# Patient Record
Sex: MALE | Race: WHITE | Employment: UNEMPLOYED | URBAN - METROPOLITAN AREA
[De-identification: names, ages, dates, MRNs, and addresses within clinical notes are randomized per-mention and may not be internally consistent; named-entity substitution may affect disease eponyms.]

---

## 2019-04-26 NOTE — PROGRESS NOTES
Name_______________________________________Printed:____________________  Date and time of surgery____5/9/19  1100____________________Arrival Time:___FEC 0930_____________   1. Do not eat or drink anything after 12 midnight (or____hours) prior to surgery. This includes no water, chewing gum or mints. Endoscopy patients follow your doctors bowel prep instructions,which may include taking part of prep after midnight. 2. Take the following pills with a small sip of water on the morning of surgery_________advair, albuterol prn__________________________________________                  Do not take blood pressure medications ending in pril or sartan the leah prior to surgery or the morning of surgery_   3. Aspirin, Ibuprofen, Advil, Naproxen, Vitamin E and other Anti-inflammatory products should be stopped for 5 days before surgery or as directed by your physician. 4. Check with your Doctor regarding stopping Plavix, Coumadin,Eliquis, Lovenox,Effient,Pradaxa,Xarelto, Fragmin or other blood thinners and follow their instructions. 5. Do not smoke, and do not drink any alcoholic beverages 24 hours prior to surgery. This includes NA Beer. Refrain from the usage of any recreational drugs. 6. You may brush your teeth and gargle the morning of surgery. DO NOT SWALLOW WATER   7. You MUST make arrangements for a responsible adult to stay on site while you are here and take you home after your surgery. You will not be allowed to leave alone or drive yourself home. It is strongly suggested someone stay with you the first 24 hrs. Your surgery will be cancelled if you do not have a ride home. 8. A parent/legal guardian must accompany a child scheduled for surgery and plan to stay at the hospital until the child is discharged. Please do not bring other children with you.    9. Please wear simple, loose fitting clothing to the hospital.  Do not bring valuables (money, credit cards, checkbooks, etc.) Do not wear any makeup (including no eye makeup) or nail polish on your fingers or toes. 10. DO NOT wear any jewelry or piercings on day of surgery. All body piercing jewelry must be removed. 11. If you have ___dentures, they will be removed before going to the OR; we will provide you a container. If you wear ___contact lenses or ___glasses, they will be removed; please bring a case for them. 12. Please see your family doctor/pediatrician for a history & physical and/or concerning medications. Bring any test results/reports from your physician's office. PCP__________________Phone___________H&P Appt. Date________             13 If you  have a Living Will and Durable Power of  for Healthcare, please bring in a copy. 15. Notify your Surgeon if you develop any illness between now and surgery  time, cough, cold, fever, sore throat, nausea, vomiting, etc.  Please notify your surgeon if you experience dizziness, shortness of breath or blurred vision between now & the time of your surgery             15. DO NOT shave your operative site 96 hours prior to surgery. For face & neck surgery, men may use an electric razor 48 hours prior to surgery. 16. Shower the night before surgery with ___Antibacterial soap ___Hibiclens             17. To provide excellent care visitors will be limited to one in the room at any given time. 18.  Please bring picture ID and insurance card. 19.  Visit our web site for additional information:  [email or web address]              20.During flu season no children under the age of 15 are permitted in the hospital for the safety of all patients.                               21. If you take a long acting insulin in the evening only  take half of your usual  dose the night  before your procedure              22. If you use a c-pap please bring DOS if staying overnight,             23.For your convenience [facility] has a pharmacy on site to fill your prescriptions. 24. If you use oxygen and have a portable tank please bring it  with you the DOS             25. Bring a complete list of all your medications with name and dose include any supplements. 26. Other__________________________________________   *Please call pre admission testing if you any further questions   Kavita Guajardo     [id number] [facility]  [phone]   [address] [place] [address]       All above information reviewed with patient in person or by phone. Patient verbalizes understanding. All questions and concerns addressed.                                                                                                  Patient/Rep____________________                                                                                                                                    PRE OP INSTRUCTIONS

## 2019-04-29 ENCOUNTER — ANESTHESIA EVENT (OUTPATIENT)
Dept: ENDOSCOPY | Age: 44
End: 2019-04-29
Payer: COMMERCIAL

## 2019-05-09 ENCOUNTER — HOSPITAL ENCOUNTER (OUTPATIENT)
Age: 44
Setting detail: OUTPATIENT SURGERY
Discharge: HOME | End: 2019-05-09
Attending: INTERNAL MEDICINE | Admitting: INTERNAL MEDICINE
Payer: COMMERCIAL

## 2019-05-09 ENCOUNTER — ANESTHESIA (OUTPATIENT)
Dept: ENDOSCOPY | Age: 44
End: 2019-05-09
Payer: COMMERCIAL

## 2019-05-09 VITALS
BODY MASS INDEX: 26.6 KG/M2 | HEIGHT: 71 IN | HEART RATE: 83 BPM | TEMPERATURE: 99.6 F | WEIGHT: 190 LBS | SYSTOLIC BLOOD PRESSURE: 141 MMHG | OXYGEN SATURATION: 100 % | DIASTOLIC BLOOD PRESSURE: 87 MMHG | RESPIRATION RATE: 16 BRPM

## 2019-05-09 VITALS — SYSTOLIC BLOOD PRESSURE: 156 MMHG | DIASTOLIC BLOOD PRESSURE: 110 MMHG | OXYGEN SATURATION: 94 %

## 2019-05-09 PROCEDURE — 7100000011 HC PHASE II RECOVERY - ADDTL 15 MIN: Performed by: INTERNAL MEDICINE

## 2019-05-09 PROCEDURE — 2500000003 HC RX 250 WO HCPCS: Performed by: NURSE ANESTHETIST, CERTIFIED REGISTERED

## 2019-05-09 PROCEDURE — 3700000000 HC ANESTHESIA ATTENDED CARE: Performed by: INTERNAL MEDICINE

## 2019-05-09 PROCEDURE — 2709999900 HC NON-CHARGEABLE SUPPLY: Performed by: INTERNAL MEDICINE

## 2019-05-09 PROCEDURE — 2580000003 HC RX 258: Performed by: FAMILY MEDICINE

## 2019-05-09 PROCEDURE — 7100000010 HC PHASE II RECOVERY - FIRST 15 MIN: Performed by: INTERNAL MEDICINE

## 2019-05-09 PROCEDURE — 6360000002 HC RX W HCPCS: Performed by: NURSE ANESTHETIST, CERTIFIED REGISTERED

## 2019-05-09 PROCEDURE — 3609012400 HC EGD TRANSORAL BIOPSY SINGLE/MULTIPLE: Performed by: INTERNAL MEDICINE

## 2019-05-09 RX ORDER — CETIRIZINE HYDROCHLORIDE 10 MG/1
10 TABLET ORAL DAILY
COMMUNITY
End: 2022-10-28

## 2019-05-09 RX ORDER — SODIUM CHLORIDE 0.9 % (FLUSH) 0.9 %
10 SYRINGE (ML) INJECTION PRN
Status: DISCONTINUED | OUTPATIENT
Start: 2019-05-09 | End: 2019-05-09 | Stop reason: HOSPADM

## 2019-05-09 RX ORDER — OMEPRAZOLE 20 MG/1
20 CAPSULE, DELAYED RELEASE ORAL
Qty: 60 CAPSULE | Refills: 5 | Status: SHIPPED | OUTPATIENT
Start: 2019-05-09

## 2019-05-09 RX ORDER — LIDOCAINE HYDROCHLORIDE 20 MG/ML
INJECTION, SOLUTION EPIDURAL; INFILTRATION; INTRACAUDAL; PERINEURAL PRN
Status: DISCONTINUED | OUTPATIENT
Start: 2019-05-09 | End: 2019-05-09 | Stop reason: SDUPTHER

## 2019-05-09 RX ORDER — SODIUM CHLORIDE 0.9 % (FLUSH) 0.9 %
10 SYRINGE (ML) INJECTION EVERY 12 HOURS SCHEDULED
Status: DISCONTINUED | OUTPATIENT
Start: 2019-05-09 | End: 2019-05-09 | Stop reason: HOSPADM

## 2019-05-09 RX ORDER — SODIUM CHLORIDE 9 MG/ML
INJECTION, SOLUTION INTRAVENOUS CONTINUOUS
Status: DISCONTINUED | OUTPATIENT
Start: 2019-05-09 | End: 2019-05-09 | Stop reason: HOSPADM

## 2019-05-09 RX ORDER — LABETALOL HYDROCHLORIDE 5 MG/ML
5 INJECTION, SOLUTION INTRAVENOUS EVERY 10 MIN PRN
Status: DISCONTINUED | OUTPATIENT
Start: 2019-05-09 | End: 2019-05-09 | Stop reason: HOSPADM

## 2019-05-09 RX ORDER — PROPOFOL 10 MG/ML
INJECTION, EMULSION INTRAVENOUS PRN
Status: DISCONTINUED | OUTPATIENT
Start: 2019-05-09 | End: 2019-05-09 | Stop reason: SDUPTHER

## 2019-05-09 RX ORDER — PROMETHAZINE HYDROCHLORIDE 25 MG/ML
6.25 INJECTION, SOLUTION INTRAMUSCULAR; INTRAVENOUS
Status: DISCONTINUED | OUTPATIENT
Start: 2019-05-09 | End: 2019-05-09 | Stop reason: HOSPADM

## 2019-05-09 RX ORDER — ONDANSETRON 2 MG/ML
4 INJECTION INTRAMUSCULAR; INTRAVENOUS
Status: DISCONTINUED | OUTPATIENT
Start: 2019-05-09 | End: 2019-05-09 | Stop reason: HOSPADM

## 2019-05-09 RX ADMIN — LIDOCAINE HYDROCHLORIDE 100 MG: 20 INJECTION, SOLUTION EPIDURAL; INFILTRATION; INTRACAUDAL; PERINEURAL at 11:09

## 2019-05-09 RX ADMIN — PROPOFOL 5 MG: 10 INJECTION, EMULSION INTRAVENOUS at 11:17

## 2019-05-09 RX ADMIN — PROPOFOL 50 MG: 10 INJECTION, EMULSION INTRAVENOUS at 11:12

## 2019-05-09 RX ADMIN — PROPOFOL 100 MG: 10 INJECTION, EMULSION INTRAVENOUS at 11:09

## 2019-05-09 RX ADMIN — PROPOFOL 50 MG: 10 INJECTION, EMULSION INTRAVENOUS at 11:16

## 2019-05-09 RX ADMIN — PROPOFOL 25 MG: 10 INJECTION, EMULSION INTRAVENOUS at 11:11

## 2019-05-09 RX ADMIN — SODIUM CHLORIDE: 9 INJECTION, SOLUTION INTRAVENOUS at 11:08

## 2019-05-09 RX ADMIN — PROPOFOL 20 MG: 10 INJECTION, EMULSION INTRAVENOUS at 11:10

## 2019-05-09 RX ADMIN — PROPOFOL 50 MG: 10 INJECTION, EMULSION INTRAVENOUS at 11:14

## 2019-05-09 ASSESSMENT — PAIN SCALES - GENERAL: PAINLEVEL_OUTOF10: 0

## 2019-05-09 ASSESSMENT — PAIN - FUNCTIONAL ASSESSMENT: PAIN_FUNCTIONAL_ASSESSMENT: 0-10

## 2019-05-09 NOTE — ANESTHESIA POSTPROCEDURE EVALUATION
[facility] Department of Anesthesiology  Post-Anesthesia Note       Name:  Esthela Cervantes                                  Age:  37 y.o. MRN:  [id number]     Last Vitals & Oxygen Saturation: /89   Pulse 99   Temp 99.6 °F (37.6 °C) (Temporal)   Resp 16   Ht 5' 11\" (1.803 m)   Wt 190 lb (86.2 kg)   SpO2 100%   BMI 26.50 kg/m²   Patient Vitals for the past 4 hrs:   BP Temp Temp src Pulse Resp SpO2 Height Weight   05/09/19 1135 127/89 -- -- 99 16 100 % -- --   05/09/19 1120 131/88 99.6 °F (37.6 °C) Temporal 104 14 96 % -- --   05/09/19 1012 (!) 141/99 98.1 °F (36.7 °C) Temporal 92 18 100 % 5' 11\" (1.803 m) 190 lb (86.2 kg)       Level of consciousness:  Awake, alert to baseline    Respiratory: Respirations easy, no distress. Stable. Cardiovascular: Hemodynamically stable. Hydration: Adequate. PONV: Adequately managed. Post-op pain: Adequately controlled. Post-op assessment: Tolerated anesthetic well without complication. Complications:  None.     Rajni Elena MD  May 9, 2019   11:36 AM

## 2019-05-09 NOTE — ANESTHESIA PRE PROCEDURE
[facility] Department of Anesthesiology  Pre-Anesthesia Evaluation/Consultation       Name:  Geoff Howard  : [birth year]  Age:  37 y.o. MRN:  [id number]  Date: 2019           Surgeon: Surgeon(s):  Brigette Espana MD    Procedure: Procedure(s):  EGD     Allergies   Allergen Reactions    Symbicort [Budesonide-Formoterol Fumarate] Other (See Comments)     Lung infection     There is no problem list on file for this patient. Past Medical History:   Diagnosis Date    Asthma      History reviewed. No pertinent surgical history. Social History     Tobacco Use    Smoking status: Never Smoker    Smokeless tobacco: Never Used   Substance Use Topics    Alcohol use: Yes     Comment: hasn't had since last week , occasional beer samples    Drug use: No     Medications  No current facility-administered medications on file prior to encounter.       Current Outpatient Medications on File Prior to Encounter   Medication Sig Dispense Refill    cetirizine (ZYRTEC) 10 MG tablet Take 10 mg by mouth daily      Fluticasone-Salmeterol (ADVAIR HFA IN) Inhale into the lungs 2 times daily       albuterol sulfate HFA (PROVENTIL HFA) 108 (90 BASE) MCG/ACT inhaler Inhale 2 puffs into the lungs every 6 hours as needed for Wheezing      fluticasone (FLONASE) 50 MCG/ACT nasal spray 1 spray by Nasal route daily      ondansetron (ZOFRAN ODT) 4 MG disintegrating tablet Take 1 tablet by mouth every 8 hours as needed for Nausea or Vomiting 20 tablet 0     Current Facility-Administered Medications   Medication Dose Route Frequency Provider Last Rate Last Dose    0.9 % sodium chloride infusion   Intravenous Continuous Angela Malave MD        sodium chloride flush 0.9 % injection 10 mL  10 mL Intravenous 2 times per day Angela Malave MD        sodium chloride flush 0.9 % injection 10 mL  10 mL Intravenous PRN Angela Malave MD         Vital Signs (Current)   Vitals: hours.   Coags  No results found for: PROTIME, INR, APTT  HCG (If Applicable) No results found for: PREGTESTUR, PREGSERUM, HCG, HCGQUANT   ABGs No results found for: PHART, PO2ART, EKY3AWO, ILN9IIH, BEART, Z3LKRQGJ   Type & Screen (If Applicable)  No results found for: LABABO, LABRH                         BMI: Wt Readings from Last 3 Encounters:       NPO Status:   Date of last liquid consumption: 05/08/19   Time of last liquid consumption: 2300   Date of last solid food consumption: 05/08/19      Time of last solid consumption: 2300       Anesthesia Evaluation  Patient summary reviewed no history of anesthetic complications:   Airway: Mallampati: II  TM distance: >3 FB   Neck ROM: full   Dental: normal exam         Pulmonary:normal exam    (+) recent URI:  asthma:                            Cardiovascular:Negative CV ROS  Exercise tolerance: good (>4 METS),         ECG reviewed  Rhythm: regular  Rate: normal                    Neuro/Psych:   Negative Neuro/Psych ROS              GI/Hepatic/Renal:   (+) GERD:,           Endo/Other: Negative Endo/Other ROS                    Abdominal:   (+) obese,         Vascular: negative vascular ROS. Anesthesia Plan      general     ASA 2       Induction: intravenous. Anesthetic plan and risks discussed with patient. Plan discussed with CRNA. This pre-anesthesia assessment may be used as a history and physical.    DOS STAFF ADDENDUM:    Pt seen and examined, chart reviewed (including anesthesia, drug and allergy history). No interval changes to history and physical examination. Anesthetic plan, risks, benefits, alternatives, and personnel involved discussed with patient. Patient verbalized an understanding and agrees to proceed.       Saloni Srinivasan MD  May 9, 2019  10:31 AM

## 2019-05-09 NOTE — PROGRESS NOTES
Patient present for upper endoscopy. Patient states he has had abdominal pain/nausea/vomiting for about 9 months. Teaching / education initiated regarding perioperative experience, expectations, and pain management during stay. Patient verbalized understanding.

## 2019-05-09 NOTE — PROGRESS NOTES
on file    Intimate partner violence:     Fear of current or ex partner: Not on file     Emotionally abused: Not on file     Physically abused: Not on file     Forced sexual activity: Not on file   Other Topics Concern    Not on file   Social History Narrative    Not on file       Family History:  History reviewed. No pertinent family history.     Vital Signs:  Vitals:    05/09/19 1012   BP: (!) 141/99   Pulse: 92   Resp: 18   Temp: 98.1 °F (36.7 °C)   SpO2: 100%

## 2019-06-04 ENCOUNTER — OFFICE VISIT (OUTPATIENT)
Dept: ENT CLINIC | Age: 44
End: 2019-06-04
Payer: COMMERCIAL

## 2019-06-04 VITALS — HEART RATE: 120 BPM | SYSTOLIC BLOOD PRESSURE: 138 MMHG | DIASTOLIC BLOOD PRESSURE: 70 MMHG | OXYGEN SATURATION: 97 %

## 2019-06-04 DIAGNOSIS — J32.9 RECURRENT SINUSITIS: ICD-10-CM

## 2019-06-04 DIAGNOSIS — J30.9 ALLERGIC SINUSITIS: Primary | ICD-10-CM

## 2019-06-04 DIAGNOSIS — J34.2 NOSE SEPTUM DEVIATION: ICD-10-CM

## 2019-06-04 PROCEDURE — 1036F TOBACCO NON-USER: CPT | Performed by: OTOLARYNGOLOGY

## 2019-06-04 PROCEDURE — G8427 DOCREV CUR MEDS BY ELIG CLIN: HCPCS | Performed by: OTOLARYNGOLOGY

## 2019-06-04 PROCEDURE — 99203 OFFICE O/P NEW LOW 30 MIN: CPT | Performed by: OTOLARYNGOLOGY

## 2019-06-04 PROCEDURE — G8419 CALC BMI OUT NRM PARAM NOF/U: HCPCS | Performed by: OTOLARYNGOLOGY

## 2019-06-04 RX ORDER — METHYLPREDNISOLONE 4 MG/1
4 TABLET ORAL SEE ADMIN INSTRUCTIONS
Qty: 1 KIT | Refills: 0 | Status: SHIPPED | OUTPATIENT
Start: 2019-06-04 | End: 2019-07-17

## 2019-06-04 ASSESSMENT — ENCOUNTER SYMPTOMS
SINUS PRESSURE: 1
SINUS PAIN: 1
EYES NEGATIVE: 1
ALLERGIC/IMMUNOLOGIC NEGATIVE: 1
VOICE CHANGE: 0
TROUBLE SWALLOWING: 0
SORE THROAT: 0
RHINORRHEA: 1
RESPIRATORY NEGATIVE: 1
FACIAL SWELLING: 0

## 2019-06-04 NOTE — PROGRESS NOTES
SUBJECTIVE:    Chief Complaint   Patient presents with    Sinus Problem     Alleries? Pressure, drinage, headaches, can't breathe and can't sleep. Latesha Celestin is a 37 y.o. male    Patient relates a long-standing history of difficulty with congestion and severe nasal obstruction greater on the left side than the right. This is been markedly severe of the past several months and he is quite beside himself as to what to do. He is tried over-the-counter medications orally as well as Flonase nasal spray and Afrin with no success. It been on 2 bouts of an antibiotic in the past year as well. The problem affects his sleep. It seems to be somewhat worse in the spring and summer. He does have symptoms year round, however. His most recent actual infection was in February. He's had no recent fever. He does not smoke and is not exposed to unusual fumes. There is no history of nasal trauma. Past Medical History:   Diagnosis Date    Asthma       Past Surgical History:   Procedure Laterality Date    UPPER GASTROINTESTINAL ENDOSCOPY N/A 5/9/2019    EGD BIOPSY performed by Elidia Escalera MD at [address] [place] [address]      No family history on file. Social History     Tobacco Use    Smoking status: Never Smoker    Smokeless tobacco: Never Used   Substance Use Topics    Alcohol use: Yes     Comment: hasn't had since last week , occasional beer samples        Review of Systems:  Review of Systems   Constitutional: Negative. Negative for fever. HENT: Positive for postnasal drip, rhinorrhea, sinus pressure and sinus pain. Negative for congestion, dental problem, drooling, ear discharge, ear pain, facial swelling, hearing loss, mouth sores, nosebleeds, sneezing, sore throat, tinnitus, trouble swallowing and voice change. Eyes: Negative. Respiratory: Negative. Cardiovascular: Negative. Endocrine: Negative. Skin: Negative. Allergic/Immunologic: Negative. Neurological: Negative. Hematological: Negative. Psychiatric/Behavioral: Negative. OBJECTIVE:  /70   Pulse 120   SpO2 97%   Physical Exam   Constitutional: He is oriented to person, place, and time. He appears well-developed and well-nourished. HENT:   Head: Normocephalic and atraumatic. Right Ear: External ear normal.   Left Ear: External ear normal.   Mouth/Throat: Oropharynx is clear and moist. No oropharyngeal exudate. Indirect laryngoscopy is unremarkable. Nasal mucosa treated with cotton pledgets  impregnated with Afrin and lidocaine placed in middle meatuses against turbinates. Pledgets left in place for five minutes and removed enabling enhanced visualization. The exam revealed positive edema of mucosa. it was negative for erythema indicative of infection. There was evidence of deviation of the septum which was significant. There were not polyps present. .There were not other masses present. The turbinates were not enlarged beyond normal.     Eyes: Pupils are equal, round, and reactive to light. Conjunctivae and EOM are normal.   Neck: Normal range of motion. Neck supple. No tracheal deviation present. No thyromegaly present. Cardiovascular: Normal rate and regular rhythm. Pulmonary/Chest: Effort normal.   Lymphadenopathy:     He has no cervical adenopathy. Neurological: He is alert and oriented to person, place, and time. Skin: Skin is warm and dry. Psychiatric: He has a normal mood and affect. His behavior is normal. Judgment and thought content normal.        ASSESSMENT:    Marked deviation of nasal septum with 80% obstruction on the right and 75% obstruction on the left is noted. In addition, there is evidence that there may be some recurring sinus infections as well. PLAN:     It is very likely will require septal surgery but I would like to see what the sinuses look like prior to initiating that therapy. As a result, sinus CT scan will be ordered. He will continue his current nasal spray.   A Medrol Dosepak will be given for improvement in his condition currently.     Kamari Guardado MD

## 2019-06-12 ENCOUNTER — HOSPITAL ENCOUNTER (OUTPATIENT)
Dept: CT IMAGING | Age: 44
Discharge: HOME | End: 2019-06-12
Payer: COMMERCIAL

## 2019-06-12 DIAGNOSIS — J32.9 RECURRENT SINUSITIS: ICD-10-CM

## 2019-06-12 PROCEDURE — 70486 CT MAXILLOFACIAL W/O DYE: CPT

## 2019-06-14 ENCOUNTER — TELEPHONE (OUTPATIENT)
Dept: OTOLARYNGOLOGY | Age: 44
End: 2019-06-14

## 2019-06-18 ENCOUNTER — TELEPHONE (OUTPATIENT)
Dept: OTOLARYNGOLOGY | Age: 44
End: 2019-06-18

## 2019-06-18 NOTE — TELEPHONE ENCOUNTER
I have had a discussion with the patient relative to the findings on his CT scan which indicates significant deviation of the septum along with what looks like a polyp on the left side. We will schedule septal reconstruction and functional endoscopic sinus surgery on the left to remove the polyp.

## 2019-06-20 ENCOUNTER — TELEPHONE (OUTPATIENT)
Dept: ENT CLINIC | Age: 44
End: 2019-06-20

## 2019-07-17 ENCOUNTER — OFFICE VISIT (OUTPATIENT)
Dept: FAMILY MEDICINE CLINIC | Age: 44
End: 2019-07-17
Payer: COMMERCIAL

## 2019-07-17 VITALS
HEART RATE: 97 BPM | OXYGEN SATURATION: 97 % | HEIGHT: 70 IN | WEIGHT: 189 LBS | BODY MASS INDEX: 27.06 KG/M2 | DIASTOLIC BLOOD PRESSURE: 95 MMHG | SYSTOLIC BLOOD PRESSURE: 140 MMHG

## 2019-07-17 DIAGNOSIS — Z00.00 ANNUAL PHYSICAL EXAM: Primary | ICD-10-CM

## 2019-07-17 DIAGNOSIS — B96.89 ACUTE BACTERIAL SINUSITIS: ICD-10-CM

## 2019-07-17 DIAGNOSIS — K21.00 GASTROESOPHAGEAL REFLUX DISEASE WITH ESOPHAGITIS: ICD-10-CM

## 2019-07-17 DIAGNOSIS — Z01.818 PRE-OP EXAM: ICD-10-CM

## 2019-07-17 DIAGNOSIS — J01.90 ACUTE BACTERIAL SINUSITIS: ICD-10-CM

## 2019-07-17 DIAGNOSIS — Z23 NEED FOR VACCINATION: ICD-10-CM

## 2019-07-17 DIAGNOSIS — G25.0 ESSENTIAL TREMOR: ICD-10-CM

## 2019-07-17 PROCEDURE — 90471 IMMUNIZATION ADMIN: CPT | Performed by: FAMILY MEDICINE

## 2019-07-17 PROCEDURE — 99386 PREV VISIT NEW AGE 40-64: CPT | Performed by: FAMILY MEDICINE

## 2019-07-17 PROCEDURE — 90472 IMMUNIZATION ADMIN EACH ADD: CPT | Performed by: FAMILY MEDICINE

## 2019-07-17 PROCEDURE — 90732 PPSV23 VACC 2 YRS+ SUBQ/IM: CPT | Performed by: FAMILY MEDICINE

## 2019-07-17 PROCEDURE — 90715 TDAP VACCINE 7 YRS/> IM: CPT | Performed by: FAMILY MEDICINE

## 2019-07-17 RX ORDER — PROPRANOLOL HYDROCHLORIDE 40 MG/1
40 TABLET ORAL 2 TIMES DAILY
Qty: 60 TABLET | Refills: 5 | Status: SHIPPED | OUTPATIENT
Start: 2019-07-17 | End: 2021-01-29

## 2019-07-17 ASSESSMENT — PATIENT HEALTH QUESTIONNAIRE - PHQ9
SUM OF ALL RESPONSES TO PHQ QUESTIONS 1-9: 0
2. FEELING DOWN, DEPRESSED OR HOPELESS: 0
SUM OF ALL RESPONSES TO PHQ QUESTIONS 1-9: 0
SUM OF ALL RESPONSES TO PHQ9 QUESTIONS 1 & 2: 0
1. LITTLE INTEREST OR PLEASURE IN DOING THINGS: 0

## 2019-07-17 NOTE — PROGRESS NOTES
swelling, no hearing problems, no LAD      Vitals:  BP (!) 140/100   Pulse 97   Ht 5' 10.08\" (1.78 m)   Wt 189 lb (85.7 kg)   SpO2 97%   BMI 27.06 kg/m²     Physical Exam   General:  Well-appearing, NAD, alert, non-toxic  HEENT:  Normocephalic, atraumatic. Pupils equal and round. TMs pearly with good landmarks. Moist mucous membranes. Normal dentition  NECK:  Supple, normal range of motion, no LAD, no meningeal signs, no JVD, nontender  CHEST/LUNGS: CTAB, no crackles, no wheeze, no rhonchi. Symmetric rise  CARDIOVASCULAR: RRR,  no murmur, no rub  ABDOMEN: Soft, non-tender, non-distended. No masses  EXTREMETIES: Normal movement of all extremities. No edema. No joint swelling. SKIN:  No rash, no cellulitis, no bruising, no petechiae/purpura/vesicles/pustules/abscess  PSYCH:  A+O x 3; normal affect  NEURO:  GCS 15, CN2-12 grossly intact, no focal motor/sensory deficits, no cerebellar deficits, normal gait, normal speech      Assessment/Plan     78-year-old male here for annual exam/preop exam.  Vital signs are stable. No sign of heart failure. No sign of active infection. Patient is cleared for the surgery. Advised pt to hold propanolol the day of surgery. Will check routine labs, and update patient on vaccines. Patient blood pressure is borderline. No need for medications at this time, however we will be starting him on propanolol for his essential tremor. Follow-up in 1 month to check blood pressure and assess his progress on the essential tremor. Advised patient that the beta-blocker could potentially affect his asthma, so he should let us know if his breathing changes in any way. Patient has intermittent asthma. Continue Advair and albuterol as needed. Overall is stable.     Discussed with patient medication/s dosage, instructions, potential S/E, A/R and Drug Interaction  Educational material provided regarding patient's condition  Tylenol or Motrin as needed for pain or fever  Advise to

## 2019-07-17 NOTE — PATIENT INSTRUCTIONS
a straw. When should you call for help? Watch closely for changes in your health, and be sure to contact your doctor if:    · You notice your tremors are getting worse.     · You can't do your everyday activities because of your tremors.     · You are sad and embarrassed about your shaking. Where can you learn more? Go to [email or web address] and sign in to your [employer] account. Enter B746 in the [employer] box to learn more about \"Benign Essential Tremor: Care Instructions. \"     If you do not have an account, please click on the \"Sign Up Now\" link. Current as of: Priti 3, 2018  Content Version: 12.0  © 8638-5916 Healthwise, Incorporated. Care instructions adapted under license by [facility] ([facility]). If you have questions about a medical condition or this instruction, always ask your healthcare professional. [address] any warranty or liability for your use of this information.

## 2019-07-31 ENCOUNTER — TELEPHONE (OUTPATIENT)
Dept: ENT CLINIC | Age: 44
End: 2019-07-31

## 2019-07-31 NOTE — TELEPHONE ENCOUNTER
PT RUSTY do surgery , cost too much, he says his share will be around $5,00.00 and  rusty afford. Please cancel his surgery for 8-5-19.

## 2019-08-06 ENCOUNTER — APPOINTMENT (OUTPATIENT)
Dept: GENERAL RADIOLOGY | Age: 44
End: 2019-08-06
Payer: COMMERCIAL

## 2019-08-06 ENCOUNTER — APPOINTMENT (OUTPATIENT)
Dept: CT IMAGING | Age: 44
End: 2019-08-06
Payer: COMMERCIAL

## 2019-08-06 ENCOUNTER — HOSPITAL ENCOUNTER (EMERGENCY)
Age: 44
Discharge: HOME | End: 2019-08-06
Attending: EMERGENCY MEDICINE
Payer: COMMERCIAL

## 2019-08-06 VITALS
BODY MASS INDEX: 26.6 KG/M2 | DIASTOLIC BLOOD PRESSURE: 102 MMHG | OXYGEN SATURATION: 100 % | WEIGHT: 190 LBS | TEMPERATURE: 99 F | RESPIRATION RATE: 17 BRPM | SYSTOLIC BLOOD PRESSURE: 182 MMHG | HEIGHT: 71 IN | HEART RATE: 101 BPM

## 2019-08-06 DIAGNOSIS — Z91.89 AT RISK FOR SEIZURES: Primary | ICD-10-CM

## 2019-08-06 DIAGNOSIS — R55 SYNCOPE AND COLLAPSE: ICD-10-CM

## 2019-08-06 DIAGNOSIS — S01.512A TONGUE LACERATION, INITIAL ENCOUNTER: ICD-10-CM

## 2019-08-06 LAB
A/G RATIO: 1.5 (ref 1.1–2.2)
ALBUMIN SERPL-MCNC: 4.5 G/DL (ref 3.4–5)
ALP BLD-CCNC: 46 U/L (ref 40–129)
ALT SERPL-CCNC: 57 U/L (ref 10–40)
AMPHETAMINE SCREEN, URINE: NORMAL
ANION GAP SERPL CALCULATED.3IONS-SCNC: 21 MMOL/L (ref 3–16)
AST SERPL-CCNC: 68 U/L (ref 15–37)
BARBITURATE SCREEN URINE: NORMAL
BASOPHILS ABSOLUTE: 0 K/UL (ref 0–0.2)
BASOPHILS RELATIVE PERCENT: 0.8 %
BENZODIAZEPINE SCREEN, URINE: NORMAL
BILIRUB SERPL-MCNC: 1.2 MG/DL (ref 0–1)
BILIRUBIN URINE: NEGATIVE
BLOOD, URINE: ABNORMAL
BUN BLDV-MCNC: 11 MG/DL (ref 7–20)
CALCIUM SERPL-MCNC: 9.9 MG/DL (ref 8.3–10.6)
CANNABINOID SCREEN URINE: NORMAL
CHLORIDE BLD-SCNC: 97 MMOL/L (ref 99–110)
CLARITY: CLEAR
CO2: 18 MMOL/L (ref 21–32)
COCAINE METABOLITE SCREEN URINE: NORMAL
COLOR: YELLOW
CREAT SERPL-MCNC: 1 MG/DL (ref 0.9–1.3)
EKG ATRIAL RATE: 109 BPM
EKG DIAGNOSIS: NORMAL
EKG P AXIS: 69 DEGREES
EKG P-R INTERVAL: 148 MS
EKG Q-T INTERVAL: 344 MS
EKG QRS DURATION: 80 MS
EKG QTC CALCULATION (BAZETT): 463 MS
EKG R AXIS: 17 DEGREES
EKG T AXIS: 18 DEGREES
EKG VENTRICULAR RATE: 109 BPM
EOSINOPHILS ABSOLUTE: 0.1 K/UL (ref 0–0.6)
EOSINOPHILS RELATIVE PERCENT: 1.9 %
EPITHELIAL CELLS, UA: 0 /HPF (ref 0–5)
ETHANOL: NORMAL MG/DL (ref 0–0.08)
GFR AFRICAN AMERICAN: >60
GFR NON-AFRICAN AMERICAN: >60
GLOBULIN: 3 G/DL
GLUCOSE BLD-MCNC: 158 MG/DL (ref 70–99)
GLUCOSE URINE: NEGATIVE MG/DL
HCT VFR BLD CALC: 44.5 % (ref 40.5–52.5)
HEMOGLOBIN: 14.9 G/DL (ref 13.5–17.5)
HYALINE CASTS: 2 /LPF (ref 0–8)
KETONES, URINE: 15 MG/DL
LACTIC ACID: 1.5 MMOL/L (ref 0.4–2)
LACTIC ACID: 7.7 MMOL/L (ref 0.4–2)
LEUKOCYTE ESTERASE, URINE: NEGATIVE
LYMPHOCYTES ABSOLUTE: 0.9 K/UL (ref 1–5.1)
LYMPHOCYTES RELATIVE PERCENT: 17.3 %
Lab: NORMAL
MCH RBC QN AUTO: 33.2 PG (ref 26–34)
MCHC RBC AUTO-ENTMCNC: 33.4 G/DL (ref 31–36)
MCV RBC AUTO: 99.4 FL (ref 80–100)
METHADONE SCREEN, URINE: NORMAL
MICROSCOPIC EXAMINATION: YES
MONOCYTES ABSOLUTE: 0.4 K/UL (ref 0–1.3)
MONOCYTES RELATIVE PERCENT: 8.7 %
NEUTROPHILS ABSOLUTE: 3.6 K/UL (ref 1.7–7.7)
NEUTROPHILS RELATIVE PERCENT: 71.3 %
NITRITE, URINE: NEGATIVE
OPIATE SCREEN URINE: NORMAL
OXYCODONE URINE: NORMAL
PDW BLD-RTO: 13.2 % (ref 12.4–15.4)
PH UA: 7
PH UA: 7 (ref 5–8)
PHENCYCLIDINE SCREEN URINE: NORMAL
PLATELET # BLD: ABNORMAL K/UL (ref 135–450)
PLATELET SLIDE REVIEW: ABNORMAL
POTASSIUM REFLEX MAGNESIUM: 3.6 MMOL/L (ref 3.5–5.1)
PRO-BNP: 189 PG/ML (ref 0–124)
PROPOXYPHENE SCREEN: NORMAL
PROTEIN UA: ABNORMAL MG/DL
RBC # BLD: 4.48 M/UL (ref 4.2–5.9)
RBC # BLD: NORMAL 10*6/UL
RBC UA: 1 /HPF (ref 0–4)
SLIDE REVIEW: ABNORMAL
SODIUM BLD-SCNC: 136 MMOL/L (ref 136–145)
SPECIFIC GRAVITY UA: 1.01 (ref 1–1.03)
TOTAL CK: 184 U/L (ref 39–308)
TOTAL PROTEIN: 7.5 G/DL (ref 6.4–8.2)
TROPONIN: <0.01 NG/ML
URINE REFLEX TO CULTURE: ABNORMAL
URINE TYPE: ABNORMAL
UROBILINOGEN, URINE: 1 E.U./DL
WBC # BLD: 5.1 K/UL (ref 4–11)
WBC UA: 1 /HPF (ref 0–5)

## 2019-08-06 PROCEDURE — 81001 URINALYSIS AUTO W/SCOPE: CPT

## 2019-08-06 PROCEDURE — 80307 DRUG TEST PRSMV CHEM ANLYZR: CPT

## 2019-08-06 PROCEDURE — 71045 X-RAY EXAM CHEST 1 VIEW: CPT

## 2019-08-06 PROCEDURE — 83605 ASSAY OF LACTIC ACID: CPT

## 2019-08-06 PROCEDURE — 96361 HYDRATE IV INFUSION ADD-ON: CPT

## 2019-08-06 PROCEDURE — 72125 CT NECK SPINE W/O DYE: CPT

## 2019-08-06 PROCEDURE — 6360000002 HC RX W HCPCS: Performed by: PHYSICIAN ASSISTANT

## 2019-08-06 PROCEDURE — 2580000003 HC RX 258: Performed by: PHYSICIAN ASSISTANT

## 2019-08-06 PROCEDURE — 93010 ELECTROCARDIOGRAM REPORT: CPT | Performed by: INTERNAL MEDICINE

## 2019-08-06 PROCEDURE — 70450 CT HEAD/BRAIN W/O DYE: CPT

## 2019-08-06 PROCEDURE — 72100 X-RAY EXAM L-S SPINE 2/3 VWS: CPT

## 2019-08-06 PROCEDURE — 96375 TX/PRO/DX INJ NEW DRUG ADDON: CPT

## 2019-08-06 PROCEDURE — G0480 DRUG TEST DEF 1-7 CLASSES: HCPCS

## 2019-08-06 PROCEDURE — 85025 COMPLETE CBC W/AUTO DIFF WBC: CPT

## 2019-08-06 PROCEDURE — 80053 COMPREHEN METABOLIC PANEL: CPT

## 2019-08-06 PROCEDURE — 84484 ASSAY OF TROPONIN QUANT: CPT

## 2019-08-06 PROCEDURE — 99284 EMERGENCY DEPT VISIT MOD MDM: CPT

## 2019-08-06 PROCEDURE — 82550 ASSAY OF CK (CPK): CPT

## 2019-08-06 PROCEDURE — 93005 ELECTROCARDIOGRAM TRACING: CPT | Performed by: PHYSICIAN ASSISTANT

## 2019-08-06 PROCEDURE — 96374 THER/PROPH/DIAG INJ IV PUSH: CPT

## 2019-08-06 PROCEDURE — 83880 ASSAY OF NATRIURETIC PEPTIDE: CPT

## 2019-08-06 RX ORDER — CHLORHEXIDINE GLUCONATE 0.12 MG/ML
15 RINSE ORAL 2 TIMES DAILY
Qty: 420 ML | Refills: 0 | Status: SHIPPED | OUTPATIENT
Start: 2019-08-06 | End: 2019-08-20

## 2019-08-06 RX ORDER — AMOXICILLIN AND CLAVULANATE POTASSIUM 875; 125 MG/1; MG/1
1 TABLET, FILM COATED ORAL 2 TIMES DAILY
Qty: 20 TABLET | Refills: 0 | Status: SHIPPED | OUTPATIENT
Start: 2019-08-06 | End: 2019-08-16

## 2019-08-06 RX ORDER — 0.9 % SODIUM CHLORIDE 0.9 %
1586 INTRAVENOUS SOLUTION INTRAVENOUS ONCE
Status: COMPLETED | OUTPATIENT
Start: 2019-08-06 | End: 2019-08-06

## 2019-08-06 RX ORDER — ONDANSETRON 2 MG/ML
4 INJECTION INTRAMUSCULAR; INTRAVENOUS ONCE
Status: COMPLETED | OUTPATIENT
Start: 2019-08-06 | End: 2019-08-06

## 2019-08-06 RX ORDER — 0.9 % SODIUM CHLORIDE 0.9 %
1000 INTRAVENOUS SOLUTION INTRAVENOUS ONCE
Status: COMPLETED | OUTPATIENT
Start: 2019-08-06 | End: 2019-08-06

## 2019-08-06 RX ORDER — MORPHINE SULFATE 2 MG/ML
2 INJECTION, SOLUTION INTRAMUSCULAR; INTRAVENOUS ONCE
Status: COMPLETED | OUTPATIENT
Start: 2019-08-06 | End: 2019-08-06

## 2019-08-06 RX ADMIN — SODIUM CHLORIDE 1000 ML: 9 INJECTION, SOLUTION INTRAVENOUS at 13:17

## 2019-08-06 RX ADMIN — ONDANSETRON 4 MG: 2 INJECTION INTRAMUSCULAR; INTRAVENOUS at 15:31

## 2019-08-06 RX ADMIN — MORPHINE SULFATE 2 MG: 2 INJECTION, SOLUTION INTRAMUSCULAR; INTRAVENOUS at 15:31

## 2019-08-06 RX ADMIN — SODIUM CHLORIDE 1586 ML: 9 INJECTION, SOLUTION INTRAVENOUS at 14:30

## 2019-08-06 ASSESSMENT — ENCOUNTER SYMPTOMS
RESPIRATORY NEGATIVE: 1
DIARRHEA: 0
COLOR CHANGE: 0
ABDOMINAL PAIN: 0
PHOTOPHOBIA: 0
NAUSEA: 0
SHORTNESS OF BREATH: 0
COUGH: 0
VOMITING: 0
CONSTIPATION: 0

## 2019-08-06 ASSESSMENT — PAIN SCALES - GENERAL: PAINLEVEL_OUTOF10: 10

## 2019-08-06 NOTE — ED NOTES
Bed: 01  Expected date:   Expected time:   Means of arrival: [facility]  Comments:  FF [address] [facility]  08/06/19 121

## 2019-08-06 NOTE — ED PROVIDER NOTES
source of infection and is not septic. EKG with sinus tachycardia, troponin normal, ACS or malignant dysrhythmia not supported. No clinically significant electrolyte abnormalities. No injury identified by imaging. On re-eval after c-collar removal patient has maceration of left lateral tongue, no active bleeding, nothing that would benefit from primary repair. Tetanus UTD. Case discussed with neurology who recommended outpatient follow up. Instructed patient he is at risk for seizures and not to drive until he is evaluated by neurology. Strict return precautions discussed with patient and hsis mother. Patient Referrals:  Vimal Pitt MD  [address]  [phone]    Schedule an appointment as soon as possible for a visit   For a Re-check in 3-5     days. [facility] Emergency Department  [address] [facility]  [address] [facility] [address]  [phone]  Go to   As needed, If symptoms worsen    Pranav Romero MD  [address] [address]  [phone]    Schedule an appointment as soon as possible for a visit   As needed, If symptoms worsen      Discharge Medications:  Discharge Medication List as of 8/6/2019  4:40 PM      START taking these medications    Details   chlorhexidine (PERIDEX) 0.12 % solution Take 15 mLs by mouth 2 times daily for 14 days, Disp-420 mL, R-0Print      amoxicillin-clavulanate (AUGMENTIN) 875-125 MG per tablet Take 1 tablet by mouth 2 times daily for 10 days, Disp-20 tablet, R-0Print             FINAL IMPRESSION  1. At risk for seizures    2. Syncope and collapse    3. Tongue laceration, initial encounter        Blood pressure (!) 182/102, pulse 101, temperature 99 °F (37.2 °C), temperature source Oral, resp. rate 17, height 5' 11\" (1.803 m), weight 190 lb (86.2 kg), SpO2 100 %. For [facility] encounter, please see documentation by advanced practice provider, CICI Ames.     Norris Dodge DO [place] (electronically signed)  Attending Emergency Physician       Luis Emanuel DO  08/07/19 0140

## 2019-08-07 NOTE — ED PROVIDER NOTES
more for level 5)     ED Triage Vitals [08/06/19 1221]   BP Temp Temp Source Pulse Resp SpO2 Height Weight   123/83 99 °F (37.2 °C) Oral 119 22 95 % 5' 11\" (1.803 m) 190 lb (86.2 kg)       Physical Exam   Constitutional: He is oriented to person, place, and time. He appears well-developed and well-nourished. No distress. HENT:   Head: Normocephalic. Right Ear: External ear normal.   Left Ear: External ear normal.   Mouth/Throat: No oropharyngeal exudate. Upon examination patient has dried blood noted around the oral mucosa. Does have what appears to be an avulsion laceration to the left lateral aspect of the mid tongue. Nothing to repair at this time. No trismus or dental abnormality noted. No evidence of jaw malocclusion. No evidence of [place] fracture. No raccoon eyes or escamilla sign. No epistaxis. No septal hematoma. Eyes: Pupils are equal, round, and reactive to light. Conjunctivae and EOM are normal. Right eye exhibits no discharge. Left eye exhibits no discharge. Neck: Normal range of motion. Neck supple. Cardiovascular: Normal rate, regular rhythm, normal heart sounds and intact distal pulses. Exam reveals no gallop and no friction rub. No murmur heard. Pulmonary/Chest: Effort normal and breath sounds normal. No stridor. No respiratory distress. He has no wheezes. He has no rales. He exhibits no tenderness. Abdominal: Soft. He exhibits no distension and no mass. There is no tenderness. There is no rebound and no guarding. Musculoskeletal: Normal range of motion. C-collar in place at this time. No tenderness palpation of the midline cervical, thoracic or lumbar spine. No anterior chest wall tenderness. No pelvis instability. No TTP to the upper and lower extremities throughout. Full range of motion strength throughout. Distal neurovascular intact. Gait deferred. Neurological: He is alert and oriented to person, place, and time. He has normal strength.  No cranial nerve

## 2019-08-12 ENCOUNTER — OFFICE VISIT (OUTPATIENT)
Dept: FAMILY MEDICINE CLINIC | Age: 44
End: 2019-08-12
Payer: COMMERCIAL

## 2019-08-12 VITALS
WEIGHT: 185 LBS | OXYGEN SATURATION: 95 % | BODY MASS INDEX: 25.8 KG/M2 | DIASTOLIC BLOOD PRESSURE: 78 MMHG | SYSTOLIC BLOOD PRESSURE: 102 MMHG | HEART RATE: 89 BPM

## 2019-08-12 DIAGNOSIS — R56.9 SEIZURE (HCC): ICD-10-CM

## 2019-08-12 DIAGNOSIS — S01.512A LACERATION OF TONGUE, INITIAL ENCOUNTER: ICD-10-CM

## 2019-08-12 DIAGNOSIS — G25.0 ESSENTIAL TREMOR: ICD-10-CM

## 2019-08-12 DIAGNOSIS — R40.20 LOSS OF CONSCIOUSNESS (HCC): Primary | ICD-10-CM

## 2019-08-12 PROCEDURE — 1036F TOBACCO NON-USER: CPT | Performed by: FAMILY MEDICINE

## 2019-08-12 PROCEDURE — 99214 OFFICE O/P EST MOD 30 MIN: CPT | Performed by: FAMILY MEDICINE

## 2019-08-12 PROCEDURE — G8427 DOCREV CUR MEDS BY ELIG CLIN: HCPCS | Performed by: FAMILY MEDICINE

## 2019-08-12 PROCEDURE — G8419 CALC BMI OUT NRM PARAM NOF/U: HCPCS | Performed by: FAMILY MEDICINE

## 2019-08-12 RX ORDER — HYDROCODONE BITARTRATE AND ACETAMINOPHEN 5; 325 MG/1; MG/1
1 TABLET ORAL EVERY 6 HOURS PRN
Qty: 20 TABLET | Refills: 0 | Status: SHIPPED | OUTPATIENT
Start: 2019-08-12 | End: 2019-08-17

## 2019-08-12 NOTE — PROGRESS NOTES
[address] Note    Date: 8/12/2019                                               Subjective/Objective:     Chief Complaint   Patient presents with    Follow-Up from Hospital     in pain. Zechariah Oxford bp check       HPI   Patient is here for blood pressure check. Currently takes propanolol. Denies side effects medication. Patient's tremor has improved on propanolol. Patient is also here for ED follow-up. He was seen in the ED on 8/6/2019 after he lost consciousness in the shower. He was told that while he was in the shower, his friend noticed he had generalized tremors and clenching of his teeth. Prior to this patient had no history of seizures. Lab tests were normal. EKG and CT of the head and neck were normal. Did not start him on seizure medicine. Since leaving the hospital pt has been without seizures. Only complaint is that his tongue hurts where he bit it. Pain is significant, is hard to eat and drink. There are no active problems to display for this patient. Past Medical History:   Diagnosis Date    Asthma     GERD (gastroesophageal reflux disease)     Hypertension        Current Outpatient Medications   Medication Sig Dispense Refill    HYDROcodone-acetaminophen (NORCO) 5-325 MG per tablet Take 1 tablet by mouth every 6 hours as needed for Pain for up to 5 days. Intended supply: 5 days.  Take lowest dose possible to manage pain 20 tablet 0    chlorhexidine (PERIDEX) 0.12 % solution Take 15 mLs by mouth 2 times daily for 14 days 420 mL 0    amoxicillin-clavulanate (AUGMENTIN) 875-125 MG per tablet Take 1 tablet by mouth 2 times daily for 10 days 20 tablet 0    propranolol (INDERAL) 40 MG tablet Take 1 tablet by mouth 2 times daily 60 tablet 5    cetirizine (ZYRTEC) 10 MG tablet Take 10 mg by mouth daily      omeprazole (PRILOSEC) 20 MG delayed release capsule Take 1 capsule by mouth 2 times daily (before meals) 60 capsule 5    Fluticasone-Salmeterol (ADVAIR HFA IN) Inhale into the lungs 2 times daily       albuterol sulfate HFA (PROVENTIL HFA) 108 (90 BASE) MCG/ACT inhaler Inhale 2 puffs into the lungs every 6 hours as needed for Wheezing      fluticasone (FLONASE) 50 MCG/ACT nasal spray 1 spray by Nasal route daily       No current facility-administered medications for this visit. Allergies   Allergen Reactions    Symbicort [Budesonide-Formoterol Fumarate] Other (See Comments)     Lung infection       Review of Systems   No fever, no vomiting, +HA on L side. Vitals:  /78   Pulse 89   Wt 185 lb (83.9 kg)   SpO2 95%   BMI 25.80 kg/m²     Physical Exam   General:  Well-appearing, NAD, alert, non-toxic   No numbness, no weaknessHEENT:  Normocephalic, atraumatic. Pupils equal and round. +3cm laceration of L posterior tongue. No bleeding or drainage  CHEST/LUNGS: CTAB, no crackles, no wheeze, no rhonchi. Symmetric rise  CARDIOVASCULAR: RRR,  no murmur, no rub  EXTREMETIES: Normal movement of all extremities  SKIN:  No rash, no cellulitis, no bruising, no petechiae/purpura/vesicles/pustules/abscess  PSYCH:  A+O x 3; normal affect  NEURO:  GCS 15, CN2-12 grossly intact, no focal motor/sensory deficits, no cerebellar deficits, normal gait, normal speech      Assessment/Plan     25-year-old male with recent unprovoked seizure. No diagnosis of epilepsy at this time. Recommend follow-up neurology as scheduled. Patient has pain in his tongue where he bit it during the seizure. Recommend follow-up with ENT to ensure proper healing. Norco as needed for pain. No sign of infection at this time. Patient has elevated blood pressure that is now at goal on propanolol. Continue propanolol. Follow up in 6 months for blood pressure check. Discharged in fair condition at 4:35 PM.    1. Loss of consciousness ([address]      2. Seizure ([address]    - Bobbi Solares MD, Neurology, [facility]    3.  Laceration of tongue, initial encounter    -

## 2019-09-11 ENCOUNTER — HOSPITAL ENCOUNTER (OUTPATIENT)
Age: 44
Discharge: HOME | End: 2019-09-11
Payer: COMMERCIAL

## 2019-09-11 DIAGNOSIS — Z00.00 ANNUAL PHYSICAL EXAM: ICD-10-CM

## 2019-09-11 LAB
A/G RATIO: 1.6 (ref 1.1–2.2)
ALBUMIN SERPL-MCNC: 4.7 G/DL (ref 3.4–5)
ALP BLD-CCNC: 52 U/L (ref 40–129)
ALT SERPL-CCNC: 71 U/L (ref 10–40)
ANION GAP SERPL CALCULATED.3IONS-SCNC: 20 MMOL/L (ref 3–16)
AST SERPL-CCNC: 118 U/L (ref 15–37)
BASOPHILS ABSOLUTE: 0.1 K/UL (ref 0–0.2)
BASOPHILS RELATIVE PERCENT: 2.8 %
BILIRUB SERPL-MCNC: 0.8 MG/DL (ref 0–1)
BUN BLDV-MCNC: 6 MG/DL (ref 7–20)
CALCIUM SERPL-MCNC: 9.6 MG/DL (ref 8.3–10.6)
CHLORIDE BLD-SCNC: 105 MMOL/L (ref 99–110)
CHOLESTEROL, FASTING: 251 MG/DL (ref 0–199)
CO2: 22 MMOL/L (ref 21–32)
CREAT SERPL-MCNC: 0.8 MG/DL (ref 0.9–1.3)
EOSINOPHILS ABSOLUTE: 0.2 K/UL (ref 0–0.6)
EOSINOPHILS RELATIVE PERCENT: 3.4 %
GFR AFRICAN AMERICAN: >60
GFR NON-AFRICAN AMERICAN: >60
GLOBULIN: 2.9 G/DL
GLUCOSE BLD-MCNC: 93 MG/DL (ref 70–99)
HCT VFR BLD CALC: 46.4 % (ref 40.5–52.5)
HDLC SERPL-MCNC: 55 MG/DL (ref 40–60)
HEMOGLOBIN: 15.7 G/DL (ref 13.5–17.5)
LDL CHOLESTEROL CALCULATED: 166 MG/DL
LYMPHOCYTES ABSOLUTE: 1.2 K/UL (ref 1–5.1)
LYMPHOCYTES RELATIVE PERCENT: 25.2 %
MCH RBC QN AUTO: 33.8 PG (ref 26–34)
MCHC RBC AUTO-ENTMCNC: 33.9 G/DL (ref 31–36)
MCV RBC AUTO: 99.7 FL (ref 80–100)
MONOCYTES ABSOLUTE: 0.5 K/UL (ref 0–1.3)
MONOCYTES RELATIVE PERCENT: 10.3 %
NEUTROPHILS ABSOLUTE: 2.8 K/UL (ref 1.7–7.7)
NEUTROPHILS RELATIVE PERCENT: 58.3 %
PDW BLD-RTO: 13.8 % (ref 12.4–15.4)
PLATELET # BLD: 144 K/UL (ref 135–450)
PMV BLD AUTO: 9 FL (ref 5–10.5)
POTASSIUM SERPL-SCNC: 3.8 MMOL/L (ref 3.5–5.1)
RBC # BLD: 4.65 M/UL (ref 4.2–5.9)
SODIUM BLD-SCNC: 147 MMOL/L (ref 136–145)
TOTAL PROTEIN: 7.6 G/DL (ref 6.4–8.2)
TRIGLYCERIDE, FASTING: 151 MG/DL (ref 0–150)
TSH REFLEX: 1.31 UIU/ML (ref 0.27–4.2)
VLDLC SERPL CALC-MCNC: 30 MG/DL
WBC # BLD: 4.8 K/UL (ref 4–11)

## 2019-09-11 PROCEDURE — 85025 COMPLETE CBC W/AUTO DIFF WBC: CPT

## 2019-09-11 PROCEDURE — 80053 COMPREHEN METABOLIC PANEL: CPT

## 2019-09-11 PROCEDURE — 84443 ASSAY THYROID STIM HORMONE: CPT

## 2019-09-11 PROCEDURE — 80061 LIPID PANEL: CPT

## 2019-09-11 PROCEDURE — 83036 HEMOGLOBIN GLYCOSYLATED A1C: CPT

## 2019-09-11 PROCEDURE — 36415 COLL VENOUS BLD VENIPUNCTURE: CPT

## 2019-09-12 ENCOUNTER — TELEPHONE (OUTPATIENT)
Dept: FAMILY MEDICINE CLINIC | Age: 44
End: 2019-09-12

## 2019-09-12 LAB
ESTIMATED AVERAGE GLUCOSE: 96.8 MG/DL
HBA1C MFR BLD: 5 %

## 2019-09-13 ENCOUNTER — TELEPHONE (OUTPATIENT)
Dept: FAMILY MEDICINE CLINIC | Age: 44
End: 2019-09-13

## 2019-09-13 NOTE — TELEPHONE ENCOUNTER
Pt had blood work on 9/11/19 and is calling for the results.       Once results are available, please call pt back and advise

## 2019-09-18 ENCOUNTER — OFFICE VISIT (OUTPATIENT)
Dept: FAMILY MEDICINE CLINIC | Age: 44
End: 2019-09-18
Payer: COMMERCIAL

## 2019-09-18 VITALS
WEIGHT: 184 LBS | HEART RATE: 88 BPM | DIASTOLIC BLOOD PRESSURE: 82 MMHG | SYSTOLIC BLOOD PRESSURE: 112 MMHG | HEIGHT: 71 IN | BODY MASS INDEX: 25.76 KG/M2

## 2019-09-18 DIAGNOSIS — R74.8 ELEVATED LIVER ENZYMES: ICD-10-CM

## 2019-09-18 DIAGNOSIS — R56.9 SEIZURE (HCC): ICD-10-CM

## 2019-09-18 DIAGNOSIS — R40.20 LOSS OF CONSCIOUSNESS (HCC): ICD-10-CM

## 2019-09-18 DIAGNOSIS — F41.9 ANXIETY: Primary | ICD-10-CM

## 2019-09-18 DIAGNOSIS — R55 SYNCOPE, UNSPECIFIED SYNCOPE TYPE: ICD-10-CM

## 2019-09-18 DIAGNOSIS — F41.0 PANIC DISORDER: ICD-10-CM

## 2019-09-18 PROCEDURE — 99214 OFFICE O/P EST MOD 30 MIN: CPT | Performed by: FAMILY MEDICINE

## 2019-09-18 RX ORDER — LORAZEPAM 0.5 MG/1
0.5 TABLET ORAL 3 TIMES DAILY PRN
Qty: 30 TABLET | Refills: 0 | Status: SHIPPED | OUTPATIENT
Start: 2019-09-18 | End: 2019-10-18

## 2019-09-18 NOTE — PATIENT INSTRUCTIONS
Patient Education        Panic Attacks: Care Instructions  Your Care Instructions    During a panic attack, you may have a feeling of intense fear or terror, trouble breathing, chest pain or tightness, heartbeat changes, dizziness, sweating, and shaking. A panic attack starts suddenly and usually lasts from 5 to 20 minutes but may last even longer. You have the most anxiety about 10 minutes after the attack starts. An attack can begin with a stressful event, or it can happen without a cause. Although panic attacks can cause scary symptoms, you can learn to manage them with self-care, counseling, and medicine. Follow-up care is a key part of your treatment and safety. Be sure to make and go to all appointments, and call your doctor if you are having problems. It's also a good idea to know your test results and keep a list of the medicines you take. How can you care for yourself at home? · Take your medicine exactly as directed. Call your doctor if you think you are having a problem with your medicine. · Go to your counseling sessions and follow-up appointments. · Recognize and accept your anxiety. Then, when you are in a situation that makes you anxious, say to yourself, \"This is not an emergency. I feel uncomfortable, but I am not in danger. I can keep going even if I feel anxious. \"  · Be kind to your body:  ? Relieve tension with exercise or a massage. ? Get enough rest.  ? Avoid alcohol, caffeine, nicotine, and illegal drugs. They can increase your anxiety level, cause sleep problems, or trigger a panic attack. ? Learn and do relaxation techniques. See below for more about these techniques. · Engage your mind. Get out and do something you enjoy. Go to a funny movie, or take a walk or hike. Plan your day. Having too much or too little to do can make you anxious. · Keep a record of your symptoms.  Discuss your fears with a good friend or family member, or join a support group for people with similar do anything that might cause an accident if you are not fully alert. Never play a relaxation tape while driving a car. When should you call for help? Call 911 anytime you think you may need emergency care. For example, call if:    · You feel you cannot stop from hurting yourself or someone else.    Watch closely for changes in your health, and be sure to contact your doctor if:    · Your panic attacks get worse.     · You have new or different anxiety.     · You are not getting better as expected. Where can you learn more? Go to [email or web address]://[email or web address].Ajaline. org and sign in to your [employer] account. Enter H601 in the MyClean box to learn more about \"Panic Attacks: Care Instructions. \"     If you do not have an account, please click on the \"Sign Up Now\" link. Current as of: September 11, 2018  Content Version: 12.1  © 1998-1755 [employer]wise, Incorporated. Care instructions adapted under license by [facility] ([facility]). If you have questions about a medical condition or this instruction, always ask your healthcare professional. [address] any warranty or liability for your use of this information.

## 2019-09-18 NOTE — PROGRESS NOTES
fatty liver. Recommend weight loss of 5 to 10 pounds. We will recheck in 6 to 12 months. No somewhat elevated. Per statin calculator, no need for statin at this time. Discussed with patient medication/s dosage, instructions, potential S/E, A/R and Drug Interaction  Educational material provided regarding patient's condition  Tylenol or Motrin as needed for pain or fever  Advise to return here if worse or go to nearest ER  Encourage fluids  Pt discharged in stable condition at 15:12      1. Anxiety    - LORazepam (ATIVAN) 0.5 MG tablet; Take 1 tablet by mouth 3 times daily as needed for Anxiety for up to 30 days. Dispense: 30 tablet; Refill: 0    2. Syncope, unspecified syncope type    - Martha Paniagua MD, Endocrinology, [facility]    3. Seizure ([facility] [address]      4. Loss of consciousness ([facility] [address]      5. Panic disorder    - LORazepam (ATIVAN) 0.5 MG tablet; Take 1 tablet by mouth 3 times daily as needed for Anxiety for up to 30 days. Dispense: 30 tablet; Refill: 0    6. Elevated liver enzymes           Orders Placed This Encounter   Procedures   Martha Paniagua MD, Endocrinology, [facility]     Referral Priority:   Routine     Referral Type:   Eval and Treat     Referral Reason:   Specialty Services Required     Referred to Provider:   Pavithra Hughes MD     Requested Specialty:   Endocrinology     Number of Visits Requested:   1       No follow-ups on file.     Shwetha Kendrick MD    9/18/2019  3:13 PM

## 2019-09-25 RX ORDER — ALBUTEROL SULFATE 90 UG/1
2 AEROSOL, METERED RESPIRATORY (INHALATION) EVERY 6 HOURS PRN
Qty: 1 INHALER | Refills: 3 | Status: CANCELLED | OUTPATIENT
Start: 2019-09-25

## 2019-10-22 ENCOUNTER — OFFICE VISIT (OUTPATIENT)
Dept: NEUROLOGY | Age: 44
End: 2019-10-22
Payer: COMMERCIAL

## 2019-10-22 VITALS
DIASTOLIC BLOOD PRESSURE: 103 MMHG | HEART RATE: 87 BPM | SYSTOLIC BLOOD PRESSURE: 177 MMHG | BODY MASS INDEX: 25.8 KG/M2 | WEIGHT: 185 LBS

## 2019-10-22 DIAGNOSIS — F41.9 ANXIETY: ICD-10-CM

## 2019-10-22 DIAGNOSIS — R56.9 NEW ONSET SEIZURE (HCC): Primary | ICD-10-CM

## 2019-10-22 PROCEDURE — 99245 OFF/OP CONSLTJ NEW/EST HI 55: CPT | Performed by: PSYCHIATRY & NEUROLOGY

## 2019-10-22 RX ORDER — LORAZEPAM 0.5 MG/1
0.5 TABLET ORAL EVERY 6 HOURS PRN
COMMUNITY
End: 2019-11-20

## 2019-10-30 ENCOUNTER — HOSPITAL ENCOUNTER (OUTPATIENT)
Dept: MRI IMAGING | Age: 44
Discharge: HOME | End: 2019-10-30
Payer: COMMERCIAL

## 2019-10-30 ENCOUNTER — HOSPITAL ENCOUNTER (OUTPATIENT)
Dept: NEUROLOGY | Age: 44
Discharge: HOME | End: 2019-10-30
Payer: COMMERCIAL

## 2019-10-30 DIAGNOSIS — R56.9 NEW ONSET SEIZURE (HCC): ICD-10-CM

## 2019-10-30 PROCEDURE — 95816 EEG AWAKE AND DROWSY: CPT

## 2019-10-30 PROCEDURE — A9577 INJ MULTIHANCE: HCPCS | Performed by: PSYCHIATRY & NEUROLOGY

## 2019-10-30 PROCEDURE — 6360000004 HC RX CONTRAST MEDICATION: Performed by: PSYCHIATRY & NEUROLOGY

## 2019-10-30 PROCEDURE — 70553 MRI BRAIN STEM W/O & W/DYE: CPT

## 2019-10-30 PROCEDURE — 95816 EEG AWAKE AND DROWSY: CPT | Performed by: PSYCHIATRY & NEUROLOGY

## 2019-10-30 RX ADMIN — GADOBENATE DIMEGLUMINE 20 ML: 529 INJECTION, SOLUTION INTRAVENOUS at 15:14

## 2019-11-05 ENCOUNTER — TELEPHONE (OUTPATIENT)
Dept: NEUROLOGY | Age: 44
End: 2019-11-05

## 2019-11-20 ENCOUNTER — OFFICE VISIT (OUTPATIENT)
Dept: FAMILY MEDICINE CLINIC | Age: 44
End: 2019-11-20
Payer: COMMERCIAL

## 2019-11-20 VITALS
WEIGHT: 182 LBS | DIASTOLIC BLOOD PRESSURE: 78 MMHG | HEART RATE: 85 BPM | SYSTOLIC BLOOD PRESSURE: 120 MMHG | BODY MASS INDEX: 25.38 KG/M2 | OXYGEN SATURATION: 98 %

## 2019-11-20 DIAGNOSIS — R40.20 LOSS OF CONSCIOUSNESS (HCC): ICD-10-CM

## 2019-11-20 DIAGNOSIS — F41.9 ANXIETY: ICD-10-CM

## 2019-11-20 DIAGNOSIS — F41.0 PANIC DISORDER: ICD-10-CM

## 2019-11-20 DIAGNOSIS — Z23 NEED FOR VACCINATION: Primary | ICD-10-CM

## 2019-11-20 PROCEDURE — 99214 OFFICE O/P EST MOD 30 MIN: CPT | Performed by: FAMILY MEDICINE

## 2019-11-20 PROCEDURE — 90471 IMMUNIZATION ADMIN: CPT | Performed by: FAMILY MEDICINE

## 2019-11-20 PROCEDURE — 90686 IIV4 VACC NO PRSV 0.5 ML IM: CPT | Performed by: FAMILY MEDICINE

## 2019-11-20 RX ORDER — ALPRAZOLAM 0.5 MG/1
0.5 TABLET ORAL 3 TIMES DAILY PRN
Qty: 90 TABLET | Refills: 0 | Status: SHIPPED | OUTPATIENT
Start: 2019-11-20 | End: 2019-12-20

## 2019-11-20 RX ORDER — BUSPIRONE HYDROCHLORIDE 5 MG/1
5 TABLET ORAL 3 TIMES DAILY
Qty: 90 TABLET | Refills: 0 | Status: SHIPPED | OUTPATIENT
Start: 2019-11-20 | End: 2019-12-20

## 2020-01-13 ENCOUNTER — HOSPITAL ENCOUNTER (INPATIENT)
Age: 45
LOS: 3 days | Discharge: HOME | DRG: 897 | End: 2020-01-16
Attending: FAMILY MEDICINE | Admitting: INTERNAL MEDICINE
Payer: COMMERCIAL

## 2020-01-13 ENCOUNTER — APPOINTMENT (OUTPATIENT)
Dept: CT IMAGING | Age: 45
DRG: 897 | End: 2020-01-13
Payer: COMMERCIAL

## 2020-01-13 ENCOUNTER — APPOINTMENT (OUTPATIENT)
Dept: GENERAL RADIOLOGY | Age: 45
DRG: 897 | End: 2020-01-13
Payer: COMMERCIAL

## 2020-01-13 PROBLEM — F10.930 ALCOHOL WITHDRAWAL SEIZURE WITHOUT COMPLICATION (HCC): Status: ACTIVE | Noted: 2020-01-13

## 2020-01-13 PROBLEM — F10.10 ALCOHOL ABUSE: Status: ACTIVE | Noted: 2020-01-13

## 2020-01-13 PROBLEM — E87.29 HIGH ANION GAP METABOLIC ACIDOSIS: Status: ACTIVE | Noted: 2020-01-13

## 2020-01-13 PROBLEM — E87.20 LACTIC ACIDOSIS: Status: ACTIVE | Noted: 2020-01-13

## 2020-01-13 PROBLEM — R56.9 ALCOHOL WITHDRAWAL SEIZURE WITHOUT COMPLICATION (HCC): Status: ACTIVE | Noted: 2020-01-13

## 2020-01-13 LAB
AMPHETAMINE SCREEN, URINE: NORMAL
ANION GAP SERPL CALCULATED.3IONS-SCNC: 28 MMOL/L (ref 3–16)
BARBITURATE SCREEN URINE: NORMAL
BASOPHILS ABSOLUTE: 0.1 K/UL (ref 0–0.2)
BASOPHILS RELATIVE PERCENT: 0.9 %
BENZODIAZEPINE SCREEN, URINE: NORMAL
BILIRUBIN URINE: NEGATIVE
BLOOD, URINE: ABNORMAL
BUN BLDV-MCNC: 6 MG/DL (ref 7–20)
CALCIUM SERPL-MCNC: 9.3 MG/DL (ref 8.3–10.6)
CANNABINOID SCREEN URINE: NORMAL
CHLORIDE BLD-SCNC: 96 MMOL/L (ref 99–110)
CLARITY: CLEAR
CO2: 14 MMOL/L (ref 21–32)
COCAINE METABOLITE SCREEN URINE: NORMAL
COLOR: YELLOW
CREAT SERPL-MCNC: 0.7 MG/DL (ref 0.9–1.3)
EOSINOPHILS ABSOLUTE: 0.1 K/UL (ref 0–0.6)
EOSINOPHILS RELATIVE PERCENT: 1.1 %
EPITHELIAL CELLS, UA: 0 /HPF (ref 0–5)
ETHANOL: 20 MG/DL (ref 0–0.08)
GFR AFRICAN AMERICAN: >60
GFR NON-AFRICAN AMERICAN: >60
GLUCOSE BLD-MCNC: 119 MG/DL (ref 70–99)
GLUCOSE URINE: NEGATIVE MG/DL
HCT VFR BLD CALC: 49.9 % (ref 40.5–52.5)
HEMOGLOBIN: 16.4 G/DL (ref 13.5–17.5)
HYALINE CASTS: 1 /LPF (ref 0–8)
INR BLD: 0.99 (ref 0.86–1.14)
KETONES, URINE: 15 MG/DL
LACTIC ACID, SEPSIS: 10.8 MMOL/L (ref 0.4–1.9)
LACTIC ACID, SEPSIS: 2 MMOL/L (ref 0.4–1.9)
LEUKOCYTE ESTERASE, URINE: NEGATIVE
LYMPHOCYTES ABSOLUTE: 1.3 K/UL (ref 1–5.1)
LYMPHOCYTES RELATIVE PERCENT: 18.3 %
Lab: NORMAL
MCH RBC QN AUTO: 33.4 PG (ref 26–34)
MCHC RBC AUTO-ENTMCNC: 32.9 G/DL (ref 31–36)
MCV RBC AUTO: 101.4 FL (ref 80–100)
METHADONE SCREEN, URINE: NORMAL
MICROSCOPIC EXAMINATION: YES
MONOCYTES ABSOLUTE: 0.7 K/UL (ref 0–1.3)
MONOCYTES RELATIVE PERCENT: 9.7 %
NEUTROPHILS ABSOLUTE: 5.1 K/UL (ref 1.7–7.7)
NEUTROPHILS RELATIVE PERCENT: 70 %
NITRITE, URINE: NEGATIVE
OPIATE SCREEN URINE: NORMAL
OXYCODONE URINE: NORMAL
PDW BLD-RTO: 13.2 % (ref 12.4–15.4)
PH UA: 7
PH UA: 7 (ref 5–8)
PHENCYCLIDINE SCREEN URINE: NORMAL
PLATELET # BLD: 85 K/UL (ref 135–450)
PMV BLD AUTO: 9.9 FL (ref 5–10.5)
POTASSIUM SERPL-SCNC: 3.9 MMOL/L (ref 3.5–5.1)
PROPOXYPHENE SCREEN: NORMAL
PROTEIN UA: 30 MG/DL
PROTHROMBIN TIME: 11.5 SEC (ref 10–13.2)
RAPID INFLUENZA  B AGN: NEGATIVE
RAPID INFLUENZA A AGN: NEGATIVE
RBC # BLD: 4.92 M/UL (ref 4.2–5.9)
RBC UA: 2 /HPF (ref 0–4)
SLIDE REVIEW: ABNORMAL
SODIUM BLD-SCNC: 138 MMOL/L (ref 136–145)
SPECIFIC GRAVITY UA: 1.01 (ref 1–1.03)
TROPONIN: <0.01 NG/ML
URINE REFLEX TO CULTURE: ABNORMAL
URINE TYPE: ABNORMAL
UROBILINOGEN, URINE: 1 E.U./DL
WBC # BLD: 7.3 K/UL (ref 4–11)
WBC UA: 3 /HPF (ref 0–5)

## 2020-01-13 PROCEDURE — 85610 PROTHROMBIN TIME: CPT

## 2020-01-13 PROCEDURE — 6360000002 HC RX W HCPCS: Performed by: NURSE PRACTITIONER

## 2020-01-13 PROCEDURE — G0480 DRUG TEST DEF 1-7 CLASSES: HCPCS

## 2020-01-13 PROCEDURE — 87804 INFLUENZA ASSAY W/OPTIC: CPT

## 2020-01-13 PROCEDURE — 83605 ASSAY OF LACTIC ACID: CPT

## 2020-01-13 PROCEDURE — 84484 ASSAY OF TROPONIN QUANT: CPT

## 2020-01-13 PROCEDURE — 71045 X-RAY EXAM CHEST 1 VIEW: CPT

## 2020-01-13 PROCEDURE — 80074 ACUTE HEPATITIS PANEL: CPT

## 2020-01-13 PROCEDURE — 81001 URINALYSIS AUTO W/SCOPE: CPT

## 2020-01-13 PROCEDURE — 36415 COLL VENOUS BLD VENIPUNCTURE: CPT

## 2020-01-13 PROCEDURE — 80048 BASIC METABOLIC PNL TOTAL CA: CPT

## 2020-01-13 PROCEDURE — 6370000000 HC RX 637 (ALT 250 FOR IP): Performed by: NURSE PRACTITIONER

## 2020-01-13 PROCEDURE — 96375 TX/PRO/DX INJ NEW DRUG ADDON: CPT

## 2020-01-13 PROCEDURE — 99285 EMERGENCY DEPT VISIT HI MDM: CPT

## 2020-01-13 PROCEDURE — 85025 COMPLETE CBC W/AUTO DIFF WBC: CPT

## 2020-01-13 PROCEDURE — 2580000003 HC RX 258: Performed by: NURSE PRACTITIONER

## 2020-01-13 PROCEDURE — 2500000003 HC RX 250 WO HCPCS: Performed by: NURSE PRACTITIONER

## 2020-01-13 PROCEDURE — 70450 CT HEAD/BRAIN W/O DYE: CPT

## 2020-01-13 PROCEDURE — 96374 THER/PROPH/DIAG INJ IV PUSH: CPT

## 2020-01-13 PROCEDURE — 1200000000 HC SEMI PRIVATE

## 2020-01-13 PROCEDURE — 87040 BLOOD CULTURE FOR BACTERIA: CPT

## 2020-01-13 PROCEDURE — 80307 DRUG TEST PRSMV CHEM ANLYZR: CPT

## 2020-01-13 PROCEDURE — 93005 ELECTROCARDIOGRAM TRACING: CPT | Performed by: FAMILY MEDICINE

## 2020-01-13 RX ORDER — LORAZEPAM 2 MG/ML
2 INJECTION INTRAMUSCULAR
Status: DISCONTINUED | OUTPATIENT
Start: 2020-01-13 | End: 2020-01-16 | Stop reason: HOSPADM

## 2020-01-13 RX ORDER — LORAZEPAM 2 MG/ML
4 INJECTION INTRAMUSCULAR
Status: DISCONTINUED | OUTPATIENT
Start: 2020-01-13 | End: 2020-01-16 | Stop reason: HOSPADM

## 2020-01-13 RX ORDER — LORAZEPAM 2 MG/ML
3 INJECTION INTRAMUSCULAR
Status: DISCONTINUED | OUTPATIENT
Start: 2020-01-13 | End: 2020-01-16 | Stop reason: HOSPADM

## 2020-01-13 RX ORDER — LORAZEPAM 1 MG/1
1 TABLET ORAL
Status: DISCONTINUED | OUTPATIENT
Start: 2020-01-13 | End: 2020-01-16 | Stop reason: HOSPADM

## 2020-01-13 RX ORDER — DIAZEPAM 5 MG/1
10 TABLET ORAL ONCE
Status: COMPLETED | OUTPATIENT
Start: 2020-01-13 | End: 2020-01-13

## 2020-01-13 RX ORDER — LORAZEPAM 1 MG/1
3 TABLET ORAL
Status: DISCONTINUED | OUTPATIENT
Start: 2020-01-13 | End: 2020-01-16 | Stop reason: HOSPADM

## 2020-01-13 RX ORDER — ALBUTEROL SULFATE 90 UG/1
2 AEROSOL, METERED RESPIRATORY (INHALATION) EVERY 4 HOURS PRN
Status: DISCONTINUED | OUTPATIENT
Start: 2020-01-13 | End: 2020-01-16 | Stop reason: HOSPADM

## 2020-01-13 RX ORDER — LORAZEPAM 2 MG/ML
1 INJECTION INTRAMUSCULAR
Status: DISCONTINUED | OUTPATIENT
Start: 2020-01-13 | End: 2020-01-16 | Stop reason: HOSPADM

## 2020-01-13 RX ORDER — SODIUM CHLORIDE 9 MG/ML
INJECTION, SOLUTION INTRAVENOUS CONTINUOUS
Status: DISCONTINUED | OUTPATIENT
Start: 2020-01-14 | End: 2020-01-14

## 2020-01-13 RX ORDER — SODIUM CHLORIDE 0.9 % (FLUSH) 0.9 %
10 SYRINGE (ML) INJECTION EVERY 12 HOURS SCHEDULED
Status: DISCONTINUED | OUTPATIENT
Start: 2020-01-13 | End: 2020-01-14 | Stop reason: SDUPTHER

## 2020-01-13 RX ORDER — CHLORDIAZEPOXIDE HYDROCHLORIDE 5 MG/1
10 CAPSULE, GELATIN COATED ORAL 3 TIMES DAILY
Status: DISCONTINUED | OUTPATIENT
Start: 2020-01-14 | End: 2020-01-14

## 2020-01-13 RX ORDER — FOLIC ACID 1 MG/1
1 TABLET ORAL DAILY
Status: DISCONTINUED | OUTPATIENT
Start: 2020-01-14 | End: 2020-01-16 | Stop reason: HOSPADM

## 2020-01-13 RX ORDER — PROPRANOLOL HYDROCHLORIDE 20 MG/1
40 TABLET ORAL 2 TIMES DAILY
Status: DISCONTINUED | OUTPATIENT
Start: 2020-01-14 | End: 2020-01-16 | Stop reason: HOSPADM

## 2020-01-13 RX ORDER — LORAZEPAM 1 MG/1
4 TABLET ORAL
Status: DISCONTINUED | OUTPATIENT
Start: 2020-01-13 | End: 2020-01-16 | Stop reason: HOSPADM

## 2020-01-13 RX ORDER — LORAZEPAM 1 MG/1
2 TABLET ORAL
Status: DISCONTINUED | OUTPATIENT
Start: 2020-01-13 | End: 2020-01-16 | Stop reason: HOSPADM

## 2020-01-13 RX ORDER — ONDANSETRON 2 MG/ML
4 INJECTION INTRAMUSCULAR; INTRAVENOUS EVERY 6 HOURS PRN
Status: DISCONTINUED | OUTPATIENT
Start: 2020-01-13 | End: 2020-01-16 | Stop reason: HOSPADM

## 2020-01-13 RX ORDER — ONDANSETRON 2 MG/ML
4 INJECTION INTRAMUSCULAR; INTRAVENOUS EVERY 30 MIN PRN
Status: DISCONTINUED | OUTPATIENT
Start: 2020-01-13 | End: 2020-01-14 | Stop reason: HOSPADM

## 2020-01-13 RX ORDER — THIAMINE MONONITRATE (VIT B1) 100 MG
100 TABLET ORAL DAILY
Status: DISCONTINUED | OUTPATIENT
Start: 2020-01-14 | End: 2020-01-14

## 2020-01-13 RX ORDER — 0.9 % SODIUM CHLORIDE 0.9 %
1000 INTRAVENOUS SOLUTION INTRAVENOUS ONCE
Status: COMPLETED | OUTPATIENT
Start: 2020-01-13 | End: 2020-01-13

## 2020-01-13 RX ORDER — SODIUM CHLORIDE 0.9 % (FLUSH) 0.9 %
10 SYRINGE (ML) INJECTION PRN
Status: DISCONTINUED | OUTPATIENT
Start: 2020-01-13 | End: 2020-01-16 | Stop reason: HOSPADM

## 2020-01-13 RX ORDER — OMEPRAZOLE 20 MG/1
20 CAPSULE, DELAYED RELEASE ORAL
Status: DISCONTINUED | OUTPATIENT
Start: 2020-01-14 | End: 2020-01-14 | Stop reason: CLARIF

## 2020-01-13 RX ORDER — SODIUM CHLORIDE 0.9 % (FLUSH) 0.9 %
10 SYRINGE (ML) INJECTION EVERY 12 HOURS SCHEDULED
Status: DISCONTINUED | OUTPATIENT
Start: 2020-01-14 | End: 2020-01-16 | Stop reason: HOSPADM

## 2020-01-13 RX ORDER — MULTIVITAMIN WITH FOLIC ACID 400 MCG
1 TABLET ORAL DAILY
Status: DISCONTINUED | OUTPATIENT
Start: 2020-01-14 | End: 2020-01-16 | Stop reason: HOSPADM

## 2020-01-13 RX ORDER — ACETAMINOPHEN 325 MG/1
650 TABLET ORAL EVERY 4 HOURS PRN
Status: DISCONTINUED | OUTPATIENT
Start: 2020-01-13 | End: 2020-01-16 | Stop reason: HOSPADM

## 2020-01-13 RX ORDER — SODIUM CHLORIDE 0.9 % (FLUSH) 0.9 %
10 SYRINGE (ML) INJECTION PRN
Status: DISCONTINUED | OUTPATIENT
Start: 2020-01-13 | End: 2020-01-14 | Stop reason: SDUPTHER

## 2020-01-13 RX ADMIN — FOLIC ACID: 5 INJECTION, SOLUTION INTRAMUSCULAR; INTRAVENOUS; SUBCUTANEOUS at 20:16

## 2020-01-13 RX ADMIN — DIAZEPAM 10 MG: 5 TABLET ORAL at 21:06

## 2020-01-13 RX ADMIN — SODIUM CHLORIDE 1000 ML: 9 INJECTION, SOLUTION INTRAVENOUS at 20:26

## 2020-01-13 RX ADMIN — LORAZEPAM 1 MG: 2 INJECTION INTRAMUSCULAR; INTRAVENOUS at 20:47

## 2020-01-13 RX ADMIN — ONDANSETRON 4 MG: 2 INJECTION INTRAMUSCULAR; INTRAVENOUS at 20:47

## 2020-01-13 ASSESSMENT — ENCOUNTER SYMPTOMS
SHORTNESS OF BREATH: 0
VOMITING: 0
CHEST TIGHTNESS: 0
ABDOMINAL PAIN: 0
DIARRHEA: 0
NAUSEA: 0

## 2020-01-14 PROBLEM — D69.6 THROMBOCYTOPENIA (HCC): Status: ACTIVE | Noted: 2020-01-13

## 2020-01-14 LAB
A/G RATIO: 1.3 (ref 1.1–2.2)
ALBUMIN SERPL-MCNC: 3.7 G/DL (ref 3.4–5)
ALP BLD-CCNC: 58 U/L (ref 40–129)
ALT SERPL-CCNC: 54 U/L (ref 10–40)
ANION GAP SERPL CALCULATED.3IONS-SCNC: 13 MMOL/L (ref 3–16)
AST SERPL-CCNC: 80 U/L (ref 15–37)
BILIRUB SERPL-MCNC: 1.4 MG/DL (ref 0–1)
BUN BLDV-MCNC: 5 MG/DL (ref 7–20)
C DIFF TOXIN/ANTIGEN: NORMAL
CALCIUM SERPL-MCNC: 8.7 MG/DL (ref 8.3–10.6)
CHLORIDE BLD-SCNC: 101 MMOL/L (ref 99–110)
CO2: 24 MMOL/L (ref 21–32)
CREAT SERPL-MCNC: 0.7 MG/DL (ref 0.9–1.3)
EKG ATRIAL RATE: 127 BPM
EKG DIAGNOSIS: NORMAL
EKG Q-T INTERVAL: 318 MS
EKG QRS DURATION: 78 MS
EKG QTC CALCULATION (BAZETT): 462 MS
EKG R AXIS: 45 DEGREES
EKG T AXIS: 23 DEGREES
EKG VENTRICULAR RATE: 127 BPM
GFR AFRICAN AMERICAN: >60
GFR NON-AFRICAN AMERICAN: >60
GLOBULIN: 2.8 G/DL
GLUCOSE BLD-MCNC: 95 MG/DL (ref 70–99)
HAV IGM SER IA-ACNC: NORMAL
HCT VFR BLD CALC: 41.2 % (ref 40.5–52.5)
HEMOGLOBIN: 14.2 G/DL (ref 13.5–17.5)
HEPATITIS B CORE IGM ANTIBODY: NORMAL
HEPATITIS B SURFACE ANTIGEN INTERPRETATION: NORMAL
HEPATITIS C ANTIBODY INTERPRETATION: NORMAL
LACTIC ACID: 1.4 MMOL/L (ref 0.4–2)
LACTIC ACID: 1.7 MMOL/L (ref 0.4–2)
LACTIC ACID: 1.8 MMOL/L (ref 0.4–2)
MAGNESIUM: 1.8 MG/DL (ref 1.8–2.4)
MCH RBC QN AUTO: 33.7 PG (ref 26–34)
MCHC RBC AUTO-ENTMCNC: 34.6 G/DL (ref 31–36)
MCV RBC AUTO: 97.6 FL (ref 80–100)
PDW BLD-RTO: 12.7 % (ref 12.4–15.4)
PHOSPHORUS: 2 MG/DL (ref 2.5–4.9)
PLATELET # BLD: 57 K/UL (ref 135–450)
PLATELET SLIDE REVIEW: ABNORMAL
PMV BLD AUTO: 10.2 FL (ref 5–10.5)
POTASSIUM REFLEX MAGNESIUM: 3.3 MMOL/L (ref 3.5–5.1)
RBC # BLD: 4.22 M/UL (ref 4.2–5.9)
SLIDE REVIEW: ABNORMAL
SODIUM BLD-SCNC: 138 MMOL/L (ref 136–145)
TOTAL PROTEIN: 6.5 G/DL (ref 6.4–8.2)
WBC # BLD: 5 K/UL (ref 4–11)

## 2020-01-14 PROCEDURE — 6370000000 HC RX 637 (ALT 250 FOR IP): Performed by: INTERNAL MEDICINE

## 2020-01-14 PROCEDURE — 99255 IP/OBS CONSLTJ NEW/EST HI 80: CPT | Performed by: PSYCHIATRY & NEUROLOGY

## 2020-01-14 PROCEDURE — 93010 ELECTROCARDIOGRAM REPORT: CPT | Performed by: INTERNAL MEDICINE

## 2020-01-14 PROCEDURE — 6370000000 HC RX 637 (ALT 250 FOR IP): Performed by: PSYCHIATRY & NEUROLOGY

## 2020-01-14 PROCEDURE — 84100 ASSAY OF PHOSPHORUS: CPT

## 2020-01-14 PROCEDURE — 94761 N-INVAS EAR/PLS OXIMETRY MLT: CPT

## 2020-01-14 PROCEDURE — 87324 CLOSTRIDIUM AG IA: CPT

## 2020-01-14 PROCEDURE — 2580000003 HC RX 258: Performed by: INTERNAL MEDICINE

## 2020-01-14 PROCEDURE — 6360000002 HC RX W HCPCS: Performed by: INTERNAL MEDICINE

## 2020-01-14 PROCEDURE — 94640 AIRWAY INHALATION TREATMENT: CPT

## 2020-01-14 PROCEDURE — 6360000002 HC RX W HCPCS: Performed by: HOSPITALIST

## 2020-01-14 PROCEDURE — 87449 NOS EACH ORGANISM AG IA: CPT

## 2020-01-14 PROCEDURE — 83735 ASSAY OF MAGNESIUM: CPT

## 2020-01-14 PROCEDURE — 94760 N-INVAS EAR/PLS OXIMETRY 1: CPT

## 2020-01-14 PROCEDURE — 80053 COMPREHEN METABOLIC PANEL: CPT

## 2020-01-14 PROCEDURE — 85027 COMPLETE CBC AUTOMATED: CPT

## 2020-01-14 PROCEDURE — 83605 ASSAY OF LACTIC ACID: CPT

## 2020-01-14 PROCEDURE — 2000000000 HC ICU R&B

## 2020-01-14 PROCEDURE — 36415 COLL VENOUS BLD VENIPUNCTURE: CPT

## 2020-01-14 PROCEDURE — 2500000003 HC RX 250 WO HCPCS: Performed by: INTERNAL MEDICINE

## 2020-01-14 RX ORDER — MAGNESIUM SULFATE 1 G/100ML
1 INJECTION INTRAVENOUS PRN
Status: DISCONTINUED | OUTPATIENT
Start: 2020-01-14 | End: 2020-01-16 | Stop reason: HOSPADM

## 2020-01-14 RX ORDER — MAGNESIUM SULFATE IN WATER 40 MG/ML
2 INJECTION, SOLUTION INTRAVENOUS ONCE
Status: COMPLETED | OUTPATIENT
Start: 2020-01-14 | End: 2020-01-14

## 2020-01-14 RX ORDER — ATORVASTATIN CALCIUM 20 MG/1
20 TABLET, FILM COATED ORAL NIGHTLY
Status: DISCONTINUED | OUTPATIENT
Start: 2020-01-14 | End: 2020-01-16 | Stop reason: HOSPADM

## 2020-01-14 RX ORDER — THIAMINE MONONITRATE (VIT B1) 100 MG
100 TABLET ORAL DAILY
Status: DISCONTINUED | OUTPATIENT
Start: 2020-01-17 | End: 2020-01-16 | Stop reason: HOSPADM

## 2020-01-14 RX ORDER — CHLORDIAZEPOXIDE HYDROCHLORIDE 25 MG/1
25 CAPSULE, GELATIN COATED ORAL 3 TIMES DAILY
Status: DISCONTINUED | OUTPATIENT
Start: 2020-01-14 | End: 2020-01-16 | Stop reason: HOSPADM

## 2020-01-14 RX ORDER — PANTOPRAZOLE SODIUM 40 MG/1
40 TABLET, DELAYED RELEASE ORAL
Status: DISCONTINUED | OUTPATIENT
Start: 2020-01-14 | End: 2020-01-16 | Stop reason: HOSPADM

## 2020-01-14 RX ORDER — LEVETIRACETAM 5 MG/ML
500 INJECTION INTRAVASCULAR EVERY 12 HOURS
Status: DISCONTINUED | OUTPATIENT
Start: 2020-01-14 | End: 2020-01-16

## 2020-01-14 RX ORDER — HYDRALAZINE HYDROCHLORIDE 20 MG/ML
10 INJECTION INTRAMUSCULAR; INTRAVENOUS EVERY 4 HOURS PRN
Status: DISCONTINUED | OUTPATIENT
Start: 2020-01-14 | End: 2020-01-14

## 2020-01-14 RX ORDER — LEVETIRACETAM 500 MG/1
500 TABLET ORAL 2 TIMES DAILY
Status: DISCONTINUED | OUTPATIENT
Start: 2020-01-14 | End: 2020-01-14

## 2020-01-14 RX ORDER — POTASSIUM CHLORIDE 20 MEQ/1
40 TABLET, EXTENDED RELEASE ORAL PRN
Status: DISCONTINUED | OUTPATIENT
Start: 2020-01-14 | End: 2020-01-16 | Stop reason: HOSPADM

## 2020-01-14 RX ORDER — POTASSIUM CHLORIDE 7.45 MG/ML
10 INJECTION INTRAVENOUS PRN
Status: DISCONTINUED | OUTPATIENT
Start: 2020-01-14 | End: 2020-01-16 | Stop reason: HOSPADM

## 2020-01-14 RX ORDER — HYDRALAZINE HYDROCHLORIDE 20 MG/ML
10 INJECTION INTRAMUSCULAR; INTRAVENOUS EVERY 6 HOURS PRN
Status: DISCONTINUED | OUTPATIENT
Start: 2020-01-14 | End: 2020-01-16 | Stop reason: HOSPADM

## 2020-01-14 RX ORDER — THIAMINE MONONITRATE (VIT B1) 100 MG
300 TABLET ORAL DAILY
Status: COMPLETED | OUTPATIENT
Start: 2020-01-14 | End: 2020-01-16

## 2020-01-14 RX ORDER — AMLODIPINE BESYLATE 5 MG/1
5 TABLET ORAL DAILY
Status: DISCONTINUED | OUTPATIENT
Start: 2020-01-14 | End: 2020-01-16 | Stop reason: HOSPADM

## 2020-01-14 RX ORDER — METOPROLOL TARTRATE 5 MG/5ML
2.5 INJECTION INTRAVENOUS EVERY 6 HOURS PRN
Status: DISCONTINUED | OUTPATIENT
Start: 2020-01-14 | End: 2020-01-16 | Stop reason: HOSPADM

## 2020-01-14 RX ADMIN — LEVETIRACETAM 500 MG: 5 INJECTION INTRAVENOUS at 21:59

## 2020-01-14 RX ADMIN — PROPRANOLOL HYDROCHLORIDE 40 MG: 20 TABLET ORAL at 09:45

## 2020-01-14 RX ADMIN — Medication 2 PUFF: at 08:31

## 2020-01-14 RX ADMIN — SODIUM CHLORIDE: 9 INJECTION, SOLUTION INTRAVENOUS at 00:23

## 2020-01-14 RX ADMIN — LORAZEPAM 4 MG: 2 INJECTION INTRAMUSCULAR; INTRAVENOUS at 20:14

## 2020-01-14 RX ADMIN — Medication 10 ML: at 09:47

## 2020-01-14 RX ADMIN — PROPRANOLOL HYDROCHLORIDE 40 MG: 20 TABLET ORAL at 00:33

## 2020-01-14 RX ADMIN — MAGNESIUM SULFATE HEPTAHYDRATE 2 G: 40 INJECTION, SOLUTION INTRAVENOUS at 12:30

## 2020-01-14 RX ADMIN — THERA TABS 1 TABLET: TAB at 09:45

## 2020-01-14 RX ADMIN — POTASSIUM CHLORIDE 40 MEQ: 1500 TABLET, EXTENDED RELEASE ORAL at 10:53

## 2020-01-14 RX ADMIN — FOLIC ACID 1 MG: 1 TABLET ORAL at 09:45

## 2020-01-14 RX ADMIN — LORAZEPAM 2 MG: 2 INJECTION INTRAMUSCULAR; INTRAVENOUS at 18:41

## 2020-01-14 RX ADMIN — LORAZEPAM 2 MG: 2 INJECTION INTRAMUSCULAR; INTRAVENOUS at 13:56

## 2020-01-14 RX ADMIN — PANTOPRAZOLE SODIUM 40 MG: 40 TABLET, DELAYED RELEASE ORAL at 09:52

## 2020-01-14 RX ADMIN — Medication 300 MG: at 12:30

## 2020-01-14 RX ADMIN — Medication 100 MG: at 09:46

## 2020-01-14 RX ADMIN — LORAZEPAM 1 MG: 2 INJECTION INTRAMUSCULAR; INTRAVENOUS at 16:44

## 2020-01-14 RX ADMIN — AMLODIPINE BESYLATE 5 MG: 5 TABLET ORAL at 13:56

## 2020-01-14 RX ADMIN — Medication 10 ML: at 20:06

## 2020-01-14 RX ADMIN — LEVETIRACETAM 500 MG: 500 TABLET ORAL at 10:53

## 2020-01-14 RX ADMIN — LORAZEPAM 4 MG: 2 INJECTION INTRAMUSCULAR; INTRAVENOUS at 14:49

## 2020-01-14 RX ADMIN — SODIUM CHLORIDE: 9 INJECTION, SOLUTION INTRAVENOUS at 09:47

## 2020-01-14 RX ADMIN — Medication 2 PUFF: at 06:19

## 2020-01-14 RX ADMIN — CHLORDIAZEPOXIDE HYDROCHLORIDE 10 MG: 5 CAPSULE ORAL at 09:45

## 2020-01-14 RX ADMIN — Medication 10 ML: at 14:49

## 2020-01-14 RX ADMIN — CHLORDIAZEPOXIDE HYDROCHLORIDE 25 MG: 25 CAPSULE ORAL at 18:41

## 2020-01-14 RX ADMIN — LORAZEPAM 4 MG: 2 INJECTION INTRAMUSCULAR; INTRAVENOUS at 22:01

## 2020-01-14 RX ADMIN — DEXMEDETOMIDINE 0.2 MCG/KG/HR: 100 INJECTION, SOLUTION, CONCENTRATE INTRAVENOUS at 22:12

## 2020-01-14 ASSESSMENT — PAIN SCALES - GENERAL
PAINLEVEL_OUTOF10: 0

## 2020-01-14 NOTE — PROGRESS NOTES
Medicine Addendum    Apparently pt was lying to me and does actively drink as stated in previous notes. Now in active withdrawal and hallucinating. Has already fallen and is requiring increasing amounts of ativan. Will transfer to higher level of care. Likely needs phenobarb protocol which requires ICU transfer. Could also be benzo withdrawal, but that would be illicit consumption as his OARRS report documents last xanax as being 11/20/2019. Discussed with nursing.     Electronically signed by Nereida Becker MD on 1/14/2020 at 4:01 PM

## 2020-01-14 NOTE — CONSULTS
In patient Neurology consult        [facility] Neurology      Jared Coley MD      Favio Arboleda  [id number]    Date of Service: 1/14/2020    Referring Physician: Jose Hudson MD      Reason for the consult and CC: Acute encephalopathy and recurrent seizure. HPI:   The patient is a 53y.o.  years old male with history of alcohol abuse, hypertension and GED who was admitted to the hospital yesterday with witnessed seizure at home. He does not recall such events. He was home when he suddenly blacked out and had a witnessed convulsion by his friend. Description was generalized tonic-clonic seizure for several seconds to minutes. Degree was severe. No other triggers or other associated symptom except for postictal confusion. He did bite his tongue with no bladder incontinence. No relieving or aggravating factor recent fever chills or head trauma. He came to the ED for evaluation. Initial work-up was unremarkable. UDS was negative. He was admitted. Today he is awake and alert. He denies any residual symptoms. No headache, chest pain, dysphagia or focal weakness or numbness. His last drink was 24 hours before such a seizure. He drinks only twice a week. He denies use of drugs. No family history of epilepsy or history of trauma with LOC. He had a similar seizure back in August 2019. Description was generalized motor seizure at home while taking a shower. He was seen by Dr. Jigar Saunders last October who had some work-up including unremarkable MRI and EEG. He was not placed on AED. He feels his first seizure was not related to alcohol. He does not work. No other new symptoms today. Other review of system was unremarkable.       Family History   Problem Relation Age of Onset    High Blood Pressure Mother      Past Surgical History:   Procedure Laterality Date    UPPER GASTROINTESTINAL ENDOSCOPY N/A 5/9/2019    EGD BIOPSY performed by Grady Turner MD at [address] [place] [address]        Past Medical History:   Diagnosis Date    Asthma     GERD (gastroesophageal reflux disease)     Hypertension      Social History     Tobacco Use    Smoking status: Never Smoker    Smokeless tobacco: Never Used   Substance Use Topics    Alcohol use: Yes     Comment: hasn't had since last week , occasional beer samples    Drug use: No     Allergies   Allergen Reactions    Symbicort [Budesonide-Formoterol Fumarate] Other (See Comments)     Lung infection     Current Facility-Administered Medications   Medication Dose Route Frequency Provider Last Rate Last Dose    mometasone-formoterol (DULERA) 200-5 MCG/ACT inhaler 2 puff  2 puff Inhalation BID Rosario Parra MD   2 puff at 01/14/20 0831    pantoprazole (PROTONIX) tablet 40 mg  40 mg Oral QAM AC Rosario Parra MD   40 mg at 01/14/20 0952    levETIRAcetam (KEPPRA) tablet 500 mg  500 mg Oral BID Axel Yoder MD        LORazepam (ATIVAN) tablet 1 mg  1 mg Oral Q1H PRN Rosario Parra MD        Or    LORazepam (ATIVAN) injection 1 mg  1 mg Intravenous Q1H PRN Rosario Parra MD   1 mg at 01/13/20 2047    Or    LORazepam (ATIVAN) tablet 2 mg  2 mg Oral Q1H PRN Rosario Parra MD        Or    LORazepam (ATIVAN) injection 2 mg  2 mg Intravenous Q1H PRN Rosario Parra MD        Or    LORazepam (ATIVAN) tablet 3 mg  3 mg Oral Q1H PRN Rosario Parra MD        Or    LORazepam (ATIVAN) injection 3 mg  3 mg Intravenous Q1H PRN Rosario Parra MD        Or    LORazepam (ATIVAN) tablet 4 mg  4 mg Oral Q1H PRN Rosario Parra MD        Or    LORazepam (ATIVAN) injection 4 mg  4 mg Intravenous Q1H PRN Rosario Parra MD        albuterol sulfate  (90 Base) MCG/ACT inhaler 2 puff  2 puff Inhalation Q4H PRN Rosario Parra MD   2 puff at 01/14/20 0619    propranolol (INDERAL) tablet 40 mg  40 mg Oral BID Rosario Parra MD   40 mg at 01/14/20 0945    sodium chloride flush 0.9 % injection 10 mL  10 mL remote memory  Normal attention span and concentration. Language: intact naming, repeating and fluency   Good fund of Knowledge. Aware of current events and vocabulary   Cranial Nerves:   II: Visual fields: Full to confrontation and nl [facility]. Pupils: equal, round, reactive to light  III,IV,VI: Extra Ocular Movements are intact. No nystagmus  V: Facial sensation is intact to pin prick and light touch  VII: Facial strength and movements: intact and symmetric  VIII: Hearing: Intact to finger rub bilaterally  IX: Palate elevation is symmetric  XI: Shoulder shrug is intact  XII: Tongue movements are normal  Musculoskeletal: 5/5 in all 4 extremities. Tone: Normal tone. Reflexes: Bilateral biceps 2/4, triceps 2/4, brachial radialis 2/4, knee 2/4 and ankle 2/4. Planters: flexor bilaterally. Coordination: no pronator drift, no dysmetria with FNF in upper extremities. Normal REM. Mild postural hand tremors  Sensation: normal to all modalities in both arms and legs. Gait/Posture: Not tested due to recent seizure    Data:  LABS:   Lab Results   Component Value Date     01/14/2020    K 3.3 01/14/2020     01/14/2020    CO2 24 01/14/2020    BUN 5 01/14/2020    CREATININE 0.7 01/14/2020    GFRAA >60 01/14/2020    LABGLOM >60 01/14/2020    GLUCOSE 95 01/14/2020    PHOS 2.0 01/14/2020    MG 1.80 01/14/2020    CALCIUM 8.7 01/14/2020     Lab Results   Component Value Date    WBC 5.0 01/14/2020    RBC 4.22 01/14/2020    HGB 14.2 01/14/2020    HCT 41.2 01/14/2020    MCV 97.6 01/14/2020    RDW 12.7 01/14/2020    PLT 57 01/14/2020     Lab Results   Component Value Date    INR 0.99 01/13/2020    PROTIME 11.5 01/13/2020       Neuroimaging were independently reviewed by myself and discussed results with the patient. Initial CT of the head showed no acute findings. Reviewed notes from different physicians  Reviewed lab and blood testing. Initial blood test showed sodium 138 and potassium 3.3. UDS was negative.   Alcohol

## 2020-01-14 NOTE — CARE COORDINATION
Discharge Planning Assessment  RN/SW discharge planner met with patient/(and family member) to discuss reason for admission, current living situation, and potential needs at the time of discharge    Demographics/Insurance verified Yes- [facility]    Current type of dwelling:ranch home with 2-3 steps to enter and 10-12 down to basement     Patient from ECF/SW confirmed with:n/a    Living arrangements:states lives with a friend    Level of function/Support:independent with adl's, states mother is probably his emotional support    [facility] [place]    Last Visit to [address] [place] [address] Road then 6 months    DME:none     Active with any community resources/agencies/skilled home care:none offered pt resources for outpatient treatment, pt states he does not drink anymore. Review of physician note states drinks twice weekly. Pt states \" I can't drink I take anxiety pills. \" pt states he was referred to a psychiatrist but never went d/t \"I have no need for a psychiatrist, they just want your money. \"     Medication compliance issues: none     Financial issues that could impact healthcare:none     Transportation at the time of discharge: states friend     Tentative discharge plan: home. CM will continue to follow for any changing discharge needs.      Mnany Matta RN, BSN  [phone]

## 2020-01-14 NOTE — H&P
Hospital Medicine History & Physical      PCP: Isidra Wilkes MD    Date of Admission: 1/13/2020    Date of Service: Pt seen/examined on 1/13/2020  and Admitted to Inpatient with expected LOS greater than two midnights due to medical therapy. Chief Complaint:    Chief Complaint   Patient presents with    Seizures     pt brought in by squad. pt had witnessed seizure by friend at home. pt with ETOH present on scene per squad. pt does not recall event. History Of Present Illness: The patient is a 53 y.o. male with history of alcohol abuse, hypertension, history of convulsions for which she was seen by neurology in October 2019 and had EEG and MRI of the brain done which were unremarkable. He is not on any antiepileptic drugs. He reports as his last drink was over 24 hours ago and had a witnessed seizure at home this evening reported tonic-clonic in nature. He was brought to the ER. On presentation laboratory work-up noted for elevated lactic acid with tachycardia. He received IV fluid and started on CIWA protocol. CT brain was unremarkable  Chest x-ray clear  EKG noted for atrial fibrillation with rapid ventricular response    Past Medical History:        Diagnosis Date    Asthma     GERD (gastroesophageal reflux disease)     Hypertension        Past Surgical History:        Procedure Laterality Date    UPPER GASTROINTESTINAL ENDOSCOPY N/A 5/9/2019    EGD BIOPSY performed by Maya Gonzalez MD at [address] [place] [address]       Medications Prior to Admission:    Prior to Admission medications    Medication Sig Start Date End Date Taking?  Authorizing Provider   fluticasone-salmeterol (ADVAIR DISKUS) 500-50 MCG/DOSE diskus inhaler Inhale 1 puff into the lungs every 12 hours 12/12/19  Yes Porter Plummer MD   albuterol sulfate (PROAIR RESPICLICK) 339 (90 Base) MCG/ACT aerosol powder inhalation Inhale 2 puffs into the lungs every 4 hours as needed for Shortness of Breath 9/25/19  Yes Cliff Caro Aly Sanchez MD   propranolol (INDERAL) 40 MG tablet Take 1 tablet by mouth 2 times daily 7/17/19  Yes Robert Mcpherson MD   cetirizine (ZYRTEC) 10 MG tablet Take 10 mg by mouth daily   Yes Historical Provider, MD   omeprazole (PRILOSEC) 20 MG delayed release capsule Take 1 capsule by mouth 2 times daily (before meals) 5/9/19  Yes Pascale Nash MD   Fluticasone-Salmeterol (ADVAIR HFA IN) Inhale into the lungs 2 times daily    Yes Historical Provider, MD   fluticasone (FLONASE) 50 MCG/ACT nasal spray 1 spray by Nasal route daily   Yes Historical Provider, MD       Allergies:  Symbicort [budesonide-formoterol fumarate]    Social History:  The patient currently lives with family    TOBACCO:   reports that he has never smoked. He has never used smokeless tobacco.  ETOH:   reports current alcohol use. Family History:  Reviewed in detail and negative for DM, Early CAD, Cancer, CVA. Positive as follows:        Problem Relation Age of Onset    High Blood Pressure Mother        REVIEW OF SYSTEMS:   All other systems reviewed and negative. PHYSICAL EXAM:    BP (!) 120/104   Pulse 129   Temp 98.1 °F (36.7 °C) (Oral)   Resp 18   SpO2 94%     General appearance: No apparent distress appears stated age and cooperative. HEENT Normal cephalic, atraumatic without obvious deformity. Pupils equal, round, and reactive to light. Extra ocular muscles intact. Conjunctivae/corneas clear. Neck: Supple, No jugular venous distention/bruits. Trachea midline without thyromegaly or adenopathy with full range of motion. Lungs: Clear to auscultation, bilaterally without Rales/Wheezes/Rhonchi with good respiratory effort. Heart: Regular rate and rhythm with Normal S1/S2 without murmurs, rubs or gallops, point of maximum impulse non-displaced  Abdomen: Soft, non-tender or non-distended without rigidity or guarding and positive bowel sounds all four quadrants. Extremities: No clubbing, cyanosis, or edema bilaterally.   Full range of motion without deformity and normal gait intact. Skin: Skin color, texture, turgor normal.  No rashes or lesions. Neurologic: Alert and oriented X 3, neurovascularly intact with sensory/motor intact upper extremities/lower extremities, bilaterally. Cranial nerves: II-XII intact, grossly non-focal.  Mental status: Alert, oriented, thought content appropriate. CBC   Recent Labs     01/13/20 1858   WBC 7.3   HGB 16.4   HCT 49.9   PLT 85*      RENAL  Recent Labs     01/13/20 1858      K 3.9   CL 96*   CO2 14*   BUN 6*   CREATININE 0.7*     COAG  Recent Labs     01/13/20 1858   INR 0.99     CARDIAC ENZYMES  Recent Labs     01/13/20 1858   TROPONINI <0.01       U/A:    Lab Results   Component Value Date    COLORU YELLOW 01/13/2020    WBCUA 3 01/13/2020    RBCUA 2 01/13/2020    CLARITYU Clear 01/13/2020    SPECGRAV 1.010 01/13/2020    LEUKOCYTESUR Negative 01/13/2020    BLOODU MODERATE 01/13/2020    GLUCOSEU Negative 01/13/2020         Active Hospital Problems    Diagnosis Date Noted    Alcohol withdrawal seizure without complication ([facility] [address] [K50.546] 01/13/2020    High anion gap metabolic acidosis [W90.7] 01/13/2020    Lactic acidosis [E87.2] 01/13/2020    Alcohol abuse [F10.10] 01/13/2020         ASSESSMENT/PLAN:  53 y.o. male with history of alcohol abuse, hypertension, history of convulsions who had witnessed seizure at home in the setting of alcohol use possibly alcohol withdrawal seizures.     Plan:  - We will continue seizure precautions  - We will place him on scheduled Librium  - [facility]  - Vitamins including thiamine, folic acid and multivitamins  -Neurology consult  Given recent MRI which was unremarkable, will hold off repeat MRI at this time until seen by neurology  -Counseled on alcohol use      DVT Prophylaxis: Subcut enoxaparin  Diet: General  Code Status: Full         Blanchie Libman, MD    Thank you Jose Sebastian MD for the opportunity to be involved in this patient's care. If you have any questions or concerns please feel free to contact me at [phone].

## 2020-01-14 NOTE — ED PROVIDER NOTES
I independently examined and evaluated Edgar Arboleda. In brief, 49-year-old male who drinks alcohol on a near daily basis who has not drank in approximately 24 to 36 hours and had a witnessed seizure at home today. This is the second time in the last year he has had a seizure that has been believed to be alcohol withdrawal related. He was admitted in July for the same, saw neurology, was not started on medications at that time secondary to being believed not to be epileptic but alcohol withdrawal related. Focused exam revealed tremulous and tachycardic. Awake alert oriented. No hallucinations. No delusions. Lungs clear to auscultation. Heart tachycardic but regular. Abdomen is soft and nontender with no peritoneal signs. No shoulder pain. Upper extremities and lower extremities with normal strength and movement. No evidence of dislocation. No tongue biting. ED course: Lactic and acidosis are consistent with grand mal seizure. Treated with Ativan, Valium, banana bag. Will require admission for the same. Influenza a and B are negative. Renal function is normal.  No anemia. White blood cell count normal.  No evidence of infectious etiology. Alcohol level 20 consistent with not drinking over the last 24 hours but half gallon to a gallon per day typically. Chest x-ray with no acute pathology. Head CT negative for tumor, bleed, mass, stroke, fracture. Treated with both Ativan as well as Valium and started on CIWA scale. Currently mentating normally with no hallucinations or delusions. No recurrent seizures while in the emergency department so far. 2230: Repeat lactic trending down appropriately and is currently 2.0. Urine drug screen negative. Urinalysis negative. In total critical care time of 15 minutes with CNS, toxicology, cardiac systems of acute risk of decompensation and collapse requiring fluid resuscitation, thiamine, Ativan, Valium and seizure precautions.   This

## 2020-01-14 NOTE — PROGRESS NOTES
Pt CIWA scale elevated (see MAR); ativan given. Pt anxious, agitated, refuses to keep bathroom door open, hallucinating. Cleatus Sables in bathroom, unobserved. PCA outside door,heard thud and  Found pt sitting on buttocks. Did not hit head. No abrasions. VSS. Assisted back to bed; pt agitated and uncooperative.

## 2020-01-14 NOTE — ED PROVIDER NOTES
[facility]        Pt Name: Damion Hirsch  MRN: [id number]  Birthdate 1975  Date of evaluation: 1/13/2020  Provider: Clarance Dandy, APRN - YONI  PCP: Herman Appiah MD    This patient was seen and evaluated by the attending physician [facility]       Chief Complaint   Patient presents with    Seizures     pt brought in by squad. pt had witnessed seizure by friend at home. pt with ETOH present on scene per squad. pt does not recall event. HISTORY OF PRESENT ILLNESS   (Location/Symptom, Timing/Onset, Context/Setting, Quality, Duration, Modifying Factors, Severity)  Note limiting factors. Damion Hirsch is a 53 y.o. male presents to the emergency department after seizure-like activity/convulsions reported by friend called 911. The patient was on the couch today, visiting with his friend when he returned home from work. The friend states that he did fall asleep but heard the patient shout his name, when he awakened he found the patient with arm straight out and then began convulsion-like activity with lips turning blue and patient staring straight forward. States that the activity did then seem to \"fall asleep with snoring\" lasting less than 5 seconds, the patient awakened and states that he does not remember the event that caused him to present to the emergency department. Patient reports history of 1 previous episode of seizure-like activity, states that he did follow-up with neurosurgery, Dr. Carmela Sage, had a negative EEG and was told that the seizure-like activity was likely related to stress. Patient states that he takes Xanax as needed for history of high anxiety. Friend did pull me aside and reports that the patient does have a history of heavy drinking, drinks 1/2 gallon of whiskey some nights.   States that the patient has not had any alcohol today, he believes that this activity may be related to not drinking. Patient is awake and alert, pale and ill-appearing. He denies any recent travel or sick exposure. Reports that he does take his medications as appropriate as prescribed. He denies any drugs or alcohol today. Denies any fever, lightheadedness, dizziness, visual disturbances. No chest pain or pressure. No neck or back pain. No shortness of breath, cough, or congestion. No abdominal pain, nausea, vomiting, diarrhea, constipation, or dysuria. No rash. Nursing Notes were all reviewed and agreed with or any disagreements were addressed in the HPI. REVIEW OF SYSTEMS    (2-9 systems for level 4, 10 or more for level 5)     Review of Systems   Constitutional: Negative for activity change, chills and fever. Respiratory: Negative for chest tightness and shortness of breath. Cardiovascular: Negative for chest pain. Gastrointestinal: Negative for abdominal pain, diarrhea, nausea and vomiting. Genitourinary: Negative for dysuria. Neurological: Positive for seizures. All other systems reviewed and are negative. Positives and Pertinent negatives as per HPI. Except as noted above in the ROS, all other systems were reviewed and negative.        PAST MEDICAL HISTORY     Past Medical History:   Diagnosis Date    Asthma     GERD (gastroesophageal reflux disease)     Hypertension          SURGICAL HISTORY     Past Surgical History:   Procedure Laterality Date    UPPER GASTROINTESTINAL ENDOSCOPY N/A 5/9/2019    EGD BIOPSY performed by Earnest Pérez MD at [phone]       Previous Medications    ALBUTEROL SULFATE (PROAIR RESPICLICK) 167 (90 BASE) MCG/ACT AEROSOL POWDER INHALATION    Inhale 2 puffs into the lungs every 4 hours as needed for Shortness of Breath    CETIRIZINE (ZYRTEC) 10 MG TABLET    Take 10 mg by mouth daily    FLUTICASONE (FLONASE) 50 MCG/ACT NASAL SPRAY    1 spray by Nasal route daily    FLUTICASONE-SALMETEROL (ADVAIR DISKUS) 500-50 MCG/DOSE DISKUS INHALER    Inhale 1 puff into the lungs every 12 hours    FLUTICASONE-SALMETEROL (ADVAIR HFA IN)    Inhale into the lungs 2 times daily     OMEPRAZOLE (PRILOSEC) 20 MG DELAYED RELEASE CAPSULE    Take 1 capsule by mouth 2 times daily (before meals)    PROPRANOLOL (INDERAL) 40 MG TABLET    Take 1 tablet by mouth 2 times daily         ALLERGIES     Symbicort [budesonide-formoterol fumarate]    FAMILYHISTORY       Family History   Problem Relation Age of Onset    High Blood Pressure Mother           SOCIAL HISTORY       Social History     Tobacco Use    Smoking status: Never Smoker    Smokeless tobacco: Never Used   Substance Use Topics    Alcohol use: Yes     Comment: hasn't had since last week , occasional beer samples    Drug use: No       SCREENINGS             PHYSICAL EXAM    (up to 7 for level 4, 8 or more for level 5)     ED Triage Vitals [01/13/20 1857]   BP Temp Temp Source Pulse Resp SpO2 Height Weight   (!) 139/102 98.1 °F (36.7 °C) Oral 63 20 95 % -- --       Physical Exam  Vitals signs and nursing note reviewed. Constitutional:       Appearance: He is well-developed. He is not diaphoretic. HENT:      Head: Normocephalic and atraumatic. Right Ear: External ear normal.      Left Ear: External ear normal.   Eyes:      General:         Right eye: No discharge. Left eye: No discharge. Neck:      Musculoskeletal: Normal range of motion and neck supple. Vascular: No JVD. Cardiovascular:      Rate and Rhythm: Tachycardia present. Rhythm irregular. Pulses: Normal pulses. Heart sounds: Normal heart sounds. Pulmonary:      Effort: Pulmonary effort is normal. No respiratory distress. Breath sounds: Normal breath sounds. Abdominal:      Palpations: Abdomen is soft. Musculoskeletal: Normal range of motion. Skin:     General: Skin is warm and dry. Coloration: Skin is pale.    Neurological:      Mental Status: He is alert and oriented to person, place, and time.    Psychiatric:         Behavior: Behavior normal.         DIAGNOSTIC RESULTS   LABS:    Labs Reviewed   BASIC METABOLIC PANEL - Abnormal; Notable for the following components:       Result Value    Chloride 96 (*)     CO2 14 (*)     Anion Gap 28 (*)     Glucose 119 (*)     BUN 6 (*)     CREATININE 0.7 (*)     All other components within normal limits    Narrative:     ERIC  [facility] tel. [phone],  Chemistry results called to and read back by ghislaine parson, 01/13/2020  19:34, by ARSENIO  Performed at:  [facility] [address],  [place], [address]   Phone [phone]   CBC WITH AUTO DIFFERENTIAL - Abnormal; Notable for the following components:    .4 (*)     Platelets 85 (*)     All other components within normal limits    Narrative:     Performed at:  [facility] [address],  [place], [address] [place]   Phone [phone]   LACTATE, SEPSIS - Abnormal; Notable for the following components:    Lactic Acid, Sepsis 10.8 (*)     All other components within normal limits    Narrative:     Jayson Jose  [facility] tel. [phone],  Chemistry results called to and read back by GHISLAINE Sheets, 01/13/2020 20:28,  by Prema Pinedo  Performed at:  [facility] [address],  [place], [address]   Phone [phone]   RAPID INFLUENZA A/B ANTIGENS    Narrative:     Performed at:  [facility] [address], [place], [address]   Phone [phone]   CULTURE BLOOD #2   CULTURE BLOOD #1   PROTIME-INR    Narrative:     Performed at:  [facility] [address],  [place], [address]   Phone [phone]   ETHANOL    Narrative:     Performed at:  [facility] [address],  [place], [address]   Phone [phone]   TROPONIN    Narrative:     Performed at:  [facility]) - chloride bolus (1,000 mLs Intravenous New Bag 1/13/20 2026)   sodium chloride flush 0.9 % injection 10 mL ( Intravenous Canceled Entry 1/13/20 2026)   sodium chloride flush 0.9 % injection 10 mL (has no administration in time range)   LORazepam (ATIVAN) tablet 1 mg ( Oral See Alternative 1/13/20 2047)     Or   LORazepam (ATIVAN) injection 1 mg (1 mg Intravenous Given 1/13/20 2047)     Or   LORazepam (ATIVAN) tablet 2 mg ( Oral See Alternative 1/13/20 2047)     Or   LORazepam (ATIVAN) injection 2 mg ( Intravenous See Alternative 1/13/20 2047)     Or   LORazepam (ATIVAN) tablet 3 mg ( Oral See Alternative 1/13/20 2047)     Or   LORazepam (ATIVAN) injection 3 mg ( Intravenous See Alternative 1/13/20 2047)     Or   LORazepam (ATIVAN) tablet 4 mg ( Oral See Alternative 1/13/20 2047)     Or   LORazepam (ATIVAN) injection 4 mg ( Intravenous See Alternative 1/13/20 2047)   ondansetron (ZOFRAN) injection 4 mg (4 mg Intravenous Given 1/13/20 2047)   sodium chloride 0.9 % 3,607 mL with folic acid 1 mg, adult multi-vitamin with vitamin k 10 mL, thiamine 100 mg ( Intravenous New Bag 1/13/20 2016)   diazepam (VALIUM) tablet 10 mg (10 mg Oral Given 1/13/20 2106)       Briefly, this is a 53year old male that presents to the emergency department after seizure-like activity/convulsions reported by friend called 911. The patient was on the couch today, visiting with his friend when he returned home from work. The friend states that he did fall asleep but heard the patient shout his name, when he awakened he found the patient with arm straight out and then began convulsion-like activity with lips turning blue and patient staring straight forward. States that the activity did then seem to \"fall asleep with snoring\" lasting less than 5 seconds, the patient awakened and states that he does not remember the event that caused him to present to the emergency department.     Patient reports history of 1 previous episode of seizure-like Ativan. He was still tremulous following the Ativan with significant tachycardia, attending then asked me to give the patient 10 mg of Valium p.o. He was also given a banana bag and IV fluids. Hospitalist consulted for admission of this patient, the patient is agreeable regarding plan of care    FINAL IMPRESSION      1. Alcohol withdrawal seizure without complication [facility])          DISPOSITION/PLAN   DISPOSITION Decision To Admit 01/13/2020 08:57:55 PM      PATIENT REFERREDTO:  No follow-up provider specified.     DISCHARGE MEDICATIONS:  New Prescriptions    No medications on file       DISCONTINUED MEDICATIONS:  Discontinued Medications    No medications on file              (Please note that portions of this note were completed with a voice recognition program.  Efforts were made to edit the dictations but occasionally words are mis-transcribed.)    BRUCE Sequeira CNP (electronically signed)           BRUCE Sequeira CNP  01/13/20 2127

## 2020-01-15 LAB
ANION GAP SERPL CALCULATED.3IONS-SCNC: 13 MMOL/L (ref 3–16)
BUN BLDV-MCNC: 4 MG/DL (ref 7–20)
CALCIUM SERPL-MCNC: 9.4 MG/DL (ref 8.3–10.6)
CHLORIDE BLD-SCNC: 103 MMOL/L (ref 99–110)
CHOLESTEROL, TOTAL: 178 MG/DL (ref 0–199)
CO2: 25 MMOL/L (ref 21–32)
CREAT SERPL-MCNC: 0.7 MG/DL (ref 0.9–1.3)
GFR AFRICAN AMERICAN: >60
GFR NON-AFRICAN AMERICAN: >60
GLUCOSE BLD-MCNC: 86 MG/DL (ref 70–99)
HDLC SERPL-MCNC: 43 MG/DL (ref 40–60)
LDL CHOLESTEROL CALCULATED: 115 MG/DL
MAGNESIUM: 2.2 MG/DL (ref 1.8–2.4)
POTASSIUM SERPL-SCNC: 3.7 MMOL/L (ref 3.5–5.1)
SODIUM BLD-SCNC: 141 MMOL/L (ref 136–145)
TRIGL SERPL-MCNC: 98 MG/DL (ref 0–150)
VLDLC SERPL CALC-MCNC: 20 MG/DL

## 2020-01-15 PROCEDURE — 2000000000 HC ICU R&B

## 2020-01-15 PROCEDURE — 83735 ASSAY OF MAGNESIUM: CPT

## 2020-01-15 PROCEDURE — 80048 BASIC METABOLIC PNL TOTAL CA: CPT

## 2020-01-15 PROCEDURE — 2580000003 HC RX 258: Performed by: INTERNAL MEDICINE

## 2020-01-15 PROCEDURE — 80061 LIPID PANEL: CPT

## 2020-01-15 PROCEDURE — 94640 AIRWAY INHALATION TREATMENT: CPT

## 2020-01-15 PROCEDURE — 6370000000 HC RX 637 (ALT 250 FOR IP): Performed by: INTERNAL MEDICINE

## 2020-01-15 PROCEDURE — 2500000003 HC RX 250 WO HCPCS: Performed by: INTERNAL MEDICINE

## 2020-01-15 PROCEDURE — 99232 SBSQ HOSP IP/OBS MODERATE 35: CPT | Performed by: PSYCHIATRY & NEUROLOGY

## 2020-01-15 PROCEDURE — 99223 1ST HOSP IP/OBS HIGH 75: CPT | Performed by: INTERNAL MEDICINE

## 2020-01-15 PROCEDURE — 6360000002 HC RX W HCPCS: Performed by: HOSPITALIST

## 2020-01-15 PROCEDURE — 36415 COLL VENOUS BLD VENIPUNCTURE: CPT

## 2020-01-15 RX ADMIN — AMLODIPINE BESYLATE 5 MG: 5 TABLET ORAL at 09:30

## 2020-01-15 RX ADMIN — THERA TABS 1 TABLET: TAB at 09:30

## 2020-01-15 RX ADMIN — CHLORDIAZEPOXIDE HYDROCHLORIDE 25 MG: 25 CAPSULE ORAL at 13:38

## 2020-01-15 RX ADMIN — Medication 2 PUFF: at 20:04

## 2020-01-15 RX ADMIN — FOLIC ACID 1 MG: 1 TABLET ORAL at 09:30

## 2020-01-15 RX ADMIN — LEVETIRACETAM 500 MG: 5 INJECTION INTRAVENOUS at 19:47

## 2020-01-15 RX ADMIN — DEXMEDETOMIDINE 0.5 MCG/KG/HR: 100 INJECTION, SOLUTION, CONCENTRATE INTRAVENOUS at 14:11

## 2020-01-15 RX ADMIN — CHLORDIAZEPOXIDE HYDROCHLORIDE 25 MG: 25 CAPSULE ORAL at 09:30

## 2020-01-15 RX ADMIN — ATORVASTATIN CALCIUM 20 MG: 20 TABLET, FILM COATED ORAL at 19:47

## 2020-01-15 RX ADMIN — HYDRALAZINE HYDROCHLORIDE 10 MG: 20 INJECTION INTRAMUSCULAR; INTRAVENOUS at 00:03

## 2020-01-15 RX ADMIN — Medication 10 ML: at 09:30

## 2020-01-15 RX ADMIN — LEVETIRACETAM 500 MG: 5 INJECTION INTRAVENOUS at 09:30

## 2020-01-15 RX ADMIN — PROPRANOLOL HYDROCHLORIDE 40 MG: 20 TABLET ORAL at 19:47

## 2020-01-15 RX ADMIN — PROPRANOLOL HYDROCHLORIDE 40 MG: 20 TABLET ORAL at 09:29

## 2020-01-15 RX ADMIN — Medication 300 MG: at 09:29

## 2020-01-15 RX ADMIN — Medication 2 PUFF: at 20:03

## 2020-01-15 RX ADMIN — Medication 10 ML: at 19:48

## 2020-01-15 RX ADMIN — CHLORDIAZEPOXIDE HYDROCHLORIDE 25 MG: 25 CAPSULE ORAL at 19:47

## 2020-01-15 ASSESSMENT — PAIN SCALES - GENERAL
PAINLEVEL_OUTOF10: 0

## 2020-01-15 ASSESSMENT — ENCOUNTER SYMPTOMS
CHEST TIGHTNESS: 0
SHORTNESS OF BREATH: 0
COUGH: 0
WHEEZING: 0

## 2020-01-15 NOTE — PROGRESS NOTES
Reassessment and VS completed. See flowsheet. Small amount of urinary incontinence this morning. Offered urinal- pt states \"I don't know. \" Pt awake and disoriented. Asking John Hidalgo is everybody at? \" Precedex slightly increased as agitated with care. See eMAR. Zoila care provided and diaper changed. Bed pad changed. Repositioned for comfort. Call light in reach.

## 2020-01-15 NOTE — PROGRESS NOTES
Almaz Villegas  Neurology Follow-up  [facility] Neurology    Date of Service: 1/15/2020    Subjective:   CC: Follow up today regarding: Acute encephalopathy, breakthrough seizure and DT    Events noted. Chart and lab reviewed. The patient became more confused and agitated overnight requiring restraints and transferred to the ICU. He is currently in the ICU, restraints and sedated. Other ROS was limited. ROS : A 10-12 system review could not be obtained due to poor cooperation and confusion. family history includes High Blood Pressure in his mother.     Past Medical History:   Diagnosis Date    Asthma     GERD (gastroesophageal reflux disease)     Hypertension      Current Facility-Administered Medications   Medication Dose Route Frequency Provider Last Rate Last Dose    mometasone-formoterol (DULERA) 200-5 MCG/ACT inhaler 2 puff  2 puff Inhalation BID Rosario Parra MD   2 puff at 01/14/20 0831    pantoprazole (PROTONIX) tablet 40 mg  40 mg Oral QAM AC Rosario Parra MD   40 mg at 01/14/20 0952    potassium chloride (KLOR-CON M) extended release tablet 40 mEq  40 mEq Oral PRN Jitendra Christina MD   40 mEq at 01/14/20 1053    Or    potassium bicarb-citric acid (EFFER-K) effervescent tablet 40 mEq  40 mEq Oral PRN Jitendra Christina MD        Or    potassium chloride 10 mEq/100 mL IVPB (Peripheral Line)  10 mEq Intravenous PRN Jitendra Christina MD        magnesium sulfate 1 g in dextrose 5% 100 mL IVPB  1 g Intravenous PRN Jitendra Christina MD        [START ON 1/17/2020] vitamin B-1 (THIAMINE) tablet 100 mg  100 mg Oral Daily Jitendra Christina MD        vitamin B-1 (THIAMINE) tablet 300 mg  300 mg Oral Daily Jitendra Christina MD   300 mg at 01/15/20 0929    amLODIPine (NORVASC) tablet 5 mg  5 mg Oral Daily Jitendra Christina MD   5 mg at 01/15/20 0930    atorvastatin (LIPITOR) tablet 20 mg  20 mg Oral Nightly MD Sanjuana Fairbanks 141 01/15/2020    K 3.7 01/15/2020    K 3.3 01/14/2020     01/15/2020    CO2 25 01/15/2020    BUN 4 01/15/2020    CREATININE 0.7 01/15/2020    GFRAA >60 01/15/2020    LABGLOM >60 01/15/2020    GLUCOSE 86 01/15/2020    PHOS 2.0 01/14/2020    MG 2.20 01/15/2020    CALCIUM 9.4 01/15/2020     Lab Results   Component Value Date    WBC 5.0 01/14/2020    RBC 4.22 01/14/2020    HGB 14.2 01/14/2020    HCT 41.2 01/14/2020    MCV 97.6 01/14/2020    RDW 12.7 01/14/2020    PLT 57 01/14/2020     Lab Results   Component Value Date    INR 0.99 01/13/2020    PROTIME 11.5 01/13/2020       Neuroimaging and labs were independently reviewed by me  Reviewed notes from different physicians. Impression:  Acute encephalopathy and likely breakthrough seizure. Severe. Likely underlying epilepsy and his recurrent seizures could be triggered or provoked by his drinking habit. Chronic alcohol abuse  Hypokalemia  Hypertension  Depression      Recommendation  Continue current supportive care  DT precautions  He is currently on phenobarb protocol for DT  Seizure precautions  Keppra when he is more awake and alert  Hydration  Blood sugar monitor  Follow CMP  We will follow           Alessio Dickerson MD   [phone]      This dictation was generated by voice recognition computer software. Although all attempts are made to edit the dictation for accuracy, there may be errors in the transcription that are not intended.

## 2020-01-15 NOTE — PROGRESS NOTES
Pt still attempting to climb OOB, throwing his legs over the side rails and sounding the bed alarm. Pt disoriented x4. Does know that it is \"January\" but states incorrect year. Precedex initiated at rate of 0.2mcg/kg/hr per MD order. New IV placed in left hand, as Keppra and Precedex are not compatible. Tolerated well. Call light in reach.

## 2020-01-15 NOTE — PROGRESS NOTES
Spoke to Dr. Gisele Palacio regarding critical care consult. No Precedex ordered at this time- give 4mg Ativan per [place] protocol per MD verbal order. Will reassess need for Precedex.

## 2020-01-15 NOTE — PROGRESS NOTES
Pt pulled out both IV's from bilateral hands. Hematoma to left hand. Blood on hands and sheet. Changed. New IV placed to left hand. Pt states he is trying to go home. Wants to call his mother. Dialed mother, Jesi Shine in [place] and let pt talk to her. Updated again on POC and reoriented pt.

## 2020-01-15 NOTE — CONSULTS
Behavior: Behavior normal.       LABS:  Recent Labs     01/13/20 1858 01/14/20  0510   WBC 7.3 5.0   HGB 16.4 14.2   HCT 49.9 41.2   PLT 85* 57*                                                                  Recent Labs     01/13/20 1858 01/14/20  0510 01/15/20  0447    138 141   K 3.9 3.3* 3.7   CL 96* 101 103   CO2 14* 24 25   BUN 6* 5* 4*   CREATININE 0.7* 0.7* 0.7*   GLUCOSE 119* 95 86     Recent Labs     01/14/20  0510   AST 80*   ALT 54*   BILITOT 1.4*   ALKPHOS 58     Recent Labs     01/13/20 1858   TROPONINI <0.01     No results for input(s): BNP in the last 72 hours. No results found for: PHART, Brittany Rumple, PO2ART  Recent Labs     01/13/20 1858   INR 0.99     @LABRCNT(NITRITE,COLORU,PHUR,LABCAST,WBCUA,RBCUA,MUCUS,TRICHOMONAS,YEAST,BACTERIA,CLARITYU,SPECGRAV,LEUKOCYTESUR,UROBILINOGEN,BILIRUBINUR,BLOODU,GLUCOSEU,KETONESU,AMORPHOUS)@     DATA:  CT head  1. No acute intracranial abnormality. 2. Partial opacification of the right mastoid air cells, possibly   representing a mastoid effusion.         CXR   Stable portable study. EKG  A.fib  Poor progression of R wave     Assessment &Plan:    Patient Active Problem List:     Seizure disorder (HCC)     Thrombocytopenia (HCC)     High anion gap metabolic acidosis     Lactic acidosis     Alcohol abuse     Acute encephalopathy     Partial idiopathic epilepsy with seizures of localized onset, not intractable, without status epilepticus ([facility] [address]     HTN (hypertension), benign      DT. Still in DT. Hemodynamically stable on current Mx. Continue to have tremors and nystagmus. Seizure thought to be due to DT. Neurology following   A.fib evident on admission EKG. Prior EKG was sinus. This is probably due to \"holiday heart\". Currently in sinus. Continue librium   Continue CIWA  precedex   Thiamine   Get Echo after resolution of DT. He is currently in sinus.   I will hold on anticoagulation     The patient and / or the family were informed of the

## 2020-01-16 VITALS
OXYGEN SATURATION: 98 % | WEIGHT: 176.15 LBS | DIASTOLIC BLOOD PRESSURE: 97 MMHG | TEMPERATURE: 97.4 F | HEIGHT: 71 IN | BODY MASS INDEX: 24.66 KG/M2 | SYSTOLIC BLOOD PRESSURE: 141 MMHG | RESPIRATION RATE: 15 BRPM | HEART RATE: 82 BPM

## 2020-01-16 LAB
ANION GAP SERPL CALCULATED.3IONS-SCNC: 13 MMOL/L (ref 3–16)
BUN BLDV-MCNC: 9 MG/DL (ref 7–20)
CALCIUM SERPL-MCNC: 9.3 MG/DL (ref 8.3–10.6)
CHLORIDE BLD-SCNC: 102 MMOL/L (ref 99–110)
CO2: 23 MMOL/L (ref 21–32)
CREAT SERPL-MCNC: 0.7 MG/DL (ref 0.9–1.3)
GFR AFRICAN AMERICAN: >60
GFR NON-AFRICAN AMERICAN: >60
GLUCOSE BLD-MCNC: 98 MG/DL (ref 70–99)
MAGNESIUM: 2.2 MG/DL (ref 1.8–2.4)
POTASSIUM SERPL-SCNC: 3.3 MMOL/L (ref 3.5–5.1)
SODIUM BLD-SCNC: 138 MMOL/L (ref 136–145)

## 2020-01-16 PROCEDURE — 80048 BASIC METABOLIC PNL TOTAL CA: CPT

## 2020-01-16 PROCEDURE — 83735 ASSAY OF MAGNESIUM: CPT

## 2020-01-16 PROCEDURE — 94640 AIRWAY INHALATION TREATMENT: CPT

## 2020-01-16 PROCEDURE — 99232 SBSQ HOSP IP/OBS MODERATE 35: CPT | Performed by: PSYCHIATRY & NEUROLOGY

## 2020-01-16 PROCEDURE — 99233 SBSQ HOSP IP/OBS HIGH 50: CPT | Performed by: INTERNAL MEDICINE

## 2020-01-16 PROCEDURE — 6370000000 HC RX 637 (ALT 250 FOR IP): Performed by: PSYCHIATRY & NEUROLOGY

## 2020-01-16 PROCEDURE — 94761 N-INVAS EAR/PLS OXIMETRY MLT: CPT

## 2020-01-16 PROCEDURE — 6370000000 HC RX 637 (ALT 250 FOR IP): Performed by: INTERNAL MEDICINE

## 2020-01-16 PROCEDURE — 2580000003 HC RX 258: Performed by: INTERNAL MEDICINE

## 2020-01-16 RX ORDER — LEVETIRACETAM 250 MG/1
500 TABLET ORAL 2 TIMES DAILY
Status: DISCONTINUED | OUTPATIENT
Start: 2020-01-16 | End: 2020-01-16 | Stop reason: HOSPADM

## 2020-01-16 RX ORDER — LEVETIRACETAM 500 MG/1
500 TABLET ORAL 2 TIMES DAILY
Qty: 60 TABLET | Refills: 3 | Status: SHIPPED | OUTPATIENT
Start: 2020-01-16

## 2020-01-16 RX ADMIN — PROPRANOLOL HYDROCHLORIDE 40 MG: 20 TABLET ORAL at 08:36

## 2020-01-16 RX ADMIN — Medication 2 PUFF: at 09:01

## 2020-01-16 RX ADMIN — AMLODIPINE BESYLATE 5 MG: 5 TABLET ORAL at 08:36

## 2020-01-16 RX ADMIN — LEVETIRACETAM 500 MG: 250 TABLET ORAL at 08:36

## 2020-01-16 RX ADMIN — POTASSIUM CHLORIDE 40 MEQ: 1500 TABLET, EXTENDED RELEASE ORAL at 08:36

## 2020-01-16 RX ADMIN — PANTOPRAZOLE SODIUM 40 MG: 40 TABLET, DELAYED RELEASE ORAL at 08:36

## 2020-01-16 RX ADMIN — THERA TABS 1 TABLET: TAB at 08:36

## 2020-01-16 RX ADMIN — FOLIC ACID 1 MG: 1 TABLET ORAL at 08:36

## 2020-01-16 RX ADMIN — Medication 300 MG: at 08:36

## 2020-01-16 RX ADMIN — Medication 10 ML: at 08:37

## 2020-01-16 RX ADMIN — CHLORDIAZEPOXIDE HYDROCHLORIDE 25 MG: 25 CAPSULE ORAL at 08:36

## 2020-01-16 ASSESSMENT — PAIN SCALES - GENERAL
PAINLEVEL_OUTOF10: 0

## 2020-01-16 NOTE — PROGRESS NOTES
Shift assessment completed, see MAR. Morning medication passed, see mar. A&O X4. VSS and afebrile. Lung sounds are clear all over. Bowel sounds are active. Pedal pulses are palpable. Pt up to bathroom and urinated, face washed and teeth brushed. Precedex stopped. Critical Care team at bedside and updated. Possible discharge later today. Will continue to monitor.

## 2020-01-16 NOTE — PROGRESS NOTES
Labs     01/13/20  1858 01/14/20  0510   WBC 7.3 5.0   HGB 16.4 14.2   HCT 49.9 41.2   .4* 97.6   PLT 85* 57*     BMP:   Recent Labs     01/14/20  0510 01/15/20  0447 01/16/20  0501    141 138   K 3.3* 3.7 3.3*    103 102   CO2 24 25 23   PHOS 2.0*  --   --    BUN 5* 4* 9   CREATININE 0.7* 0.7* 0.7*     LIVER PROFILE:   Recent Labs     01/14/20  0510   AST 80*   ALT 54*   BILITOT 1.4*   ALKPHOS 58     PT/INR:   Recent Labs     01/13/20 1858   PROTIME 11.5   INR 0.99     APTT: No results for input(s): APTT in the last 72 hours. UA:  Recent Labs     01/13/20  2204   COLORU YELLOW   PHUR 7.0  7.0   WBCUA 3   RBCUA 2   CLARITYU Clear   SPECGRAV 1.010   LEUKOCYTESUR Negative   UROBILINOGEN 1.0   BILIRUBINUR Negative   BLOODU MODERATE*   GLUCOSEU Negative         Assessment/Plan:  53 y.o. male with     DT - resolved. Did not require ativan overnight. He is off precedex. Remain on librium. Seizure thought to be due to DT. Neurology following   A.fib evident on admission EKG. Prior EKG was sinus. This is probably due to \"holiday heart\". remain in sinus during his presence in the ICU. D/c precedex  If he gets discharged consider outpatient echo, however it is more likely that this episode of A.fib is due to excessive drinking.      I will sign off, please call with any questions or concerns.    Joo Cain MD

## 2020-01-16 NOTE — PROGRESS NOTES
Discharge instructions discussed with pt and his room mate. Paper script for Keppra IV removed and pt wheeled out to lobby. Discharged home.

## 2020-01-16 NOTE — PROGRESS NOTES
Pt. Awake and alert. Report off to Kartik Batista. Restraints off to eat dinner, Room-mate at bedside. Call light in reach and bed alarm intact. Room-mate to take pt. Cell phone home to charge and bring back tomorrow. Glasses on pt. Room-mate did bring those today.

## 2020-01-16 NOTE — PROGRESS NOTES
[facility]    1/16/2020 11:21 AM        Name: Sherice Fox . Admitted: 1/13/2020  Primary Care Provider: Penny Tony MD (Tel: [phone])                        Subjective:  . No acute events overnight. Resting well. Pain control. Diet ok. Labs reviewed  Denies any chest pain sob.      Reviewed interval ancillary notes    Current Medications  levETIRAcetam (KEPPRA) tablet 500 mg, BID  mometasone-formoterol (DULERA) 200-5 MCG/ACT inhaler 2 puff, BID  pantoprazole (PROTONIX) tablet 40 mg, QAM AC  potassium chloride (KLOR-CON M) extended release tablet 40 mEq, PRN    Or  potassium bicarb-citric acid (EFFER-K) effervescent tablet 40 mEq, PRN    Or  potassium chloride 10 mEq/100 mL IVPB (Peripheral Line), PRN  magnesium sulfate 1 g in dextrose 5% 100 mL IVPB, PRN  [START ON 1/17/2020] vitamin B-1 (THIAMINE) tablet 100 mg, Daily  amLODIPine (NORVASC) tablet 5 mg, Daily  atorvastatin (LIPITOR) tablet 20 mg, Nightly  chlordiazePOXIDE (LIBRIUM) capsule 25 mg, TID  hydrALAZINE (APRESOLINE) injection 10 mg, Q6H PRN  metoprolol (LOPRESSOR) injection 2.5 mg, Q6H PRN  dexmedetomidine (PRECEDEX) 400 mcg in sodium chloride 0.9 % 100 mL infusion, Continuous  LORazepam (ATIVAN) tablet 1 mg, Q1H PRN    Or  LORazepam (ATIVAN) injection 1 mg, Q1H PRN    Or  LORazepam (ATIVAN) tablet 2 mg, Q1H PRN    Or  LORazepam (ATIVAN) injection 2 mg, Q1H PRN    Or  LORazepam (ATIVAN) tablet 3 mg, Q1H PRN    Or  LORazepam (ATIVAN) injection 3 mg, Q1H PRN    Or  LORazepam (ATIVAN) tablet 4 mg, Q1H PRN    Or  LORazepam (ATIVAN) injection 4 mg, Q1H PRN  albuterol sulfate  (90 Base) MCG/ACT inhaler 2 puff, Q4H PRN  propranolol (INDERAL) tablet 40 mg, BID  sodium chloride flush 0.9 % injection 10 mL, 2 times per day  sodium chloride flush 0.9 % injection 10 mL, PRN  magnesium hydroxide (MILK OF MAGNESIA) 400 MG/5ML suspension 30 mL, Daily PRN  ondansetron (ZOFRAN) injection 4 mg, Q6H PRN  acetaminophen (TYLENOL) tablet 950 mg, K2X PRN  folic acid (FOLVITE) tablet 1 mg, Daily  multivitamin 1 tablet, Daily        Objective:  BP (!) 141/97   Pulse 82   Temp 97.4 °F (36.3 °C) (Temporal)   Resp 15   Ht 5' 11\" (1.803 m)   Wt 176 lb 2.4 oz (79.9 kg)   SpO2 98%   BMI 24.57 kg/m²     Intake/Output Summary (Last 24 hours) at 1/16/2020 1121  Last data filed at 1/16/2020 0600  Gross per 24 hour   Intake 1386 ml   Output 375 ml   Net 1011 ml      Wt Readings from Last 3 Encounters:   01/16/20 176 lb 2.4 oz (79.9 kg)   11/20/19 182 lb (82.6 kg)   10/22/19 185 lb (83.9 kg)       General appearance:  Appears comfortable  Eyes: Sclera clear. Pupils equal.  ENT: Moist oral mucosa. Trachea midline, no adenopathy. Cardiovascular: Regular rhythm, normal S1, S2. No murmur. No edema in lower extremities  Respiratory: Not using accessory muscles. Good inspiratory effort. Clear to auscultation bilaterally, no wheeze or crackles. GI: Abdomen soft, no tenderness, not distended, normal bowel sounds  Musculoskeletal: No cyanosis in digits, neck supple  Neurology: CN 2-12 grossly intact. No speech or motor deficits  Psych: Normal affect.  Alert and oriented in time, place and person  Skin: Warm, dry, normal turgor    Labs and Tests:  CBC:   Recent Labs     01/13/20  1858 01/14/20  0510   WBC 7.3 5.0   HGB 16.4 14.2   PLT 85* 57*     BMP:    Recent Labs     01/14/20  0510 01/15/20  0447 01/16/20  0501    141 138   K 3.3* 3.7 3.3*    103 102   CO2 24 25 23   BUN 5* 4* 9   CREATININE 0.7* 0.7* 0.7*   GLUCOSE 95 86 98     Hepatic:   Recent Labs     01/14/20  0510   AST 80*   ALT 54*   BILITOT 1.4*   ALKPHOS 58       Discussed care with family and patient             Spent 30  minutes with patient and family at bedside and on unit reviewing medical records and labs, spent greater than 50% time counseling patient and family on diagnosis and plan   Problem List  Principal Problem:    Seizure disorder [facility])  Active Problems: Thrombocytopenia (HCC)    High anion gap metabolic acidosis    Lactic acidosis    Alcohol abuse    Acute encephalopathy    Partial idiopathic epilepsy with seizures of localized onset, not intractable, without status epilepticus ([facility] [address]    HTN (hypertension), benign  Resolved Problems:    * No resolved hospital problems. *       Assessment & Plan:   1. Acute alcohol withdrawal  -Improved significantly.   Doing better  Precedex drip has been turned down   -Plan for discharge later today if improving s      Seizure  -Likely alcohol withdrawal none since admission monitor closely  Continue Keppra  -      Diet: DIET GENERAL;  Code:Full Code  DVT PPX lovenox       Fracisco Plata MD   1/16/2020 11:21 AM

## 2020-01-16 NOTE — PROGRESS NOTES
Bill Guardado  Neurology Follow-up  [facility]    Date of Service: 1/16/2020    Subjective:   CC: Follow up today regarding: Acute encephalopathy, breakthrough seizure and DT    Events noted. Chart and lab reviewed. The patient is awake and alert today. He is back to his baseline. He denies any headache, double vision or blurred vision or weakness or numbness. He wants to go home. Other review of system was unremarkable. ROS : A 10-12 system review could not be obtained due to poor cooperation and confusion. family history includes High Blood Pressure in his mother.     Past Medical History:   Diagnosis Date    Asthma     GERD (gastroesophageal reflux disease)     Hypertension      Current Facility-Administered Medications   Medication Dose Route Frequency Provider Last Rate Last Dose    levETIRAcetam (KEPPRA) tablet 500 mg  500 mg Oral BID Roseann Mishra MD   500 mg at 01/16/20 0836    mometasone-formoterol (Wendy Toussaint) 200-5 MCG/ACT inhaler 2 puff  2 puff Inhalation BID Rosario Parra MD   2 puff at 01/16/20 0901    pantoprazole (PROTONIX) tablet 40 mg  40 mg Oral QAM AC Rosario Parra MD   40 mg at 01/16/20 0836    potassium chloride (KLOR-CON M) extended release tablet 40 mEq  40 mEq Oral PRN Graciela Villanueva MD   40 mEq at 01/16/20 0836    Or    potassium bicarb-citric acid (EFFER-K) effervescent tablet 40 mEq  40 mEq Oral PRN Graciela Villanueva MD        Or    potassium chloride 10 mEq/100 mL IVPB (Peripheral Line)  10 mEq Intravenous PRN Graciela Villanueva MD        magnesium sulfate 1 g in dextrose 5% 100 mL IVPB  1 g Intravenous PRN Graciela Villanueva MD        [START ON 1/17/2020] vitamin B-1 (THIAMINE) tablet 100 mg  100 mg Oral Daily Graciela Villanueva MD        amLODIPine (NORVASC) tablet 5 mg  5 mg Oral Daily Graciela Villnaueva MD   5 mg at 01/16/20 0836    atorvastatin (LIPITOR) tablet 20 mg  20 mg Oral Nightly Marvin Santiago Trini Holland MD   20 mg at 01/15/20 1947    chlordiazePOXIDE (LIBRIUM) capsule 25 mg  25 mg Oral TID Umang Panda MD   25 mg at 01/16/20 0836    hydrALAZINE (APRESOLINE) injection 10 mg  10 mg Intravenous Q6H PRN Sarkis Eugene MD   10 mg at 01/15/20 0003    metoprolol (LOPRESSOR) injection 2.5 mg  2.5 mg Intravenous Q6H PRN Sarkis Eugene MD        dexmedetomidine (PRECEDEX) 400 mcg in sodium chloride 0.9 % 100 mL infusion  0.2 mcg/kg/hr Intravenous Continuous Maia Balderas MD   Stopped at 01/16/20 0837    LORazepam (ATIVAN) tablet 1 mg  1 mg Oral Q1H PRN Rosario Parra MD        Or    LORazepam (ATIVAN) injection 1 mg  1 mg Intravenous Q1H PRN Rosario Parra MD   1 mg at 01/14/20 1644    Or    LORazepam (ATIVAN) tablet 2 mg  2 mg Oral Q1H PRN Rosario Parra MD        Or    LORazepam (ATIVAN) injection 2 mg  2 mg Intravenous Q1H PRN Rosario Parra MD   2 mg at 01/14/20 1841    Or    LORazepam (ATIVAN) tablet 3 mg  3 mg Oral Q1H PRN Rosario Parra MD        Or    LORazepam (ATIVAN) injection 3 mg  3 mg Intravenous Q1H PRN Rosario Parra MD        Or    LORazepam (ATIVAN) tablet 4 mg  4 mg Oral Q1H PRN Rosario Parra MD        Or    LORazepam (ATIVAN) injection 4 mg  4 mg Intravenous Q1H PRN Rosario Parra MD   4 mg at 01/14/20 2201    albuterol sulfate  (90 Base) MCG/ACT inhaler 2 puff  2 puff Inhalation Q4H PRN Rosario Parra MD   2 puff at 01/15/20 2003    propranolol (INDERAL) tablet 40 mg  40 mg Oral BID Rosario Parra MD   40 mg at 01/16/20 0836    sodium chloride flush 0.9 % injection 10 mL  10 mL Intravenous 2 times per day Rosario Parra MD   10 mL at 01/16/20 0837    sodium chloride flush 0.9 % injection 10 mL  10 mL Intravenous PRN Rosario Parra MD   10 mL at 01/14/20 1449    magnesium hydroxide (MILK OF MAGNESIA) 400 MG/5ML suspension 30 mL  30 mL Oral Daily PRN MD Lucio Albrecht ondansetron (ZOFRAN) injection 4 mg  4 mg Intravenous Q6H PRN Rosario Parra MD        acetaminophen (TYLENOL) tablet 650 mg  650 mg Oral Q4H PRN Rosario Parra MD        folic acid (FOLVITE) tablet 1 mg  1 mg Oral Daily Rosario Parra MD   1 mg at 01/16/20 0836    multivitamin 1 tablet  1 tablet Oral Daily Rosario Parra MD   1 tablet at 01/16/20 9700     Allergies   Allergen Reactions    Symbicort [Budesonide-Formoterol Fumarate] Other (See Comments)     Lung infection      reports that he has never smoked. He has never used smokeless tobacco. He reports current alcohol use. He reports that he does not use drugs. Exam:   Constitutional:   Vitals:    01/16/20 0500 01/16/20 0600 01/16/20 0834 01/16/20 0903   BP: (!) 142/91 (!) 147/83 (!) 141/97    Pulse: 66 57 82    Resp: 14 14 18 15   Temp:   97.4 °F (36.3 °C)    TempSrc:   Temporal    SpO2:   100% 98%   Weight:       Height:           General appearance:  Normal development and appear in no acute distress. Eye: No icterus. Neck: supple  Cardiovascular:      No lower leg edema with good pulsation. Mental Status:   Oriented to person, place, problem, and time. Memory: Aware of recent and remote event. Good immediate recall. Intact remote memory  Normal attention span and concentration. Language: intact naming, repeating and fluency   Good fund of Knowledge. Aware of current events and vocabulary   Cranial Nerves:   II: V Pupils: equal, round, reactive to light  III,IV,VI: Extra Ocular Movements are intact. No nystagmus  V: Facial sensation is intact to pin prick and light touch  VII: Facial strength and movements: intact and symmetric  VIII: Hearing: Intact to finger rub bilaterally  IX: Palate elevation is symmetric  XI: Shoulder shrug is intact  XII: Tongue movements are normal  Musculoskeletal: 5/5 in all 4 extremities. Tone: Normal tone.    Reflexes: Bilateral biceps 2/4, triceps 2/4, brachial radialis 2/4, knee 2/4

## 2020-01-16 NOTE — PROGRESS NOTES
Reassessment and VS completed. See flowsheet. Assisted pt to BR, same as before. Still A&O and calm. Pt cleaned himself up in BR with bath wipes, wilfredo care, brushed teeth. New gown. New bed pad- back to bed safely. Sitting up on side of bed. Lemon-lime soda provided. Denies further needs. Call light in reach.

## 2020-01-17 LAB
BLOOD CULTURE, ROUTINE: NORMAL
CULTURE, BLOOD 2: NORMAL

## 2020-01-17 NOTE — DISCHARGE SUMMARY
[facility] DISCHARGE SUMMARY    Patient Demographics    Patient. Izzy Hamilton  Date of Birth. 1975  MRN. [id number]     Primary care provider. Jacqui Bales MD  (Tel: [phone])    Admit date: 1/13/2020    Discharge date (blank if same as Note Date): 1/16/2020  Note Date: 1/17/2020     Reason for Hospitalization. Chief Complaint   Patient presents with    Seizures     pt brought in by squad. pt had witnessed seizure by friend at home. pt with ETOH present on scene per squad. pt does not recall event. [facility]. Seizrue  - started on keppra    ETOh withdrawal  - needed predex gtt  - discharged stable after withdrawal resoved     Consults. IP CONSULT TO HOSPITALIST  IP CONSULT TO NEUROLOGY  IP CONSULT TO CRITICAL CARE    Physical examination on discharge day. BP (!) 141/97   Pulse 82   Temp 97.4 °F (36.3 °C) (Temporal)   Resp 15   Ht 5' 11\" (1.803 m)   Wt 176 lb 2.4 oz (79.9 kg)   SpO2 98%   BMI 24.57 kg/m²   General appearance. Alert. Looks comfortable. HEENT. Sclera clear. Moist mucus membranes. Cardiovascular. Regular rate and rhythm, normal S1, S2. No murmur. Respiratory. Not using accessory muscles. Clear to auscultation bilaterally, no wheeze. Gastrointestinal. Abdomen soft, non-tender, not distended, normal bowel sounds  Neurology. Facial symmetry. No speech deficits. Moving all extremities equally. Extremities. No edema in lower extremities. Skin. Warm, dry, normal turgor    Condition at time of discharge stable     Medication instructions provided to patient at discharge.      Medication List      START taking these medications    levETIRAcetam 500 MG tablet  Commonly known as:  KEPPRA  Take 1 tablet by mouth 2 times daily        CONTINUE taking these medications    * ADVAIR HFA IN     * fluticasone-salmeterol 500-50 MCG/DOSE diskus

## 2020-01-20 ENCOUNTER — TELEPHONE (OUTPATIENT)
Dept: FAMILY MEDICINE CLINIC | Age: 45
End: 2020-01-20

## 2020-01-20 NOTE — TELEPHONE ENCOUNTER
[place] requested last ov (11/20/19) to be faxed to [facility] [place]    FAXED ([phone])

## 2020-01-22 ENCOUNTER — OFFICE VISIT (OUTPATIENT)
Dept: FAMILY MEDICINE CLINIC | Age: 45
End: 2020-01-22
Payer: COMMERCIAL

## 2020-01-22 VITALS
DIASTOLIC BLOOD PRESSURE: 90 MMHG | OXYGEN SATURATION: 94 % | SYSTOLIC BLOOD PRESSURE: 140 MMHG | WEIGHT: 182 LBS | BODY MASS INDEX: 25.38 KG/M2 | HEART RATE: 90 BPM

## 2020-01-22 PROBLEM — F10.10 ALCOHOL ABUSE: Status: RESOLVED | Noted: 2020-01-13 | Resolved: 2020-01-22

## 2020-01-22 PROCEDURE — 1111F DSCHRG MED/CURRENT MED MERGE: CPT | Performed by: FAMILY MEDICINE

## 2020-01-22 PROCEDURE — 99214 OFFICE O/P EST MOD 30 MIN: CPT | Performed by: FAMILY MEDICINE

## 2020-01-22 ASSESSMENT — PATIENT HEALTH QUESTIONNAIRE - PHQ9
1. LITTLE INTEREST OR PLEASURE IN DOING THINGS: 0
2. FEELING DOWN, DEPRESSED OR HOPELESS: 0
SUM OF ALL RESPONSES TO PHQ9 QUESTIONS 1 & 2: 0
SUM OF ALL RESPONSES TO PHQ QUESTIONS 1-9: 0
SUM OF ALL RESPONSES TO PHQ QUESTIONS 1-9: 0

## 2020-01-31 ENCOUNTER — TELEPHONE (OUTPATIENT)
Dept: FAMILY MEDICINE CLINIC | Age: 45
End: 2020-01-31

## 2020-02-03 ENCOUNTER — TELEPHONE (OUTPATIENT)
Dept: FAMILY MEDICINE CLINIC | Age: 45
End: 2020-02-03

## 2020-02-03 RX ORDER — ALBUTEROL SULFATE 2.5 MG/3ML
2.5 SOLUTION RESPIRATORY (INHALATION) EVERY 6 HOURS PRN
Qty: 120 EACH | Refills: 3 | Status: SHIPPED | OUTPATIENT
Start: 2020-02-03 | End: 2021-01-11

## 2020-02-03 NOTE — TELEPHONE ENCOUNTER
Pt states he spoke with Dr Noe Goetz about medication for his breathing machine. Pt states he has been given Albuterol Sulfate Inhalation Solution 0.083% 2.5mg/3ml.     Pt would like to have a refill sent to [facility]

## 2020-02-07 ENCOUNTER — TELEPHONE (OUTPATIENT)
Dept: FAMILY MEDICINE CLINIC | Age: 45
End: 2020-02-07

## 2020-02-13 ENCOUNTER — TELEPHONE (OUTPATIENT)
Dept: FAMILY MEDICINE CLINIC | Age: 45
End: 2020-02-13

## 2020-02-13 NOTE — TELEPHONE ENCOUNTER
A referral was sent to [facility] on 9/18/19 from Dr. Quinn Sotelo with a diagnosis of syncope. Is there another reason for this referral relating to the endocrine system?

## 2020-02-18 ENCOUNTER — TELEPHONE (OUTPATIENT)
Dept: FAMILY MEDICINE CLINIC | Age: 45
End: 2020-02-18

## 2020-02-18 NOTE — TELEPHONE ENCOUNTER
Pt calling in letting us know he recently switched to [facility], he is stating that Dr. Pete Yost had informed him he would continue to see him and the insurance would still cover his visits. Wanted to verify if this was correct. He is also seeing a Urologist (Dr. Neil Has w/ [facility]) wanted to know if a referral will be neccessary with this insurance to continue seeing him.  Please call pt to discuss

## 2020-05-28 RX ORDER — ALBUTEROL SULFATE 90 UG/1
POWDER, METERED RESPIRATORY (INHALATION)
Qty: 1 INHALER | Refills: 3 | Status: SHIPPED | OUTPATIENT
Start: 2020-05-28 | End: 2020-10-19

## 2020-10-19 RX ORDER — ALBUTEROL SULFATE 90 UG/1
POWDER, METERED RESPIRATORY (INHALATION)
Qty: 1 INHALER | Refills: 3 | Status: SHIPPED | OUTPATIENT
Start: 2020-10-19 | End: 2020-10-26

## 2020-10-20 ENCOUNTER — OFFICE VISIT (OUTPATIENT)
Dept: PRIMARY CARE CLINIC | Age: 45
End: 2020-10-20
Payer: COMMERCIAL

## 2020-10-20 PROCEDURE — G8428 CUR MEDS NOT DOCUMENT: HCPCS | Performed by: NURSE PRACTITIONER

## 2020-10-20 PROCEDURE — 99211 OFF/OP EST MAY X REQ PHY/QHP: CPT | Performed by: NURSE PRACTITIONER

## 2020-10-20 PROCEDURE — G8420 CALC BMI NORM PARAMETERS: HCPCS | Performed by: NURSE PRACTITIONER

## 2020-10-20 NOTE — PATIENT INSTRUCTIONS

## 2020-10-23 ENCOUNTER — TELEPHONE (OUTPATIENT)
Dept: FAMILY MEDICINE CLINIC | Age: 45
End: 2020-10-23

## 2020-10-23 LAB — SARS-COV-2: DETECTED

## 2020-10-23 NOTE — TELEPHONE ENCOUNTER
Pt states he tested positive for covid and he would like to know how to move forward. Please call back and advise.

## 2020-10-26 ENCOUNTER — VIRTUAL VISIT (OUTPATIENT)
Dept: FAMILY MEDICINE CLINIC | Age: 45
End: 2020-10-26
Payer: COMMERCIAL

## 2020-10-26 PROCEDURE — 99213 OFFICE O/P EST LOW 20 MIN: CPT | Performed by: FAMILY MEDICINE

## 2020-10-26 PROCEDURE — G8427 DOCREV CUR MEDS BY ELIG CLIN: HCPCS | Performed by: FAMILY MEDICINE

## 2020-10-26 PROCEDURE — G8484 FLU IMMUNIZE NO ADMIN: HCPCS | Performed by: FAMILY MEDICINE

## 2020-10-26 PROCEDURE — 1036F TOBACCO NON-USER: CPT | Performed by: FAMILY MEDICINE

## 2020-10-26 PROCEDURE — G8420 CALC BMI NORM PARAMETERS: HCPCS | Performed by: FAMILY MEDICINE

## 2020-10-26 RX ORDER — ALBUTEROL SULFATE 90 UG/1
2 AEROSOL, METERED RESPIRATORY (INHALATION) 4 TIMES DAILY PRN
Qty: 1 INHALER | Refills: 5 | Status: SHIPPED | OUTPATIENT
Start: 2020-10-26 | End: 2021-08-18

## 2020-10-26 RX ORDER — ALBUTEROL SULFATE 2.5 MG/3ML
2.5 SOLUTION RESPIRATORY (INHALATION) EVERY 6 HOURS PRN
Qty: 120 EACH | Refills: 3 | Status: CANCELLED | OUTPATIENT
Start: 2020-10-26

## 2020-10-26 RX ORDER — DOXYCYCLINE HYCLATE 100 MG
100 TABLET ORAL 2 TIMES DAILY
Qty: 20 TABLET | Refills: 0 | Status: SHIPPED | OUTPATIENT
Start: 2020-10-26 | End: 2020-11-05

## 2020-10-26 NOTE — PROGRESS NOTES
the defined types were placed in this encounter. No follow-ups on file.     Saint Provost, MD    10/26/2020  5:13 PM

## 2021-01-29 DIAGNOSIS — G25.0 ESSENTIAL TREMOR: ICD-10-CM

## 2021-01-29 RX ORDER — PROPRANOLOL HYDROCHLORIDE 40 MG/1
TABLET ORAL
Qty: 60 TABLET | Refills: 2 | Status: SHIPPED | OUTPATIENT
Start: 2021-01-29 | End: 2022-03-22

## 2021-08-18 DIAGNOSIS — J45.40 MODERATE PERSISTENT ASTHMA WITHOUT COMPLICATION: ICD-10-CM

## 2021-08-18 RX ORDER — ALBUTEROL SULFATE 90 UG/1
AEROSOL, METERED RESPIRATORY (INHALATION)
Qty: 1 INHALER | Refills: 2 | Status: SHIPPED | OUTPATIENT
Start: 2021-08-18 | End: 2021-12-17

## 2021-08-18 NOTE — TELEPHONE ENCOUNTER
Medication:   Requested Prescriptions     Pending Prescriptions Disp Refills    albuterol sulfate  (90 Base) MCG/ACT inhaler [Pharmacy Med Name: ALBUTEROL HFA 90 MCG INHALER] 18 g      Sig: INHALE TWO PUFFS BY MOUTH FOUR TIMES A DAY AS NEEDED FOR WHEEZING        Last Filled:  10/26/20 #1 inhaler, 5 RF     Patient Phone Number: [phone] (home)     Last appt: 10/26/2020 cough   Next appt: Visit date not found

## 2021-09-30 ENCOUNTER — NURSE TRIAGE (OUTPATIENT)
Dept: OTHER | Facility: CLINIC | Age: 46
End: 2021-09-30

## 2021-09-30 ENCOUNTER — VIRTUAL VISIT (OUTPATIENT)
Dept: FAMILY MEDICINE CLINIC | Age: 46
End: 2021-09-30
Payer: COMMERCIAL

## 2021-09-30 DIAGNOSIS — R05.9 COUGH: Primary | ICD-10-CM

## 2021-09-30 PROCEDURE — 99213 OFFICE O/P EST LOW 20 MIN: CPT | Performed by: NURSE PRACTITIONER

## 2021-09-30 RX ORDER — LEVOFLOXACIN 500 MG/1
500 TABLET, FILM COATED ORAL DAILY
Qty: 7 TABLET | Refills: 0 | Status: SHIPPED | OUTPATIENT
Start: 2021-09-30 | End: 2021-10-07

## 2021-09-30 RX ORDER — PREDNISONE 20 MG/1
40 TABLET ORAL DAILY
Qty: 10 TABLET | Refills: 0 | Status: SHIPPED | OUTPATIENT
Start: 2021-09-30 | End: 2021-10-05

## 2021-09-30 SDOH — ECONOMIC STABILITY: FOOD INSECURITY: WITHIN THE PAST 12 MONTHS, YOU WORRIED THAT YOUR FOOD WOULD RUN OUT BEFORE YOU GOT MONEY TO BUY MORE.: NEVER TRUE

## 2021-09-30 SDOH — ECONOMIC STABILITY: FOOD INSECURITY: WITHIN THE PAST 12 MONTHS, THE FOOD YOU BOUGHT JUST DIDN'T LAST AND YOU DIDN'T HAVE MONEY TO GET MORE.: NEVER TRUE

## 2021-09-30 ASSESSMENT — ENCOUNTER SYMPTOMS
NAUSEA: 0
SINUS PRESSURE: 0
SORE THROAT: 0
VOMITING: 0
DIARRHEA: 0
CHEST TIGHTNESS: 0
CHOKING: 0
COUGH: 1
WHEEZING: 1
SINUS PAIN: 0
SHORTNESS OF BREATH: 1

## 2021-09-30 ASSESSMENT — PATIENT HEALTH QUESTIONNAIRE - PHQ9
SUM OF ALL RESPONSES TO PHQ QUESTIONS 1-9: 0
1. LITTLE INTEREST OR PLEASURE IN DOING THINGS: 0
2. FEELING DOWN, DEPRESSED OR HOPELESS: 0
SUM OF ALL RESPONSES TO PHQ QUESTIONS 1-9: 0
SUM OF ALL RESPONSES TO PHQ QUESTIONS 1-9: 0
SUM OF ALL RESPONSES TO PHQ9 QUESTIONS 1 & 2: 0

## 2021-09-30 ASSESSMENT — SOCIAL DETERMINANTS OF HEALTH (SDOH): HOW HARD IS IT FOR YOU TO PAY FOR THE VERY BASICS LIKE FOOD, HOUSING, MEDICAL CARE, AND HEATING?: HARD

## 2021-09-30 NOTE — PROGRESS NOTES
2021    TELEHEALTH EVALUATION -- Audio/Visual (During [facility]- public health emergency)    Tony Rogers (:  ) has requested an audio/video evaluation for the following concern(s):    Has been sick for three days. Cough with sputum - clear to yellow to green now. Shortness of breath for two days and wheezing. Positive for headache. Can walk about 30 feet and then gets pretty winded. Has been using his inhaler at least twice per day and using his nebulizer every twelve hours. Denies fever, loss of taste or smell,  GI issues. Had covid last year, has not been vaccinated. Review of Systems   Constitutional: Negative for chills, diaphoresis, fatigue and fever. HENT: Positive for congestion. Negative for ear discharge, ear pain, sinus pressure, sinus pain and sore throat. Respiratory: Positive for cough, shortness of breath and wheezing. Negative for choking and chest tightness. Cardiovascular: Negative for chest pain and leg swelling. Gastrointestinal: Negative for diarrhea, nausea and vomiting. Neurological: Positive for headaches. Prior to Visit Medications    Medication Sig Taking?  Authorizing Provider   predniSONE (DELTASONE) 20 MG tablet Take 2 tablets by mouth daily for 5 days Yes BRUCE Pederson CNP   levoFLOXacin (LEVAQUIN) 500 MG tablet Take 1 tablet by mouth daily for 7 days Yes BRUCE Pederson CNP   albuterol sulfate  (90 Base) MCG/ACT inhaler INHALE TWO PUFFS BY MOUTH FOUR TIMES A DAY AS NEEDED FOR WHEEZING Yes Mikey Gregorio MD   fluticasone-salmeterol (ADVAIR) 500-50 MCG/DOSE diskus inhaler INHALE ONE PUFF BY MOUTH EVERY 12 HOURS Yes Mikey Gregorio MD   propranolol (INDERAL) 40 MG tablet TAKE ONE TABLET BY MOUTH TWICE A DAY Yes Mikey Gregorio MD   albuterol (PROVENTIL) (2.5 MG/3ML) 0.083% nebulizer solution USE THREE MILLILITERS VIA NEBULIZATION BY MOUTH EVERY 6 HOURS AS NEEDED FOR WHEEZING Yes Mikey Gregorio MD   ALPRAZolam (XANAX PO) Take by mouth 3 times daily as needed Yes Historical Provider, MD   hydrocortisone 2.5 % cream Apply topically 2 times daily. Yes Mary Carmen Javier MD   levETIRAcetam (KEPPRA) 500 MG tablet Take 1 tablet by mouth 2 times daily Yes Marcia Jacobo MD   cetirizine (ZYRTEC) 10 MG tablet Take 10 mg by mouth daily Yes Historical Provider, MD   omeprazole (PRILOSEC) 20 MG delayed release capsule Take 1 capsule by mouth 2 times daily (before meals) Yes Dorian Barboza MD   fluticasone (FLONASE) 50 MCG/ACT nasal spray 1 spray by Nasal route daily Yes Historical Provider, MD     Past Medical History:   Diagnosis Date    Asthma     GERD (gastroesophageal reflux disease)     Hypertension     Seizure ([address]     LAST SEIZURE, JANUARY 2020. FOLLOWS WITH NEUROLOGIST, DR Car Chand     Past Surgical History:   Procedure Laterality Date    UPPER GASTROINTESTINAL ENDOSCOPY N/A 5/9/2019    EGD BIOPSY performed by Dorian Barboza MD at [address]     Family History   Problem Relation Age of Onset    High Blood Pressure Mother      Allergies   Allergen Reactions    Symbicort [Budesonide-Formoterol Fumarate] Other (See Comments)     Lung infection     Social History     Tobacco Use    Smoking status: Never Smoker    Smokeless tobacco: Never Used   Vaping Use    Vaping Use: Never used   Substance Use Topics    Alcohol use: Yes     Comment: OCCAS    Drug use: No        PHYSICAL EXAMINATION:  Vital Signs: (As obtained by patient/caregiver or practitioner observation)  There were no vitals taken for this visit. Respiratory rate appears normal  Constitutional: Appears well-developed and well-nourished. No apparent distress    Mental status: Alert and awake. Oriented to person/place/fortunato. Able to follow commands    Eyes: EOM normal. Sclera normal. No discharge visible  HENT: Normocephalic, atraumatic.    Mouth/Throat: Mucous membranes are moist. External Ears Normal    Neck: No visualized mass   Pulmonary/Chest: encounter: 20 minutes    Services were provided through a video synchronous discussion virtually to substitute for in-person clinic visit. Patient and provider were located at their individual homes. --BRUCE Gunn CNP on 9/30/2021 at 1:15 PM    An electronic signature was used to authenticate this note. Wilson Orlando

## 2021-09-30 NOTE — PATIENT INSTRUCTIONS
[address] [facility]  [address] [place] [address], [address] [place] [address]  Tel: [phone]  Testing can be done 8:00-12:00

## 2021-09-30 NOTE — TELEPHONE ENCOUNTER
Reason for Disposition   MILD difficulty breathing (e.g., minimal/no SOB at rest, SOB with walking, pulse < 100) of new onset or worse than normal    Answer Assessment - Initial Assessment Questions  1. RESPIRATORY STATUS: \"Describe your breathing? \" (e.g., wheezing, shortness of breath, unable to speak, severe coughing)       Wheezing is \"quite often\" (more than usual), shortness of breath is \"not too bad \" right now , but last night worse    2. ONSET: \"When did this breathing problem begin? \"       Initially when he was 13years old ( has asthma) but lately worse , that started Tuesday with coughing up secretions (yesterday it was yellow) today it was thick and green    3. PATTERN \"Does the difficult breathing come and go, or has it been constant since it started? \"       Comes and goes    4. SEVERITY: \"How bad is your breathing? \" (e.g., mild, moderate, severe)     - MILD: No SOB at rest, mild SOB with walking, speaks normally in sentences, can lay down, no retractions, pulse < 100.     - MODERATE: SOB at rest, SOB with minimal exertion and prefers to sit, cannot lie down flat, speaks in phrases, mild retractions, audible wheezing, pulse 100-120.     - SEVERE: Very SOB at rest, speaks in single words, struggling to breathe, sitting hunched forward, retractions, pulse > 120       No sob at rest, but wheezing noted    5. RECURRENT SYMPTOM: \"Have you had difficulty breathing before? \" If so, ask: \"When was the last time? \" and \"What happened that time? \"       Yes , has had in past, but this is worse    6. CARDIAC HISTORY: \"Do you have any history of heart disease? \" (e.g., heart attack, angina, bypass surgery, angioplasty)       Denies    7. LUNG HISTORY: \"Do you have any history of lung disease? \"  (e.g., pulmonary embolus, asthma, emphysema)      Takes breathing treatments daily . H/o asthma, URI    8. CAUSE: \"What do you think is causing the breathing problem? \"       Bronchitis ?     9. OTHER SYMPTOMS: \"Do you have any other symptoms? (e.g., dizziness, runny nose, cough, chest pain, fever)      To pt, he feels like he has bronchitis. Cough with colored mucus, meds he's on makes him dizzy (nothing out of his norm), denies fever    10. PREGNANCY: \"Is there any chance you are pregnant? \" \"When was your last menstrual period? \"        No     11. TRAVEL: \"Have you traveled out of the country in the last month? \" (e.g., travel history, exposures)        no    Protocols used: BREATHING DIFFICULTY-ADULT-[place]    Received call from [address]  at [facility] with Red Flag Complaint. Brief description of triage: see above    Triage indicates for patient to be seen now. This RN recommended going to  if no available apt as recommended. Care advice provided, patient verbalizes understanding; denies any other questions or concerns; instructed to call back for any new or worsening symptoms. Writer provided warm transfer to [facility] at [facility] for appointment scheduling. Attention Provider: Thank you for allowing me to participate in the care of your patient. The patient was connected to triage in response to information provided to the [facility]. Please do not respond through this encounter as the response is not directed to a shared pool.

## 2021-10-05 LAB — SARS-COV-2: NOT DETECTED

## 2021-12-17 DIAGNOSIS — J45.40 MODERATE PERSISTENT ASTHMA WITHOUT COMPLICATION: ICD-10-CM

## 2021-12-17 RX ORDER — ALBUTEROL SULFATE 90 UG/1
AEROSOL, METERED RESPIRATORY (INHALATION)
Qty: 18 G | Refills: 2 | Status: SHIPPED | OUTPATIENT
Start: 2021-12-17 | End: 2022-05-16

## 2021-12-17 NOTE — TELEPHONE ENCOUNTER
Medication:   Requested Prescriptions     Pending Prescriptions Disp Refills    albuterol sulfate  (90 Base) MCG/ACT inhaler [Pharmacy Med Name: ALBUTEROL HFA 90 MCG INHALER] 18 g      Sig: INHALE TWO PUFFS BY MOUTH FOUR TIMES A DAY AS NEEDED FOR WHEEZING        Last Filled:  08/18/2021 #1 2rf    Patient Phone Number: [phone] (home)     Last appt: 9/30/2021   Next appt: Visit date not found    Last OARRS: No flowsheet data found.

## 2022-01-04 ENCOUNTER — NURSE TRIAGE (OUTPATIENT)
Dept: OTHER | Facility: CLINIC | Age: 47
End: 2022-01-04

## 2022-01-04 ENCOUNTER — VIRTUAL VISIT (OUTPATIENT)
Dept: FAMILY MEDICINE CLINIC | Age: 47
End: 2022-01-04
Payer: COMMERCIAL

## 2022-01-04 ENCOUNTER — TELEPHONE (OUTPATIENT)
Dept: FAMILY MEDICINE CLINIC | Age: 47
End: 2022-01-04
Payer: COMMERCIAL

## 2022-01-04 DIAGNOSIS — R05.9 COUGH: Primary | ICD-10-CM

## 2022-01-04 DIAGNOSIS — J45.40 MODERATE PERSISTENT ASTHMA WITHOUT COMPLICATION: ICD-10-CM

## 2022-01-04 PROCEDURE — 1036F TOBACCO NON-USER: CPT | Performed by: STUDENT IN AN ORGANIZED HEALTH CARE EDUCATION/TRAINING PROGRAM

## 2022-01-04 PROCEDURE — 87804 INFLUENZA ASSAY W/OPTIC: CPT | Performed by: NURSE PRACTITIONER

## 2022-01-04 PROCEDURE — G8421 BMI NOT CALCULATED: HCPCS | Performed by: STUDENT IN AN ORGANIZED HEALTH CARE EDUCATION/TRAINING PROGRAM

## 2022-01-04 PROCEDURE — G8484 FLU IMMUNIZE NO ADMIN: HCPCS | Performed by: STUDENT IN AN ORGANIZED HEALTH CARE EDUCATION/TRAINING PROGRAM

## 2022-01-04 PROCEDURE — 99213 OFFICE O/P EST LOW 20 MIN: CPT | Performed by: STUDENT IN AN ORGANIZED HEALTH CARE EDUCATION/TRAINING PROGRAM

## 2022-01-04 PROCEDURE — G8427 DOCREV CUR MEDS BY ELIG CLIN: HCPCS | Performed by: STUDENT IN AN ORGANIZED HEALTH CARE EDUCATION/TRAINING PROGRAM

## 2022-01-04 RX ORDER — GUAIFENESIN AND DEXTROMETHORPHAN HYDROBROMIDE 1200; 60 MG/1; MG/1
1 TABLET, EXTENDED RELEASE ORAL EVERY 12 HOURS PRN
Qty: 28 TABLET | Refills: 0 | Status: SHIPPED | OUTPATIENT
Start: 2022-01-04 | End: 2022-10-28

## 2022-01-04 RX ORDER — DOXYCYCLINE HYCLATE 100 MG
100 TABLET ORAL 2 TIMES DAILY
Qty: 14 TABLET | Refills: 0 | Status: SHIPPED | OUTPATIENT
Start: 2022-01-04 | End: 2022-01-11

## 2022-01-04 RX ORDER — PREDNISONE 20 MG/1
40 TABLET ORAL DAILY
Qty: 10 TABLET | Refills: 0 | Status: SHIPPED | OUTPATIENT
Start: 2022-01-04 | End: 2022-01-09

## 2022-01-04 NOTE — PROGRESS NOTES
[address] [facility] Note    Date: 1/4/2022      Assessment/Plan:     1. Cough  -     COVID-19; Future  2. Moderate persistent asthma without complication     History of asthma, concern asthma exacerbation/bronchitis  Treat with prednisone 40 for 5 days, doxycycline and Mucinex DM as needed  Albuterol as needed  To get Covid tested, going to get a kit from [employer] or [employer] and let us know if positive, quarantine for 10 days. Go to the ER if having chest pain, shortness of breath, increased work of breathing, trouble breathing, or other concerning symptoms  To follow-up if not getting better. Orders Placed This Encounter   Medications    predniSONE (DELTASONE) 20 MG tablet     Sig: Take 2 tablets by mouth daily for 5 days     Dispense:  10 tablet     Refill:  0    doxycycline hyclate (VIBRA-TABS) 100 MG tablet     Sig: Take 1 tablet by mouth 2 times daily for 7 days     Dispense:  14 tablet     Refill:  0    Dextromethorphan-guaiFENesin  MG TB12     Sig: Take 1 tablet by mouth every 12 hours as needed (cough or congestion)     Dispense:  28 tablet     Refill:  0         Return if symptoms worsen or fail to improve. Due to the current coronavirus pandemic, this telephone/video visit was insisted, with patient's  (and/or legal guardian's) consent, to reduce the patient's risk of exposure to COVID-19 and provide necessary medical care. The patient was at home while the provider was either at home or at the clinic. Services were provided through a synchronous discussion over the telephone and/or video chat to substitute for in person clinic visit, and coded as such. The patient (and/or legal guardian) has also been advised to contact this office for worsening conditions or problems, and seek emergency medical treatment and/or call 911 if deemed necessary.                  Subjective/Objective:     Chief Complaint   Patient presents with    Cough    Shortness of Breath    Fever       HPI   Symptoms started last night around 8 pm, didn't feel well, wheezing and a little hard to breath last night  Felt cold  Vomited in middle of night  Did have a fever but that has gone down  Diarrhea has gone away  Has asthma, feels like bronchitis coming on  Had something similar in 9/30/21, given prednisone 40 x 5 days and levaquin  Coughing up some mucus, yellow color  No body aches  Had COVID in 2020  Takes albuteorl and advair  No chest pain or shortness of breath    Wt Readings from Last 3 Encounters:   10/20/20 173 lb (78.5 kg)   01/22/20 182 lb (82.6 kg)   01/16/20 176 lb 2.4 oz (79.9 kg)     There is no height or weight on file to calculate BMI. BP Readings from Last 3 Encounters:   01/22/20 (!) 140/90   01/16/20 (!) 141/97   11/20/19 120/78     The ASCVD Risk score (Aurelia Verma., et al., 2013) failed to calculate for the following reasons: The systolic blood pressure is missing    ROS: denies nausea/vomiting, fevers, chills, chest pain, shortness of breath, diarrhea, constipation, blood in the urine or stool         Patient Active Problem List   Diagnosis    [facility] disorder ([facility]    Thrombocytopenia ([address]    High anion gap metabolic acidosis    Lactic acidosis    Acute encephalopathy    Partial idiopathic epilepsy with seizures of localized onset, not intractable, without status epilepticus ([address]    HTN (hypertension), benign    Moderate persistent asthma without complication     Past Medical History:   Diagnosis Date    Asthma     GERD (gastroesophageal reflux disease)     Hypertension     Seizure ([address]     LAST SEIZURE, JANUARY 2020.   FOLLOWS WITH NEUROLOGIST, DR Osmin Langston       Past Surgical History:   Procedure Laterality Date    UPPER GASTROINTESTINAL ENDOSCOPY N/A 5/9/2019    EGD BIOPSY performed by Jamison Pugh MD at [facility] ENDOSCOPY       Current Outpatient Medications   Medication Sig Dispense Refill    predniSONE (Aminta Gusman) 20 MG tablet Take 2 tablets by mouth daily for 5 days 10 tablet 0    doxycycline hyclate (VIBRA-TABS) 100 MG tablet Take 1 tablet by mouth 2 times daily for 7 days 14 tablet 0    Dextromethorphan-guaiFENesin  MG TB12 Take 1 tablet by mouth every 12 hours as needed (cough or congestion) 28 tablet 0    albuterol sulfate  (90 Base) MCG/ACT inhaler INHALE TWO PUFFS BY MOUTH FOUR TIMES A DAY AS NEEDED FOR WHEEZING 18 g 2    fluticasone-salmeterol (ADVAIR) 500-50 MCG/DOSE diskus inhaler INHALE ONE PUFF BY MOUTH EVERY 12 HOURS 1 Inhaler 2    propranolol (INDERAL) 40 MG tablet TAKE ONE TABLET BY MOUTH TWICE A DAY 60 tablet 2    albuterol (PROVENTIL) (2.5 MG/3ML) 0.083% nebulizer solution USE THREE MILLILITERS VIA NEBULIZATION BY MOUTH EVERY 6 HOURS AS NEEDED FOR WHEEZING 120 each 2    ALPRAZolam (XANAX PO) Take by mouth 3 times daily as needed      hydrocortisone 2.5 % cream Apply topically 2 times daily. 28 g 0    levETIRAcetam (KEPPRA) 500 MG tablet Take 1 tablet by mouth 2 times daily 60 tablet 3    cetirizine (ZYRTEC) 10 MG tablet Take 10 mg by mouth daily      omeprazole (PRILOSEC) 20 MG delayed release capsule Take 1 capsule by mouth 2 times daily (before meals) 60 capsule 5    fluticasone (FLONASE) 50 MCG/ACT nasal spray 1 spray by Nasal route daily       No current facility-administered medications for this visit.      Allergies   Allergen Reactions    Symbicort [Budesonide-Formoterol Fumarate] Other (See Comments)     Lung infection       Social History     Socioeconomic History    Marital status:      Spouse name: None    Number of children: None    Years of education: None    Highest education level: None   Occupational History    None   Tobacco Use    Smoking status: Never Smoker    Smokeless tobacco: Never Used   Vaping Use    Vaping Use: Never used   Substance and Sexual Activity    Alcohol use: Yes     Comment: OCCAS    Drug use: No    Sexual activity: Not Currently Partners: Female   Other Topics Concern    None   Social History Narrative    None     Social Determinants of Health     Financial Resource Strain: High Risk    Difficulty of Paying Living Expenses: Hard   Food Insecurity: No Food Insecurity    Worried About Running Out of Food in the Last Year: Never true    Luke of Food in the Last Year: Never true   Transportation Needs:     Lack of Transportation (Medical): Not on file    Lack of Transportation (Non-Medical): Not on file   Physical Activity:     Days of Exercise per Week: Not on file    Minutes of Exercise per Session: Not on file   Stress:     Feeling of Stress : Not on file   Social Connections:     Frequency of Communication with Friends and Family: Not on file    Frequency of Social Gatherings with Friends and Family: Not on file    Attends Cheondoism Services: Not on file    Active Member of [address] [place] [address] or Organizations: Not on file    Attends Club or Organization Meetings: Not on file    Marital Status: Not on file   Intimate Partner Violence:     Fear of Current or Ex-Partner: Not on file    Emotionally Abused: Not on file    Physically Abused: Not on file    Sexually Abused: Not on file   Housing Stability:     Unable to Pay for Housing in the Last Year: Not on file    Number of [place] in the Last Year: Not on file    Unstable Housing in the Last Year: Not on file     Family History   Problem Relation Age of Onset    High Blood Pressure Mother          Vitals: There were no vitals taken for this visit. Physical Exam   There were no vitals taken for this visit.   GEN:  alert and pleasant, in NAD  HEENT:  NCAT, EOM intact, no facial asymmetry   NECK:  good range of motion  RR: in NAD over video, normal respiratory rate, talking in complete sentences without getting short of breath  EXT: No rash or edema observed over video  NEURO: Alert oriented to person/place/date and time, normal mood and affect, able to follow commands  No focal changes over video     Orders Placed This Encounter   Procedures    COVID-19     Standing Status:   Future     Standing Expiration Date:   1/4/2023     Scheduling Instructions:      1) Due to current limited availability of the COVID-19 test, tests will be prioritized based on responses to questions above. Testing may be delayed due to volume. 2) Print and instruct patient to adhere to CDC home isolation program. (Link Above)              3) Set up or refer patient for a monitoring program.              4) Have patient sign up for and leverage MyChart (if not previously done). Order Specific Question:   Is this test for diagnosis or screening? Answer:   Diagnosis of ill patient     Order Specific Question:   Symptomatic for COVID-19 as defined by CDC? Answer:   Yes     Order Specific Question:   Date of Symptom Onset     Answer:   1/3/2022     Order Specific Question:   Hospitalized for COVID-19? Answer:   No     Order Specific Question:   Admitted to ICU for COVID-19? Answer:   No     Order Specific Question:   Employed in healthcare setting? Answer:   No     Order Specific Question:   Resident in a congregate (group) care setting? Answer:   No     Order Specific Question:   Pregnant: Answer:   No     Order Specific Question:   Previously tested for COVID-19? Answer: Yes       Azucena Tian MD    1/4/2022 2:00 PM    Documentation was done using voice recognition dragon software. Every effort was made to ensure accuracy; however, inadvertent, unintentional computerized transcription errors may be present.

## 2022-01-04 NOTE — TELEPHONE ENCOUNTER
Video visit with any available provider today, if no availability, patient should head to urgent care or ER    Needs Covid test flu test, oxygen level etc.

## 2022-01-04 NOTE — TELEPHONE ENCOUNTER
Received call from [address] [facility] at [facility], caller not on line. Complaint: Pt has asthma. He has COVID-like symptoms (cough, fever, chills, sweats, etc.) and suspects he may be developing bronchitis or pneumonia     Fever chills sweats     Market: [address] [place] [address] Name: [address] telephone number verified as [phone]    Connected with caller via phone, please see below triage    Received call from [address] [facility] at [facility] with Red Flag Complaint. Subjective: Caller states \"It started at 8 pm last night. I went outside to take out the garbage, and then I started having chills. I woke up in middle of night vomited and having diarrhea. I was wheezing so loud. I was sick back in Sept with similar symptoms. \"     Current Symptoms: Moderate shortness of breath (SOB at rest) with wheezing    Productive cough with yellowish phlegm (pt having difficulty coughing up phlegm)    Chest soreness from frequency and force of coughing    Pt having a hard time keeping fluids down - Zofran has been helping relieve nausea/vomiting as of this morning    No recent COVID test (pt has not been vaccinated)  COVID + back in 2020; tested negative back in Oct.      Onset: 1 day ago; worsening    Associated Symptoms: diarrhea    Pain Severity:     Temperature: 100 F    What has been tried: Albuterol and Advair inhaler and nebulizer machine (has not used nebulizer machine today);  Zofran for nausea     LMP: NA Pregnant: NA     2nd level triage completed with Kasandra from PCP office, recommends patient be seen in ED as well - Kasandra took over call to provide pt with further instruction     Recommended disposition: GO TO ED NOW: Pt refused to proceed to ER - despite multiple attempts to educate on the potential risks of delaying treatment - pt declined - second level triage to [facility] @ PCP for further instruction    Care advice provided, patient verbalizes understanding; denies any other questions or concerns; instructed to call back for any new or worsening symptoms. Writer provided warm transfer to Kasandra at [address] [place] [address] for further instruction      Attention Provider: Thank you for allowing me to participate in the care of your patient. The patient was connected to triage in response to information provided to the ECC/PSC. Please do not respond through this encounter as the response is not directed to a shared pool.     Reason for Disposition   MODERATE difficulty breathing (e.g., speaks in phrases, SOB even at rest, pulse 100-120) of new-onset or worse than normal    Protocols used: BREATHING DIFFICULTY-ADULT-[place]

## 2022-01-04 NOTE — TELEPHONE ENCOUNTER
Patient was nurse triaged for COVID-like symptoms (cough, fever, chills, sweats, etc.) and suspects he may be developing bronchitis or pneumonia. Caller states \"It started at 8 pm last night- went outside to take out garbage - having chills- woke up in middle of night vomited and having diarrhea. I was wheezing so loud. I was sick back in Sept with similar symptoms. \" He is SOB at rest. Was told to go to ED and is refusing.  Stated he would like a message sent back and a call back     Please advise

## 2022-01-04 NOTE — PATIENT INSTRUCTIONS
History of asthma, concern asthma exacerbation/bronchitis  Treat with prednisone 40 for 5 days, doxycycline and Mucinex DM as needed  Albuterol as needed  To get Covid tested, going to get a kit from [employer] or [employer] and let us know if positive, quarantine for 10 days. Go to the ER if having chest pain, shortness of breath, increased work of breathing, trouble breathing, or other concerning symptoms  To follow-up if not getting better.

## 2022-01-04 NOTE — TELEPHONE ENCOUNTER
Recommend COVID and flu testing. I ordered both  Then can do VV with me tomorrow or Thursday.  I recommend Thursday to make sure results are done by visit

## 2022-01-05 ENCOUNTER — NURSE ONLY (OUTPATIENT)
Dept: FAMILY MEDICINE CLINIC | Age: 47
End: 2022-01-05

## 2022-01-05 DIAGNOSIS — R05.9 COUGH: ICD-10-CM

## 2022-01-06 LAB — SARS-COV-2: DETECTED

## 2022-01-07 DIAGNOSIS — U07.1 COVID: Primary | ICD-10-CM

## 2022-01-07 RX ORDER — ETESEVIMAB 35 MG/ML
1400 INJECTION, SOLUTION INTRAVENOUS ONCE
Qty: 40 ML | Refills: 0 | Status: SHIPPED | OUTPATIENT
Start: 2022-01-07 | End: 2022-01-07

## 2022-01-07 RX ORDER — BAMLANIVIMAB 35 MG/ML
700 INJECTION, SOLUTION INTRAVENOUS ONCE
Qty: 20 ML | Refills: 0 | Status: SHIPPED | OUTPATIENT
Start: 2022-01-07 | End: 2022-01-07

## 2022-01-07 NOTE — PROGRESS NOTES
Order signed, please notify patient. Spoke to patient and he did say he is not having any chest pain or shortness of breath and is doing somewhat better. To go to ER if having any chest pain or trouble breathing.

## 2022-01-12 ENCOUNTER — TELEPHONE (OUTPATIENT)
Dept: FAMILY MEDICINE CLINIC | Age: 47
End: 2022-01-12

## 2022-01-12 NOTE — TELEPHONE ENCOUNTER
FYI: pt said he was told by  to go to infusion center for monoclonal antibody, pt said he feels better and doesn't thinks its necessary.  Said he only has cough now with light yellow mucus

## 2022-03-21 DIAGNOSIS — G25.0 ESSENTIAL TREMOR: ICD-10-CM

## 2022-03-22 RX ORDER — PROPRANOLOL HYDROCHLORIDE 40 MG/1
TABLET ORAL
Qty: 60 TABLET | Refills: 2 | Status: SHIPPED | OUTPATIENT
Start: 2022-03-22 | End: 2022-10-03

## 2022-03-22 NOTE — TELEPHONE ENCOUNTER
Medication:   Requested Prescriptions     Pending Prescriptions Disp Refills    propranolol (INDERAL) 40 MG tablet [Pharmacy Med Name: PROPRANOLOL 40 MG TABLET] 60 tablet 2     Sig: TAKE ONE TABLET BY MOUTH TWICE A DAY       Last Filled:  1/29/2021    Patient Phone Number: [phone] (home)     Last appt: 1/4/2022   Next appt: Visit date not found    Lab Results   Component Value Date     01/16/2020    K 3.3 (L) 01/16/2020     01/16/2020    CO2 23 01/16/2020    BUN 9 01/16/2020    CREATININE 0.7 (L) 01/16/2020    GLUCOSE 98 01/16/2020    CALCIUM 9.3 01/16/2020    PROT 6.5 01/14/2020    LABALBU 3.7 01/14/2020    BILITOT 1.4 (H) 01/14/2020    ALKPHOS 58 01/14/2020    AST 80 (H) 01/14/2020    ALT 54 (H) 01/14/2020    LABGLOM >60 01/16/2020    GFRAA >60 01/16/2020    AGRATIO 1.3 01/14/2020    GLOB 2.8 01/14/2020

## 2022-05-16 DIAGNOSIS — J45.40 MODERATE PERSISTENT ASTHMA WITHOUT COMPLICATION: ICD-10-CM

## 2022-05-16 RX ORDER — ALBUTEROL SULFATE 90 UG/1
AEROSOL, METERED RESPIRATORY (INHALATION)
Qty: 18 G | Refills: 0 | Status: SHIPPED | OUTPATIENT
Start: 2022-05-16

## 2022-05-27 ENCOUNTER — TELEMEDICINE (OUTPATIENT)
Dept: FAMILY MEDICINE CLINIC | Age: 47
End: 2022-05-27
Payer: COMMERCIAL

## 2022-05-27 ENCOUNTER — NURSE TRIAGE (OUTPATIENT)
Dept: OTHER | Facility: CLINIC | Age: 47
End: 2022-05-27

## 2022-05-27 DIAGNOSIS — G89.29 CHRONIC MIDLINE THORACIC BACK PAIN: ICD-10-CM

## 2022-05-27 DIAGNOSIS — M54.50 LUMBAR PAIN: Primary | ICD-10-CM

## 2022-05-27 DIAGNOSIS — R53.83 OTHER FATIGUE: ICD-10-CM

## 2022-05-27 DIAGNOSIS — M54.6 CHRONIC MIDLINE THORACIC BACK PAIN: ICD-10-CM

## 2022-05-27 DIAGNOSIS — J45.40 MODERATE PERSISTENT ASTHMA WITHOUT COMPLICATION: ICD-10-CM

## 2022-05-27 PROCEDURE — 99214 OFFICE O/P EST MOD 30 MIN: CPT | Performed by: NURSE PRACTITIONER

## 2022-05-27 PROCEDURE — 1036F TOBACCO NON-USER: CPT | Performed by: NURSE PRACTITIONER

## 2022-05-27 PROCEDURE — G8427 DOCREV CUR MEDS BY ELIG CLIN: HCPCS | Performed by: NURSE PRACTITIONER

## 2022-05-27 PROCEDURE — G8421 BMI NOT CALCULATED: HCPCS | Performed by: NURSE PRACTITIONER

## 2022-05-27 RX ORDER — PREDNISONE 50 MG/1
50 TABLET ORAL DAILY
Qty: 5 TABLET | Refills: 0 | Status: SHIPPED | OUTPATIENT
Start: 2022-05-27 | End: 2022-06-01

## 2022-05-27 ASSESSMENT — ENCOUNTER SYMPTOMS
COUGH: 1
BACK PAIN: 1
SHORTNESS OF BREATH: 1
GASTROINTESTINAL NEGATIVE: 1
CHEST TIGHTNESS: 0
WHEEZING: 1

## 2022-05-27 ASSESSMENT — PATIENT HEALTH QUESTIONNAIRE - PHQ9
1. LITTLE INTEREST OR PLEASURE IN DOING THINGS: 0
SUM OF ALL RESPONSES TO PHQ QUESTIONS 1-9: 0
SUM OF ALL RESPONSES TO PHQ QUESTIONS 1-9: 0
2. FEELING DOWN, DEPRESSED OR HOPELESS: 0
SUM OF ALL RESPONSES TO PHQ9 QUESTIONS 1 & 2: 0
SUM OF ALL RESPONSES TO PHQ QUESTIONS 1-9: 0
SUM OF ALL RESPONSES TO PHQ QUESTIONS 1-9: 0

## 2022-05-27 NOTE — TELEPHONE ENCOUNTER
Received call from [facility] [address] at [facility] with Red Flag Complaint. Subjective: Caller states \"I am taking Keppra 500mg twice a day and I can barely stay awake\"     States has an appointment with neurology next week    Current Symptoms: fatigue, states can only stay awake for 5 hours at a time, chronic wheeze    Onset: 1 year ago or more; gradual    Denies - black or tarry stool / new numbness / new tingling / weakness on one side of the body    Pain Severity: chronic back pain    Temperature: denies     What has been tried: tylenol      Recommended disposition: See in Office Within 2 Weeks    Care advice provided, patient verbalizes understanding; denies any other questions or concerns; instructed to call back for any new or worsening symptoms. Patient/Caller agrees with recommended disposition; writer provided warm transfer to Abdelrahman Eng at [facility] for appointment scheduling     Attention Provider: Thank you for allowing me to participate in the care of your patient. The patient was connected to triage in response to information provided to the ECC/PSC. Please do not respond through this encounter as the response is not directed to a shared pool.         Reason for Disposition   Weakness is a chronic symptom (recurrent or ongoing AND lasting > 4 weeks)    Protocols used: WEAKNESS (GENERALIZED) AND FATIGUE-ADULT-[place]

## 2022-05-27 NOTE — PROGRESS NOTES
2022    TELEHEALTH EVALUATION -- Audio/Visual (During RXSPV-71 public health emergency)    Liyah Villalta (:  ) has requested an audio/video evaluation for the following concern(s):    Back:  Patient states he injured his back about 1.5 years ago after a fall related to s a seizure. He states it has been hurting ever since. He has tried Tylenol with some relief. He has not been seen for this in the past, pain is in lower back, midline. Denies radiation down legs, stops at tailbone. He admits to numbness/tingling in legs. He states he has been incontinent of bowel on two separate occasions not related to a seizure. The last episode was in February and this was not associated with a seizure. Asthma:  Feels his asthma is uncontrolled. He asked if he can take steroids daily since they make him feel so much better. He is taking Advair daily and Albuterol PRN along with a nebulizer at home when needed. He states he has shortness of breath when taking he garbage out and has to sit until it goes away due to his asthma. He does cough occasionally. Fatigue:  He states this is solely related to his seizure medications. He can not stay awake for more than four hours until he needs a nap. He states he has felt this way since his neurologist changes his medications. He has an appointment with Dr. Torres Rendon - Neurology and is going to ask him to change his medication. Review of Systems   Constitutional: Positive for fatigue. Negative for chills, diaphoresis and fever. HENT: Negative. Respiratory: Positive for cough, shortness of breath and wheezing. Negative for chest tightness. Cardiovascular: Negative. Negative for chest pain and palpitations. Gastrointestinal: Negative. Genitourinary: Negative. Musculoskeletal: Positive for back pain. Neurological: Negative. Prior to Visit Medications    Medication Sig Taking?  Authorizing Provider   predniSONE (DELTASONE) 50 MG infection     Social History     Tobacco Use    Smoking status: Never Smoker    Smokeless tobacco: Never Used   Vaping Use    Vaping Use: Never used   Substance Use Topics    Alcohol use: Yes     Comment: OCCAS    Drug use: No        PHYSICAL EXAMINATION:  Vital Signs: (As obtained by patient/caregiver or practitioner observation)  There were no vitals taken for this visit. Respiratory rate appears normal  Constitutional: Appears well-developed and well-nourished. No apparent distress    Mental status: Alert and awake. Oriented to person/place/fortunato. Able to follow commands    Eyes: EOM normal. Sclera normal. No discharge visible  HENT: Normocephalic, atraumatic. Mouth/Throat: Mucous membranes are moist. External Ears Normal    Neck: No visualized mass   Pulmonary/Chest: Respiratory effort normal.  No visualized signs of difficulty breathing or respiratory distress        Musculoskeletal:  Normal range of motion of neck  Neurological: No Facial Asymmetry (Cranial nerve 7 motor function) (limited exam to video visit). No gaze palsy       Skin:  No significant exanthematous lesions or discoloration noted on facial skin       Psychiatric: Normal Affect. No Hallucinations          ASSESSMENT/PLAN:  1. Lumbar pain  Continue Tylenol PRN  - XR LUMBAR SPINE (2-3 VIEWS); Future  - [facility]    2. Chronic midline thoracic back pain  Continue Tylenol PRN  - XR THORACIC SPINE (3 VIEWS); Future  - [facility]    3. Moderate persistent asthma without complication  Continue Advair and Albuterol  - Full PFT Study With Bronchodilator; Future  - Maria Esther Meneses MD, Pulmonary, [facility]  - predniSONE (DELTASONE) 50 MG tablet; Take 1 tablet by mouth daily for 5 days  Dispense: 5 tablet; Refill: 0    4. Other fatigue  Related to seizure medications  Keep scheduled appointment for Neurology  - Comprehensive Metabolic Panel;  Future  - CBC with Auto Differential; Future  - Lipid, Fasting; Future  - TSH with Reflex; Future  - T4, Free; Future  - Hemoglobin A1C; Future    Follow up in one month for annual physical exam    Liyah Villalta is a 48 y.o. male being evaluated by a Virtual Visit (video visit) encounter to address concerns as mentioned above. A caregiver was present when appropriate. Due to this being a TeleHealth encounter (During CSETX-56 public health emergency), evaluation of the following organ systems was limited: Vitals/Constitutional/EENT/Resp/CV/GI//MS/Neuro/Skin/Heme-Lymph-Imm. Pursuant to the emergency declaration under the [address] [place] [address], [address] [place] [address] authority and the [employer] Act, this Virtual Visit was conducted with patient's (and/or legal guardian's) consent, to reduce the patient's risk of exposure to COVID-19 and provide necessary medical care. The patient (and/or legal guardian) has also been advised to contact this office for worsening conditions or problems, and seek emergency medical treatment and/or call 911 if deemed necessary. Patient identification was verified at the start of the visit: Yes    Total time spent on this encounter: 20 minutes    Services were provided through a video synchronous discussion virtually to substitute for in-person clinic visit. Patient and provider were located at their individual homes. --BRUCE Morin CNP on 5/27/2022 at 12:09 PM    An electronic signature was used to authenticate this note. Nabeel Weeks

## 2022-06-02 ENCOUNTER — HOSPITAL ENCOUNTER (OUTPATIENT)
Age: 47
Discharge: HOME | End: 2022-06-02
Payer: COMMERCIAL

## 2022-06-02 ENCOUNTER — TELEPHONE (OUTPATIENT)
Dept: FAMILY MEDICINE CLINIC | Age: 47
End: 2022-06-02

## 2022-06-02 DIAGNOSIS — R53.83 OTHER FATIGUE: ICD-10-CM

## 2022-06-02 LAB
A/G RATIO: 1.3 (ref 1.1–2.2)
ALBUMIN SERPL-MCNC: 4.3 G/DL (ref 3.4–5)
ALP BLD-CCNC: 48 U/L (ref 40–129)
ALT SERPL-CCNC: 69 U/L (ref 10–40)
ANION GAP SERPL CALCULATED.3IONS-SCNC: 15 MMOL/L (ref 3–16)
AST SERPL-CCNC: 89 U/L (ref 15–37)
BASOPHILS ABSOLUTE: 0 K/UL (ref 0–0.2)
BASOPHILS RELATIVE PERCENT: 0.6 %
BILIRUB SERPL-MCNC: 1.7 MG/DL (ref 0–1)
BUN BLDV-MCNC: 14 MG/DL (ref 7–20)
CALCIUM SERPL-MCNC: 9.5 MG/DL (ref 8.3–10.6)
CHLORIDE BLD-SCNC: 105 MMOL/L (ref 99–110)
CHOLESTEROL, FASTING: 197 MG/DL (ref 0–199)
CO2: 22 MMOL/L (ref 21–32)
CREAT SERPL-MCNC: 0.8 MG/DL (ref 0.9–1.3)
EOSINOPHILS ABSOLUTE: 0 K/UL (ref 0–0.6)
EOSINOPHILS RELATIVE PERCENT: 0.4 %
GFR AFRICAN AMERICAN: >60
GFR NON-AFRICAN AMERICAN: >60
GLUCOSE BLD-MCNC: 84 MG/DL (ref 70–99)
HCT VFR BLD CALC: 48.4 % (ref 40.5–52.5)
HDLC SERPL-MCNC: 40 MG/DL (ref 40–60)
HEMOGLOBIN: 16.8 G/DL (ref 13.5–17.5)
LDL CHOLESTEROL CALCULATED: 136 MG/DL
LYMPHOCYTES ABSOLUTE: 1.3 K/UL (ref 1–5.1)
LYMPHOCYTES RELATIVE PERCENT: 16.6 %
MCH RBC QN AUTO: 34 PG (ref 26–34)
MCHC RBC AUTO-ENTMCNC: 34.7 G/DL (ref 31–36)
MCV RBC AUTO: 97.9 FL (ref 80–100)
MONOCYTES ABSOLUTE: 1 K/UL (ref 0–1.3)
MONOCYTES RELATIVE PERCENT: 12.5 %
NEUTROPHILS ABSOLUTE: 5.5 K/UL (ref 1.7–7.7)
NEUTROPHILS RELATIVE PERCENT: 69.9 %
PDW BLD-RTO: 12.4 % (ref 12.4–15.4)
PLATELET # BLD: 98 K/UL (ref 135–450)
PMV BLD AUTO: 10 FL (ref 5–10.5)
POTASSIUM SERPL-SCNC: 3.5 MMOL/L (ref 3.5–5.1)
RBC # BLD: 4.94 M/UL (ref 4.2–5.9)
SODIUM BLD-SCNC: 142 MMOL/L (ref 136–145)
T4 FREE: 1.4 NG/DL (ref 0.9–1.8)
TOTAL PROTEIN: 7.7 G/DL (ref 6.4–8.2)
TRIGLYCERIDE, FASTING: 106 MG/DL (ref 0–150)
TSH REFLEX: 4.56 UIU/ML (ref 0.27–4.2)
VLDLC SERPL CALC-MCNC: 21 MG/DL
WBC # BLD: 7.9 K/UL (ref 4–11)

## 2022-06-02 PROCEDURE — 84439 ASSAY OF FREE THYROXINE: CPT

## 2022-06-02 PROCEDURE — 80061 LIPID PANEL: CPT

## 2022-06-02 PROCEDURE — 80053 COMPREHEN METABOLIC PANEL: CPT

## 2022-06-02 PROCEDURE — 36415 COLL VENOUS BLD VENIPUNCTURE: CPT

## 2022-06-02 PROCEDURE — 85025 COMPLETE CBC W/AUTO DIFF WBC: CPT

## 2022-06-02 PROCEDURE — 84443 ASSAY THYROID STIM HORMONE: CPT

## 2022-06-02 PROCEDURE — 83036 HEMOGLOBIN GLYCOSYLATED A1C: CPT

## 2022-06-02 NOTE — TELEPHONE ENCOUNTER
Mr. Janak Bonner returned call  Patient has a hearing on 7-14-22, asking Dr. Tad Shine to complete form when he returns so  has for hearing

## 2022-06-02 NOTE — TELEPHONE ENCOUNTER
Request for medical records and functional capacity questionaire requested from Gisele Chencho office  Medical records request sent to [facility] form can not be completed until Dr. Jaswant Combs returns  LVM for  Tay [place] w/this information  Scanned and placed in Dr. Wendy Allred

## 2022-06-03 LAB
ESTIMATED AVERAGE GLUCOSE: 99.7 MG/DL
HBA1C MFR BLD: 5.1 %

## 2022-06-07 ENCOUNTER — OFFICE VISIT (OUTPATIENT)
Dept: ORTHOPEDIC SURGERY | Age: 47
End: 2022-06-07
Payer: COMMERCIAL

## 2022-06-07 VITALS — BODY MASS INDEX: 24.23 KG/M2 | HEIGHT: 71 IN | WEIGHT: 173.06 LBS

## 2022-06-07 DIAGNOSIS — M47.816 LUMBAR SPONDYLOSIS: ICD-10-CM

## 2022-06-07 DIAGNOSIS — Z86.2 HISTORY OF THROMBOCYTOPENIA: ICD-10-CM

## 2022-06-07 DIAGNOSIS — M51.26 LUMBAR DISCOGENIC PAIN SYNDROME: ICD-10-CM

## 2022-06-07 DIAGNOSIS — M54.9 BACK PAIN, UNSPECIFIED BACK LOCATION, UNSPECIFIED BACK PAIN LATERALITY, UNSPECIFIED CHRONICITY: ICD-10-CM

## 2022-06-07 PROCEDURE — G8428 CUR MEDS NOT DOCUMENT: HCPCS | Performed by: INTERNAL MEDICINE

## 2022-06-07 PROCEDURE — 99204 OFFICE O/P NEW MOD 45 MIN: CPT | Performed by: INTERNAL MEDICINE

## 2022-06-07 PROCEDURE — G8420 CALC BMI NORM PARAMETERS: HCPCS | Performed by: INTERNAL MEDICINE

## 2022-06-07 PROCEDURE — 1036F TOBACCO NON-USER: CPT | Performed by: INTERNAL MEDICINE

## 2022-06-07 RX ORDER — CELECOXIB 200 MG/1
200 CAPSULE ORAL DAILY
Qty: 30 CAPSULE | Refills: 1 | Status: SHIPPED | OUTPATIENT
Start: 2022-06-07

## 2022-06-07 RX ORDER — CYCLOBENZAPRINE HCL 10 MG
10 TABLET ORAL NIGHTLY PRN
Qty: 30 TABLET | Refills: 1 | Status: SHIPPED | OUTPATIENT
Start: 2022-06-07

## 2022-06-07 NOTE — PROGRESS NOTES
Chief Complaint:   Chief Complaint   Patient presents with    Lower Back Pain     Lumbar pain, has seizers and will fall. Has fallen several times over the years, but his most recent was last year where he has had severe back pain ever since. Standing or sitting too long causes severe pain and extending causes pain. History of Present Illness:       Patient is a 48 y.o. male presents with the above complaint. The symptoms began 1 yearsago and started with an injury. The patient describes a aching pain that does not radiate. The symptoms are near constant  and are are cycling since the onset. Patient is experienced multiple falls related to breakthrough seizures and blunt injury to his lower back related to these falls. He has predictable worsening pattern of pain with increased activity levels    Past medical history significant for thrombocytopenia platelet count reviewed below     The symptoms of back pain doshow a discogenic provacative pattern and are worsened by bending forwards, sitting, walking and Increased activity levels and prolonged standing and improved with Lying down. There is not new onset weakness or progressive weakness of the lower extremities that has developed. The patient denies new onset bowel or bladder dysfunction. There is no history of previous spinal trauma. The patient does not have history or orthopaedic lumbar spine surgery. Pain localizes to the lumbar region    Pain levels: 9    There is no lower limb pain . Work-up to date has included: none  Prior treatment has included acetaminophen-Tylenol. This patient reports no improvement with this treatment. The patient has no history or autoimmune disease, inflammatory arthropathy or crystal arthropathy. Past Medical History:        Past Medical History:   Diagnosis Date    Asthma     GERD (gastroesophageal reflux disease)     Hypertension     Seizure ([address]     LAST SEIZURE, JANUARY 2020.   FOLLOWS WITH NEUROLOGIST, DR All Paulino         Past Surgical History:   Procedure Laterality Date    UPPER GASTROINTESTINAL ENDOSCOPY N/A 5/9/2019    EGD BIOPSY performed by Ashly Nesbitt MD at [address] [facility] ENDOSCOPY         Present Medications:         Current Outpatient Medications   Medication Sig Dispense Refill    celecoxib (CELEBREX) 200 MG capsule Take 1 capsule by mouth daily 30 capsule 1    cyclobenzaprine (FLEXERIL) 10 mg tablet Take 1 tablet by mouth nightly as needed for Muscle spasms 30 tablet 1    albuterol sulfate  (90 Base) MCG/ACT inhaler INHALE TWO PUFFS BY MOUTH FOUR TIMES A DAY AS NEEDED FOR WHEEZING 18 g 0    propranolol (INDERAL) 40 MG tablet TAKE ONE TABLET BY MOUTH TWICE A DAY 60 tablet 2    fluticasone-salmeterol (ADVAIR) 500-50 MCG/DOSE diskus inhaler INHALE ONE PUFF BY MOUTH EVERY 12 HOURS 1 each 2    Dextromethorphan-guaiFENesin  MG TB12 Take 1 tablet by mouth every 12 hours as needed (cough or congestion) 28 tablet 0    albuterol (PROVENTIL) (2.5 MG/3ML) 0.083% nebulizer solution USE THREE MILLILITERS VIA NEBULIZATION BY MOUTH EVERY 6 HOURS AS NEEDED FOR WHEEZING 120 each 2    ALPRAZolam (XANAX PO) Take by mouth 3 times daily as needed      hydrocortisone 2.5 % cream Apply topically 2 times daily. 28 g 0    levETIRAcetam (KEPPRA) 500 MG tablet Take 1 tablet by mouth 2 times daily 60 tablet 3    cetirizine (ZYRTEC) 10 MG tablet Take 10 mg by mouth daily      omeprazole (PRILOSEC) 20 MG delayed release capsule Take 1 capsule by mouth 2 times daily (before meals) 60 capsule 5    fluticasone (FLONASE) 50 MCG/ACT nasal spray 1 spray by Nasal route daily       No current facility-administered medications for this visit. Allergies:         Allergies   Allergen Reactions    Symbicort [Budesonide-Formoterol Fumarate] Other (See Comments)     Lung infection        Social History:         Social History     Socioeconomic History    Marital status:  Spouse name: Not on file    Number of children: Not on file    Years of education: Not on file    Highest education level: Not on file   Occupational History    Not on file   Tobacco Use    Smoking status: Never Smoker    Smokeless tobacco: Never Used   Vaping Use    Vaping Use: Never used   Substance and Sexual Activity    Alcohol use: Yes     Comment: OCCAS    Drug use: No    Sexual activity: Not Currently     Partners: Female   Other Topics Concern    Not on file   Social History Narrative    Not on file     Social Determinants of Health     Financial Resource Strain: High Risk    Difficulty of Paying Living Expenses: Hard   Food Insecurity: No Food Insecurity    Worried About Running Out of Food in the Last Year: Never true    Luke of Food in the Last Year: Never true   Transportation Needs:     Lack of Transportation (Medical): Not on file    Lack of Transportation (Non-Medical):  Not on file   Physical Activity:     Days of Exercise per Week: Not on file    Minutes of Exercise per Session: Not on file   Stress:     Feeling of Stress : Not on file   Social Connections:     Frequency of Communication with Friends and Family: Not on file    Frequency of Social Gatherings with Friends and Family: Not on file    Attends Yarsanism Services: Not on file    Active Member of [address] [place] [address] [employer] or Organizations: Not on file    Attends Club or Organization Meetings: Not on file    Marital Status: Not on file   Intimate Partner Violence:     Fear of Current or Ex-Partner: Not on file    Emotionally Abused: Not on file    Physically Abused: Not on file    Sexually Abused: Not on file   Housing Stability:     Unable to Pay for Housing in the Last Year: Not on file    Number of [place] in the Last Year: Not on file    Unstable Housing in the Last Year: Not on file        Review of Symptoms:    Pertinent items are noted in HPI    Review of systems reviewed from Patient History Form dated on today's date and   available in the patient's chart under the Media tab. Vital Signs: There were no vitals filed for this visit. General Exam:     Constitutional: Patient is adequately groomed with no evidence of malnutrition  Mental Status: The patient is oriented to time, place and person. The patient's mood and affect are appropriate. Vascular: Examination reveals no swelling or calf tenderness. Peripheral pulses are palpable and 2+. Lymphatics: no lymphadenopathy of the inguinal region or lower extremity      Physical Exam: lower back      Primary Exam:    Inspection: No deformity atrophy appreciable curvature      Palpation: No focal trigger point tenderness      Range of Motion: 80/5 pain in extension phase greater than flexion and with extension      Strength: Normal lower extremity      Special Tests: Negative SLR      Skin: There are no rashes, ulcerations or lesions. Gait: Nonantalgic      Reflex intact lower     Additional Comments:        Additional Examinations:           Neurologic -Light touch sensation and manual muscle testing is normal C5-C8 . Biceps and triceps reflexes are symmetric and +2. Spurlling sign is negative            Office Imaging Results/Procedures PerformedToday:      Radiology:      X-rays obtained and reviewed in office:   Views 3 views lumbar spine   Location lumbar spine   Impression normal alignment on the AP projection stigmata SBO spine level.   Lateral projection reveals no focal events intervertebral to space narrowing vertebral body heights are well-maintained no evidence of discrete spondylolisthesis     Office Procedures:     Orders Placed This Encounter   Procedures    XR LUMBAR SPINE (2-3 VIEWS)     Standing Status:   Future     Number of Occurrences:   1     Standing Expiration Date:   6/2/2023     Order Specific Question:   Reason for exam:     Answer:   pain    MRI LUMBAR SPINE WO CONTRAST     Standing Status:   Future     Standing Expiration Date: 6/7/2023     Scheduling Instructions:      Juan Rodriguez, please call pt to schedule, Jeff Russ will obtain auth and fwd to your facility. Pt advised to f/u in clinic 2-3 days after MRI for results. Order Specific Question:   Reason for exam:     Answer:   R/O HNP / STENOSIS     Order Specific Question:   What is the sedation requirement? Answer:   None           Other Outside Imaging and Testing Personally Reviewed:    XR LUMBAR SPINE (2-3 VIEWS)    Result Date: 6/7/2022  Radiology exam is complete. No Radiologist dictation. Please follow up with ordering provider. Results for Ayaka Sanchez (MRN [id number]) as of 6/7/2022 15:57   Ref. Range 6/2/2022 10:59   WBC Latest Ref Range: 4.0 - 11.0 K/uL 7.9   RBC Latest Ref Range: 4.20 - 5.90 M/uL 4.94   Hemoglobin Quant Latest Ref Range: 13.5 - 17.5 g/dL 16.8   Hematocrit Latest Ref Range: 40.5 - 52.5 % 48.4   MCV Latest Ref Range: 80.0 - 100.0 fL 97.9   MCH Latest Ref Range: 26.0 - 34.0 pg 34.0   MCHC Latest Ref Range: 31.0 - 36.0 g/dL 34.7   MPV Latest Ref Range: 5.0 - 10.5 fL 10.0   RDW Latest Ref Range: 12.4 - 15.4 % 12.4   Platelet Count Latest Ref Range: 135 - 450 K/uL 98 (L)   Neutrophils % Latest Units: % 69.9   Lymphocyte % Latest Units: % 16.6   Monocytes % Latest Units: % 12.5   Eosinophils % Latest Units: % 0.4   Basophils % Latest Units: % 0.6   Neutrophils Absolute Latest Ref Range: 1.7 - 7.7 K/uL 5.5   Lymphocytes Absolute Latest Ref Range: 1.0 - 5.1 K/uL 1.3           Assessment   Impression: . Encounter Diagnoses   Name Primary?     Lumbar discogenic pain syndrome     Lumbar spondylosis     History of thrombocytopenia     Back pain, unspecified back location, unspecified back pain laterality, unspecified chronicity               Plan:       MRI lumbar spine evaluate severity of discopathy/ stenosis suspected clinically  Consider Him a candidate for spine intervention injection  Activity modification, lumbar spine precautions  lumbar spinestabilization home exercise program  Medical pain management: NSAID: Celebrex, muscle relaxant: Flexeril    Approximately  45 minutes was spent related to previewing pertinent medical documentation prior to the patient's visit along with counseling during the patient's visit with respect to treatment options inclusive of alternatives to treatment and the complications and risks related to those treatment options along with expectations of outcome related to those treatments and inclusive of time in the documentation and ordering of testing and treatment after the visit. The nature of the finding, probable diagnosis and likely treatment was thoroughly discussed with the patient. The options, risks, complications, alternative treatment as well as some of the differential diagnosis was discussed. The patient was thoroughly informed and all questions were answered. the patient indicated understanding and satisfaction with the discussion. Orders:        Orders Placed This Encounter   Procedures    XR LUMBAR SPINE (2-3 VIEWS)     Standing Status:   Future     Number of Occurrences:   1     Standing Expiration Date:   6/2/2023     Order Specific Question:   Reason for exam:     Answer:   pain    MRI LUMBAR SPINE WO CONTRAST     Standing Status:   Future     Standing Expiration Date:   6/7/2023     Scheduling Instructions:      Donell Mallory, please call pt to schedule, [phone]      [facility] will obtain auth and fwd to your facility. Pt advised to f/u in clinic 2-3 days after MRI for results. Order Specific Question:   Reason for exam:     Answer:   R/O HNP / STENOSIS     Order Specific Question:   What is the sedation requirement? Answer:   None           Disclaimer: \"This note was dictated with voice recognition software. Though review and correction are routine, we apologize for any errors. \"

## 2022-06-10 ENCOUNTER — HOSPITAL ENCOUNTER (OUTPATIENT)
Dept: PULMONOLOGY | Age: 47
Discharge: HOME | End: 2022-06-10
Payer: COMMERCIAL

## 2022-06-10 VITALS — RESPIRATION RATE: 16 BRPM | HEART RATE: 82 BPM | OXYGEN SATURATION: 98 %

## 2022-06-10 DIAGNOSIS — J45.40 MODERATE PERSISTENT ASTHMA WITHOUT COMPLICATION: ICD-10-CM

## 2022-06-10 LAB
DLCO %PRED: 90 %
DLCO PRED: NORMAL
DLCO/VA %PRED: NORMAL
DLCO/VA PRED: NORMAL
DLCO/VA: NORMAL
DLCO: NORMAL
EXPIRATORY TIME-POST: NORMAL
EXPIRATORY TIME: NORMAL
FEF 25-75% %CHNG: NORMAL
FEF 25-75% %PRED-POST: NORMAL
FEF 25-75% %PRED-PRE: NORMAL
FEF 25-75% PRED: NORMAL
FEF 25-75%-POST: NORMAL
FEF 25-75%-PRE: NORMAL
FEV1 %PRED-POST: 46 %
FEV1 %PRED-PRE: 35 %
FEV1 PRED: NORMAL
FEV1-POST: NORMAL
FEV1-PRE: NORMAL
FEV1/FVC %PRED-POST: NORMAL
FEV1/FVC %PRED-PRE: NORMAL
FEV1/FVC PRED: NORMAL
FEV1/FVC-POST: 87 %
FEV1/FVC-PRE: 80 %
FVC %PRED-POST: NORMAL
FVC %PRED-PRE: NORMAL
FVC PRED: NORMAL
FVC-POST: NORMAL
FVC-PRE: NORMAL
GAW %PRED: NORMAL
GAW PRED: NORMAL
GAW: NORMAL
IC %PRED: NORMAL
IC PRED: NORMAL
IC: NORMAL
MEP: NORMAL
MIP: NORMAL
MVV %PRED-PRE: NORMAL
MVV PRED: NORMAL
MVV-PRE: NORMAL
PEF %PRED-POST: NORMAL
PEF %PRED-PRE: NORMAL
PEF PRED: NORMAL
PEF%CHNG: NORMAL
PEF-POST: NORMAL
PEF-PRE: NORMAL
RAW %PRED: NORMAL
RAW PRED: NORMAL
RAW: NORMAL
RV %PRED: NORMAL
RV PRED: NORMAL
RV: NORMAL
SVC %PRED: NORMAL
SVC PRED: NORMAL
SVC: NORMAL
TLC %PRED: 77 %
TLC PRED: NORMAL
TLC: NORMAL
VA %PRED: NORMAL
VA PRED: NORMAL
VA: NORMAL
VTG %PRED: NORMAL
VTG PRED: NORMAL
VTG: NORMAL

## 2022-06-10 PROCEDURE — 6370000000 HC RX 637 (ALT 250 FOR IP): Performed by: FAMILY MEDICINE

## 2022-06-10 RX ORDER — ALBUTEROL SULFATE 90 UG/1
4 AEROSOL, METERED RESPIRATORY (INHALATION) ONCE
Status: COMPLETED | OUTPATIENT
Start: 2022-06-10 | End: 2022-06-10

## 2022-06-10 RX ADMIN — Medication 4 PUFF: at 13:14

## 2022-06-10 ASSESSMENT — PULMONARY FUNCTION TESTS
FEV1/FVC_PRE: 80
FEV1/FVC_POST: 87
FEV1_PERCENT_PREDICTED_PRE: 35
FEV1_PERCENT_PREDICTED_POST: 46

## 2022-06-13 NOTE — PROCEDURES
[facility]                     [facility], [address]                               PULMONARY FUNCTION    PATIENT NAME: Georges Pedersen                     :        1975  MED REC NO:   [id number]                          ROOM:  ACCOUNT NO:   [de-identified]                           ADMIT DATE: 06/10/2022  PROVIDER:     Xena Eugene MD    DATE OF PROCEDURE:  06/10/2022    Spirometry reveals decreased FVC at 2.28 liters, which is 43% predicted. FEV1 is decreased at 1.43 liters, which is 35% predicted. FEV1/FVC  ratio is reduced at 63%. Expiratory flow rates are also reduced. There  is a 32% improvement in FEV1 after inhaled bronchodilators. Lung  volumes reveal decreased total lung capacity at 77% predicted, vital  capacity is decreased at 55% predicted, residual volume is normal,  diffusion capacity is normal.    IMPRESSION:  Severe obstructive lung disease with a good response to  inhaled bronchodilators. Reduced total lung capacity may indicate an  element of restriction as well.         Skye Philip MD    D: 2022 9:39:49       T: 2022 15:02:23     [id number]  Job#: [id number]     Doc#: [id number]    CC:

## 2022-06-14 NOTE — PROGRESS NOTES
Christie Sherman is a 48 y.o. male. HPI:  Christie Sherman, was evaluated through a synchronous (real-time) audio-video encounter. The patient (or guardian if applicable) is aware that this is a billable service, which includes applicable co-pays. This Virtual Visit was conducted with patient's (and/or legal guardian's) consent. The visit was conducted pursuant to the emergency declaration under the [address] [place] [address] and the [employer] Act. Patient identification was verified, and a caregiver was present when appropriate. The patient was located in a state where the provider was licensed to provide care. --Rodrick Shen MD on 6/15/2022 at 8:10 PM    An electronic signature was used to authenticate this note. Visit to discuss results of PFTs  Commend Advair twice daily regularly and albuterol as needed  Not a smoker          Wt Readings from Last 3 Encounters:   06/07/22 173 lb 1 oz (78.5 kg)   10/20/20 173 lb (78.5 kg)   01/22/20 182 lb (82.6 kg)       REVIEW OF SYSTEMS:   CONSTITUTIONAL: See history of present illness,   Weight noted   HEENT: No new vision difficulties or ringing in the ears. RESPIRATORY: Going shortness of breath and asthma symptoms despite high-dose Advair twice daily and albuterol as needed/abnormal PFTs  CARDIOVASCULAR: no CP, palpitations or SOB with exertion  GI: No nausea, vomiting, diarrhea, constipation, abdominal pain or changes in bowel habits. : No urinary frequency, urgency, incontinence hematuria or dysuria. SKIN: No cyanosis or skin lesions. MUSCULOSKELETAL: No new muscle or joint pain.    NEUROLOGICAL: Seeing neurology for seizures, fairly well controlled  PSYCHIATRIC: No anxiety, insomnia or depression     Allergies   Allergen Reactions    Symbicort [Budesonide-Formoterol Fumarate] Other (See Comments)     Lung infection       Prior to Visit Medications Medication Sig Taking? Authorizing Provider   celecoxib (CELEBREX) 200 MG capsule Take 1 capsule by mouth daily  Rafi Reno MD   cyclobenzaprine (FLEXERIL) 10 mg tablet Take 1 tablet by mouth nightly as needed for Muscle spasms  Rafi Reno MD   albuterol sulfate  (90 Base) MCG/ACT inhaler INHALE TWO PUFFS BY MOUTH FOUR TIMES A DAY AS NEEDED FOR WHEEZING  BRUCE Chen CNP   propranolol (INDERAL) 40 MG tablet TAKE ONE TABLET BY MOUTH TWICE A DAY  Bernabe Lira MD   fluticasone-salmeterol (ADVAIR) 500-50 MCG/DOSE diskus inhaler INHALE ONE PUFF BY MOUTH EVERY 12 HOURS  Bernabe Lira MD   Dextromethorphan-guaiFENesin  MG TB12 Take 1 tablet by mouth every 12 hours as needed (cough or congestion)  Daniele Matta MD   albuterol (PROVENTIL) (2.5 MG/3ML) 0.083% nebulizer solution USE THREE MILLILITERS VIA NEBULIZATION BY MOUTH EVERY 6 HOURS AS NEEDED FOR WHEEZING  Bernabe Lira MD   ALPRAZolam (XANAX PO) Take by mouth 3 times daily as needed  Historical Provider, MD   hydrocortisone 2.5 % cream Apply topically 2 times daily. Bernabe Lira MD   levETIRAcetam (KEPPRA) 500 MG tablet Take 1 tablet by mouth 2 times daily  Allen Ramírez MD   cetirizine (ZYRTEC) 10 MG tablet Take 10 mg by mouth daily  Historical Provider, MD   omeprazole (PRILOSEC) 20 MG delayed release capsule Take 1 capsule by mouth 2 times daily (before meals)  Bro Javier MD   fluticasone Peg Slight) 50 MCG/ACT nasal spray 1 spray by Nasal route daily  Historical Provider, MD       Past Medical History:   Diagnosis Date    Asthma     GERD (gastroesophageal reflux disease)     Hypertension     [facility] ([facility]     LAST SEIZURE, JANUARY 2020.   FOLLOWS WITH NEUROLOGIST, DR Nikko Vasquez       Social History     Tobacco Use    Smoking status: Never Smoker    Smokeless tobacco: Never Used   Substance Use Topics    Alcohol use: Yes     Comment: OCCAS       Family History   Problem Relation Age of Onset    High Blood Pressure Mother        OBJECTIVE:  There were no vitals taken for this visit. GEN:  alert and pleasant , in NAD over video  HEENT:  NCAT, EOM intact, no facial asymmetry,   NECK:  good range of motion  RR: in NAD over video, normal respiratory rate   EXT: No rash or edema observed over video  NEURO: Alert oriented to person/place/date and time , normal mood and affect, able to follow commands ,  No focal changes over video     ASSESSMENT/PLAN:  1. Abnormal PFT  Referral to pulmonary  Patient on larger dose of Advair twice a day but still abnormal PFT    2. Moderate persistent asthma without complication  Refer to pulmonary  - Zehra Rojas MD, Pulmonary, [facility]    3. Seizure disorder ([facility]  On Keppra, sees neurology, continue    4.  Partial idiopathic epilepsy with seizures of localized onset, not intractable, without status epilepticus ([facility]  Continue sees to see neurology      25 Total Minutes spent pre charting (reviewing problem list, reviewing health maintenance items , meds, any test results, consultant and hospital notes ) and  obtaining present visit history, performing appropriate medical exam/evaluation, counseling and educating the patient (and family), ordering medications ,tests, and procedures as needed, refilling medication(s), placing referral(s) when needed in addition to coordinating care for this patient and documenting in electronic health record

## 2022-06-15 ENCOUNTER — TELEMEDICINE (OUTPATIENT)
Dept: FAMILY MEDICINE CLINIC | Age: 47
End: 2022-06-15
Payer: COMMERCIAL

## 2022-06-15 DIAGNOSIS — G40.909 SEIZURE DISORDER (HCC): ICD-10-CM

## 2022-06-15 DIAGNOSIS — R94.2 ABNORMAL PFT: Primary | ICD-10-CM

## 2022-06-15 DIAGNOSIS — G40.009 PARTIAL IDIOPATHIC EPILEPSY WITH SEIZURES OF LOCALIZED ONSET, NOT INTRACTABLE, WITHOUT STATUS EPILEPTICUS (HCC): ICD-10-CM

## 2022-06-15 DIAGNOSIS — J45.40 MODERATE PERSISTENT ASTHMA WITHOUT COMPLICATION: ICD-10-CM

## 2022-06-15 PROCEDURE — G8427 DOCREV CUR MEDS BY ELIG CLIN: HCPCS | Performed by: FAMILY MEDICINE

## 2022-06-15 PROCEDURE — 99213 OFFICE O/P EST LOW 20 MIN: CPT | Performed by: FAMILY MEDICINE

## 2022-06-29 ENCOUNTER — OFFICE VISIT (OUTPATIENT)
Dept: PULMONOLOGY | Age: 47
End: 2022-06-29
Payer: COMMERCIAL

## 2022-06-29 DIAGNOSIS — J98.6 ELEVATED DIAPHRAGM: ICD-10-CM

## 2022-06-29 DIAGNOSIS — J45.50 SEVERE PERSISTENT ASTHMA WITHOUT COMPLICATION: ICD-10-CM

## 2022-06-29 DIAGNOSIS — Z91.09 ENVIRONMENTAL ALLERGIES: ICD-10-CM

## 2022-06-29 DIAGNOSIS — K21.9 GASTROESOPHAGEAL REFLUX DISEASE WITHOUT ESOPHAGITIS: ICD-10-CM

## 2022-06-29 PROBLEM — J45.40 MODERATE PERSISTENT ASTHMA WITHOUT COMPLICATION: Status: RESOLVED | Noted: 2022-01-04 | Resolved: 2022-06-29

## 2022-06-29 PROCEDURE — G8420 CALC BMI NORM PARAMETERS: HCPCS | Performed by: INTERNAL MEDICINE

## 2022-06-29 PROCEDURE — 99214 OFFICE O/P EST MOD 30 MIN: CPT | Performed by: INTERNAL MEDICINE

## 2022-06-29 PROCEDURE — G8427 DOCREV CUR MEDS BY ELIG CLIN: HCPCS | Performed by: INTERNAL MEDICINE

## 2022-06-29 PROCEDURE — 1036F TOBACCO NON-USER: CPT | Performed by: INTERNAL MEDICINE

## 2022-06-29 RX ORDER — ALBUTEROL SULFATE 90 UG/1
2 AEROSOL, METERED RESPIRATORY (INHALATION) EVERY 6 HOURS PRN
Qty: 18 G | Refills: 11 | Status: SHIPPED | OUTPATIENT
Start: 2022-06-29 | End: 2023-08-01

## 2022-07-06 ENCOUNTER — OFFICE VISIT (OUTPATIENT)
Dept: FAMILY MEDICINE CLINIC | Age: 47
End: 2022-07-06
Payer: COMMERCIAL

## 2022-07-06 VITALS
HEART RATE: 93 BPM | SYSTOLIC BLOOD PRESSURE: 152 MMHG | OXYGEN SATURATION: 99 % | BODY MASS INDEX: 26.26 KG/M2 | WEIGHT: 188.2 LBS | DIASTOLIC BLOOD PRESSURE: 98 MMHG

## 2022-07-06 DIAGNOSIS — J45.50 SEVERE PERSISTENT ASTHMA WITHOUT COMPLICATION: ICD-10-CM

## 2022-07-06 DIAGNOSIS — G40.909 SEIZURE DISORDER (HCC): Primary | ICD-10-CM

## 2022-07-06 DIAGNOSIS — G40.009 PARTIAL IDIOPATHIC EPILEPSY WITH SEIZURES OF LOCALIZED ONSET, NOT INTRACTABLE, WITHOUT STATUS EPILEPTICUS (HCC): ICD-10-CM

## 2022-07-06 PROCEDURE — G8427 DOCREV CUR MEDS BY ELIG CLIN: HCPCS | Performed by: FAMILY MEDICINE

## 2022-07-06 PROCEDURE — 99213 OFFICE O/P EST LOW 20 MIN: CPT | Performed by: FAMILY MEDICINE

## 2022-07-06 PROCEDURE — 1036F TOBACCO NON-USER: CPT | Performed by: FAMILY MEDICINE

## 2022-07-06 PROCEDURE — G8419 CALC BMI OUT NRM PARAM NOF/U: HCPCS | Performed by: FAMILY MEDICINE

## 2022-07-06 ASSESSMENT — PATIENT HEALTH QUESTIONNAIRE - PHQ9
SUM OF ALL RESPONSES TO PHQ QUESTIONS 1-9: 0
1. LITTLE INTEREST OR PLEASURE IN DOING THINGS: 0
SUM OF ALL RESPONSES TO PHQ QUESTIONS 1-9: 0
SUM OF ALL RESPONSES TO PHQ QUESTIONS 1-9: 0
2. FEELING DOWN, DEPRESSED OR HOPELESS: 0
SUM OF ALL RESPONSES TO PHQ9 QUESTIONS 1 & 2: 0
SUM OF ALL RESPONSES TO PHQ QUESTIONS 1-9: 0

## 2022-07-06 NOTE — PROGRESS NOTES
[address] Note    Date: 7/6/2022                                               Deedee Medina:     Chief Complaint   Patient presents with    Forms       HPI   Patient is here for disability paperwork. Has been out of work due to back pain for the last year. Also reports seizure disorder, takes Keppra and sees neurology. Last seizure 4 to 5 months ago. Also reports that his asthma is worsening. Currently takes Advair and Zyrtec and Singulair and albuterol. Is seeing pulmonology. Does not get short of breath at rest, but gets short of breath easily if he walks around. 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   FOLLOWS WITH NEUROLOGIST, DR Sun Marsh       Current Outpatient Medications   Medication Sig Dispense Refill    VENTOLIN  (90 Base) MCG/ACT inhaler Inhale 2 puffs into the lungs every 6 hours as needed for Wheezing 18 g 11    celecoxib (CELEBREX) 200 MG capsule Take 1 capsule by mouth daily 30 capsule 1    cyclobenzaprine (FLEXERIL) 10 mg tablet Take 1 tablet by mouth nightly as needed for Muscle spasms 30 tablet 1    albuterol sulfate  (90 Base) MCG/ACT inhaler INHALE TWO PUFFS BY MOUTH FOUR TIMES A DAY AS NEEDED FOR WHEEZING 18 g 0    propranolol (INDERAL) 40 MG tablet TAKE ONE TABLET BY MOUTH TWICE A DAY 60 tablet 2    fluticasone-salmeterol (ADVAIR) 500-50 MCG/DOSE diskus inhaler INHALE ONE PUFF BY MOUTH EVERY 12 HOURS 1 each 2    Dextromethorphan-guaiFENesin  MG TB12 Take 1 tablet by mouth every 12 hours as needed (cough or congestion) 28 tablet 0    albuterol (PROVENTIL) (2.5 MG/3ML) 0.083% nebulizer solution USE THREE MILLILITERS VIA NEBULIZATION BY MOUTH EVERY 6 HOURS AS NEEDED FOR WHEEZING 120 each 2    ALPRAZolam (XANAX PO) Take by mouth 3 times daily as needed      hydrocortisone 2.5 % cream Apply topically 2 times daily. 28 g 0    levETIRAcetam (KEPPRA) 500 MG tablet Take 1 tablet by mouth 2 times daily 60 tablet 3    cetirizine (ZYRTEC) 10 MG tablet Take 10 mg by mouth daily      omeprazole (PRILOSEC) 20 MG delayed release capsule Take 1 capsule by mouth 2 times daily (before meals) 60 capsule 5    fluticasone (FLONASE) 50 MCG/ACT nasal spray 1 spray by Nasal route daily       No current facility-administered medications for this visit. Allergies   Allergen Reactions    Symbicort [Budesonide-Formoterol Fumarate] Other (See Comments)     Lung infection       Review of Systems   No fever, no cough    Vitals:  BP (!) 152/98   Pulse 93   Wt 188 lb 3.2 oz (85.4 kg)   SpO2 99%   BMI 26.26 kg/m²     Wt Readings from Last 3 Encounters:   07/06/22 188 lb 3.2 oz (85.4 kg)   06/07/22 173 lb 1 oz (78.5 kg)   10/20/20 173 lb (78.5 kg)        Physical Exam   General:  Well-appearing, NAD, alert, non-toxic  HEENT:  Normocephalic, atraumatic. CHEST/LUNGS: No dyspnea  EXTREMETIES: Normal movement of all extremities. No edema. No joint swelling. SKIN:  No rash, no cellulitis, no bruising, no petechiae/purpura/vesicles/pustules/abscess  PSYCH:  A+O x 3; normal affect  NEURO:  GCS 15, CN2-12 grossly intact, no focal motor/sensory deficits, normal gait, normal speech      Assessment/Plan     53-year-old male with seizure disorder, asthma, low back pain.

## 2022-07-11 ENCOUNTER — OFFICE VISIT (OUTPATIENT)
Dept: ORTHOPEDIC SURGERY | Age: 47
End: 2022-07-11
Payer: COMMERCIAL

## 2022-07-11 VITALS — BODY MASS INDEX: 26.36 KG/M2 | HEIGHT: 71 IN | WEIGHT: 188.27 LBS

## 2022-07-11 DIAGNOSIS — M51.26 DISC DISPLACEMENT, LUMBAR: ICD-10-CM

## 2022-07-11 DIAGNOSIS — M47.816 LUMBAR SPONDYLOSIS: ICD-10-CM

## 2022-07-11 DIAGNOSIS — Z86.2 HISTORY OF THROMBOCYTOPENIA: ICD-10-CM

## 2022-07-11 PROCEDURE — 99214 OFFICE O/P EST MOD 30 MIN: CPT | Performed by: INTERNAL MEDICINE

## 2022-07-11 PROCEDURE — G8427 DOCREV CUR MEDS BY ELIG CLIN: HCPCS | Performed by: INTERNAL MEDICINE

## 2022-07-11 PROCEDURE — G8419 CALC BMI OUT NRM PARAM NOF/U: HCPCS | Performed by: INTERNAL MEDICINE

## 2022-07-11 PROCEDURE — 1036F TOBACCO NON-USER: CPT | Performed by: INTERNAL MEDICINE

## 2022-07-11 RX ORDER — METHYLPREDNISOLONE 4 MG/1
TABLET ORAL
Qty: 1 KIT | Refills: 0 | Status: SHIPPED | OUTPATIENT
Start: 2022-07-11 | End: 2022-08-30 | Stop reason: ALTCHOICE

## 2022-07-11 NOTE — PROGRESS NOTES
Chief Complaint:   Chief Complaint   Patient presents with    Lower Back Pain     Lumbar MRI TR, feels the same as last visit. Feels popping in his back when he raises his R leg. Achy low back pain that feels like it's twisting. History of Present Illness:       Patient is a 48 y.o. male returns follow up for the above complaint. The patient was last seen approximately 5 weeksago. The symptoms show no change since the last visit. The patient has had further testing for the problem. MRI completed in the interim. Poor response to the trial of NSAIDs and Muscle relaxants. Back:leg pain 100:0 . Pain back is aching in quality. The symptoms do not follow a typical discogenic provocative pattern. Pain levels:9. The patient denies new onset or progressive weakness of the lower extremities. The patient denies now onset bowel or bladder function. He continues on medical pain management as per previous. Past Medical History:        Past Medical History:   Diagnosis Date    Asthma     GERD (gastroesophageal reflux disease)     Hypertension     Seizure ([facility] [address]     LAST SEIZURE, JANUARY 2020. FOLLOWS WITH NEUROLOGIST, DR Jazzmine Gottlieb        Present Medications:         Current Outpatient Medications   Medication Sig Dispense Refill    methylPREDNISolone (MEDROL, TOBI,) 4 MG tablet By mouth.  1 kit 0    VENTOLIN  (90 Base) MCG/ACT inhaler Inhale 2 puffs into the lungs every 6 hours as needed for Wheezing 18 g 11    celecoxib (CELEBREX) 200 MG capsule Take 1 capsule by mouth daily 30 capsule 1    cyclobenzaprine (FLEXERIL) 10 mg tablet Take 1 tablet by mouth nightly as needed for Muscle spasms 30 tablet 1    albuterol sulfate  (90 Base) MCG/ACT inhaler INHALE TWO PUFFS BY MOUTH FOUR TIMES A DAY AS NEEDED FOR WHEEZING 18 g 0    propranolol (INDERAL) 40 MG tablet TAKE ONE TABLET BY MOUTH TWICE A DAY 60 tablet 2    fluticasone-salmeterol (ADVAIR) 500-50 MCG/DOSE diskus inhaler INHALE ONE PUFF BY MOUTH EVERY 12 HOURS 1 each 2    Dextromethorphan-guaiFENesin  MG TB12 Take 1 tablet by mouth every 12 hours as needed (cough or congestion) 28 tablet 0    albuterol (PROVENTIL) (2.5 MG/3ML) 0.083% nebulizer solution USE THREE MILLILITERS VIA NEBULIZATION BY MOUTH EVERY 6 HOURS AS NEEDED FOR WHEEZING 120 each 2    ALPRAZolam (XANAX PO) Take by mouth 3 times daily as needed      hydrocortisone 2.5 % cream Apply topically 2 times daily. 28 g 0    levETIRAcetam (KEPPRA) 500 MG tablet Take 1 tablet by mouth 2 times daily 60 tablet 3    cetirizine (ZYRTEC) 10 MG tablet Take 10 mg by mouth daily      omeprazole (PRILOSEC) 20 MG delayed release capsule Take 1 capsule by mouth 2 times daily (before meals) 60 capsule 5    fluticasone (FLONASE) 50 MCG/ACT nasal spray 1 spray by Nasal route daily       No current facility-administered medications for this visit. Allergies: Allergies   Allergen Reactions    Symbicort [Budesonide-Formoterol Fumarate] Other (See Comments)     Lung infection           Review of Systems:    Pertinent items are noted in HPI        Vital Signs: There were no vitals filed for this visit. General Exam:     Constitutional: Patient is adequately groomed with no evidence of malnutrition    Physical Exam: lower back      Primary Exam:    Inspection: No deformity atrophy appreciable curvature      Palpation: No focal trigger point tenderness      Range of Motion: 80/2 pain with extension greater than flexion      Strength: Normal lower extremity      Special Tests: Negative SLR      Skin: There are no rashes, ulcerations or lesions.       Gait: Nonantalgic      Neurovascular - non focal and intact       Additional Comments:        Additional Examinations:                Office Imaging Results/Procedures PerformedToday:           Office Procedures:     Orders Placed This Encounter   Procedures   [address] disc displacement.  Facet arthropathy with mild narrowing of the floor of the foramen    bilaterally.                       Thank you for the opportunity to provide your interpretation.               Maurice Barger MD       A: PA 06/28/2022 4:19 PM                   Other Outside Imaging and Testing Personally Reviewed:    No results found. Assessment   Impression: . Encounter Diagnoses   Name Primary?  Disc displacement, lumbar     Lumbar spondylosis     History of thrombocytopenia               Plan: Activity modification lumbar precautions  Continue maintenance HEP and start formal course of PT  Medrol Dosepak followed by Celebrex thereafter with GI precaution and as needed use of Zanaflex  Consider lumbar spine intervention injection only as needed      Current pain levels are out of proportion to objective findings. Proceed as outlined above    Approximately 30 minutes was spent related to previewing pertinent medical documentation prior to the patient's visit along with counseling during the patient's visit with respect to treatment options inclusive of alternatives to treatment and the complications and risks related to those treatment options along with expectations of outcome related to those treatments and inclusive of time in the documentation and ordering of testing and treatment after the visit. Orders:        Orders Placed This Encounter   Procedures   [address] [place] [address] ([facility]     Referral Priority:   Routine     Referral Type:   Eval and Treat     Referral Reason:   Specialty Services Required     Requested Specialty:   Physical Therapist     Number of Visits Requested:   1         Ana Fritz MD.      Ligia [employer]: \"This note was dictated with voice recognition software. Though review and correction are routine, we apologize for any errors. \"

## 2022-07-13 ENCOUNTER — HOSPITAL ENCOUNTER (OUTPATIENT)
Dept: GENERAL RADIOLOGY | Age: 47
Discharge: HOME | End: 2022-07-13
Payer: COMMERCIAL

## 2022-07-13 ENCOUNTER — HOSPITAL ENCOUNTER (OUTPATIENT)
Age: 47
Discharge: HOME | End: 2022-07-13
Payer: COMMERCIAL

## 2022-07-13 DIAGNOSIS — J45.50 SEVERE PERSISTENT ASTHMA WITHOUT COMPLICATION: ICD-10-CM

## 2022-07-13 DIAGNOSIS — J98.6 ELEVATED DIAPHRAGM: ICD-10-CM

## 2022-07-13 DIAGNOSIS — Z91.09 ENVIRONMENTAL ALLERGIES: ICD-10-CM

## 2022-07-13 LAB
BASOPHILS ABSOLUTE: 0.1 K/UL (ref 0–0.2)
BASOPHILS RELATIVE PERCENT: 1.3 %
EOSINOPHILS ABSOLUTE: 0 K/UL (ref 0–0.6)
EOSINOPHILS RELATIVE PERCENT: 0.5 %
HCT VFR BLD CALC: 48 % (ref 40.5–52.5)
HEMOGLOBIN: 16.4 G/DL (ref 13.5–17.5)
LYMPHOCYTES ABSOLUTE: 0.7 K/UL (ref 1–5.1)
LYMPHOCYTES RELATIVE PERCENT: 10.3 %
MCH RBC QN AUTO: 34 PG (ref 26–34)
MCHC RBC AUTO-ENTMCNC: 34.2 G/DL (ref 31–36)
MCV RBC AUTO: 99.2 FL (ref 80–100)
MONOCYTES ABSOLUTE: 0.3 K/UL (ref 0–1.3)
MONOCYTES RELATIVE PERCENT: 5.2 %
NEUTROPHILS ABSOLUTE: 5.4 K/UL (ref 1.7–7.7)
NEUTROPHILS RELATIVE PERCENT: 82.7 %
PDW BLD-RTO: 12.9 % (ref 12.4–15.4)
PLATELET # BLD: 132 K/UL (ref 135–450)
PMV BLD AUTO: 9.4 FL (ref 5–10.5)
RBC # BLD: 4.83 M/UL (ref 4.2–5.9)
WBC # BLD: 6.6 K/UL (ref 4–11)

## 2022-07-13 PROCEDURE — 76000 FLUOROSCOPY <1 HR PHYS/QHP: CPT

## 2022-07-13 PROCEDURE — 86003 ALLG SPEC IGE CRUDE XTRC EA: CPT

## 2022-07-13 PROCEDURE — 85025 COMPLETE CBC W/AUTO DIFF WBC: CPT

## 2022-07-13 PROCEDURE — 36415 COLL VENOUS BLD VENIPUNCTURE: CPT

## 2022-07-13 PROCEDURE — 82785 ASSAY OF IGE: CPT

## 2022-07-17 LAB
2000687N OAK TREE IGE: <0.1 KU/L (ref 0–0.34)
ALLERGEN BARLEY IGE: <0.1 KU/L
ALLERGEN BEEF: <0.1 KU/L
ALLERGEN BERMUDA GRASS IGE: <0.1 KU/L (ref 0–0.34)
ALLERGEN BIRCH IGE: <0.1 KU/L (ref 0–0.34)
ALLERGEN CABBAGE IGE: <0.1 KU/L
ALLERGEN CARROT IGE: <0.1 KU/L
ALLERGEN CHICKEN IGE: <0.1 KU/L
ALLERGEN CODFISH IGE: <0.1 KU/L
ALLERGEN CORN IGE: <0.1 KU/L
ALLERGEN COW MILK IGE: 0.1 KU/L
ALLERGEN CRAB IGE: 0.49 KU/L
ALLERGEN DOG DANDER IGE: 15.4 KU/L (ref 0–0.34)
ALLERGEN EGG WHITE IGE: <0.1 KU/L
ALLERGEN GERMAN COCKROACH IGE: 0.44 KU/L (ref 0–0.34)
ALLERGEN GRAPE IGE: <0.1 KU/L
ALLERGEN HORMODENDRUM IGE: <0.1 KUL/L (ref 0–0.34)
ALLERGEN LETTUCE IGE: <0.1 KU/L
ALLERGEN MOUSE EPITHELIA IGE: 0.65 KU/L (ref 0–0.34)
ALLERGEN NAVY BEAN: <0.1 KU/L
ALLERGEN OAT: <0.1 KU/L
ALLERGEN ORANGE IGE: <0.1 KU/L
ALLERGEN PEANUT (F13) IGE: <0.1 KU/L
ALLERGEN PECAN TREE IGE: 0.22 KU/L (ref 0–0.34)
ALLERGEN PEPPER C. ANNUUM IGE: <0.1 KU/L
ALLERGEN PIGWEED ROUGH IGE: <0.1 KU/L (ref 0–0.34)
ALLERGEN PORK: 0.65 KU/L
ALLERGEN RICE IGE: <0.1 KU/L
ALLERGEN RYE IGE: <0.1 KU/L
ALLERGEN SHEEP SORREL (W18) IGE: <0.1 KU/L (ref 0–0.34)
ALLERGEN SOYBEAN IGE: <0.1 KU/L
ALLERGEN TOMATO IGE: <0.1 KU/L
ALLERGEN TREE SYCAMORE: <0.1 KU/L (ref 0–0.34)
ALLERGEN TUNA IGE: <0.1 KU/L
ALLERGEN WALNUT TREE IGE: <0.1 KU/L (ref 0–0.34)
ALLERGEN WHEAT IGE: <0.1 KU/L
ALLERGEN WHITE MULBERRY TREE, IGE: <0.1 KU/L (ref 0–0.34)
ALLERGEN, TREE, WHITE ASH IGE: <0.1 KU/L (ref 0–0.34)
ALTERNARIA ALTERNATA: <0.1 KU/L (ref 0–0.34)
ASPERGILLUS FUMIGATUS: <0.1 KU/L (ref 0–0.34)
CAT DANDER ANTIBODY: 13.9 KU/L (ref 0–0.34)
COTTONWOOD TREE: <0.1 KU/L (ref 0–0.34)
D. FARINAE: 1.05 KU/L (ref 0–0.34)
D. PTERONYSSINUS: 0.68 KU/L (ref 0–0.34)
ELM TREE: <0.1 KU/L (ref 0–0.34)
IGE: 172 IU/ML
IGE: 175 IU/ML
MAPLE/BOXELDER TREE: <0.1 KU/L (ref 0–0.34)
MOUNTAIN CEDAR TREE: 0.11 KU/L (ref 0–0.34)
MUCOR RACEMOSUS: <0.1 KU/L (ref 0–0.34)
P. NOTATUM: <0.1 KU/L (ref 0–0.34)
POTATO, IGE: <0.1 KU/L
RUSSIAN THISTLE: <0.1 KU/L (ref 0–0.34)
SHORT RAGWD(A ARTEMIS.) IGE: 0.11 KU/L (ref 0–0.34)
SHRIMP: 0.7 KU/L
TIMOTHY GRASS: <0.1 KU/L (ref 0–0.34)

## 2022-07-19 ENCOUNTER — HOSPITAL ENCOUNTER (OUTPATIENT)
Dept: PHYSICAL THERAPY | Age: 47
Setting detail: THERAPIES SERIES
Discharge: HOME | End: 2022-07-19
Payer: COMMERCIAL

## 2022-07-19 PROCEDURE — 97110 THERAPEUTIC EXERCISES: CPT

## 2022-07-19 PROCEDURE — 97162 PT EVAL MOD COMPLEX 30 MIN: CPT

## 2022-07-19 NOTE — FLOWSHEET NOTE
[employer]    Physical Therapy Treatment Note/ Progress Report:     Date:  2022    Patient Name:  Shea Fraser    :  [birth year]  MRN: [id number]  Medical/Treatment Diagnosis Information:  Diagnosis: Lumbar discogenic pain syndrome (M51.26), lumbar spondylosis (M47.816)       Treatment diagnosis: Low back pain   Insurance/Certification information:   [employer] - $0 deductible, $0 OOP max, 100% co-insurance, 30 PT visits per calendar year, auth required   Physician Information:  Jim Quintero[address] signed (Y/N): []  Yes [x]  No  Date sent:     Date of Patient follow up with Physician:      Progress Report: []  Yes  [x]  No     Functional Scale:           Date assessed:  TO physical FS primary measure score = 23     22    Date Range for reporting period:  Beginnin22  Ending:      Progress report due (10 Rx/or 30 days whichever is less):      Recertification due (POC duration/ or 90 days whichever is less): 22     Visit # Insurance Allowable Auth Needed   1 30 - pending auth [x]Yes    []No     Pain level:  9/10     SUBJECTIVE:  See eval    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; FALL RISK    Treatment based classification:     Exercises/Interventions:     Therapeutic Ex 15' Wt / Resistance sets/sec reps notes          LTR  1 10           Pt education 10'   Diagnosis, prognosis, tx plan                                                          Therapeutic Activities                                                               Manual Intervention 5'    Pt unable to lie prone or in L sidelying - only able to lie supine or R sidelying          Prone PA       GISTM/STM 5'   R lumbar paraspinals at level of TL junction   Lumbar Manip       SI Manip       Hip belt mobs       Hip LA distraction              NMR re-education                                             Pt. Education:  -pt educated on diagnosis, prognosis and expectations for rehab  -all pt questions were answered    Home Exercise Program:  [address] access code [facility]    Therapeutic Exercise and NMR EXR  [x] (36515) Provided verbal/tactile cueing for activities related to strengthening, flexibility, endurance, ROM  for improvements in proximal hip and core control with self care, mobility, lifting and ambulation.  [] (85191) Provided verbal/tactile cueing for activities related to improving balance, coordination, kinesthetic sense, posture, motor skill, proprioception  to assist with core control in self care, mobility, lifting, and ambulation.      Therapeutic Activities:    [] (10641 or 56595) Provided verbal/tactile cueing for activities related to improving balance, coordination, kinesthetic sense, posture, motor skill, proprioception and motor activation to allow for proper function  with self care and ADLs  [] (60062) Provided training and instruction to the patient for proper core and proximal hip recruitment and positioning with ambulation re-education     Home Exercise Program:    [x] (56208) Reviewed/Progressed HEP activities related to strengthening, flexibility, endurance, ROM of core, proximal hip and LE for functional self-care, mobility, lifting and ambulation   [] (24081) Reviewed/Progressed HEP activities related to improving balance, coordination, kinesthetic sense, posture, motor skill, proprioception of core, proximal hip and LE for self care, mobility, lifting, and ambulation      Manual Treatments:  PROM / STM / Oscillations-Mobs:  G-I, II, III, IV (PA's, Inf., Post.)  [x] (33296) Provided manual therapy to mobilize proximal hip and LS spine soft tissue/joints for the purpose of modulating pain, promoting relaxation,  increasing ROM, reducing/eliminating soft tissue swelling/inflammation/restriction, improving soft tissue extensibility and allowing for proper ROM for normal function with self care, mobility, lifting and ambulation. Modalities:       Charges:  Timed Code Treatment Minutes: 20   Total Treatment Minutes: 50       [] EVAL (LOW) 09542 (typically 20 minutes face-to-face)  [x] EVAL (MOD) 04577 (typically 30 minutes face-to-face)  [] EVAL (HIGH) 05634 (typically 45 minutes face-to-face)  [] RE-EVAL     [x] KW(96600) x     [] Nyssa Stevie (19891)  [] NMR (35241) x     [] VASO (99678)  [] Manual (32852) x     [] Other:  [] TA (00937)x     [] Mech Traction (97638)  [] ES(attended) (39717)     [] ES (un) (01875): If BWC Please Indicate Time In/Out and Total Minutes  CPT Code Time in Time out Total Min                                            Goals:   Patient stated goal: \"to decrease my back pain\"  [] Progressing: [] Met: [] Not Met: [] Adjusted     Therapist goals for Patient:  Short Term Goals: To be achieved in: 2 weeks  1. Independent in HEP and progression per patient tolerance, in order to prevent re-injury. [] Progressing: [] Met: [] Not Met: [] Adjusted  2. Patient will have a decrease in pain to facilitate improvement in movement, function, and ADLs as indicated by Functional Deficits. [] Progressing: [] Met: [] Not Met: [] Adjusted     Long Term Goals: To be achieved in: 8 weeks  1. Patient will reach FOTO predicted score of at least 33 to assist with reaching prior level of function. [] Progressing: [] Met: [] Not Met: [] Adjusted  2. Patient will demonstrate increased AROM to WNL, good LS mobility, good hip ROM to allow for proper joint functioning as indicated by patients Functional Deficits. [] Progressing: [] Met: [] Not Met: [] Adjusted  3. Patient will demonstrate an increase in Strength to good proximal hip and core activation to allow for proper functional mobility as indicated by patients Functional Deficits. [] Progressing: [] Met: [] Not Met: [] Adjusted  4.  Patient will return to sleeping for 1 night without disturbances due to increased symptoms or restriction. [] Progressing: [] Met: [] Not Met: [] Adjusted  5. Patient will be able to sit for 10 minutes without increased symptoms or restriction. [] Progressing: [] Met: [] Not Met: [] Adjusted        ASSESSMENT:  See eval    Treatment/Activity Tolerance:  [] Patient tolerated treatment well [] Patient limited by fatique  [x] Patient limited by pain  [] Patient limited by other medical complications  [] Other:     Overall Progression Towards Functional goals/ Treatment Progress Update:  [] Patient is progressing as expected towards functional goals listed. [] Progression is slowed due to complexities/Impairments listed. [] Progression has been slowed due to co-morbidities. [x] Plan just implemented, too soon to assess goals progression <30days   [] Goals require adjustment due to lack of progress  [] Patient is not progressing as expected and requires additional follow up with physician  [] Other:    Prognosis for POC: [] Good [x] Fair  [] Poor    Patient requires continued skilled intervention: [x] Yes  [] No        PLAN: See eval  [] Continue per plan of care [] Alter current plan (see comments)  [x] Plan of care initiated [] Hold pending MD visit [] Discharge    Electronically signed by: Kindra Ceballos PT    Note: If patient does not return for scheduled/recommended follow up visits, this note will serve as a discharge from care along with the most recent update on progress.

## 2022-07-19 NOTE — PLAN OF CARE
Naila GLAMSQUAD  [address] [place] [address]  Phone [phone]    Fax [phone]                                                       Physical Therapy Certification    Dear Cedric Abdul*  ,    We had the pleasure of evaluating the following patient for physical therapy services at [address] [place] [address]. A summary of our findings can be found in the initial assessment below. This includes our plan of care. If you have any questions or concerns regarding these findings, please do not hesitate to contact me at the office phone number checked above. Thank you for the referral.       Physician Signature:_______________________________Date:__________________  By signing above (or electronic signature), therapists plan is approved by physician      Patient: Catia Flannery   :    MRN: [id number]  Referring Physician: Cedric Abdul*        Evaluation Date: 2022      Medical Diagnosis Information:  Diagnosis: Lumbar discogenic pain syndrome (M51.26), lumbar spondylosis (M47.816)                                              Insurance information: [facility] - $0 deductible, $0 OOP max, 100% co-insurance, 30 PT visits per calendar year, auth required      Precautions/ Contra-indications: hx of seizures x 15 years resulting in numerous falls    C-SSRS Triggered by Intake questionnaire (Past 2 wk assessment):   [x] No, Questionnaire did not trigger screening.   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  Pt reports having a fall Aug 2021 where he fell backwards onto an air compressor, was very swollen and bruised throughout his entire lower back. Pt states that his LBP is progressively worsening since then. Pain is achy and feels \"like his back is twisted. \" Pain is worst with standing up after bending forwards, getting up after being on the floor, standing, sitting, walking and sleeping (prefers to sleep on L due to pain). Pt denies radicular sxs. MRI showed L4-5 bulging disc, L5-S1 minimal retrolisthesis, disc displacement, mild facet arthropathy; L3-4 disc displacement, facet arthropathy. Relevant Medical History:hx of atonic seizures, multiple falls in the past, HTN, anxiety, asthma, dizziness (which pt relates to being a result of meds)  Functional Disability Index: FOTO physical FS primary measure score = 23.9    Pain Scale: 9/10  Easing factors: icy hot patches  Provocative factors: standing up after bending forwards, getting up after being on the floor, standing, sitting, walking and sleeping     Type: [x]Constant   []Intermittent  []Radiating [x]Localized []other:     Numbness/Tingling: Pt denies N/T in B LE    Occupation/School: Unemployed    Living Status/Prior Level of Function: Independent with ADLs and IADLs.     OBJECTIVE:     ROM     Lumbar Flexion 30 *pn    Lumbar Extension 5 *pn     LEFT RIGHT   Lumbar sidebend 25% \"tight\" on R 25% \"tight\" on L   Lumbar Quadrant     Thoracic Rotation      LEFT RIGHT   HIP Flex 90 *LBP 90 *LBP   HIP Abd     HIP ER Dec Dec   HIP IR Dec Dec   Knee Flex     Knee ext     Hamstring length Significant limitations Significant limitations   Piriformis length     Kaleb Test          Strength  LEFT RIGHT   ALL NORMAL []      MfA     TrA     HIP Flexors (L1-2) 4- 4-   HIP Abductors     HIP extension     Knee EXT (L3) 4- 4   Knee Flex (S1) 4- 4   Ankle DF (L4) 4- 4   Ankle PF (S2) 4- 4   Great Toe Ext (L5)         Reflexes  Normal Abnormal Comments   ALL NORMAL [x]       S1-2 Seated achilles [] []    S1-2 Prone knee bend [] []    L3-4 Patellar tendon [] []    C5-6 Biceps [] []    C6 Brachioradialis [] []    C7-8 Triceps [] []      Sensation:    [x]Dermatomes:   [x] Normal   [] Abnormal     Balance: NT today due to pain levels    Joint mobility: difficult to accurately assess due to pt inability to lie prone   []Normal    []Hypo   []Hyper    Palpation: TTP B lumbar and lower thoracic paraspinals (R>L)    Functional Mobility/Transfers: Pt has difficulty with bed mobility and sit to stand transfers due to LBP    Posture: Rounded shoulders, decreased lumbar lordosis    Bandages/Dressings/Incisions: N/A    Gait: (include devices/WB status) decreased trunk rotation, decreased arm swing B, decreased B hip extension      Neural dynamic tension testing Normal Abnormal Comments   Slump Test      SLR  X     Femoral nerve (L2-4)        Orthopedic Special Tests:    Normal Abnormal N/A Comments   Toe walk         Heel Walk       Fwd Bend-aberrant or innominate mvmt)  X     Trendelenburg       Kemps/Quadrant       Stork       LORE/Erik       Hip scour       ASLR       Crossed SLR X      Supine to sit       Thigh thrust       SI distraction/compression       PA/Spring    Difficult to accurately assess   Prone Instability test       Prone knee bend       Sacral Spring/thrust                                     [x] Patient history, allergies, meds reviewed. Medical chart reviewed. See intake form. Review Of Systems (ROS):  [x]Performed Review of systems (Integumentary, CardioPulmonary, Neurological) by intake and observation. Intake form has been scanned into medical record. Patient has been instructed to contact their primary care physician regarding ROS issues if not already being addressed at this time.       Co-morbidities/Complexities (which will affect course of rehabilitation):   []None           Arthritic conditions   []Rheumatoid arthritis (M05.9)  []Osteoarthritis (M19.91)   Cardiovascular conditions   [x]Hypertension (I10)  []Hyperlipidemia (E78.5)  []Angina pectoris (I20)  []Atherosclerosis (I70)   Musculoskeletal conditions   []Disc pathology   []Congenital spine pathologies   []Prior surgical intervention  []Osteoporosis (M81.8)  []Osteopenia (M85.8)   Endocrine conditions   []Hypothyroid (E03.9)  []Hyperthyroid Gastrointestinal conditions   []Constipation (L55.53)   Metabolic conditions   []Morbid obesity (E66.01)  []Diabetes type 1(E10.65) or 2 (E11.65)   []Neuropathy (G60.9)     Pulmonary conditions   [x]Asthma (J45)  []Coughing   []COPD (J44.9)   Psychological Disorders  [x]Anxiety (F41.9)  []Depression (F32.9)   []Other:   [x]Other:   hx of atonic seizures that have resulted in frequent falls over the last 15 years       Barriers to/and or personal factors that will affect rehab potential:              [x]Age  []Sex              []Motivation/Lack of Motivation                        [x]Co-Morbidities              []Cognitive Function, education/learning barriers              []Environmental, home barriers              []profession/work barriers  [x]past PT/medical experience  []other:  Justification:     Falls Risk Assessment (30 days):   [x] Falls Risk assessed and no intervention required.   [] Falls Risk assessed and Patient requires intervention due to being higher risk   TUG score (>12s at risk):     [] Falls education provided, including         ASSESSMENT:   Functional Impairments:     [x]Noted lumbar/proximal hip hypomobility   []Noted lumbosacral and/or generalized hypermobility   [x]Decreased Lumbosacral/hip/LE functional ROM   [x]Decreased core/proximal hip strength and neuromuscular control    [x]Decreased LE functional strength    []Abnormal reflexes/sensation/myotomal/dermatomal deficits  [x]Reduced balance/proprioceptive control    []other:      Functional Activity Limitations (from functional questionnaire and intake)   [x]Reduced ability to tolerate prolonged functional positions   [x]Reduced ability or difficulty with changes of positions or transfers between positions   [x]Reduced ability to maintain good posture and demonstrate good body mechanics with sitting, bending, and lifting   [x]Reduced ability to sleep   [x] Reduced ability or tolerance with driving and/or computer work   [x]Reduced ability to perform lifting, reaching, carrying tasks   [x]Reduced ability to squat   [x]Reduced ability to forward bend   [x]Reduced ability to ambulate prolonged functional periods/distances/surfaces   [x]Reduced ability to ascend/descend stairs   []other:       Participation Restrictions   [x]Reduced participation in self care activities   [x]Reduced participation in home management activities   []Reduced participation in work activities   [x]Reduced participation in social activities. []Reduced participation in sport/recreation activities. Classification:   [x]Signs/symptoms consistent with Lumbar instability/stabilization subgroup. [x]Signs/symptoms consistent with Lumbar mobilization/manipulation subgroup, myotomes and dermatomes intact. Meets manipulation criteria. []Signs/symptoms consistent with Lumbar direction specific/centralization subgroup   []Signs/symptoms consistent with Lumbar traction subgroup     []Signs/symptoms consistent with lumbar facet dysfunction   []Signs/symptoms consistent with lumbar stenosis type dysfunction   []Signs/symptoms consistent with nerve root involvement including myotome & dermatome dysfunction   []Signs/symptoms consistent with post-surgical status including: decreased ROM, strength and function.    []signs/symptoms consistent with pathology which may benefit from Dry needling     []other:      Prognosis/Rehab Potential:      []Excellent   [x]Good    []Fair   []Poor    Tolerance of evaluation/treatment:    []Excellent   []Good    [x]Fair   []Poor     Physical Therapy Evaluation Complexity Justification  [x] A history of present problem pain\"  [] Progressing: [] Met: [] Not Met: [] Adjusted    Therapist goals for Patient:   Short Term Goals: To be achieved in: 2 weeks  1. Independent in HEP and progression per patient tolerance, in order to prevent re-injury. [] Progressing: [] Met: [] Not Met: [] Adjusted  2. Patient will have a decrease in pain to facilitate improvement in movement, function, and ADLs as indicated by Functional Deficits. [] Progressing: [] Met: [] Not Met: [] Adjusted    Long Term Goals: To be achieved in: 8 weeks  1. Patient will reach FOTO predicted score of at least 33 to assist with reaching prior level of function. [] Progressing: [] Met: [] Not Met: [] Adjusted  2. Patient will demonstrate increased AROM to WNL, good LS mobility, good hip ROM to allow for proper joint functioning as indicated by patients Functional Deficits. [] Progressing: [] Met: [] Not Met: [] Adjusted  3. Patient will demonstrate an increase in Strength to good proximal hip and core activation to allow for proper functional mobility as indicated by patients Functional Deficits. [] Progressing: [] Met: [] Not Met: [] Adjusted  4. Patient will return to sleeping for 1 night without disturbances due to increased symptoms or restriction. [] Progressing: [] Met: [] Not Met: [] Adjusted  5. Patient will be able to sit for 10 minutes without increased symptoms or restriction.      [] Progressing: [] Met: [] Not Met: [] Adjusted     Electronically signed by:  Jorge Alberto Dubon, PT

## 2022-07-25 ENCOUNTER — APPOINTMENT (OUTPATIENT)
Dept: PHYSICAL THERAPY | Age: 47
End: 2022-07-25
Payer: COMMERCIAL

## 2022-07-26 ENCOUNTER — HOSPITAL ENCOUNTER (OUTPATIENT)
Dept: PHYSICAL THERAPY | Age: 47
Setting detail: THERAPIES SERIES
Discharge: HOME | End: 2022-07-26
Payer: COMMERCIAL

## 2022-07-26 PROCEDURE — 97140 MANUAL THERAPY 1/> REGIONS: CPT

## 2022-07-26 PROCEDURE — 97110 THERAPEUTIC EXERCISES: CPT

## 2022-07-26 NOTE — FLOWSHEET NOTE
[facility] [employer]    Physical Therapy Treatment Note/ Progress Report:     Date:  2022    Patient Name:  Nasir Hudson    :    MRN: [id number]  Medical/Treatment Diagnosis Information:  Diagnosis: Lumbar discogenic pain syndrome (M51.26), lumbar spondylosis (M47.816)       Treatment diagnosis: Low back pain   Insurance/Certification information:   [facility] - $0 deductible, $0 OOP max, 100% co-insurance, 30 PT visits per calendar year, auth required   Physician Information:  Helen  [address] signed (Y/N): []  Yes [x]  No  Date sent:     Date of Patient follow up with Physician:      Progress Report: []  Yes  [x]  No     Functional Scale:           Date assessed:  TO physical FS primary measure score = 23     22    Date Range for reporting period:  Beginnin22  Ending:      Progress report due (10 Rx/or 30 days whichever is less): 74     Recertification due (POC duration/ or 90 days whichever is less): 22     Visit # Insurance Allowable Auth Needed    16 visits approved 22 - 22 [x]Yes    []No     Pain level:  8/10     SUBJECTIVE:  Pt reports back is feeling ok today. Pt states some days are better than others. Pt states that he has been compliant with HEP, but LTR to L bothered a lump he has on his R lateral hip/thigh this morning when doing them.      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; FALL RISK    Treatment based classification:     Exercises/Interventions:     Therapeutic Ex 20' Wt / Resistance sets/sec reps notes          LTR  1 10    Thread the needle at wall  1 10    SKTC   10\" 3x ea Performed manually           Hip add BS  5\" 2x5    Supine clamshells yellow 1 10                                      Therapeutic Activities Manual Intervention 22'    Pt unable to lie prone or in L sidelying - only able to lie supine or R sidelying          Prone PA       GISTM/STM 22'   B lumbar paraspinals (R>L) at the level of TL junction - performed with pt seated EOB   Lumbar Manip       SI Manip       Hip belt mobs       Hip LA distraction              NMR re-education                                             Pt. Education:  -pt educated on diagnosis, prognosis and expectations for rehab  -all pt questions were answered    Home Exercise Program:  Jada Presley access code [facility]    Therapeutic Exercise and NMR EXR  [x] (99649) Provided verbal/tactile cueing for activities related to strengthening, flexibility, endurance, ROM  for improvements in proximal hip and core control with self care, mobility, lifting and ambulation.  [] (86215) Provided verbal/tactile cueing for activities related to improving balance, coordination, kinesthetic sense, posture, motor skill, proprioception  to assist with core control in self care, mobility, lifting, and ambulation.      Therapeutic Activities:    [] (15645 or 75421) Provided verbal/tactile cueing for activities related to improving balance, coordination, kinesthetic sense, posture, motor skill, proprioception and motor activation to allow for proper function  with self care and ADLs  [] (24689) Provided training and instruction to the patient for proper core and proximal hip recruitment and positioning with ambulation re-education     Home Exercise Program:    [x] (36503) Reviewed/Progressed HEP activities related to strengthening, flexibility, endurance, ROM of core, proximal hip and LE for functional self-care, mobility, lifting and ambulation   [] (79901) Reviewed/Progressed HEP activities related to improving balance, coordination, kinesthetic sense, posture, motor skill, proprioception of core, proximal hip and LE for self care, mobility, lifting, and ambulation      Manual Functional Deficits. [] Progressing: [] Met: [] Not Met: [] Adjusted  3. Patient will demonstrate an increase in Strength to good proximal hip and core activation to allow for proper functional mobility as indicated by patients Functional Deficits. [] Progressing: [] Met: [] Not Met: [] Adjusted  4. Patient will return to sleeping for 1 night without disturbances due to increased symptoms or restriction. [] Progressing: [] Met: [] Not Met: [] Adjusted  5. Patient will be able to sit for 10 minutes without increased symptoms or restriction. [] Progressing: [] Met: [] Not Met: [] Adjusted        ASSESSMENT:  Pt responded well to manual therapy today. Trigger points noted throughout R lumbar paraspinals, tightness along L lumbar paraspinals. SKTC stretch was performed manually today due to pt having difficulty holding strap with UE. Added hip add BS and supine clamshells with fatigue noted with hip add BS, no increased LBP noted though. Treatment/Activity Tolerance:  [] Patient tolerated treatment well [] Patient limited by fatique  [x] Patient limited by pain  [] Patient limited by other medical complications  [] Other:     Overall Progression Towards Functional goals/ Treatment Progress Update:  [] Patient is progressing as expected towards functional goals listed. [] Progression is slowed due to complexities/Impairments listed. [] Progression has been slowed due to co-morbidities. [x] Plan just implemented, too soon to assess goals progression <30days   [] Goals require adjustment due to lack of progress  [] Patient is not progressing as expected and requires additional follow up with physician  [] Other:    Prognosis for POC: [] Good [x] Fair  [] Poor    Patient requires continued skilled intervention: [x] Yes  [] No        PLAN: Decrease LBP, improve lumbar/hip mobility and strength as tolerated by pt.    [x] Continue per plan of care [] Alter current plan (see comments)  [] Plan of care initiated [] Hold pending MD visit [] Discharge    Electronically signed by: Danny Nagy PT    Note: If patient does not return for scheduled/recommended follow up visits, this note will serve as a discharge from care along with the most recent update on progress.

## 2022-07-28 ENCOUNTER — HOSPITAL ENCOUNTER (OUTPATIENT)
Dept: PHYSICAL THERAPY | Age: 47
Setting detail: THERAPIES SERIES
Discharge: HOME | End: 2022-07-28
Payer: COMMERCIAL

## 2022-07-28 PROCEDURE — 97140 MANUAL THERAPY 1/> REGIONS: CPT

## 2022-07-28 PROCEDURE — 97110 THERAPEUTIC EXERCISES: CPT

## 2022-07-28 NOTE — FLOWSHEET NOTE
[employer]    Physical Therapy Treatment Note/ Progress Report:     Date:  2022    Patient Name:  Jerry Roberts    :  [birth year]  MRN: [id number]  Medical/Treatment Diagnosis Information:  Diagnosis: Lumbar discogenic pain syndrome (M51.26), lumbar spondylosis (M47.816)       Treatment diagnosis: Low back pain   Insurance/Certification information:   [facility] - $0 deductible, $0 OOP max, 100% co-insurance, 30 PT visits per calendar year, auth required   Physician Information:  Rommel Blankens[facility] signed (Y/N): []  Yes [x]  No  Date sent:     Date of Patient follow up with Physician:      Progress Report: []  Yes  [x]  No     Functional Scale:           Date assessed:  FOTO physical FS primary measure score = 23     22    Date Range for reporting period:  Beginnin22  Ending:      Progress report due (10 Rx/or 30 days whichever is less):      Recertification due (POC duration/ or 90 days whichever is less): 22     Visit # Insurance Allowable Auth Needed    16 visits approved 22 - 22 [x]Yes    []No     Pain level:  6-710     SUBJECTIVE:   Pt reports he feels STM helped at last session. Pt states that he was able to walk/move in the pool for about 20 mins later that day. Pt states that he had increased R sided LBP last night though. 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    LTR  1 10           SKTC   10\" 3x ea Performed manually           Hip add BS  5\" 2x5    Supine clamshells yellow 1 10                                      Therapeutic Activities Manual Intervention 22'    Pt unable to lie prone or in L sidelying - only able to lie supine or R sidelying          Prone PA       GISTM/STM 22'   B lumbar paraspinals (R>L) at the level of TL junction - pt was able to tolerate in prone today    Lumbar Manip       SI Manip       Hip belt mobs       Hip LA distraction              NMR re-education                                             Pt. Education:  -pt educated on diagnosis, prognosis and expectations for rehab  -all pt questions were answered    Home Exercise Program:  Vonnie Paget access code [facility]    Therapeutic Exercise and NMR EXR  [x] (92401) Provided verbal/tactile cueing for activities related to strengthening, flexibility, endurance, ROM  for improvements in proximal hip and core control with self care, mobility, lifting and ambulation.  [] (80347) Provided verbal/tactile cueing for activities related to improving balance, coordination, kinesthetic sense, posture, motor skill, proprioception  to assist with core control in self care, mobility, lifting, and ambulation.      Therapeutic Activities:    [] (41173 or 64329) Provided verbal/tactile cueing for activities related to improving balance, coordination, kinesthetic sense, posture, motor skill, proprioception and motor activation to allow for proper function  with self care and ADLs  [] (52811) Provided training and instruction to the patient for proper core and proximal hip recruitment and positioning with ambulation re-education     Home Exercise Program:    [x] (91366) Reviewed/Progressed HEP activities related to strengthening, flexibility, endurance, ROM of core, proximal hip and LE for functional self-care, mobility, lifting and ambulation   [] (93227) Reviewed/Progressed HEP activities related to improving balance, coordination, kinesthetic sense, posture, motor skill, proprioception of core, proximal hip and LE for self care, mobility, lifting, and ambulation      Manual Treatments: PROM / STM / Oscillations-Mobs:  G-I, II, III, IV (PA's, Inf., Post.)  [x] (52306) Provided manual therapy to mobilize proximal hip and LS spine soft tissue/joints for the purpose of modulating pain, promoting relaxation,  increasing ROM, reducing/eliminating soft tissue swelling/inflammation/restriction, improving soft tissue extensibility and allowing for proper ROM for normal function with self care, mobility, lifting and ambulation. Modalities:       Charges:  Timed Code Treatment Minutes: 40   Total Treatment Minutes: 40       [] EVAL (LOW) 84793 (typically 20 minutes face-to-face)  [] EVAL (MOD) 95001 (typically 30 minutes face-to-face)  [] EVAL (HIGH) 51447 (typically 45 minutes face-to-face)  [] RE-EVAL     [x] VU(35997) x     [] IONTO (25880)  [] NMR (32803) x     [] VASO (81863)  [x] Manual (86326) x  2   [] Other:  [] TA (20421)x     [] Mech Traction (91804)  [] ES(attended) (06480)     [] ES (un) (42838): If BWC Please Indicate Time In/Out and Total Minutes  CPT Code Time in Time out Total Min                                            Goals:   Patient stated goal: \"to decrease my back pain\"  [] Progressing: [] Met: [] Not Met: [] Adjusted     Therapist goals for Patient:  Short Term Goals: To be achieved in: 2 weeks  1. Independent in HEP and progression per patient tolerance, in order to prevent re-injury. [] Progressing: [] Met: [] Not Met: [] Adjusted  2. Patient will have a decrease in pain to facilitate improvement in movement, function, and ADLs as indicated by Functional Deficits. [] Progressing: [] Met: [] Not Met: [] Adjusted     Long Term Goals: To be achieved in: 8 weeks  1. Patient will reach FOTO predicted score of at least 33 to assist with reaching prior level of function. [] Progressing: [] Met: [] Not Met: [] Adjusted  2.  Patient will demonstrate increased AROM to WNL, good LS mobility, good hip ROM to allow for proper joint functioning as indicated by patients Functional Deficits. [] Progressing: [] Met: [] Not Met: [] Adjusted  3. Patient will demonstrate an increase in Strength to good proximal hip and core activation to allow for proper functional mobility as indicated by patients Functional Deficits. [] Progressing: [] Met: [] Not Met: [] Adjusted  4. Patient will return to sleeping for 1 night without disturbances due to increased symptoms or restriction. [] Progressing: [] Met: [] Not Met: [] Adjusted  5. Patient will be able to sit for 10 minutes without increased symptoms or restriction. [] Progressing: [] Met: [] Not Met: [] Adjusted        ASSESSMENT:  Pt responded well to manual therapy today. Trigger points and tightness noted throughout R lumbar paraspinals. Added recumbent bike for mobility and cardio with difficulty initiating movement, but then good response noted. Treatment/Activity Tolerance:  [] Patient tolerated treatment well [] Patient limited by fatique  [x] Patient limited by pain  [] Patient limited by other medical complications  [] Other:     Overall Progression Towards Functional goals/ Treatment Progress Update:  [] Patient is progressing as expected towards functional goals listed. [] Progression is slowed due to complexities/Impairments listed. [] Progression has been slowed due to co-morbidities. [x] Plan just implemented, too soon to assess goals progression <30days   [] Goals require adjustment due to lack of progress  [] Patient is not progressing as expected and requires additional follow up with physician  [] Other:    Prognosis for POC: [] Good [x] Fair  [] Poor    Patient requires continued skilled intervention: [x] Yes  [] No        PLAN: Decrease LBP, improve lumbar/hip mobility and strength as tolerated by pt.    [x] Continue per plan of care [] Alter current plan (see comments)  [] Plan of care initiated [] Hold pending MD visit [] Discharge    Electronically signed by: Kindra Ceballos, PT    Note: If patient does not return for scheduled/recommended follow up visits, this note will serve as a discharge from care along with the most recent update on progress.

## 2022-08-04 ENCOUNTER — HOSPITAL ENCOUNTER (OUTPATIENT)
Dept: PHYSICAL THERAPY | Age: 47
Setting detail: THERAPIES SERIES
Discharge: HOME | End: 2022-08-04
Payer: COMMERCIAL

## 2022-08-04 PROCEDURE — 97140 MANUAL THERAPY 1/> REGIONS: CPT

## 2022-08-04 PROCEDURE — 97110 THERAPEUTIC EXERCISES: CPT

## 2022-08-04 NOTE — FLOWSHEET NOTE
[facility]    Physical Therapy Treatment Note/ Progress Report:     Date:  2022    Patient Name:  Edie Florian    :  [birth year]  MRN: [id number]  Medical/Treatment Diagnosis Information:  Diagnosis: Lumbar discogenic pain syndrome (M51.26), lumbar spondylosis (M47.816)       Treatment diagnosis: Low back pain   Insurance/Certification information:   [facility] - $0 deductible, $0 OOP max, 100% co-insurance, 30 PT visits per calendar year, auth required   Physician Information:  Will Lora[address] signed (Y/N): []  Yes [x]  No  Date sent:     Date of Patient follow up with Physician:      Progress Report: []  Yes  [x]  No     Functional Scale:           Date assessed:  FOTO physical FS primary measure score = 23     22    Date Range for reporting period:  Beginnin22  Ending:      Progress report due (10 Rx/or 30 days whichever is less): 08     Recertification due (POC duration/ or 90 days whichever is less): 22     Visit # Insurance Allowable Auth Needed   3/16 16 visits approved 22 - 22 [x]Yes    []No     Pain level:  8/10     SUBJECTIVE:   Pt reports that he woke up in a lot of pain on R side of his back this morning, pain was so intense that he felt like he could vomit. Pt states that he did some exercises, was able to get a pop in his back and has felt a little better since then. Still very painful especially when walking though. 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    LTR  1 10           SKTC   10\" 3x ea Performed manually           Hip add BS    Supine clamshells           Seated lumbar flexion stretch w/SB Red SB 1 10                        Therapeutic Activities                                                               Manual Intervention 22'    Pt unable to lie prone or in L sidelying - only able to lie supine or R sidelying          Prone PA       GISTM/STM 22'   B lumbar paraspinals (R>L)  Performed in seated position   Lumbar Manip       SI Manip       Hip belt mobs       Hip LA distraction              NMR re-education                                             Pt. Education:  -pt educated on diagnosis, prognosis and expectations for rehab  -all pt questions were answered    Home Exercise Program:  Fam Luu access code [facility]    Therapeutic Exercise and NMR EXR  [x] (29758) Provided verbal/tactile cueing for activities related to strengthening, flexibility, endurance, ROM  for improvements in proximal hip and core control with self care, mobility, lifting and ambulation.  [] (78724) Provided verbal/tactile cueing for activities related to improving balance, coordination, kinesthetic sense, posture, motor skill, proprioception  to assist with core control in self care, mobility, lifting, and ambulation.      Therapeutic Activities:    [] (24711 or 80860) Provided verbal/tactile cueing for activities related to improving balance, coordination, kinesthetic sense, posture, motor skill, proprioception and motor activation to allow for proper function  with self care and ADLs  [] (90350) Provided training and instruction to the patient for proper core and proximal hip recruitment and positioning with ambulation re-education     Home Exercise Program:    [x] (75252) Reviewed/Progressed HEP activities related to strengthening, flexibility, endurance, ROM of core, proximal hip and LE for functional self-care, mobility, lifting and ambulation   [] (02173) Reviewed/Progressed HEP activities related to improving balance, coordination, kinesthetic sense, posture, motor skill, proprioception of core, proximal hip and LE for self care, mobility, lifting, and ambulation      Manual Treatments:  PROM / STM / Oscillations-Mobs:  G-I, II, III, IV (PA's, Inf., Post.)  [x] (81371) Provided manual therapy to mobilize proximal hip and LS spine soft tissue/joints for the purpose of modulating pain, promoting relaxation,  increasing ROM, reducing/eliminating soft tissue swelling/inflammation/restriction, improving soft tissue extensibility and allowing for proper ROM for normal function with self care, mobility, lifting and ambulation. Modalities:   10' MHP (seated)    Charges:  Timed Code Treatment Minutes: 40   Total Treatment Minutes: 50       [] EVAL (LOW) 43649 (typically 20 minutes face-to-face)  [] EVAL (MOD) 06161 (typically 30 minutes face-to-face)  [] EVAL (HIGH) 67614 (typically 45 minutes face-to-face)  [] RE-EVAL     [x] YD(13733) x     [] IONTO (70793)  [] NMR (28568) x     [] VASO (85553)  [x] Manual (37564) x  2   [] Other:  [] TA (36374)x     [] Mech Traction (40874)  [] ES(attended) (82571)     [] ES (un) (97021): If BWC Please Indicate Time In/Out and Total Minutes  CPT Code Time in Time out Total Min                                            Goals:   Patient stated goal: \"to decrease my back pain\"  [] Progressing: [] Met: [] Not Met: [] Adjusted     Therapist goals for Patient:  Short Term Goals: To be achieved in: 2 weeks  1. Independent in HEP and progression per patient tolerance, in order to prevent re-injury. [] Progressing: [] Met: [] Not Met: [] Adjusted  2. Patient will have a decrease in pain to facilitate improvement in movement, function, and ADLs as indicated by Functional Deficits. [] Progressing: [] Met: [] Not Met: [] Adjusted     Long Term Goals: To be achieved in: 8 weeks  1. Patient will reach FOTO predicted score of at least 33 to assist with reaching prior level of function. [] Progressing: [] Met: [] Not Met: [] Adjusted  2.  Patient will demonstrate increased AROM to WNL, good LS mobility, good hip ROM to allow for proper joint functioning as indicated by patients Functional Deficits. [] Progressing: [] Met: [] Not Met: [] Adjusted  3. Patient will demonstrate an increase in Strength to good proximal hip and core activation to allow for proper functional mobility as indicated by patients Functional Deficits. [] Progressing: [] Met: [] Not Met: [] Adjusted  4. Patient will return to sleeping for 1 night without disturbances due to increased symptoms or restriction. [] Progressing: [] Met: [] Not Met: [] Adjusted  5. Patient will be able to sit for 10 minutes without increased symptoms or restriction. [] Progressing: [] Met: [] Not Met: [] Adjusted        ASSESSMENT:  Pt continues to demonstrate significant tightness and TTP along R lumbar paraspinals. Modified exercises today as a result of increased pain. Pt was able to tolerate recumbent bike but did rocking instead of full revolutions due to pain with increased R hip flexion during full revolutions. Good tolerance to heat with slight improvement of sxs with walking afterwards. Treatment/Activity Tolerance:  [] Patient tolerated treatment well [] Patient limited by fatique  [x] Patient limited by pain  [] Patient limited by other medical complications  [] Other:     Overall Progression Towards Functional goals/ Treatment Progress Update:  [] Patient is progressing as expected towards functional goals listed. [] Progression is slowed due to complexities/Impairments listed. [] Progression has been slowed due to co-morbidities.   [x] Plan just implemented, too soon to assess goals progression <30days   [] Goals require adjustment due to lack of progress  [] Patient is not progressing as expected and requires additional follow up with physician  [] Other:    Prognosis for POC: [] Good [x] Fair  [] Poor    Patient requires continued skilled intervention: [x] Yes  [] No        PLAN: Decrease LBP, improve lumbar/hip mobility and strength as tolerated by pt. [x] Continue per plan of care [] Alter current plan (see comments)  [] Plan of care initiated [] Hold pending MD visit [] Discharge    Electronically signed by: Danny Nagy PT    Note: If patient does not return for scheduled/recommended follow up visits, this note will serve as a discharge from care along with the most recent update on progress.

## 2022-08-09 ENCOUNTER — OFFICE VISIT (OUTPATIENT)
Dept: PULMONOLOGY | Age: 47
End: 2022-08-09
Payer: COMMERCIAL

## 2022-08-09 VITALS — HEART RATE: 73 BPM | OXYGEN SATURATION: 97 %

## 2022-08-09 DIAGNOSIS — K21.9 GASTROESOPHAGEAL REFLUX DISEASE WITHOUT ESOPHAGITIS: ICD-10-CM

## 2022-08-09 DIAGNOSIS — J45.50 SEVERE PERSISTENT ASTHMA WITHOUT COMPLICATION: Primary | ICD-10-CM

## 2022-08-09 DIAGNOSIS — Z91.09 ENVIRONMENTAL ALLERGIES: ICD-10-CM

## 2022-08-09 PROCEDURE — 99214 OFFICE O/P EST MOD 30 MIN: CPT | Performed by: INTERNAL MEDICINE

## 2022-08-09 PROCEDURE — G8427 DOCREV CUR MEDS BY ELIG CLIN: HCPCS | Performed by: INTERNAL MEDICINE

## 2022-08-09 PROCEDURE — 1036F TOBACCO NON-USER: CPT | Performed by: INTERNAL MEDICINE

## 2022-08-09 PROCEDURE — G8419 CALC BMI OUT NRM PARAM NOF/U: HCPCS | Performed by: INTERNAL MEDICINE

## 2022-08-09 RX ORDER — FLUTICASONE FUROATE, UMECLIDINIUM BROMIDE AND VILANTEROL TRIFENATATE 200; 62.5; 25 UG/1; UG/1; UG/1
1 POWDER RESPIRATORY (INHALATION) DAILY
Qty: 1 EACH | Refills: 6 | Status: SHIPPED
Start: 2022-08-09 | End: 2022-08-16

## 2022-08-09 NOTE — PROGRESS NOTES
childhood. He states it has been pretty bad over the last couple of years. He has environmental allergies. States he is \"allergic to everything. \" Also has GERD and a history of seizures. He is on Advair and albuterol. He was on Symbicort at one time but that did not help as much. He does have a cat at home. Cat gets on the bed \"once in a while\". He does not have a feather pillow or down comforter. He is sensitive to odors such as perfumes. He states that he is allergic to grass and he has to pay someone to mow his yard. e feels about the same  He has had the requested testing. Review of Systems  No Chest pain, Nausea or vomiting reported      History  I have reviewed past medical, surgical, social and family history. This is documented elsewhere in themedical record. Physical Exam:  Well developed, well nourished  Alert and oriented  Sclera is clear  No cervical adenopathy  No JVD. Chest examination is clear. Cardiac examination reveals regular rate and rhythm without murmur, gallop or rub. The abdomen is soft, nontender and nondistended. There is no clubbing, cyanosis or edema of the extremities. There is no obvious skin rash. No focal neuro deficicts  Normal mood and affect      Allergies   Allergen Reactions    Symbicort [Budesonide-Formoterol Fumarate] Other (See Comments)     Lung infection     Prior to Visit Medications    Medication Sig Taking? Authorizing Provider   methylPREDNISolone (MEDROL, TOBI,) 4 MG tablet By mouth.   Roselia Galvez MD   VENTOLIN  (20 Base) MCG/ACT inhaler Inhale 2 puffs into the lungs every 6 hours as needed for Wheezing  Kadeem Francois MD   celecoxib (CELEBREX) 200 MG capsule Take 1 capsule by mouth daily  Roselia Galvez MD   cyclobenzaprine (FLEXERIL) 10 mg tablet Take 1 tablet by mouth nightly as needed for Muscle spasms  Roselia Galvez MD   albuterol sulfate  (90 Base) MCG/ACT inhaler INHALE TWO PUFFS BY MOUTH FOUR TIMES A DAY AS NEEDED FOR WHEEZING  BRUCE Cochran - CNP   propranolol (INDERAL) 40 MG tablet TAKE ONE TABLET BY MOUTH TWICE A DAY  Yordan Tapia MD   fluticasone-salmeterol (ADVAIR) 500-50 MCG/DOSE diskus inhaler INHALE ONE PUFF BY MOUTH EVERY 12 HOURS  Yordan Tapia MD   Dextromethorphan-guaiFENesin  MG TB12 Take 1 tablet by mouth every 12 hours as needed (cough or congestion)  Christiane Morales MD   albuterol (PROVENTIL) (2.5 MG/3ML) 0.083% nebulizer solution USE THREE MILLILITERS VIA NEBULIZATION BY MOUTH EVERY 6 HOURS AS NEEDED FOR WHEEZING  Yordan Tapia MD   ALPRAZolam (XANAX PO) Take by mouth 3 times daily as needed  Historical Provider, MD   hydrocortisone 2.5 % cream Apply topically 2 times daily. Yordan Tapia MD   levETIRAcetam (KEPPRA) 500 MG tablet Take 1 tablet by mouth 2 times daily  Alva Ratliff MD   cetirizine (ZYRTEC) 10 MG tablet Take 10 mg by mouth daily  Historical Provider, MD   omeprazole (PRILOSEC) 20 MG delayed release capsule Take 1 capsule by mouth 2 times daily (before meals)  Stefan Ceja MD   fluticasone [facility]) 50 MCG/ACT nasal spray 1 spray by Nasal route daily  Historical Provider, MD       Vitals:    08/09/22 1557   Pulse: 73   SpO2: 97%     There is no height or weight on file to calculate BMI.      Wt Readings from Last 3 Encounters:   07/11/22 188 lb 4.4 oz (85.4 kg)   07/06/22 188 lb 3.2 oz (85.4 kg)   06/07/22 173 lb 1 oz (78.5 kg)     BP Readings from Last 3 Encounters:   07/06/22 (!) 152/98   01/22/20 (!) 140/90   01/16/20 (!) 141/97        Social History     Tobacco Use   Smoking Status Never   Smokeless Tobacco Never

## 2022-08-10 ENCOUNTER — HOSPITAL ENCOUNTER (OUTPATIENT)
Dept: PHYSICAL THERAPY | Age: 47
Setting detail: THERAPIES SERIES
Discharge: HOME | End: 2022-08-10
Payer: COMMERCIAL

## 2022-08-10 DIAGNOSIS — J45.50 SEVERE PERSISTENT ASTHMA, UNSPECIFIED WHETHER COMPLICATED: ICD-10-CM

## 2022-08-10 PROCEDURE — 97110 THERAPEUTIC EXERCISES: CPT

## 2022-08-10 PROCEDURE — 97140 MANUAL THERAPY 1/> REGIONS: CPT

## 2022-08-10 NOTE — FLOWSHEET NOTE
Manual Intervention 22'    Pt unable to lie prone or in L sidelying - only able to lie supine or R sidelying          Prone PA       GISTM/STM 22'   B lumbar paraspinals (R>L)  Performed in seated position   Lumbar Manip       SI Manip       Hip belt mobs       Hip LA distraction              NMR re-education                                             Pt. Education:  -pt educated on diagnosis, prognosis and expectations for rehab  -all pt questions were answered    Home Exercise Program:  [address] access code [facility]    Therapeutic Exercise and NMR EXR  [x] (01274) Provided verbal/tactile cueing for activities related to strengthening, flexibility, endurance, ROM  for improvements in proximal hip and core control with self care, mobility, lifting and ambulation.  [] (89234) Provided verbal/tactile cueing for activities related to improving balance, coordination, kinesthetic sense, posture, motor skill, proprioception  to assist with core control in self care, mobility, lifting, and ambulation.      Therapeutic Activities:    [] (99678 or 51958) Provided verbal/tactile cueing for activities related to improving balance, coordination, kinesthetic sense, posture, motor skill, proprioception and motor activation to allow for proper function  with self care and ADLs  [] (90913) Provided training and instruction to the patient for proper core and proximal hip recruitment and positioning with ambulation re-education     Home Exercise Program:    [x] (28693) Reviewed/Progressed HEP activities related to strengthening, flexibility, endurance, ROM of core, proximal hip and LE for functional self-care, mobility, lifting and ambulation   [] (69595) Reviewed/Progressed HEP activities related to improving balance, coordination, kinesthetic sense, posture, motor skill, proprioception of core, proximal hip and LE for self care, mobility, lifting, and ambulation      Manual Treatments:  PROM / STM / Oscillations-Mobs: G-I, II, III, IV (PA's, Inf., Post.)  [x] (76091) Provided manual therapy to mobilize proximal hip and LS spine soft tissue/joints for the purpose of modulating pain, promoting relaxation,  increasing ROM, reducing/eliminating soft tissue swelling/inflammation/restriction, improving soft tissue extensibility and allowing for proper ROM for normal function with self care, mobility, lifting and ambulation. Modalities:   10' MHP (seated)    Charges:  Timed Code Treatment Minutes: 42   Total Treatment Minutes: 52       [] EVAL (LOW) 37223 (typically 20 minutes face-to-face)  [] EVAL (MOD) 14348 (typically 30 minutes face-to-face)  [] EVAL (HIGH) 79378 (typically 45 minutes face-to-face)  [] RE-EVAL     [x] GM(20742) x  1   [] IONTO (22487)  [] NMR (84012) x     [] VASO (88500)  [x] Manual (03226) x  2   [] Other:  [] TA (51516)x     [] Mech Traction (56278)  [] ES(attended) (47228)     [] ES (un) (96691): If BWC Please Indicate Time In/Out and Total Minutes  CPT Code Time in Time out Total Min                                            Goals:   Patient stated goal: \"to decrease my back pain\"  [] Progressing: [] Met: [] Not Met: [] Adjusted     Therapist goals for Patient:  Short Term Goals: To be achieved in: 2 weeks  1. Independent in HEP and progression per patient tolerance, in order to prevent re-injury. [] Progressing: [] Met: [] Not Met: [] Adjusted  2. Patient will have a decrease in pain to facilitate improvement in movement, function, and ADLs as indicated by Functional Deficits. [] Progressing: [] Met: [] Not Met: [] Adjusted     Long Term Goals: To be achieved in: 8 weeks  1. Patient will reach FOTO predicted score of at least 33 to assist with reaching prior level of function. [] Progressing: [] Met: [] Not Met: [] Adjusted  2. Patient will demonstrate increased AROM to WNL, good LS mobility, good hip ROM to allow for proper joint functioning as indicated by patients Functional Deficits.   [] Progressing: [] Met: [] Not Met: [] Adjusted  3. Patient will demonstrate an increase in Strength to good proximal hip and core activation to allow for proper functional mobility as indicated by patients Functional Deficits. [] Progressing: [] Met: [] Not Met: [] Adjusted  4. Patient will return to sleeping for 1 night without disturbances due to increased symptoms or restriction. [] Progressing: [] Met: [] Not Met: [] Adjusted  5. Patient will be able to sit for 10 minutes without increased symptoms or restriction. [] Progressing: [] Met: [] Not Met: [] Adjusted        ASSESSMENT:  Pt continues to demonstrate significant tightness and TTP along R lumbar paraspinals. Added TB rows and high rows in seated position with cues required initially to maintain appropriate form and for scapular mechanics. Pt was also able to tolerate increased time on recumbent bike today. Treatment/Activity Tolerance:  [] Patient tolerated treatment well [] Patient limited by fatique  [x] Patient limited by pain  [] Patient limited by other medical complications  [] Other:     Overall Progression Towards Functional goals/ Treatment Progress Update:  [] Patient is progressing as expected towards functional goals listed. [] Progression is slowed due to complexities/Impairments listed. [] Progression has been slowed due to co-morbidities. [x] Plan just implemented, too soon to assess goals progression <30days   [] Goals require adjustment due to lack of progress  [] Patient is not progressing as expected and requires additional follow up with physician  [] Other:    Prognosis for POC: [] Good [x] Fair  [] Poor    Patient requires continued skilled intervention: [x] Yes  [] No        PLAN: Decrease LBP, improve lumbar/hip mobility and strength as tolerated by pt.    [x] Continue per plan of care [] Alter current plan (see comments)  [] Plan of care initiated [] Hold pending MD visit [] Discharge    Electronically signed by: Meron Glasgow, PT    Note: If patient does not return for scheduled/recommended follow up visits, this note will serve as a discharge from care along with the most recent update on progress.

## 2022-08-16 ENCOUNTER — TELEPHONE (OUTPATIENT)
Dept: PULMONOLOGY | Age: 47
End: 2022-08-16

## 2022-08-16 ENCOUNTER — HOSPITAL ENCOUNTER (OUTPATIENT)
Dept: PHYSICAL THERAPY | Age: 47
Setting detail: THERAPIES SERIES
Discharge: HOME | End: 2022-08-16
Payer: COMMERCIAL

## 2022-08-16 PROCEDURE — 97112 NEUROMUSCULAR REEDUCATION: CPT

## 2022-08-16 PROCEDURE — 97110 THERAPEUTIC EXERCISES: CPT

## 2022-08-16 PROCEDURE — 97140 MANUAL THERAPY 1/> REGIONS: CPT

## 2022-08-16 NOTE — TELEPHONE ENCOUNTER
Tried to get Trelegy approved thru [facility] and it was denied due to pt only been using Advair He has to use two of the following for 30 days: Advair, Anoro, Asmanex, Atrovent, Budesonide, Combivent, Dulera, Flovent, Incruse, Pulmicort, Serevent Diskus, Spiriva, Stiolto, Striverdi Respimat, Symbicort   Want him to continue the Advair and add Incruse, Spiriva, or Stiolto?

## 2022-08-16 NOTE — FLOWSHEET NOTE
[employer] [facility] [employer]    Physical Therapy Treatment Note/ Progress Report:     Date:  2022    Patient Name:  Cinthia Silverman    :  42[birth year]  MRN: [id number]  Medical/Treatment Diagnosis Information:  Diagnosis: Lumbar discogenic pain syndrome (M51.26), lumbar spondylosis (M47.816)       Treatment diagnosis: Low back pain   Insurance/Certification information:   [facility] - $0 deductible, $0 OOP max, 100% co-insurance, 30 PT visits per calendar year, auth required   Physician Information:  Milena Yoo, [address] [place] [address] signed (Y/N): []  Yes [x]  No  Date sent:     Date of Patient follow up with Physician:      Progress Report: []  Yes  [x]  No     Functional Scale:           Date assessed:  TO physical FS primary measure score = 23     22    Date Range for reporting period:  Beginnin22  Ending:      Progress report due (10 Rx/or 30 days whichever is less): 3/40/91     Recertification due (POC duration/ or 90 days whichever is less): 22     Visit # Insurance Allowable Auth Needed    16 visits approved 22 - 22 [x]Yes    []No     Pain level:  5/10     SUBJECTIVE:   Pt reports that he feels less tightness and stiffness in his back since starting PT. Pt states that he feels better after manual therapy/STM and would like to increase PT to 2x/week.        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; FALL RISK    Treatment based classification:     Exercises/Interventions:     Therapeutic Ex 15' Wt / Resistance sets/sec reps notes   Bike  5'     LTR  1 10           SKTC   Performed manually           Hip add BS    Supine clamshells           Seated lumbar flexion stretch w/SB Red SB 1 10    Seated TB rows  Seated TB high rows Red  red 1  1 10  10                 Therapeutic Activities Manual Intervention 20'    Pt unable to lie prone or in L sidelying - only able to lie supine or R sidelying          Prone PA       GISTM/STM 22'   B lumbar paraspinals (R>L)  Performed in seated position   Lumbar Manip       SI Manip       Hip belt mobs       Hip LA distraction              NMR re-education 10'              Seated SB pelvic tilts  1 5x    Seated SB marches  1 10 Alternating LE                   Pt. Education:  -pt educated on diagnosis, prognosis and expectations for rehab  -all pt questions were answered    Home Exercise Program:  [address] access code [facility]    Therapeutic Exercise and NMR EXR  [x] (81687) Provided verbal/tactile cueing for activities related to strengthening, flexibility, endurance, ROM  for improvements in proximal hip and core control with self care, mobility, lifting and ambulation.  [] (92895) Provided verbal/tactile cueing for activities related to improving balance, coordination, kinesthetic sense, posture, motor skill, proprioception  to assist with core control in self care, mobility, lifting, and ambulation.      Therapeutic Activities:    [] (61547 or 52311) Provided verbal/tactile cueing for activities related to improving balance, coordination, kinesthetic sense, posture, motor skill, proprioception and motor activation to allow for proper function  with self care and ADLs  [] (42445) Provided training and instruction to the patient for proper core and proximal hip recruitment and positioning with ambulation re-education     Home Exercise Program:    [x] (44690) Reviewed/Progressed HEP activities related to strengthening, flexibility, endurance, ROM of core, proximal hip and LE for functional self-care, mobility, lifting and ambulation   [] (79126) Reviewed/Progressed HEP activities related to improving balance, coordination, kinesthetic sense, posture, motor skill, proprioception of core, proximal hip and LE for self care, mobility, lifting, and ambulation      Manual Treatments:  PROM / STM / Oscillations-Mobs:  G-I, II, III, IV (PA's, Inf., Post.)  [x] (91507) Provided manual therapy to mobilize proximal hip and LS spine soft tissue/joints for the purpose of modulating pain, promoting relaxation,  increasing ROM, reducing/eliminating soft tissue swelling/inflammation/restriction, improving soft tissue extensibility and allowing for proper ROM for normal function with self care, mobility, lifting and ambulation. Modalities:     Charges:  Timed Code Treatment Minutes: 45   Total Treatment Minutes: 45       [] EVAL (LOW) 72647 (typically 20 minutes face-to-face)  [] EVAL (MOD) 76882 (typically 30 minutes face-to-face)  [] EVAL (HIGH) 54186 (typically 45 minutes face-to-face)  [] RE-EVAL     [x] VY(61406) x  1   [] IONTO (07394)  [x] NMR (04949) x  1   [] VASO (55037)  [x] Manual (99709) x  1  [] Other:  [] TA (15400)x     [] Mech Traction (98469)  [] ES(attended) (64727)     [] ES (un) (17466): If BWC Please Indicate Time In/Out and Total Minutes  CPT Code Time in Time out Total Min                                            Goals:   Patient stated goal: \"to decrease my back pain\"  [] Progressing: [] Met: [] Not Met: [] Adjusted     Therapist goals for Patient:  Short Term Goals: To be achieved in: 2 weeks  1. Independent in HEP and progression per patient tolerance, in order to prevent re-injury. [] Progressing: [] Met: [] Not Met: [] Adjusted  2. Patient will have a decrease in pain to facilitate improvement in movement, function, and ADLs as indicated by Functional Deficits. [] Progressing: [] Met: [] Not Met: [] Adjusted     Long Term Goals: To be achieved in: 8 weeks  1. Patient will reach FOTO predicted score of at least 33 to assist with reaching prior level of function. [] Progressing: [] Met: [] Not Met: [] Adjusted  2.  Patient will demonstrate increased AROM to WNL, good LS mobility, good hip ROM to allow for proper joint functioning as indicated by patients Functional Deficits. [] Progressing: [] Met: [] Not Met: [] Adjusted  3. Patient will demonstrate an increase in Strength to good proximal hip and core activation to allow for proper functional mobility as indicated by patients Functional Deficits. [] Progressing: [] Met: [] Not Met: [] Adjusted  4. Patient will return to sleeping for 1 night without disturbances due to increased symptoms or restriction. [] Progressing: [] Met: [] Not Met: [] Adjusted  5. Patient will be able to sit for 10 minutes without increased symptoms or restriction. [] Progressing: [] Met: [] Not Met: [] Adjusted        ASSESSMENT:  Pt continues to demonstrate significant tightness and TTP along R lumbar paraspinals but decreased trigger points compared to initial evaluation. Added seated SB pelvic tilts and marches with significant difficulty noted. Pt requires cues to maintain appropriate form with exercises and to decrease compensation. Treatment/Activity Tolerance:  [] Patient tolerated treatment well [] Patient limited by fatique  [x] Patient limited by pain  [] Patient limited by other medical complications  [] Other:     Overall Progression Towards Functional goals/ Treatment Progress Update:  [] Patient is progressing as expected towards functional goals listed. [] Progression is slowed due to complexities/Impairments listed. [] Progression has been slowed due to co-morbidities. [x] Plan just implemented, too soon to assess goals progression <30days   [] Goals require adjustment due to lack of progress  [] Patient is not progressing as expected and requires additional follow up with physician  [] Other:    Prognosis for POC: [] Good [x] Fair  [] Poor    Patient requires continued skilled intervention: [x] Yes  [] No        PLAN: Decrease LBP, improve lumbar/hip mobility and strength as tolerated by pt.  Increase to 2x/week  [x] Continue per plan of care [] Alter current plan (see comments)  [] Plan of care initiated [] Hold pending MD visit [] Discharge    Electronically signed by: Corene Duverney, PT    Note: If patient does not return for scheduled/recommended follow up visits, this note will serve as a discharge from care along with the most recent update on progress.

## 2022-08-18 ENCOUNTER — CLINICAL DOCUMENTATION (OUTPATIENT)
Dept: ONCOLOGY | Age: 47
End: 2022-08-18

## 2022-08-19 ENCOUNTER — CLINICAL DOCUMENTATION (OUTPATIENT)
Dept: ONCOLOGY | Age: 47
End: 2022-08-19

## 2022-08-19 ENCOUNTER — TELEPHONE (OUTPATIENT)
Dept: PULMONOLOGY | Age: 47
End: 2022-08-19

## 2022-08-19 NOTE — TELEPHONE ENCOUNTER
Patient called in and said that his insurance called and said he was approved for his 1st shot and keeps getting auto mated messages from the infusion center to schedule apt but was told that his first shot would be in office and he is really confused  as to were to go for his first shot.         [facility]: [phone]

## 2022-08-19 NOTE — TELEPHONE ENCOUNTER
Pt informed all his injections will be done thru the [facility] He stated he will call them back and set up appointment

## 2022-08-23 ENCOUNTER — HOSPITAL ENCOUNTER (OUTPATIENT)
Dept: PHYSICAL THERAPY | Age: 47
Setting detail: THERAPIES SERIES
Discharge: HOME | End: 2022-08-23
Payer: COMMERCIAL

## 2022-08-23 PROCEDURE — 97140 MANUAL THERAPY 1/> REGIONS: CPT

## 2022-08-23 PROCEDURE — 97110 THERAPEUTIC EXERCISES: CPT

## 2022-08-23 NOTE — FLOWSHEET NOTE
[employer]     Physical Therapy Re-Certification Plan of Care    Dear Dr. Gailen Cushing ,    We had the pleasure of treating the following patient for physical therapy services at [address] [place] [address]. A summary of our findings can be found in the updated assessment below. This includes our plan of care. If you have any questions or concerns regarding these findings, please do not hesitate to contact me at the office phone number checked above. Thank you for the referral.     Physician Signature:________________________________Date:__________________  By signing above (or electronic signature), therapists plan is approved by physician    Date Range Of Visits: 22 - 22  Total Visits to Date: 6  Overall Response to Treatment:   []Patient is responding well to treatment and improvement is noted with regards  to goals   []Patient should continue to improve in reasonable time if they continue HEP   []Patient has plateaued and is no longer responding to skilled PT intervention    []Patient is getting worse and would benefit from return to referring MD   []Patient unable to adhere to initial POC   [x]Other: Pt feels that he has less stiffness and tightness in his lower back overall since starting PT. Pt still has good days and bad days with his bad. Pt has had more difficulty sleeping the last 3-4 nights and lower back feels tighter since then as a result. Recommend continuing with PT 1-2x/week x 6 weeks.     Physical Therapy Treatment Note/ Progress Report:     Date:  2022    Patient Name:  Tushar Horan    :  89  MRN: [id number]  Medical/Treatment Diagnosis Information:  Diagnosis: Lumbar discogenic pain syndrome (M51.26), lumbar spondylosis (M47.816)       Treatment diagnosis: Low back pain   Insurance/Certification information:   [facility] - $0 deductible, $0 OOP max, 100% co-insurance, 30 PT visits per  pelvic tilts  1 5x    Seated SB marches  1 10 Alternating LE                   Pt. Education:  -pt educated on diagnosis, prognosis and expectations for rehab  -all pt questions were answered    Home Exercise Program:  [address] access code [facility]    Therapeutic Exercise and NMR EXR  [x] (99958) Provided verbal/tactile cueing for activities related to strengthening, flexibility, endurance, ROM  for improvements in proximal hip and core control with self care, mobility, lifting and ambulation.  [] (94108) Provided verbal/tactile cueing for activities related to improving balance, coordination, kinesthetic sense, posture, motor skill, proprioception  to assist with core control in self care, mobility, lifting, and ambulation.      Therapeutic Activities:    [] (37360 or 83911) Provided verbal/tactile cueing for activities related to improving balance, coordination, kinesthetic sense, posture, motor skill, proprioception and motor activation to allow for proper function  with self care and ADLs  [] (44684) Provided training and instruction to the patient for proper core and proximal hip recruitment and positioning with ambulation re-education     Home Exercise Program:    [x] (58651) Reviewed/Progressed HEP activities related to strengthening, flexibility, endurance, ROM of core, proximal hip and LE for functional self-care, mobility, lifting and ambulation   [] (01779) Reviewed/Progressed HEP activities related to improving balance, coordination, kinesthetic sense, posture, motor skill, proprioception of core, proximal hip and LE for self care, mobility, lifting, and ambulation      Manual Treatments:  PROM / STM / Oscillations-Mobs:  G-I, II, III, IV (PA's, Inf., Post.)  [x] (83334) Provided manual therapy to mobilize proximal hip and LS spine soft tissue/joints for the purpose of modulating pain, promoting relaxation,  increasing ROM, reducing/eliminating soft tissue swelling/inflammation/restriction, improving soft tissue extensibility and allowing for proper ROM for normal function with self care, mobility, lifting and ambulation. Modalities:   10' MHP (seated)    Charges:  Timed Code Treatment Minutes: 40   Total Treatment Minutes: 50       [] EVAL (LOW) 18650 (typically 20 minutes face-to-face)  [] EVAL (MOD) 16243 (typically 30 minutes face-to-face)  [] EVAL (HIGH) 24622 (typically 45 minutes face-to-face)  [] RE-EVAL     [x] UX(03345) x  1   [] IONTO (36874)  [] NMR (40594) x     [] VASO (23130)  [x] Manual (60834) x  2  [] Other:  [] TA (12890)x     [] Mech Traction (02951)  [] ES(attended) (40171)     [] ES (un) (55988): If BWC Please Indicate Time In/Out and Total Minutes  CPT Code Time in Time out Total Min                                            Goals:   Patient stated goal: \"to decrease my back pain\"  [x] Progressing: [] Met: [] Not Met: [] Adjusted     Therapist goals for Patient:  Short Term Goals: To be achieved in: 2 weeks  1. Independent in HEP and progression per patient tolerance, in order to prevent re-injury. [x] Progressing: [] Met: [] Not Met: [] Adjusted  2. Patient will have a decrease in pain to facilitate improvement in movement, function, and ADLs as indicated by Functional Deficits. [x] Progressing: [] Met: [] Not Met: [] Adjusted     Long Term Goals: To be achieved in: 8 weeks  1. Patient will reach FOTO predicted score of at least 37 to assist with reaching prior level of function. [] Progressing: [x] Met: [] Not Met: [x] Adjusted  2. Patient will demonstrate increased AROM to WNL, good LS mobility, good hip ROM to allow for proper joint functioning as indicated by patients Functional Deficits. [x] Progressing: [] Met: [] Not Met: [] Adjusted  3. Patient will demonstrate an increase in Strength to good proximal hip and core activation to allow for proper functional mobility as indicated by patients Functional Deficits. [x] Progressing: [] Met: [] Not Met: [] Adjusted  4. Patient will return to sleeping for 1 night without disturbances due to increased symptoms or restriction. [x] Progressing: [] Met: [] Not Met: [] Adjusted  5. Patient will be able to sit for 10 minutes without increased symptoms or restriction. [x] Progressing: [] Met: [] Not Met: [] Adjusted        ASSESSMENT:  Pt demonstrates increased trigger points and tightness throughout R lumbar paraspinals today. Focused on lumbar mobility and manual therapy today. Treatment/Activity Tolerance:  [] Patient tolerated treatment well [] Patient limited by fatique  [x] Patient limited by pain  [] Patient limited by other medical complications  [] Other:     Overall Progression Towards Functional goals/ Treatment Progress Update:  [] Patient is progressing as expected towards functional goals listed. [] Progression is slowed due to complexities/Impairments listed. [] Progression has been slowed due to co-morbidities. [x] Plan just implemented, too soon to assess goals progression <30days   [] Goals require adjustment due to lack of progress  [] Patient is not progressing as expected and requires additional follow up with physician  [] Other:    Prognosis for POC: [] Good [x] Fair  [] Poor    Patient requires continued skilled intervention: [x] Yes  [] No        PLAN: Decrease LBP, improve lumbar/hip mobility and strength as tolerated by pt. [x] Continue per plan of care [] Alter current plan (see comments)  [] Plan of care initiated [] Hold pending MD visit [] Discharge    Electronically signed by: Constance Coyle PT    Note: If patient does not return for scheduled/recommended follow up visits, this note will serve as a discharge from care along with the most recent update on progress.

## 2022-08-25 ENCOUNTER — HOSPITAL ENCOUNTER (OUTPATIENT)
Dept: PHYSICAL THERAPY | Age: 47
Setting detail: THERAPIES SERIES
Discharge: HOME | End: 2022-08-25
Payer: COMMERCIAL

## 2022-08-25 PROCEDURE — 97110 THERAPEUTIC EXERCISES: CPT

## 2022-08-25 PROCEDURE — 97140 MANUAL THERAPY 1/> REGIONS: CPT

## 2022-08-25 NOTE — FLOWSHEET NOTE
[facility] [employer]     Physical Therapy Treatment Note/ Progress Report:     Date:  2022    Patient Name:  Brayton Phalen    :  [id number]  MRN: [id number]  Medical/Treatment Diagnosis Information:  Diagnosis: Lumbar discogenic pain syndrome (M51.26), lumbar spondylosis (M47.816)       Treatment diagnosis: Low back pain   Insurance/Certification information:   [facility] - $0 deductible, $0 OOP max, 100% co-insurance, 30 PT visits per calendar year, auth required   Physician Information:  Landry Thompson [address] [place] [address] signed (Y/N): []  Yes [x]  No  Date sent:     Date of Patient follow up with Physician:      Progress Report: []  Yes  [x]  No     Functional Scale:           Date assessed:  FOTO physical FS primary measure score = 23     22  FOTO = 34          22    Date Range for reporting period:  Beginnin22  Endin22    Progress report due (10 Rx/or 30 days whichever is less):      Recertification due (POC duration/ or 90 days whichever is less): 10/4/22     Visit # Insurance Allowable Auth Needed    16 visits approved 22 - 22 [x]Yes    []No     Pain level:  7/10     SUBJECTIVE:  Pt reports that back feels ok today, didn't sleep well last night again.  Pt feels that STM helps the tightness in his back the most.      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; FALL RISK    Treatment based classification:     Exercises/Interventions:     Therapeutic Ex 22' Wt / Resistance sets/sec reps notes   Bike  5'     LTR  1 10    Hand heel rock   1 10    SKTC   15\" 3x ea    DKTC w/red SB  15\" 3    Hip add BS    Supine clamshells    Pelvic tilts  1 10 In hooklying   Seated lumbar flexion stretch w/SB Red SB 1 10    Seated TB rows  Seated TB high rows Red  red 1  1 10  10    Standing lumbar SB  1 10 To L         Therapeutic Activities                                                               Manual Intervention 20'    Pt unable to lie prone or in L sidelying - only able to lie supine or R sidelying          Prone PA       GISTM/STM 20'   B lumbar paraspinals (R>L)  Performed in seated position   Lumbar Manip       SI Manip       Hip belt mobs       Hip LA distraction              NMR re-education               Seated SB pelvic tilts  1 5x    Seated SB marches  1 10 Alternating LE                   Pt. Education:  -pt educated on diagnosis, prognosis and expectations for rehab  -all pt questions were answered    Home Exercise Program:  [address] access code [facility]    Therapeutic Exercise and NMR EXR  [x] (79286) Provided verbal/tactile cueing for activities related to strengthening, flexibility, endurance, ROM  for improvements in proximal hip and core control with self care, mobility, lifting and ambulation.  [] (73800) Provided verbal/tactile cueing for activities related to improving balance, coordination, kinesthetic sense, posture, motor skill, proprioception  to assist with core control in self care, mobility, lifting, and ambulation.      Therapeutic Activities:    [] (48920 or 40366) Provided verbal/tactile cueing for activities related to improving balance, coordination, kinesthetic sense, posture, motor skill, proprioception and motor activation to allow for proper function  with self care and ADLs  [] (15970) Provided training and instruction to the patient for proper core and proximal hip recruitment and positioning with ambulation re-education     Home Exercise Program:    [x] (53067) Reviewed/Progressed HEP activities related to strengthening, flexibility, endurance, ROM of core, proximal hip and LE for functional self-care, mobility, lifting and ambulation   [] (61683) Reviewed/Progressed HEP activities related to improving balance, coordination, kinesthetic sense, posture, motor skill, proprioception of core, proximal hip and LE for self care, mobility, lifting, and ambulation      Manual Treatments:  PROM / STM / Oscillations-Mobs:  G-I, II, III, IV (PA's, Inf., Post.)  [x] (50137) Provided manual therapy to mobilize proximal hip and LS spine soft tissue/joints for the purpose of modulating pain, promoting relaxation,  increasing ROM, reducing/eliminating soft tissue swelling/inflammation/restriction, improving soft tissue extensibility and allowing for proper ROM for normal function with self care, mobility, lifting and ambulation. Modalities:     Charges:  Timed Code Treatment Minutes: 42   Total Treatment Minutes: 42       [] EVAL (LOW) 21110 (typically 20 minutes face-to-face)  [] EVAL (MOD) 57900 (typically 30 minutes face-to-face)  [] EVAL (HIGH) 34340 (typically 45 minutes face-to-face)  [] RE-EVAL     [x] LJ(10985) x  2   [] IONTO (12653)  [] NMR (25803) x     [] VASO (01211)  [x] Manual (26008) x  1 [] Other:  [] TA (13273)x     [] Mech Traction (56720)  [] ES(attended) (40767)     [] ES (un) (98275): If BWC Please Indicate Time In/Out and Total Minutes  CPT Code Time in Time out Total Min                                            Goals:   Patient stated goal: \"to decrease my back pain\"  [x] Progressing: [] Met: [] Not Met: [] Adjusted     Therapist goals for Patient:  Short Term Goals: To be achieved in: 2 weeks  1. Independent in HEP and progression per patient tolerance, in order to prevent re-injury. [x] Progressing: [] Met: [] Not Met: [] Adjusted  2. Patient will have a decrease in pain to facilitate improvement in movement, function, and ADLs as indicated by Functional Deficits. [x] Progressing: [] Met: [] Not Met: [] Adjusted     Long Term Goals: To be achieved in: 8 weeks  1. Patient will reach FOTO predicted score of at least 37 to assist with reaching prior level of function. [] Progressing: [x] Met: [] Not Met: [x] Adjusted  2.  Patient will demonstrate increased

## 2022-08-30 ENCOUNTER — HOSPITAL ENCOUNTER (OUTPATIENT)
Dept: ONCOLOGY | Age: 47
Setting detail: INFUSION SERIES
Discharge: HOME | End: 2022-08-30
Payer: COMMERCIAL

## 2022-08-30 ENCOUNTER — HOSPITAL ENCOUNTER (OUTPATIENT)
Dept: PHYSICAL THERAPY | Age: 47
Setting detail: THERAPIES SERIES
Discharge: HOME | End: 2022-08-30
Payer: COMMERCIAL

## 2022-08-30 VITALS
TEMPERATURE: 98.2 F | DIASTOLIC BLOOD PRESSURE: 95 MMHG | RESPIRATION RATE: 16 BRPM | SYSTOLIC BLOOD PRESSURE: 139 MMHG | HEART RATE: 75 BPM

## 2022-08-30 DIAGNOSIS — J45.50 SEVERE PERSISTENT ASTHMA, UNSPECIFIED WHETHER COMPLICATED: Primary | ICD-10-CM

## 2022-08-30 PROCEDURE — 6360000002 HC RX W HCPCS: Performed by: INTERNAL MEDICINE

## 2022-08-30 PROCEDURE — 99211 OFF/OP EST MAY X REQ PHY/QHP: CPT

## 2022-08-30 PROCEDURE — 97140 MANUAL THERAPY 1/> REGIONS: CPT

## 2022-08-30 PROCEDURE — 96372 THER/PROPH/DIAG INJ SC/IM: CPT

## 2022-08-30 PROCEDURE — 97110 THERAPEUTIC EXERCISES: CPT

## 2022-08-30 RX ADMIN — OMALIZUMAB 300 MG: 150 INJECTION, SOLUTION SUBCUTANEOUS at 10:46

## 2022-08-30 NOTE — PROGRESS NOTES
Patient ambulatory to department for every 4 week Xolair injection. !st time here, reviewed most common side effects and given AVS with medication eduction. No questions or concerns stated. Given 300 mg in two syringes subcutaneously, 150 mg given in each arm. Tolerated injections well. Patient stayed for 2 hours  post injection for observation, this is his first dose. Will stay 2 hors with 2 more dosed, then 30 minutes with subsequent doses. No signs of an adverse rx noted. Patient discharged per ambulatory. Scheduled to return in 4 weeks.

## 2022-08-30 NOTE — FLOWSHEET NOTE
[facility]     Physical Therapy Treatment Note/ Progress Report:     Date:  2022    Patient Name:  Xi Sbeastian    :    MRN: [id number]  Medical/Treatment Diagnosis Information:  Diagnosis: Lumbar discogenic pain syndrome (M51.26), lumbar spondylosis (M47.816)       Treatment diagnosis: Low back pain   Insurance/Certification information:   [facility] - $0 deductible, $0 OOP max, 100% co-insurance, 30 PT visits per calendar year, auth required   Physician Information:  Bryce Choi, [address] [place] [address] signed (Y/N): []  Yes [x]  No  Date sent:     Date of Patient follow up with Physician:      Progress Report: []  Yes  [x]  No     Functional Scale:           Date assessed:  FOTO physical FS primary measure score = 23     22  FOTO = 34          22    Date Range for reporting period:  Beginnin22  Endin22    Progress report due (10 Rx/or 30 days whichever is less):      Recertification due (POC duration/ or 90 days whichever is less): 10/4/22     Visit # Insurance Allowable Auth Needed    16 visits approved 22 - 22 [x]Yes    []No     Pain level:  6/10     SUBJECTIVE:  Pt reports he had Xolair injection today so his arms are sore as a result.      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; FALL RISK    Treatment based classification:     Exercises/Interventions:     Therapeutic Ex 25' Wt / Resistance sets/sec reps notes   Bike  5'     LTR  1 10    Hand heel rock   1 10 Held today due to soreness in arms   SKTC   15\" 3x ea    DKTC w/red SB  5\" 15    Hip add BS    Supine clamshells    Pelvic tilts  1 10 In hooklying   Seated lumbar flexion stretch w/SB Red SB 1 10    Seated TB rows  Seated TB high rows Red  red 1  1 10  10    Bridges W/BS 1 10 Decreased ROM   Standing hip abd  Standing hip ext  1  1 10  10 Alternating LE   Standing lumbar SB  1 10 To L         Therapeutic Activities                                                               Manual Intervention 15'    Pt unable to lie prone or in L sidelying - only able to lie supine or R sidelying          Prone PA       GISTM/STM 15'   B lumbar paraspinals (R>L)  Performed in seated position   Lumbar Manip       SI Manip       Hip belt mobs       Hip LA distraction              NMR re-education               Seated SB pelvic tilts  1 5x    Seated SB marches  1 10 Alternating LE                   Pt. Education:  -pt educated on diagnosis, prognosis and expectations for rehab  -all pt questions were answered    Home Exercise Program:  [address] access code [facility]    Therapeutic Exercise and NMR EXR  [x] (45800) Provided verbal/tactile cueing for activities related to strengthening, flexibility, endurance, ROM  for improvements in proximal hip and core control with self care, mobility, lifting and ambulation.  [] (76693) Provided verbal/tactile cueing for activities related to improving balance, coordination, kinesthetic sense, posture, motor skill, proprioception  to assist with core control in self care, mobility, lifting, and ambulation.      Therapeutic Activities:    [] (15724 or 00803) Provided verbal/tactile cueing for activities related to improving balance, coordination, kinesthetic sense, posture, motor skill, proprioception and motor activation to allow for proper function  with self care and ADLs  [] (27201) Provided training and instruction to the patient for proper core and proximal hip recruitment and positioning with ambulation re-education     Home Exercise Program:    [x] (21732) Reviewed/Progressed HEP activities related to strengthening, flexibility, endurance, ROM of core, proximal hip and LE for functional self-care, mobility, lifting and ambulation   [] (54210) Reviewed/Progressed HEP activities related to improving balance, coordination, kinesthetic sense, posture, motor skill, proprioception of core, proximal hip and LE for self care, mobility, lifting, and ambulation      Manual Treatments:  PROM / STM / Oscillations-Mobs:  G-I, II, III, IV (PA's, Inf., Post.)  [x] (46208) Provided manual therapy to mobilize proximal hip and LS spine soft tissue/joints for the purpose of modulating pain, promoting relaxation,  increasing ROM, reducing/eliminating soft tissue swelling/inflammation/restriction, improving soft tissue extensibility and allowing for proper ROM for normal function with self care, mobility, lifting and ambulation. Modalities:     Charges:  Timed Code Treatment Minutes: 40   Total Treatment Minutes: 40       [] EVAL (LOW) 54375 (typically 20 minutes face-to-face)  [] EVAL (MOD) 29857 (typically 30 minutes face-to-face)  [] EVAL (HIGH) 92812 (typically 45 minutes face-to-face)  [] RE-EVAL     [x] RB(12937) x  2   [] IONTO (59457)  [] NMR (25477) x     [] VASO (99784)  [x] Manual (99665) x  1 [] Other:  [] TA (14153)x     [] Mech Traction (70717)  [] ES(attended) (99472)     [] ES (un) (81904): If BWC Please Indicate Time In/Out and Total Minutes  CPT Code Time in Time out Total Min                                            Goals:   Patient stated goal: \"to decrease my back pain\"  [x] Progressing: [] Met: [] Not Met: [] Adjusted     Therapist goals for Patient:  Short Term Goals: To be achieved in: 2 weeks  1. Independent in HEP and progression per patient tolerance, in order to prevent re-injury. [x] Progressing: [] Met: [] Not Met: [] Adjusted  2. Patient will have a decrease in pain to facilitate improvement in movement, function, and ADLs as indicated by Functional Deficits. [x] Progressing: [] Met: [] Not Met: [] Adjusted     Long Term Goals: To be achieved in: 8 weeks  1. Patient will reach FOTO predicted score of at least 37 to assist with reaching prior level of function.   [] Progressing: [x] Met: [] Not Met: [x] Adjusted  2. Patient will demonstrate increased AROM to WNL, good LS mobility, good hip ROM to allow for proper joint functioning as indicated by patients Functional Deficits. [x] Progressing: [] Met: [] Not Met: [] Adjusted  3. Patient will demonstrate an increase in Strength to good proximal hip and core activation to allow for proper functional mobility as indicated by patients Functional Deficits. [x] Progressing: [] Met: [] Not Met: [] Adjusted  4. Patient will return to sleeping for 1 night without disturbances due to increased symptoms or restriction. [x] Progressing: [] Met: [] Not Met: [] Adjusted  5. Patient will be able to sit for 10 minutes without increased symptoms or restriction. [x] Progressing: [] Met: [] Not Met: [] Adjusted        ASSESSMENT:  Progressed glut strengthening today with significant fatigue and weakness noted. Pt requires use of ball with bridges to decrease significant valgus collapse. Cues required to decrease UE assist with standing hip abd and extension. Treatment/Activity Tolerance:  [] Patient tolerated treatment well [] Patient limited by fatique  [x] Patient limited by pain  [] Patient limited by other medical complications  [] Other:     Overall Progression Towards Functional goals/ Treatment Progress Update:  [] Patient is progressing as expected towards functional goals listed. [] Progression is slowed due to complexities/Impairments listed. [] Progression has been slowed due to co-morbidities. [x] Plan just implemented, too soon to assess goals progression <30days   [] Goals require adjustment due to lack of progress  [] Patient is not progressing as expected and requires additional follow up with physician  [] Other:    Prognosis for POC: [] Good [x] Fair  [] Poor    Patient requires continued skilled intervention: [x] Yes  [] No        PLAN: Decrease LBP, improve lumbar/hip mobility and strength as tolerated by pt.    [x] Continue per plan of care [] Alter current plan (see comments)  [] Plan of care initiated [] Hold pending MD visit [] Discharge    Electronically signed by: Akanksha Alfonso PT    Note: If patient does not return for scheduled/recommended follow up visits, this note will serve as a discharge from care along with the most recent update on progress.

## 2022-09-01 ENCOUNTER — HOSPITAL ENCOUNTER (OUTPATIENT)
Dept: PHYSICAL THERAPY | Age: 47
Setting detail: THERAPIES SERIES
Discharge: HOME | End: 2022-09-01
Payer: COMMERCIAL

## 2022-09-01 PROCEDURE — 97110 THERAPEUTIC EXERCISES: CPT

## 2022-09-01 PROCEDURE — 97140 MANUAL THERAPY 1/> REGIONS: CPT

## 2022-09-01 PROCEDURE — 97530 THERAPEUTIC ACTIVITIES: CPT

## 2022-09-01 NOTE — PROGRESS NOTES
"Symptomatic treatment:    Tylenol every 4 hours  Ibuprofen ever 6 hours  salt water gargles  Cold-eeze helps to reduce the duration of sore throat symptoms  Cepachol helps to numb the discomfort  Chloroseptic spray  Nasal saline spray reduces inflammation and dryness  Warm face compresses as often as you can  Vicks vapor rub at night  Flonase OTC or Nasacort OTC  Simple foods like chicken noodle soup help  Delsym helps with coughing at night  Zyrtec/Claritin during the day and Benadryl at night may help if allergy component   Rest as much as you can                                                          If we discussed that I think your illness is viral it will not respond to antibiotics and it will last 10-14 days.  Flonase (fluticasone) is a nasal spray which is available over the counter and may help with your symptoms   Zyrtec D, Claritin D or allegra D can also help with symptoms of congestion and drainage.   If you have hypertension avoid using the "D" which is the decongestant   If you just have drainage you can take plain zyrtec, claritin or allegra   If you just have a congested feeling you can take pseudoephedrine (unless you have high blood pressure) which you have to sign for behind the counter. Do not buy the phenylephrine which is on the shelf as it is not effective   Tylenol or ibuprofen can also be used as directed for pain unless you have an allergy to them or medical condition such as stomach ulcers, kidney or liver disease or blood thinners etc for which you should not be taking these type of medications.   If you are flying in the next few days Afrin nose drops for the airplane flight upon take off and landing may help. Other than at those times refrain from using afrin.       Please arrange follow up with your primary medical clinic as soon as possible. You must understand that you've received an Urgent Care treatment only and that you may be released before all of your medical problems are " Discharge instructions reviewed with patient/responsible adult. All home medications have been reviewed, questions answered and patient verbalized understanding. Discharge instructions signed and copies given. Patient discharged ambulatory with belongings. known or treated. You, the patient, will arrange for follow up as instructed. If your symptoms worsen or fail to improve you should go to the Emergency Room.

## 2022-09-06 ENCOUNTER — HOSPITAL ENCOUNTER (OUTPATIENT)
Dept: PHYSICAL THERAPY | Age: 47
Setting detail: THERAPIES SERIES
Discharge: HOME | End: 2022-09-06
Payer: COMMERCIAL

## 2022-09-06 PROCEDURE — 97140 MANUAL THERAPY 1/> REGIONS: CPT

## 2022-09-06 PROCEDURE — 97530 THERAPEUTIC ACTIVITIES: CPT

## 2022-09-06 PROCEDURE — 97110 THERAPEUTIC EXERCISES: CPT

## 2022-09-06 NOTE — FLOWSHEET NOTE
[facility] [employer]     Physical Therapy Treatment Note/ Progress Report:     Date:  2022    Patient Name:  Shea Bond    :  [birth year]  MRN: [id number]  Medical/Treatment Diagnosis Information:  Diagnosis: Lumbar discogenic pain syndrome (M51.26), lumbar spondylosis (M47.816)       Treatment diagnosis: Low back pain   Insurance/Certification information:   [facility] - $0 deductible, $0 OOP max, 100% co-insurance, 30 PT visits per calendar year, auth required   Physician Information:  Alejandra Garcia[address] signed (Y/N): []  Yes [x]  No  Date sent:     Date of Patient follow up with Physician:      Progress Report: []  Yes  [x]  No     Functional Scale:           Date assessed:  FOTO physical FS primary measure score = 23     22  FOTO = 34          22    Date Range for reporting period:  Beginnin22  Endin22    Progress report due (10 Rx/or 30 days whichever is less): 44    Recertification due (POC duration/ or 90 days whichever is less): 10/4/22     Visit # Insurance Allowable Auth Needed   10/16 16 visits approved 22 - 22 [x]Yes    []No     Pain level:  6/10     SUBJECTIVE:  Pt reports he feels his back locks up on him when standing up out of couch. Pt fearful of going up and down steps due to hx of falling down steps a couple of times resulting from instances where his back felt like it locked up on him.      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; FALL RISK    Treatment based classification:     Exercises/Interventions:     Therapeutic Ex 15' Wt / Resistance sets/sec reps notes   Bike  5'     LTR  1 10    Hand heel rock   1 10 Held today due to soreness in arms   SKTC   15\" 3x ea    DKTC w/red SB  5\" 15    Hip add BS    Supine clamshells    Pelvic tilts  1 10 In hooklying Seated lumbar flexion stretch w/SB Red SB 1 10    Seated TB rows  Seated TB high rows Red  red 1  1 10  10    Bridges W/BS 1 10 Decreased ROM   Standing hip abd  Standing hip ext  1  1 10  10 Alternating LE   Standing lumbar SB  1 10 To L         Therapeutic Activities 15'              Hip hinges w/dowel tony  1 10    Lateral stepping at 1/2 wall  1 3 laps    Fwd step ups 4\" 1 8x    Sit to stands  1 8x                         Manual Intervention 15'    Pt unable to lie prone or in L sidelying - only able to lie supine or R sidelying          Prone PA       GISTM/STM 15'   B lumbar paraspinals (R>L)  Performed in seated position   Lumbar Manip       SI Manip       Hip belt mobs       Hip LA distraction              NMR re-education               Seated SB pelvic tilts  1 5x    Seated SB marches  1 10 Alternating LE                   Pt. Education:  -pt educated on diagnosis, prognosis and expectations for rehab  -all pt questions were answered    Home Exercise Program:  [address] access code [facility]    Therapeutic Exercise and NMR EXR  [x] (16299) Provided verbal/tactile cueing for activities related to strengthening, flexibility, endurance, ROM  for improvements in proximal hip and core control with self care, mobility, lifting and ambulation.  [] (28994) Provided verbal/tactile cueing for activities related to improving balance, coordination, kinesthetic sense, posture, motor skill, proprioception  to assist with core control in self care, mobility, lifting, and ambulation.      Therapeutic Activities:    [] (58755 or 43682) Provided verbal/tactile cueing for activities related to improving balance, coordination, kinesthetic sense, posture, motor skill, proprioception and motor activation to allow for proper function  with self care and ADLs  [] (48904) Provided training and instruction to the patient for proper core and proximal hip recruitment and positioning with ambulation re-education     Home Exercise Program:    [x] (27370) Reviewed/Progressed HEP activities related to strengthening, flexibility, endurance, ROM of core, proximal hip and LE for functional self-care, mobility, lifting and ambulation   [] (75990) Reviewed/Progressed HEP activities related to improving balance, coordination, kinesthetic sense, posture, motor skill, proprioception of core, proximal hip and LE for self care, mobility, lifting, and ambulation      Manual Treatments:  PROM / STM / Oscillations-Mobs:  G-I, II, III, IV (PA's, Inf., Post.)  [x] (85424) Provided manual therapy to mobilize proximal hip and LS spine soft tissue/joints for the purpose of modulating pain, promoting relaxation,  increasing ROM, reducing/eliminating soft tissue swelling/inflammation/restriction, improving soft tissue extensibility and allowing for proper ROM for normal function with self care, mobility, lifting and ambulation. Modalities:     Charges:  Timed Code Treatment Minutes: 45   Total Treatment Minutes: 45       [] EVAL (LOW) 00022 (typically 20 minutes face-to-face)  [] EVAL (MOD) 61707 (typically 30 minutes face-to-face)  [] EVAL (HIGH) 13892 (typically 45 minutes face-to-face)  [] RE-EVAL     [x] FN(65019) x  1  [] IONTO (28224)  [] NMR (69943) x     [] VASO (89818)  [x] Manual (38993) x  1 [] Other:  [x] TA (77658)x 1    [] Mech Traction (83201)  [] ES(attended) (96955)     [] ES (un) (29425): If BW Please Indicate Time In/Out and Total Minutes  CPT Code Time in Time out Total Min                                            Goals:   Patient stated goal: \"to decrease my back pain\"  [x] Progressing: [] Met: [] Not Met: [] Adjusted     Therapist goals for Patient:  Short Term Goals: To be achieved in: 2 weeks  1. Independent in HEP and progression per patient tolerance, in order to prevent re-injury. [x] Progressing: [] Met: [] Not Met: [] Adjusted  2.  Patient will have a decrease in pain to facilitate improvement in movement, function, and ADLs as indicated by Functional Deficits. [x] Progressing: [] Met: [] Not Met: [] Adjusted     Long Term Goals: To be achieved in: 8 weeks  1. Patient will reach FOTO predicted score of at least 37 to assist with reaching prior level of function. [] Progressing: [x] Met: [] Not Met: [x] Adjusted  2. Patient will demonstrate increased AROM to WNL, good LS mobility, good hip ROM to allow for proper joint functioning as indicated by patients Functional Deficits. [x] Progressing: [] Met: [] Not Met: [] Adjusted  3. Patient will demonstrate an increase in Strength to good proximal hip and core activation to allow for proper functional mobility as indicated by patients Functional Deficits. [x] Progressing: [] Met: [] Not Met: [] Adjusted  4. Patient will return to sleeping for 1 night without disturbances due to increased symptoms or restriction. [x] Progressing: [] Met: [] Not Met: [] Adjusted  5. Patient will be able to sit for 10 minutes without increased symptoms or restriction. [x] Progressing: [] Met: [] Not Met: [] Adjusted        ASSESSMENT:  Pt continues to demonstrate significant strength and endurance limitations with exercises. Occasional rest breaks required due to asthma. Pt required appropriate glut activation with sit to stands and with forward step ups today. Treatment/Activity Tolerance:  [] Patient tolerated treatment well [] Patient limited by fatique  [x] Patient limited by pain  [] Patient limited by other medical complications  [] Other:     Overall Progression Towards Functional goals/ Treatment Progress Update:  [] Patient is progressing as expected towards functional goals listed. [] Progression is slowed due to complexities/Impairments listed. [] Progression has been slowed due to co-morbidities.   [x] Plan just implemented, too soon to assess goals progression <30days   [] Goals require adjustment due to lack of progress  [] Patient is not progressing as expected and requires additional follow up with physician  [] Other:    Prognosis for POC: [] Good [x] Fair  [] Poor    Patient requires continued skilled intervention: [x] Yes  [] No        PLAN: Decrease LBP, improve lumbar/hip mobility and strength as tolerated by pt. [x] Continue per plan of care [] Alter current plan (see comments)  [] Plan of care initiated [] Hold pending MD visit [] Discharge    Electronically signed by: John Camp, PT    Note: If patient does not return for scheduled/recommended follow up visits, this note will serve as a discharge from care along with the most recent update on progress.

## 2022-09-08 ENCOUNTER — HOSPITAL ENCOUNTER (OUTPATIENT)
Dept: PHYSICAL THERAPY | Age: 47
Setting detail: THERAPIES SERIES
Discharge: HOME | End: 2022-09-08
Payer: COMMERCIAL

## 2022-09-08 PROCEDURE — 97140 MANUAL THERAPY 1/> REGIONS: CPT

## 2022-09-08 PROCEDURE — 97530 THERAPEUTIC ACTIVITIES: CPT

## 2022-09-08 PROCEDURE — 97110 THERAPEUTIC EXERCISES: CPT

## 2022-09-08 NOTE — FLOWSHEET NOTE
[facility]     Physical Therapy Treatment Note/ Progress Report:     Date:  2022    Patient Name:  Garrison Garces    :    MRN: [id number]  Medical/Treatment Diagnosis Information:  Diagnosis: Lumbar discogenic pain syndrome (M51.26), lumbar spondylosis (M47.816)       Treatment diagnosis: Low back pain   Insurance/Certification information:   [facility] - $0 deductible, $0 OOP max, 100% co-insurance, 30 PT visits per calendar year, auth required   Physician Information:  Natalie Beasley, [address] [place] [address] signed (Y/N): []  Yes [x]  No  Date sent:     Date of Patient follow up with Physician:      Progress Report: []  Yes  [x]  No     Functional Scale:           Date assessed:  FOTO physical FS primary measure score = 23     22  FOTO = 34          22    Date Range for reporting period:  Beginnin22  Endin22    Progress report due (10 Rx/or 30 days whichever is less):     Recertification due (POC duration/ or 90 days whichever is less): 10/4/22     Visit # Insurance Allowable Auth Needed    16 visits approved 22 - 22 [x]Yes    []No     Pain level:  6/10     SUBJECTIVE:  Pt reports his back feels ok today, still tight but no issues after last session.       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; FALL RISK    Treatment based classification:     Exercises/Interventions:     Therapeutic Ex 15' Wt / Resistance sets/sec reps notes   Bike  5'     LTR  1 10    Hand heel rock   1 10 Held today due to soreness in arms   SKTC   15\" 3x ea    DKTC w/red SB  5\" 15    Hip add BS    Supine clamshells    Pelvic tilts  1 10 In hooklying   Seated lumbar flexion stretch w/SB Red SB 1 10    Seated TB rows  Seated TB high rows Red  red 1  1 10  10    Bridges W/BS 1 10 Decreased ROM   Standing hip abd  Standing hip ext 2#  2# 1  1 10  10 Alternating LE   Standing lumbar SB  1 10 To L         Therapeutic Activities 15'              Hip hinges w/dowel tony  1 10    Lateral stepping at 1/2 wall  1 3 laps    Fwd step ups 4\" 1 10x    Sit to stands  1 10x    [place] carry 4# 1 2 laps                  Manual Intervention 15'    Pt unable to lie prone or in L sidelying - only able to lie supine or R sidelying          Prone PA       GISTM/STM 15'   B lumbar paraspinals (R>L)  Performed in seated position   Lumbar Manip       SI Manip       Hip belt mobs       Hip LA distraction              NMR re-education               Seated SB pelvic tilts  1 5x    Seated SB marches  1 10 Alternating LE                   Pt. Education:  -pt educated on diagnosis, prognosis and expectations for rehab  -all pt questions were answered    Home Exercise Program:  [address] access code [facility]    Therapeutic Exercise and NMR EXR  [x] (92653) Provided verbal/tactile cueing for activities related to strengthening, flexibility, endurance, ROM  for improvements in proximal hip and core control with self care, mobility, lifting and ambulation.  [] (78530) Provided verbal/tactile cueing for activities related to improving balance, coordination, kinesthetic sense, posture, motor skill, proprioception  to assist with core control in self care, mobility, lifting, and ambulation.      Therapeutic Activities:    [] (99063 or 64235) Provided verbal/tactile cueing for activities related to improving balance, coordination, kinesthetic sense, posture, motor skill, proprioception and motor activation to allow for proper function  with self care and ADLs  [] (21203) Provided training and instruction to the patient for proper core and proximal hip recruitment and positioning with ambulation re-education     Home Exercise Program:    [x] (95964) Reviewed/Progressed HEP activities related to strengthening, flexibility, endurance, ROM of core, proximal hip and LE for functional self-care, mobility, lifting and ambulation   [] (49153) Reviewed/Progressed HEP activities related to improving balance, coordination, kinesthetic sense, posture, motor skill, proprioception of core, proximal hip and LE for self care, mobility, lifting, and ambulation      Manual Treatments:  PROM / STM / Oscillations-Mobs:  G-I, II, III, IV (PA's, Inf., Post.)  [x] (40334) Provided manual therapy to mobilize proximal hip and LS spine soft tissue/joints for the purpose of modulating pain, promoting relaxation,  increasing ROM, reducing/eliminating soft tissue swelling/inflammation/restriction, improving soft tissue extensibility and allowing for proper ROM for normal function with self care, mobility, lifting and ambulation. Modalities:     Charges:  Timed Code Treatment Minutes: 45   Total Treatment Minutes: 45       [] EVAL (LOW) 27766 (typically 20 minutes face-to-face)  [] EVAL (MOD) 40298 (typically 30 minutes face-to-face)  [] EVAL (HIGH) 78881 (typically 45 minutes face-to-face)  [] RE-EVAL     [x] UH(29720) x  1  [] IONTO (71102)  [] NMR (86159) x     [] VASO (30438)  [x] Manual (06218) x  1 [] Other:  [x] TA (50038)x 1    [] Mech Traction (91758)  [] ES(attended) (39940)     [] ES (un) (33517): If BWC Please Indicate Time In/Out and Total Minutes  CPT Code Time in Time out Total Min                                            Goals:   Patient stated goal: \"to decrease my back pain\"  [x] Progressing: [] Met: [] Not Met: [] Adjusted     Therapist goals for Patient:  Short Term Goals: To be achieved in: 2 weeks  1. Independent in HEP and progression per patient tolerance, in order to prevent re-injury. [x] Progressing: [] Met: [] Not Met: [] Adjusted  2. Patient will have a decrease in pain to facilitate improvement in movement, function, and ADLs as indicated by Functional Deficits. [x] Progressing: [] Met: [] Not Met: [] Adjusted     Long Term Goals:  To be achieved in: 8 weeks  1. Patient will reach FOTO predicted score of at least 37 to assist with reaching prior level of function. [] Progressing: [x] Met: [] Not Met: [x] Adjusted  2. Patient will demonstrate increased AROM to WNL, good LS mobility, good hip ROM to allow for proper joint functioning as indicated by patients Functional Deficits. [x] Progressing: [] Met: [] Not Met: [] Adjusted  3. Patient will demonstrate an increase in Strength to good proximal hip and core activation to allow for proper functional mobility as indicated by patients Functional Deficits. [x] Progressing: [] Met: [] Not Met: [] Adjusted  4. Patient will return to sleeping for 1 night without disturbances due to increased symptoms or restriction. [x] Progressing: [] Met: [] Not Met: [] Adjusted  5. Patient will be able to sit for 10 minutes without increased symptoms or restriction. [x] Progressing: [] Met: [] Not Met: [] Adjusted        ASSESSMENT:  Pt continues to demonstrate significant strength and endurance limitations with exercises. Occasional rest breaks required due to asthma. Progressed functional strengthening with fatigue noted but improved standing tolerance noted. Treatment/Activity Tolerance:  [] Patient tolerated treatment well [] Patient limited by fatique  [x] Patient limited by pain  [] Patient limited by other medical complications  [] Other:     Overall Progression Towards Functional goals/ Treatment Progress Update:  [] Patient is progressing as expected towards functional goals listed. [] Progression is slowed due to complexities/Impairments listed. [] Progression has been slowed due to co-morbidities.   [x] Plan just implemented, too soon to assess goals progression <30days   [] Goals require adjustment due to lack of progress  [] Patient is not progressing as expected and requires additional follow up with physician  [] Other:    Prognosis for POC: [] Good [x] Fair  [] Poor    Patient requires continued skilled intervention: [x] Yes  [] No        PLAN: Decrease LBP, improve lumbar/hip mobility and strength as tolerated by pt. [x] Continue per plan of care [] Alter current plan (see comments)  [] Plan of care initiated [] Hold pending MD visit [] Discharge    Electronically signed by: Satnam Elkins, PT    Note: If patient does not return for scheduled/recommended follow up visits, this note will serve as a discharge from care along with the most recent update on progress.

## 2022-09-13 ENCOUNTER — HOSPITAL ENCOUNTER (OUTPATIENT)
Dept: PHYSICAL THERAPY | Age: 47
Setting detail: THERAPIES SERIES
Discharge: HOME | End: 2022-09-13
Payer: COMMERCIAL

## 2022-09-13 PROCEDURE — 97140 MANUAL THERAPY 1/> REGIONS: CPT

## 2022-09-13 PROCEDURE — 97110 THERAPEUTIC EXERCISES: CPT

## 2022-09-13 NOTE — FLOWSHEET NOTE
[employer]     Physical Therapy Treatment Note/ Progress Report:     Date:  2022    Patient Name:  Marie Herbert    :    MRN: [id number]  Medical/Treatment Diagnosis Information:  Diagnosis: Lumbar discogenic pain syndrome (M51.26), lumbar spondylosis (M47.816)       Treatment diagnosis: Low back pain   Insurance/Certification information:   [facility] - $0 deductible, $0 OOP max, 100% co-insurance, 30 PT visits per calendar year, auth required   Physician Information:  Shaheen Henson, [address] [place] [address] signed (Y/N): []  Yes [x]  No  Date sent:     Date of Patient follow up with Physician:      Progress Report: []  Yes  [x]  No     Functional Scale:           Date assessed:  FOTO physical FS primary measure score = 23     22  FOTO = 34          22    Date Range for reporting period:  Beginnin22  Endin22    Progress report due (10 Rx/or 30 days whichever is less): 53    Recertification due (POC duration/ or 90 days whichever is less): 10/4/22     Visit # Insurance Allowable Auth Needed    16 visits approved 22 - 22 [x]Yes    []No     Pain level:  6/10     SUBJECTIVE:  Pt reports that he woke up in the middle of the night Saturday night/ morning with significant increase in pain on R side of lower back, unsure what caused this. Pt states that pain has somewhat improved since then, but was so intense he considered going to ER this weekend. Currently, pain radiates from R lower back into buttocks.        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; FALL RISK    Treatment based classification:     Exercises/Interventions:     Therapeutic Ex 20' Wt / Resistance sets/sec reps notes   Bike  5'     LTR  1 10    Hand heel rock   1 10 Held today due to soreness in arms   [facility] 15\" 3x ea    DKTC w/red SB  5\" 15    Glut stretch crossover  Piriformis stretch fig 4 30\" 3x ea    Supine clamshells    Pelvic tilts  1 10 In hooklying   Seated lumbar flexion stretch w/SB Red SB 1 10    Seated TB rows  Seated TB high rows Red  red 1  1 10  10    Bridges W/BS 1 10 Decreased ROM   Standing hip abd  Standing hip ext 2#  2# 1  1 10  10 Alternating LE   Standing lumbar SB  1 10 To L         Therapeutic Activities               Hip hinges w/dowel tony  1 10    Lateral stepping at 1/2 wall  1 3 laps    Fwd step ups 4\" 1 10x    Sit to stands  1 10x    [place] carry 4# 1 2 laps                  Manual Intervention 25'    Pt unable to lie prone or in L sidelying - only able to lie supine or R sidelying          Prone PA 10'      GISTM/STM 10'   B lumbar paraspinals (R>L)  Theragun - glutes   Lumbar Mobs in S/L 5'      SI Manip       Hip belt mobs       Hip LA distraction              NMR re-education               Seated SB pelvic tilts  1 5x    Seated SB marches  1 10 Alternating LE                   Pt. Education:  -pt educated on diagnosis, prognosis and expectations for rehab  -all pt questions were answered    Home Exercise Program:  [address] access code [facility]    Therapeutic Exercise and NMR EXR  [x] (49454) Provided verbal/tactile cueing for activities related to strengthening, flexibility, endurance, ROM  for improvements in proximal hip and core control with self care, mobility, lifting and ambulation.  [] (66097) Provided verbal/tactile cueing for activities related to improving balance, coordination, kinesthetic sense, posture, motor skill, proprioception  to assist with core control in self care, mobility, lifting, and ambulation.      Therapeutic Activities:    [] (44325 or 88664) Provided verbal/tactile cueing for activities related to improving balance, coordination, kinesthetic sense, posture, motor skill, proprioception and motor activation to allow for proper function  with self care and ADLs  [] (34160) Provided training and instruction to the patient for proper core and proximal hip recruitment and positioning with ambulation re-education     Home Exercise Program:    [x] (98094) Reviewed/Progressed HEP activities related to strengthening, flexibility, endurance, ROM of core, proximal hip and LE for functional self-care, mobility, lifting and ambulation   [] (01969) Reviewed/Progressed HEP activities related to improving balance, coordination, kinesthetic sense, posture, motor skill, proprioception of core, proximal hip and LE for self care, mobility, lifting, and ambulation      Manual Treatments:  PROM / STM / Oscillations-Mobs:  G-I, II, III, IV (PA's, Inf., Post.)  [x] (15233) Provided manual therapy to mobilize proximal hip and LS spine soft tissue/joints for the purpose of modulating pain, promoting relaxation,  increasing ROM, reducing/eliminating soft tissue swelling/inflammation/restriction, improving soft tissue extensibility and allowing for proper ROM for normal function with self care, mobility, lifting and ambulation. Modalities:   10' MHP (seated)    Charges:  Timed Code Treatment Minutes: 45   Total Treatment Minutes: 55       [] EVAL (LOW) 80302 (typically 20 minutes face-to-face)  [] EVAL (MOD) 23065 (typically 30 minutes face-to-face)  [] EVAL (HIGH) 86448 (typically 45 minutes face-to-face)  [] RE-EVAL     [x] FZ(24856) x  1  [] IONTO (83499)  [] NMR (71295) x     [] VASO (21236)  [x] Manual (43285) x  2 [] Other:  [] TA (73327)x     [] Mech Traction (52729)  [] ES(attended) (40597)     [] ES (un) (57167): If BWC Please Indicate Time In/Out and Total Minutes  CPT Code Time in Time out Total Min                                            Goals:   Patient stated goal: \"to decrease my back pain\"  [x] Progressing: [] Met: [] Not Met: [] Adjusted     Therapist goals for Patient:  Short Term Goals: To be achieved in: 2 weeks  1.  Independent in HEP and progression per patient tolerance, in order to prevent re-injury. [x] Progressing: [] Met: [] Not Met: [] Adjusted  2. Patient will have a decrease in pain to facilitate improvement in movement, function, and ADLs as indicated by Functional Deficits. [x] Progressing: [] Met: [] Not Met: [] Adjusted     Long Term Goals: To be achieved in: 8 weeks  1. Patient will reach FOTO predicted score of at least 37 to assist with reaching prior level of function. [] Progressing: [x] Met: [] Not Met: [x] Adjusted  2. Patient will demonstrate increased AROM to WNL, good LS mobility, good hip ROM to allow for proper joint functioning as indicated by patients Functional Deficits. [x] Progressing: [] Met: [] Not Met: [] Adjusted  3. Patient will demonstrate an increase in Strength to good proximal hip and core activation to allow for proper functional mobility as indicated by patients Functional Deficits. [x] Progressing: [] Met: [] Not Met: [] Adjusted  4. Patient will return to sleeping for 1 night without disturbances due to increased symptoms or restriction. [x] Progressing: [] Met: [] Not Met: [] Adjusted  5. Patient will be able to sit for 10 minutes without increased symptoms or restriction. [x] Progressing: [] Met: [] Not Met: [] Adjusted        ASSESSMENT:  Pt ambulated into clinic with antalgic gait pattern today with decreased stance phase on R LE and Trendelenburg gait. Tightness and TTP to R glut med and glut max insertions at GT. Focused on manual therapy today due to increased sxs of pain and tightness. Held functional strengthening, but pt was able to perform stretches/mobility exercises today. Treatment/Activity Tolerance:  [] Patient tolerated treatment well [] Patient limited by fatique  [x] Patient limited by pain  [] Patient limited by other medical complications  [] Other:     Overall Progression Towards Functional goals/ Treatment Progress Update:  [] Patient is progressing as expected towards functional goals listed. [] Progression is slowed due to complexities/Impairments listed. [] Progression has been slowed due to co-morbidities. [x] Plan just implemented, too soon to assess goals progression <30days   [] Goals require adjustment due to lack of progress  [] Patient is not progressing as expected and requires additional follow up with physician  [] Other:    Prognosis for POC: [] Good [x] Fair  [] Poor    Patient requires continued skilled intervention: [x] Yes  [] No        PLAN: Decrease LBP, improve lumbar/hip mobility and strength as tolerated by pt. [x] Continue per plan of care [] Alter current plan (see comments)  [] Plan of care initiated [] Hold pending MD visit [] Discharge    Electronically signed by: Akanksha Alfonso PT    Note: If patient does not return for scheduled/recommended follow up visits, this note will serve as a discharge from care along with the most recent update on progress.

## 2022-09-15 ENCOUNTER — HOSPITAL ENCOUNTER (OUTPATIENT)
Dept: PHYSICAL THERAPY | Age: 47
Setting detail: THERAPIES SERIES
Discharge: HOME | End: 2022-09-15
Payer: COMMERCIAL

## 2022-09-15 PROCEDURE — 97140 MANUAL THERAPY 1/> REGIONS: CPT

## 2022-09-15 PROCEDURE — 97110 THERAPEUTIC EXERCISES: CPT

## 2022-09-15 NOTE — FLOWSHEET NOTE
1 10    Hand heel rock   1 10 Held today due to soreness in arms   SKTC   15\" 3x ea    DKTC w/red SB  5\" 15    Glut stretch crossover  Piriformis stretch fig 4 30\" 3x ea    Supine clamshells    Pelvic tilts  1 10 In hooklying   Seated lumbar flexion stretch w/SB Red SB 1 10    Seated TB rows  Seated TB high rows Red  red 1  1 10  10    Bridges W/BS 1 10 Decreased ROM   S/L clamshells  1 15 R   Standing hip abd  Standing hip ext 2#  2# 1  1 10  10 Alternating LE   Standing lumbar SB  1 10 To L         Therapeutic Activities 5'              Hip hinges w/dowel tony  1 10    Lateral stepping at 1/2 wall yellow 1 3 laps Band around knees   Fwd step ups 6\" 1 10x    Sit to stands  1 10x    [place] carry 4# 1 2 laps                  Manual Intervention 20'    Pt unable to lie prone or in L sidelying - only able to lie supine or R sidelying          Prone PA 10'      GISTM/STM 10'   B lumbar paraspinals (R>L)  Theragun - glutes   Lumbar Mobs in S/L 5'      SI Manip       Hip belt mobs       Hip LA distraction       Progress note NPV        NMR re-education               Seated SB pelvic tilts  1 5x    Seated SB marches  1 10 Alternating LE                   Pt. Education:  -pt educated on diagnosis, prognosis and expectations for rehab  -all pt questions were answered    Home Exercise Program:  [address] access code [facility]    Therapeutic Exercise and NMR EXR  [x] (61330) Provided verbal/tactile cueing for activities related to strengthening, flexibility, endurance, ROM  for improvements in proximal hip and core control with self care, mobility, lifting and ambulation.  [] (89250) Provided verbal/tactile cueing for activities related to improving balance, coordination, kinesthetic sense, posture, motor skill, proprioception  to assist with core control in self care, mobility, lifting, and ambulation.      Therapeutic Activities:    [] (41771 or 96161) Provided verbal/tactile cueing for activities related to improving balance, coordination, kinesthetic sense, posture, motor skill, proprioception and motor activation to allow for proper function  with self care and ADLs  [] (46917) Provided training and instruction to the patient for proper core and proximal hip recruitment and positioning with ambulation re-education     Home Exercise Program:    [x] (85979) Reviewed/Progressed HEP activities related to strengthening, flexibility, endurance, ROM of core, proximal hip and LE for functional self-care, mobility, lifting and ambulation   [] (13933) Reviewed/Progressed HEP activities related to improving balance, coordination, kinesthetic sense, posture, motor skill, proprioception of core, proximal hip and LE for self care, mobility, lifting, and ambulation      Manual Treatments:  PROM / STM / Oscillations-Mobs:  G-I, II, III, IV (PA's, Inf., Post.)  [x] (48305) Provided manual therapy to mobilize proximal hip and LS spine soft tissue/joints for the purpose of modulating pain, promoting relaxation,  increasing ROM, reducing/eliminating soft tissue swelling/inflammation/restriction, improving soft tissue extensibility and allowing for proper ROM for normal function with self care, mobility, lifting and ambulation. Modalities:     Charges:  Timed Code Treatment Minutes: 47   Total Treatment Minutes: 47       [] EVAL (LOW) 86851 (typically 20 minutes face-to-face)  [] EVAL (MOD) 07520 (typically 30 minutes face-to-face)  [] EVAL (HIGH) 59137 (typically 45 minutes face-to-face)  [] RE-EVAL     [x] NW(99288) x  2  [] IONTO (83711)  [] NMR (05628) x     [] VASO (71234)  [x] Manual (23677) x  1 [] Other:  [] TA (84715)x     [] Mech Traction (88336)  [] ES(attended) (43265)     [] ES (un) (10706):      If BWC Please Indicate Time In/Out and Total Minutes  CPT Code Time in Time out Total Min                                            Goals:   Patient stated goal: \"to decrease my back pain\"  [x] Progressing: [] Met: [] Not Met: [] Adjusted progressing as expected towards functional goals listed. [] Progression is slowed due to complexities/Impairments listed. [] Progression has been slowed due to co-morbidities. [x] Plan just implemented, too soon to assess goals progression <30days   [] Goals require adjustment due to lack of progress  [] Patient is not progressing as expected and requires additional follow up with physician  [] Other:    Prognosis for POC: [] Good [x] Fair  [] Poor    Patient requires continued skilled intervention: [x] Yes  [] No        PLAN: Decrease LBP, improve lumbar/hip mobility and strength as tolerated by pt. [x] Continue per plan of care [] Alter current plan (see comments)  [] Plan of care initiated [] Hold pending MD visit [] Discharge    Electronically signed by: Jonh Camp, PT    Note: If patient does not return for scheduled/recommended follow up visits, this note will serve as a discharge from care along with the most recent update on progress.

## 2022-09-19 ENCOUNTER — HOSPITAL ENCOUNTER (OUTPATIENT)
Dept: PHYSICAL THERAPY | Age: 47
Setting detail: THERAPIES SERIES
Discharge: HOME | End: 2022-09-19
Payer: COMMERCIAL

## 2022-09-19 PROCEDURE — 97110 THERAPEUTIC EXERCISES: CPT

## 2022-09-19 PROCEDURE — 97140 MANUAL THERAPY 1/> REGIONS: CPT

## 2022-09-19 PROCEDURE — 97530 THERAPEUTIC ACTIVITIES: CPT

## 2022-09-19 NOTE — PROGRESS NOTES
[facility]     Physical Therapy Treatment Note/ Progress Report:     Date:  2022    Patient Name:  Rubia Barclay    :    MRN: [id number]  Medical/Treatment Diagnosis Information:  Diagnosis: Lumbar discogenic pain syndrome (M51.26), lumbar spondylosis (M47.816)       Treatment diagnosis: Low back pain   Insurance/Certification information:   [facility] - $0 deductible, $0 OOP max, 100% co-insurance, 30 PT visits per calendar year, auth required   Physician Information:  Marilu J[address] signed (Y/N): []  Yes [x]  No  Date sent:     Date of Patient follow up with Physician:      Progress Report: [x]  Yes  []  No     Functional Scale:           Date assessed:  FOTO physical FS primary measure score = 23     22  FOTO = 34          22  FOTO = 40          22    Date Range for reporting period:  Beginnin22  Endin22    Progress report due (10 Rx/or 30 days whichever is less):     Recertification due (POC duration/ or 90 days whichever is less): 10/4/22     Visit # Insurance Allowable Auth Needed    16 visits approved 22 - 22 [x]Yes    []No     Pain level:  4/10     SUBJECTIVE:  Pt reports his back is feeling much better since last session and was able to get a few good nights of sleep the last few days. Pt states that overall since starting PT, pt feels about 25% improvement in sxs. Pt reports having less tightness and is able to get through the day without as severe of pain, but continues to be limited with standing, walking, bending forwards and getting up off of the ground. Pt is having less pain and difficulty with prolonged sitting though.        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; FALL RISK    Treatment based classification:     Exercises/Interventions:     Therapeutic Ex 20' Wt / Resistance sets/sec reps notes   Bike  5'     LTR  1 10    Hand heel rock   1 10 Held today due to soreness in arms   SKTC   15\" 3x ea    DKTC w/red SB  5\" 15    Glut stretch crossover  Piriformis stretch fig 4 30\" 3x ea    Supine clamshells    Pelvic tilts  1 10 In hooklying   Seated lumbar flexion stretch w/SB Red SB 1 10    Seated TB rows  Seated TB high rows Red  red 1  1 10  10    Bridges W/BS 1 10 Decreased ROM   S/L clamshells  1 15 R   Standing hip abd  Standing hip ext 2#  2# 1  1 10  10 Alternating LE   Leg press    NPV?    Standing lumbar SB  1 10 To L         Therapeutic Activities 10'              Hip hinges w/dowel tony  1 10    Lateral stepping at 1/2 wall yellow 1 3 laps Band around knees   Fwd step ups 6\" 1 10x B   Lateral step ups 4\" 1 10x B   Sit to stands  1 10x    [place] carry 4# 1 2 laps                  Manual Intervention 20'    Pt unable to lie prone or in L sidelying - only able to lie supine or R sidelying          Prone PA 10'      GISTM/STM 10'   B lumbar paraspinals (R>L)   Lumbar Mobs in S/L 5'      SI Manip       Hip belt mobs       Hip LA distraction              NMR re-education               Seated SB pelvic tilts  1 5x    Seated SB marches  1 10 Alternating LE                   Pt. Education:  -pt educated on diagnosis, prognosis and expectations for rehab  -all pt questions were answered    Home Exercise Program:  [employer] access code [facility]    Therapeutic Exercise and NMR EXR  [x] (86391) Provided verbal/tactile cueing for activities related to strengthening, flexibility, endurance, ROM  for improvements in proximal hip and core control with self care, mobility, lifting and ambulation.  [] (51103) Provided verbal/tactile cueing for activities related to improving balance, coordination, kinesthetic sense, posture, motor skill, proprioception  to assist with core control in self care, mobility, lifting, and ambulation. Therapeutic Activities:    [] (21333 or 75358) Provided verbal/tactile cueing for activities related to improving balance, coordination, kinesthetic sense, posture, motor skill, proprioception and motor activation to allow for proper function  with self care and ADLs  [] (58125) Provided training and instruction to the patient for proper core and proximal hip recruitment and positioning with ambulation re-education     Home Exercise Program:    [x] (33388) Reviewed/Progressed HEP activities related to strengthening, flexibility, endurance, ROM of core, proximal hip and LE for functional self-care, mobility, lifting and ambulation   [] (23687) Reviewed/Progressed HEP activities related to improving balance, coordination, kinesthetic sense, posture, motor skill, proprioception of core, proximal hip and LE for self care, mobility, lifting, and ambulation      Manual Treatments:  PROM / STM / Oscillations-Mobs:  G-I, II, III, IV (PA's, Inf., Post.)  [x] (93044) Provided manual therapy to mobilize proximal hip and LS spine soft tissue/joints for the purpose of modulating pain, promoting relaxation,  increasing ROM, reducing/eliminating soft tissue swelling/inflammation/restriction, improving soft tissue extensibility and allowing for proper ROM for normal function with self care, mobility, lifting and ambulation. Modalities:     Charges:  Timed Code Treatment Minutes: 50   Total Treatment Minutes: 50       [] EVAL (LOW) 56225 (typically 20 minutes face-to-face)  [] EVAL (MOD) 97456 (typically 30 minutes face-to-face)  [] EVAL (HIGH) 32045 (typically 45 minutes face-to-face)  [] RE-EVAL     [x] AE(73647) x  1  [] IONTO (66551)  [] NMR (54321) x     [] VASO (33295)  [x] Manual (70790) x  1 [] Other:  [x] TA (09335)x 1    [] [facility] T[facility] (86093)  [] ES(attended) (61857)     [] ES (un) (50009):      If BWC Please Indicate Time In/Out and Total Minutes  CPT Code Time in Time out Total Min                                            Goals:   Patient stated goal: \"to decrease my back pain\"  [x] Progressing: [] Met: [] Not Met: [] Adjusted     Therapist goals for Patient:  Short Term Goals: To be achieved in: 2 weeks  1. Independent in HEP and progression per patient tolerance, in order to prevent re-injury. [x] Progressing: [] Met: [] Not Met: [] Adjusted  2. Patient will have a decrease in pain to facilitate improvement in movement, function, and ADLs as indicated by Functional Deficits. [x] Progressing: [] Met: [] Not Met: [] Adjusted     Long Term Goals: To be achieved in: 8 weeks  1. Patient will reach FOTO predicted score of at least 42 to assist with reaching prior level of function. [] Progressing: [x] Met: [] Not Met: [x] Adjusted  2. Patient will demonstrate increased AROM to WNL, good LS mobility, good hip ROM to allow for proper joint functioning as indicated by patients Functional Deficits. [x] Progressing: [] Met: [] Not Met: [] Adjusted  3. Patient will demonstrate an increase in Strength to good proximal hip and core activation to allow for proper functional mobility as indicated by patients Functional Deficits. [x] Progressing: [] Met: [] Not Met: [] Adjusted  4. Patient will return to sleeping for 1 night without disturbances due to increased symptoms or restriction. [x] Progressing: [] Met: [] Not Met: [] Adjusted  5. Patient will be able to sit for 10 minutes without increased symptoms or restriction. [x] Progressing: [] Met: [] Not Met: [] Adjusted        ASSESSMENT:  Hypomobility throughout lumbar spine addressed with manual therapy. Progressed glut strengthening exercises today with fatigue noted, especially with lateral step ups. Pt continues to require further skilled PT services to address lumbar mobility and functional strengthening in order to return to PLOF.         Treatment/Activity Tolerance:  [] Patient tolerated treatment well [] Patient limited by fatique  [x] Patient limited by pain  [] Patient limited by other medical complications  [] Other:     Overall Progression Towards Functional goals/ Treatment Progress Update:  [] Patient is progressing as expected towards functional goals listed. [x] Progression is slowed due to complexities/Impairments listed. [] Progression has been slowed due to co-morbidities. [] Plan just implemented, too soon to assess goals progression <30days   [] Goals require adjustment due to lack of progress  [] Patient is not progressing as expected and requires additional follow up with physician  [] Other:    Prognosis for POC: [] Good [x] Fair  [] Poor    Patient requires continued skilled intervention: [x] Yes  [] No        PLAN: Decrease LBP, improve lumbar/hip mobility and strength as tolerated by pt. [x] Continue per plan of care [] Alter current plan (see comments)  [] Plan of care initiated [] Hold pending MD visit [] Discharge    Electronically signed by: Alejandra Rodríguez, PT    Note: If patient does not return for scheduled/recommended follow up visits, this note will serve as a discharge from care along with the most recent update on progress.

## 2022-09-22 ENCOUNTER — HOSPITAL ENCOUNTER (OUTPATIENT)
Dept: PHYSICAL THERAPY | Age: 47
Setting detail: THERAPIES SERIES
Discharge: HOME | End: 2022-09-22
Payer: COMMERCIAL

## 2022-09-22 PROCEDURE — 97140 MANUAL THERAPY 1/> REGIONS: CPT

## 2022-09-22 PROCEDURE — 97530 THERAPEUTIC ACTIVITIES: CPT

## 2022-09-22 PROCEDURE — 97110 THERAPEUTIC EXERCISES: CPT

## 2022-09-22 NOTE — PROGRESS NOTES
[facility]     Physical Therapy Treatment Note/ Progress Report:     Date:  2022    Patient Name:  Shea Bond    :    MRN: [id number]  Medical/Treatment Diagnosis Information:  Diagnosis: Lumbar discogenic pain syndrome (M51.26), lumbar spondylosis (M47.816)       Treatment diagnosis: Low back pain   Insurance/Certification information:   [facility] - $0 deductible, $0 OOP max, 100% co-insurance, 30 PT visits per calendar year, auth required   Physician Information:  Maranda Butts[address] signed (Y/N): []  Yes [x]  No  Date sent:     Date of Patient follow up with Physician:      Progress Report: [x]  Yes  []  No     Functional Scale:           Date assessed:  FOTO physical FS primary measure score = 23     22  FOTO = 34          22  FOTO = 40          22    Date Range for reporting period:  Beginnin22  Endin22    Progress report due (10 Rx/or 30 days whichever is less): 20/61/10    Recertification due (POC duration/ or 90 days whichever is less): 10/4/22     Visit # Insurance Allowable Auth Needed    16 visits approved 22 - 22 [x]Yes    []No     Pain level:  4/10     SUBJECTIVE:  Pt reports back feels good overall today, not very tight. He continues to get better sleep overall.       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; FALL RISK    Treatment based classification:     Exercises/Interventions:     Therapeutic Ex 20' Wt / Resistance sets/sec reps notes   Bike  5'     LTR  1 10    Hand heel rock   1 10 Held today due to soreness in arms   SKTC   15\" 3x ea    DKTC w/red SB  5\" 15    Glut stretch crossover  Piriformis stretch fig 4 30\" 3x ea    Supine clamshells Pelvic tilts  1 10 In hooklying   Seated lumbar flexion stretch w/SB Red SB 1 10    Seated TB rows  Seated TB high rows Red  red 1  1 10  10    Bridges W/BS 1 10 Decreased ROM   S/L clamshells  1 15 R   Standing hip abd  Standing hip ext 2#  2# 1  1 10  10 Alternating LE   Leg press DL 60# 2 10    Standing lumbar SB  1 10 To L         Therapeutic Activities 10'              Hip hinges w/dowel tony  1 10    Lateral stepping at 1/2 wall yellow 1 3 laps Band around knees   Fwd step ups 6\" 1 10x B   Lateral step ups 6\" 1 10x B   Sit to stands  1 10x    [place] carry 4# 1 2 laps                  Manual Intervention 20'              Prone PA 10'      GISTM/STM 10'   B lumbar paraspinals (R>L)   Lumbar Mobs in S/L 5'      SI Manip       Hip belt mobs       Hip LA distraction              NMR re-education               Seated SB pelvic tilts  1 5x    Seated SB marches  1 10 Alternating LE                   Pt. Education:  -pt educated on diagnosis, prognosis and expectations for rehab  -all pt questions were answered    Home Exercise Program:  Wayne Ellis access code [facility]    Therapeutic Exercise and NMR EXR  [x] (63153) Provided verbal/tactile cueing for activities related to strengthening, flexibility, endurance, ROM  for improvements in proximal hip and core control with self care, mobility, lifting and ambulation.  [] (90257) Provided verbal/tactile cueing for activities related to improving balance, coordination, kinesthetic sense, posture, motor skill, proprioception  to assist with core control in self care, mobility, lifting, and ambulation.      Therapeutic Activities:    [] (02868 or 41775) Provided verbal/tactile cueing for activities related to improving balance, coordination, kinesthetic sense, posture, motor skill, proprioception and motor activation to allow for proper function  with self care and ADLs  [] (69474) Provided training and instruction to the patient for proper core and proximal hip recruitment and positioning with ambulation re-education     Home Exercise Program:    [x] (65519) Reviewed/Progressed HEP activities related to strengthening, flexibility, endurance, ROM of core, proximal hip and LE for functional self-care, mobility, lifting and ambulation   [] (31007) Reviewed/Progressed HEP activities related to improving balance, coordination, kinesthetic sense, posture, motor skill, proprioception of core, proximal hip and LE for self care, mobility, lifting, and ambulation      Manual Treatments:  PROM / STM / Oscillations-Mobs:  G-I, II, III, IV (PA's, Inf., Post.)  [x] (14194) Provided manual therapy to mobilize proximal hip and LS spine soft tissue/joints for the purpose of modulating pain, promoting relaxation,  increasing ROM, reducing/eliminating soft tissue swelling/inflammation/restriction, improving soft tissue extensibility and allowing for proper ROM for normal function with self care, mobility, lifting and ambulation. Modalities:     Charges:  Timed Code Treatment Minutes: 50   Total Treatment Minutes: 50       [] EVAL (LOW) 72837 (typically 20 minutes face-to-face)  [] EVAL (MOD) 28843 (typically 30 minutes face-to-face)  [] EVAL (HIGH) 82607 (typically 45 minutes face-to-face)  [] RE-EVAL     [x] OZ(87081) x  1  [] IONTO (38114)  [] NMR (01727) x     [] VASO (53908)  [x] Manual (31489) x  1 [] Other:  [x] TA (90846)x 1    [] Mech Traction (73672)  [] ES(attended) (03128)     [] ES (un) (13712): If BWC Please Indicate Time In/Out and Total Minutes  CPT Code Time in Time out Total Min                                            Goals:   Patient stated goal: \"to decrease my back pain\"  [x] Progressing: [] Met: [] Not Met: [] Adjusted     Therapist goals for Patient:  Short Term Goals: To be achieved in: 2 weeks  1. Independent in HEP and progression per patient tolerance, in order to prevent re-injury. [x] Progressing: [] Met: [] Not Met: [] Adjusted  2.  Patient will have a decrease in pain to facilitate improvement in movement, function, and ADLs as indicated by Functional Deficits. [x] Progressing: [] Met: [] Not Met: [] Adjusted     Long Term Goals: To be achieved in: 8 weeks  1. Patient will reach FOTO predicted score of at least 42 to assist with reaching prior level of function. [] Progressing: [x] Met: [] Not Met: [x] Adjusted  2. Patient will demonstrate increased AROM to WNL, good LS mobility, good hip ROM to allow for proper joint functioning as indicated by patients Functional Deficits. [x] Progressing: [] Met: [] Not Met: [] Adjusted  3. Patient will demonstrate an increase in Strength to good proximal hip and core activation to allow for proper functional mobility as indicated by patients Functional Deficits. [x] Progressing: [] Met: [] Not Met: [] Adjusted  4. Patient will return to sleeping for 1 night without disturbances due to increased symptoms or restriction. [x] Progressing: [] Met: [] Not Met: [] Adjusted  5. Patient will be able to sit for 10 minutes without increased symptoms or restriction. [x] Progressing: [] Met: [] Not Met: [] Adjusted        ASSESSMENT:  Hypomobility throughout lumbar spine addressed with manual therapy. Pt was able to perform lateral step ups on 6\" step today with not as much difficulty or soreness. Added leg press with fatigue noted. Pt continues to require further skilled PT services to address lumbar mobility and functional strengthening in order to return to PLOF. Treatment/Activity Tolerance:  [x] Patient tolerated treatment well [] Patient limited by fatique  [] Patient limited by pain  [] Patient limited by other medical complications  [] Other:     Overall Progression Towards Functional goals/ Treatment Progress Update:  [] Patient is progressing as expected towards functional goals listed. [x] Progression is slowed due to complexities/Impairments listed. [] Progression has been slowed due to co-morbidities.   [] Plan just implemented, too soon to assess goals progression <30days   [] Goals require adjustment due to lack of progress  [] Patient is not progressing as expected and requires additional follow up with physician  [] Other:    Prognosis for POC: [] Good [x] Fair  [] Poor    Patient requires continued skilled intervention: [x] Yes  [] No        PLAN: Decrease LBP, improve lumbar/hip mobility and strength as tolerated by pt. [x] Continue per plan of care [] Alter current plan (see comments)  [] Plan of care initiated [] Hold pending MD visit [] Discharge    Electronically signed by: Jorge Alberto Dubon, PT    Note: If patient does not return for scheduled/recommended follow up visits, this note will serve as a discharge from care along with the most recent update on progress.

## 2022-09-26 ENCOUNTER — HOSPITAL ENCOUNTER (OUTPATIENT)
Dept: PHYSICAL THERAPY | Age: 47
Setting detail: THERAPIES SERIES
Discharge: HOME | End: 2022-09-26
Payer: COMMERCIAL

## 2022-09-26 PROCEDURE — 97530 THERAPEUTIC ACTIVITIES: CPT

## 2022-09-26 PROCEDURE — 97110 THERAPEUTIC EXERCISES: CPT

## 2022-09-26 PROCEDURE — 97140 MANUAL THERAPY 1/> REGIONS: CPT

## 2022-09-26 NOTE — FLOWSHEET NOTE
[facility]     Physical Therapy Treatment Note/ Progress Report:     Date:  2022    Patient Name:  Rubia Barclay    :    MRN: [id number]  Medical/Treatment Diagnosis Information:  Diagnosis: Lumbar discogenic pain syndrome (M51.26), lumbar spondylosis (M47.816)       Treatment diagnosis: Low back pain   Insurance/Certification information:   [facility] - $0 deductible, $0 OOP max, 100% co-insurance, 30 PT visits per calendar year, auth required   Physician Information:  Marilu Javier[address] signed (Y/N): []  Yes [x]  No  Date sent:     Date of Patient follow up with Physician:      Progress Report: [x]  Yes  []  No     Functional Scale:           Date assessed:  FOTO physical FS primary measure score = 23     22  FOTO = 34          22  FOTO = 40          22    Date Range for reporting period:  Beginnin22  Endin22    Progress report due (10 Rx/or 30 days whichever is less):     Recertification due (POC duration/ or 90 days whichever is less): 10/4/22     Visit # Insurance Allowable Auth Needed    16 visits approved 22 - 11/10/22 [x]Yes    []No     Pain level:  4/10     SUBJECTIVE:  Pt reports that he still has good days and bad days with his back, but feels that he has more good days than he previously did.       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; FALL RISK    Treatment based classification:     Exercises/Interventions:     Therapeutic Ex 15' Wt / Resistance sets/sec reps notes   Bike  5'     LTR  1 10    Hand heel rock   1 10 Held today due to soreness in arms   SKTC   15\" 3x ea    DKTC w/red SB  5\" 15    Glut stretch crossover  Piriformis stretch fig 4 30\" 3x ea Supine clamshells    Pelvic tilts  1 10 In hooklying   Seated lumbar flexion stretch w/SB Red SB 1 10    Seated TB rows  Seated TB high rows Red  red 1  1 10  10    Bridges W/BS 1 10 Decreased ROM   S/L clamshells  1 15 R   Standing hip abd  Standing hip ext 2#  2# 1  1 10  10 Alternating LE   Leg press DL 60# 2 10    Standing lumbar SB  1 10 To L         Therapeutic Activities 15'              Hip hinges w/dowel tony  1 10    Lateral stepping at 1/2 wall orange 1 3 laps Band around knees   Fwd step ups 6\" 1 10x B   Lateral step ups 6\" 1 10x B   Sit to stands  1 10x High low table   [place] carry 4# 1 2 laps                  Manual Intervention 20'              Prone PA 10'      GISTM/STM 5'   B lumbar paraspinals (R>L)   Lumbar Mobs in S/L 5'      SI Manip       Hip belt mobs       Hip LA distraction              NMR re-education               Seated SB pelvic tilts  1 5x    Seated SB marches  1 10 Alternating LE                   Pt. Education:  -pt educated on diagnosis, prognosis and expectations for rehab  -all pt questions were answered    Home Exercise Program:  Nallely Boss access code [facility]    Therapeutic Exercise and NMR EXR  [x] (62903) Provided verbal/tactile cueing for activities related to strengthening, flexibility, endurance, ROM  for improvements in proximal hip and core control with self care, mobility, lifting and ambulation.  [] (30431) Provided verbal/tactile cueing for activities related to improving balance, coordination, kinesthetic sense, posture, motor skill, proprioception  to assist with core control in self care, mobility, lifting, and ambulation.      Therapeutic Activities:    [] (60309 or 44588) Provided verbal/tactile cueing for activities related to improving balance, coordination, kinesthetic sense, posture, motor skill, proprioception and motor activation to allow for proper function  with self care and ADLs  [] (79873) Provided training and instruction to the patient for proper Adjusted  2. Patient will have a decrease in pain to facilitate improvement in movement, function, and ADLs as indicated by Functional Deficits. [x] Progressing: [] Met: [] Not Met: [] Adjusted     Long Term Goals: To be achieved in: 8 weeks  1. Patient will reach FOTO predicted score of at least 42 to assist with reaching prior level of function. [] Progressing: [x] Met: [] Not Met: [x] Adjusted  2. Patient will demonstrate increased AROM to WNL, good LS mobility, good hip ROM to allow for proper joint functioning as indicated by patients Functional Deficits. [x] Progressing: [] Met: [] Not Met: [] Adjusted  3. Patient will demonstrate an increase in Strength to good proximal hip and core activation to allow for proper functional mobility as indicated by patients Functional Deficits. [x] Progressing: [] Met: [] Not Met: [] Adjusted  4. Patient will return to sleeping for 1 night without disturbances due to increased symptoms or restriction. [x] Progressing: [] Met: [] Not Met: [] Adjusted  5. Patient will be able to sit for 10 minutes without increased symptoms or restriction. [x] Progressing: [] Met: [] Not Met: [] Adjusted        ASSESSMENT:  Hypomobility throughout lumbar spine addressed with manual therapy. Pt requires cues with lateral stepping and lateral step ups for appropriate form and to decrease compensation for weak gluteus medius. Pt continues to require further skilled PT services to address lumbar mobility and functional strengthening in order to return to PLOF. Treatment/Activity Tolerance:  [x] Patient tolerated treatment well [] Patient limited by fatique  [] Patient limited by pain  [] Patient limited by other medical complications  [] Other:     Overall Progression Towards Functional goals/ Treatment Progress Update:  [] Patient is progressing as expected towards functional goals listed. [x] Progression is slowed due to complexities/Impairments listed.   [] Progression has been slowed due to co-morbidities. [] Plan just implemented, too soon to assess goals progression <30days   [] Goals require adjustment due to lack of progress  [] Patient is not progressing as expected and requires additional follow up with physician  [] Other:    Prognosis for POC: [] Good [x] Fair  [] Poor    Patient requires continued skilled intervention: [x] Yes  [] No        PLAN: Decrease LBP, improve lumbar/hip mobility and strength as tolerated by pt. [x] Continue per plan of care [] Alter current plan (see comments)  [] Plan of care initiated [] Hold pending MD visit [] Discharge    Electronically signed by: Kenya Campbell PT    Note: If patient does not return for scheduled/recommended follow up visits, this note will serve as a discharge from care along with the most recent update on progress.

## 2022-09-28 ENCOUNTER — HOSPITAL ENCOUNTER (OUTPATIENT)
Dept: ONCOLOGY | Age: 47
Setting detail: INFUSION SERIES
Discharge: HOME | End: 2022-09-28
Payer: COMMERCIAL

## 2022-09-28 VITALS
HEART RATE: 72 BPM | RESPIRATION RATE: 18 BRPM | WEIGHT: 180 LBS | BODY MASS INDEX: 25.12 KG/M2 | TEMPERATURE: 97.4 F | SYSTOLIC BLOOD PRESSURE: 150 MMHG | DIASTOLIC BLOOD PRESSURE: 103 MMHG

## 2022-09-28 DIAGNOSIS — J45.50 SEVERE PERSISTENT ASTHMA, UNSPECIFIED WHETHER COMPLICATED: Primary | ICD-10-CM

## 2022-09-28 PROCEDURE — 6360000002 HC RX W HCPCS: Performed by: INTERNAL MEDICINE

## 2022-09-28 PROCEDURE — 96372 THER/PROPH/DIAG INJ SC/IM: CPT

## 2022-09-28 PROCEDURE — 99211 OFF/OP EST MAY X REQ PHY/QHP: CPT

## 2022-09-28 RX ADMIN — OMALIZUMAB 300 MG: 150 INJECTION, SOLUTION SUBCUTANEOUS at 13:11

## 2022-09-28 NOTE — PROGRESS NOTES
Patient ambulatory to department for every 4 week Xolair injection. 2nd time here, reviewed most common side effects and given AVS with medication eduction. No questions or concerns stated. Given 300 mg in two syringes subcutaneously, 150 mg given in each arm. Tolerated injections well. Patient stayed for 2 hours  post injection for observation. Will stay 2 hours with 1 more dose, then 30 minutes with subsequent doses. No signs of an adverse rx noted. Patient discharged per ambulatory. Scheduled to return in 4 weeks. Patient very anxious during this visit. Blood pressure elevated, complaints of having some mild blurred vision, states it might be from not sleeping much. Educated patient on stroke symptoms. Encouraged patient to go to ER for follow up, and to be checking his blood pressure at home to better monitor.

## 2022-09-29 ENCOUNTER — HOSPITAL ENCOUNTER (OUTPATIENT)
Dept: PHYSICAL THERAPY | Age: 47
Setting detail: THERAPIES SERIES
Discharge: HOME | End: 2022-09-29
Payer: COMMERCIAL

## 2022-09-29 ENCOUNTER — TELEPHONE (OUTPATIENT)
Dept: FAMILY MEDICINE CLINIC | Age: 47
End: 2022-09-29

## 2022-09-29 PROCEDURE — 97140 MANUAL THERAPY 1/> REGIONS: CPT

## 2022-09-29 PROCEDURE — 97530 THERAPEUTIC ACTIVITIES: CPT

## 2022-09-29 NOTE — TELEPHONE ENCOUNTER
Medication and Quantity requested:  fluticasone-salmeterol (ADVAIR) 500-50 MCG/DOSE diskus inhaler      Last Visit  07/06/22 - Dr Shania Waite and phone number updated in [facility]:  yes    Lisandra Hallman    Pt has been without inhaler a few days and would like filled today.

## 2022-09-29 NOTE — FLOWSHEET NOTE
[facility]     Physical Therapy Treatment Note/ Progress Report:     Date:  2022    Patient Name:  Jerry Roberts    :    MRN: [id number]  Medical/Treatment Diagnosis Information:  Diagnosis: Lumbar discogenic pain syndrome (M51.26), lumbar spondylosis (M47.816)       Treatment diagnosis: Low back pain   Insurance/Certification information:   [facility] - $0 deductible, $0 OOP max, 100% co-insurance, 30 PT visits per calendar year, auth required   Physician Information:  Rommel Blankenship, [address] [place] [address] signed (Y/N): []  Yes [x]  No  Date sent:     Date of Patient follow up with Physician:      Progress Report: [x]  Yes  []  No     Functional Scale:           Date assessed:  FOTO physical FS primary measure score = 23     22  FOTO = 34          22  FOTO = 40          22    Date Range for reporting period:  Beginnin22  Endin22    Progress report due (10 Rx/or 30 days whichever is less):     Recertification due (POC duration/ or 90 days whichever is less): 10/4/22     Visit # Insurance Allowable Auth Needed   3/16 16 visits approved 22 - 11/10/22 [x]Yes    []No     Pain level:  4/10     SUBJECTIVE:  Pt reports that his back is feeling good overall today. Pt states that he had Xolair injection yesterday, reported having high BP initially at 169/105 which then decreased to 150/103. Pt reports he does take BP meds. Pt reports having some blurred vision yesterday, denies this today, denies HA, dizziness.      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; FALL RISK    Treatment based classification:     Exercises/Interventions:     Therapeutic Ex  Wt / Resistance sets/sec reps notes   Bike  5'     LTR  1 10    Hand heel rock   1 10 Held today due to soreness in arms   SKTC   15\" 3x ea    DKTC w/red SB  5\" 15    Glut stretch crossover  Piriformis stretch fig 4 30\" 3x ea    Supine clamshells    Pelvic tilts  1 10 In hooklying   Seated lumbar flexion stretch w/SB Red SB 1 10    Seated TB rows  Seated TB high rows Red  red 1  1 10  10    Bridges W/BS 1 10 Decreased ROM   S/L clamshells  1 15 R   Standing hip abd  Standing hip ext 2#  2# 1  1 10  10 Alternating LE   Leg press DL 60# 2 10    Standing lumbar SB  1 10 To L         Therapeutic Activities 10'              Hip hinges w/dowel tony  1 10    Lateral stepping at 1/2 wall orange 1 3 laps Band around knees   Fwd step ups 6\" 1 10x B   Lateral step ups 6\" 1 10x B   Sit to stands  1 10x High low table   [place] carry 4# 1 2 laps    BP readings / assessment 10'             Manual Intervention 20'              Prone PA 10'      GISTM/STM 5'   B lumbar paraspinals (R>L)   Lumbar Mobs in S/L 5'      SI Manip       Hip belt mobs       Hip LA distraction              NMR re-education               Seated SB pelvic tilts  1 5x    Seated SB marches  1 10 Alternating LE                   Pt. Education:  -pt educated on diagnosis, prognosis and expectations for rehab  -all pt questions were answered    Home Exercise Program:  [address] access code [facility]    Therapeutic Exercise and NMR EXR  [x] (14724) Provided verbal/tactile cueing for activities related to strengthening, flexibility, endurance, ROM  for improvements in proximal hip and core control with self care, mobility, lifting and ambulation.  [] (10165) Provided verbal/tactile cueing for activities related to improving balance, coordination, kinesthetic sense, posture, motor skill, proprioception  to assist with core control in self care, mobility, lifting, and ambulation. Therapeutic Activities:    [] (79817 or 85683) Provided verbal/tactile cueing for activities related to improving balance, coordination, kinesthetic sense, posture, motor skill, proprioception and motor activation to allow for proper function  with self care and ADLs  [] (73906) Provided training and instruction to the patient for proper core and proximal hip recruitment and positioning with ambulation re-education     Home Exercise Program:    [x] (70248) Reviewed/Progressed HEP activities related to strengthening, flexibility, endurance, ROM of core, proximal hip and LE for functional self-care, mobility, lifting and ambulation   [] (83007) Reviewed/Progressed HEP activities related to improving balance, coordination, kinesthetic sense, posture, motor skill, proprioception of core, proximal hip and LE for self care, mobility, lifting, and ambulation      Manual Treatments:  PROM / STM / Oscillations-Mobs:  G-I, II, III, IV (PA's, Inf., Post.)  [x] (30524) Provided manual therapy to mobilize proximal hip and LS spine soft tissue/joints for the purpose of modulating pain, promoting relaxation,  increasing ROM, reducing/eliminating soft tissue swelling/inflammation/restriction, improving soft tissue extensibility and allowing for proper ROM for normal function with self care, mobility, lifting and ambulation. Modalities:     Charges:  Timed Code Treatment Minutes: 30   Total Treatment Minutes: 30       [] EVAL (LOW) 72230 (typically 20 minutes face-to-face)  [] EVAL (MOD) 57783 (typically 30 minutes face-to-face)  [] EVAL (HIGH) 81604 (typically 45 minutes face-to-face)  [] RE-EVAL     [] HC(42684) x    [] IONTO (35470)  [] NMR (47311) x     [] VASO (32471)  [x] Manual (78224) x  1 [] Other:  [x] TA (85737)x 1    [] Mech Traction (71918)  [] ES(attended) (63482)     [] ES (un) (86878):      If BWC Please Indicate Time In/Out and Total Minutes  CPT Code Time in Time out Total Min                                            Goals:   Patient stated goal: \"to decrease my back pain\"  [x] Progressing: [] Met: [] Not Met: [] Adjusted     Therapist goals for Patient:  Short Term Goals: To be achieved in: 2 weeks  1. Independent in HEP and progression per patient tolerance, in order to prevent re-injury. [x] Progressing: [] Met: [] Not Met: [] Adjusted  2. Patient will have a decrease in pain to facilitate improvement in movement, function, and ADLs as indicated by Functional Deficits. [x] Progressing: [] Met: [] Not Met: [] Adjusted     Long Term Goals: To be achieved in: 8 weeks  1. Patient will reach FOTO predicted score of at least 42 to assist with reaching prior level of function. [] Progressing: [x] Met: [] Not Met: [x] Adjusted  2. Patient will demonstrate increased AROM to WNL, good LS mobility, good hip ROM to allow for proper joint functioning as indicated by patients Functional Deficits. [x] Progressing: [] Met: [] Not Met: [] Adjusted  3. Patient will demonstrate an increase in Strength to good proximal hip and core activation to allow for proper functional mobility as indicated by patients Functional Deficits. [x] Progressing: [] Met: [] Not Met: [] Adjusted  4. Patient will return to sleeping for 1 night without disturbances due to increased symptoms or restriction. [x] Progressing: [] Met: [] Not Met: [] Adjusted  5. Patient will be able to sit for 10 minutes without increased symptoms or restriction. [x] Progressing: [] Met: [] Not Met: [] Adjusted        ASSESSMENT:  Pt reports to PT today with hypertension and reports of blurred vision yesterday. Manual therapy was performed today with slight increase in BP initially afterwards, reduced to baseline after 3 minute rest break. All exercises were held today. Encouraged pt to call PCP immediately after session due to multiple instances of high BP and for follow up.        Treatment/Activity Tolerance:  [x] Patient tolerated treatment well [] Patient limited by fatique  [] Patient limited by pain  [] Patient limited by other medical complications  [] Other:     Overall Progression Towards Functional goals/ Treatment Progress Update:  [] Patient is progressing as expected towards functional goals listed. [x] Progression is slowed due to complexities/Impairments listed. [] Progression has been slowed due to co-morbidities. [] Plan just implemented, too soon to assess goals progression <30days   [] Goals require adjustment due to lack of progress  [] Patient is not progressing as expected and requires additional follow up with physician  [] Other:    Prognosis for POC: [] Good [x] Fair  [] Poor    Patient requires continued skilled intervention: [x] Yes  [] No        PLAN: Decrease LBP, improve lumbar/hip mobility and strength as tolerated by pt. [x] Continue per plan of care [] Alter current plan (see comments)  [] Plan of care initiated [x] Hold pending MD visit with PCP regarding high BP [] Discharge    Electronically signed by: Manuel Richards, PT    Note: If patient does not return for scheduled/recommended follow up visits, this note will serve as a discharge from care along with the most recent update on progress.

## 2022-09-30 ENCOUNTER — TELEPHONE (OUTPATIENT)
Dept: ORTHOPEDIC SURGERY | Age: 47
End: 2022-09-30

## 2022-09-30 DIAGNOSIS — J45.50 SEVERE PERSISTENT ASTHMA WITHOUT COMPLICATION: Primary | ICD-10-CM

## 2022-09-30 RX ORDER — FLUTICASONE PROPIONATE AND SALMETEROL 500; 50 UG/1; UG/1
1 POWDER RESPIRATORY (INHALATION) EVERY 12 HOURS
Qty: 60 EACH | Refills: 3 | Status: SHIPPED | OUTPATIENT
Start: 2022-09-30 | End: 2022-10-03 | Stop reason: SDUPTHER

## 2022-09-30 NOTE — TELEPHONE ENCOUNTER
The following medication records are no longer available for ordering. Place a new order with a different medication record.   fluticasone-salmeterol (ADVAIR) 500-50 MCG/DOSE diskus inhaler    Please advise

## 2022-09-30 NOTE — TELEPHONE ENCOUNTER
Submitted PA for Fluticasone-Salmeterol 500-50MCG/ACT aerosol powder  Via CMM Key: [id number] STATUS: No Authorization Required. If this requires a response please respond to the pool ( P [id number] [facility]). Thank you please advise patient.

## 2022-10-03 ENCOUNTER — OFFICE VISIT (OUTPATIENT)
Dept: FAMILY MEDICINE CLINIC | Age: 47
End: 2022-10-03
Payer: COMMERCIAL

## 2022-10-03 VITALS
SYSTOLIC BLOOD PRESSURE: 164 MMHG | DIASTOLIC BLOOD PRESSURE: 98 MMHG | BODY MASS INDEX: 25.9 KG/M2 | WEIGHT: 185.6 LBS | OXYGEN SATURATION: 98 % | HEART RATE: 80 BPM

## 2022-10-03 DIAGNOSIS — J45.50 SEVERE PERSISTENT ASTHMA WITHOUT COMPLICATION: ICD-10-CM

## 2022-10-03 DIAGNOSIS — I10 HYPERTENSION, ESSENTIAL: Primary | ICD-10-CM

## 2022-10-03 PROCEDURE — 99214 OFFICE O/P EST MOD 30 MIN: CPT | Performed by: FAMILY MEDICINE

## 2022-10-03 PROCEDURE — G8419 CALC BMI OUT NRM PARAM NOF/U: HCPCS | Performed by: FAMILY MEDICINE

## 2022-10-03 PROCEDURE — 1036F TOBACCO NON-USER: CPT | Performed by: FAMILY MEDICINE

## 2022-10-03 PROCEDURE — G8484 FLU IMMUNIZE NO ADMIN: HCPCS | Performed by: FAMILY MEDICINE

## 2022-10-03 PROCEDURE — G8427 DOCREV CUR MEDS BY ELIG CLIN: HCPCS | Performed by: FAMILY MEDICINE

## 2022-10-03 RX ORDER — FLUTICASONE PROPIONATE AND SALMETEROL 500; 50 UG/1; UG/1
1 POWDER RESPIRATORY (INHALATION) EVERY 12 HOURS
Qty: 1 EACH | Refills: 3 | Status: SHIPPED | OUTPATIENT
Start: 2022-10-03

## 2022-10-03 RX ORDER — LISINOPRIL 20 MG/1
20 TABLET ORAL DAILY
Qty: 90 TABLET | Refills: 1 | Status: SHIPPED | OUTPATIENT
Start: 2022-10-03 | End: 2022-10-28

## 2022-10-03 SDOH — ECONOMIC STABILITY: FOOD INSECURITY: WITHIN THE PAST 12 MONTHS, THE FOOD YOU BOUGHT JUST DIDN'T LAST AND YOU DIDN'T HAVE MONEY TO GET MORE.: NEVER TRUE

## 2022-10-03 SDOH — ECONOMIC STABILITY: FOOD INSECURITY: WITHIN THE PAST 12 MONTHS, YOU WORRIED THAT YOUR FOOD WOULD RUN OUT BEFORE YOU GOT MONEY TO BUY MORE.: NEVER TRUE

## 2022-10-03 ASSESSMENT — SOCIAL DETERMINANTS OF HEALTH (SDOH): HOW HARD IS IT FOR YOU TO PAY FOR THE VERY BASICS LIKE FOOD, HOUSING, MEDICAL CARE, AND HEATING?: SOMEWHAT HARD

## 2022-10-03 NOTE — PROGRESS NOTES
[address] Note    Date: 10/3/2022                                               Nara Schmitz:     Chief Complaint   Patient presents with    Hypertension     Discuss medication       HPI  Pt is here for BP check. Was in the hospital a few weeks ago and BP was 920 systolic. At physical therapy last week BP was 164/109. Denies CP. Breathing is at baseline, as he has significant asthma. Needs a refill of Advair. Patient Active Problem List    Diagnosis Date Noted    Severe persistent asthma 08/10/2022    Severe persistent asthma without complication 42/56/1877    Environmental allergies 06/29/2022    Gastroesophageal reflux disease without esophagitis 06/29/2022    Elevated diaphragm 06/29/2022    Acute encephalopathy     Partial idiopathic epilepsy with seizures of localized onset, not intractable, without status epilepticus ([address]     HTN (hypertension), benign     Thrombocytopenia ([address] 01/13/2020    High anion gap metabolic acidosis 24/76/3299    Lactic acidosis 01/13/2020    Seizure disorder [facility])        Past Medical History:   Diagnosis Date    Asthma     GERD (gastroesophageal reflux disease)     Hypertension     Seizure ([address]     LAST SEIZURE, JANUARY 2020. FOLLOWS WITH NEUROLOGIST, DR Alex Long       Current Outpatient Medications   Medication Sig Dispense Refill    fluticasone-salmeterol (ADVAIR DISKUS) 500-50 MCG/ACT AEPB diskus inhaler Inhale 1 puff into the lungs in the morning and 1 puff in the evening.  1 each 3    lisinopril (PRINIVIL;ZESTRIL) 20 MG tablet Take 1 tablet by mouth daily 90 tablet 1    tiotropium (SPIRIVA RESPIMAT) 2.5 MCG/ACT AERS inhaler Inhale 2 puffs into the lungs daily      VENTOLIN  (90 Base) MCG/ACT inhaler Inhale 2 puffs into the lungs every 6 hours as needed for Wheezing 18 g 11    celecoxib (CELEBREX) 200 MG capsule Take 1 capsule by mouth daily 30 capsule 1    cyclobenzaprine (FLEXERIL) 10 mg tablet Take 1 tablet by mouth nightly as needed for Muscle spasms 30 tablet 1    albuterol sulfate  (90 Base) MCG/ACT inhaler INHALE TWO PUFFS BY MOUTH FOUR TIMES A DAY AS NEEDED FOR WHEEZING 18 g 0    Dextromethorphan-guaiFENesin  MG TB12 Take 1 tablet by mouth every 12 hours as needed (cough or congestion) 28 tablet 0    albuterol (PROVENTIL) (2.5 MG/3ML) 0.083% nebulizer solution USE THREE MILLILITERS VIA NEBULIZATION BY MOUTH EVERY 6 HOURS AS NEEDED FOR WHEEZING 120 each 2    hydrocortisone 2.5 % cream Apply topically 2 times daily. 28 g 0    levETIRAcetam (KEPPRA) 500 MG tablet Take 1 tablet by mouth 2 times daily 60 tablet 3    cetirizine (ZYRTEC) 10 MG tablet Take 10 mg by mouth daily      omeprazole (PRILOSEC) 20 MG delayed release capsule Take 1 capsule by mouth 2 times daily (before meals) 60 capsule 5    fluticasone (FLONASE) 50 MCG/ACT nasal spray 1 spray by Nasal route daily       No current facility-administered medications for this visit. Allergies   Allergen Reactions    Symbicort [Budesonide-Formoterol Fumarate] Other (See Comments)     Lung infection       Review of Systems  No fever, no vomiting    Vitals:  BP (!) 164/98   Pulse 80   Wt 185 lb 9.6 oz (84.2 kg)   SpO2 98%   BMI 25.90 kg/m²     Wt Readings from Last 3 Encounters:   10/03/22 185 lb 9.6 oz (84.2 kg)   09/28/22 180 lb (81.6 kg)   07/11/22 188 lb 4.4 oz (85.4 kg)        Physical Exam  General:  Well-appearing, NAD, alert, non-toxic  HEENT:  Normocephalic, atraumatic. Pupils equal and round. CHEST/LUNGS: CTAB, no crackles, no wheeze, no rhonchi. Symmetric rise  CARDIOVASCULAR: RRR,  no murmur, no rub  EXTREMETIES: Normal movement of all extremities  SKIN:  No rash, no cellulitis, no bruising, no petechiae/purpura/vesicles/pustules/abscess  PSYCH:  A+O x 3; normal affect  NEURO:  GCS 15, CN2-12 grossly intact, no focal motor/sensory deficits, no cerebellar deficits, normal gait, normal speech      Assessment/Plan     48yo male with newly diagnosed HTN. Will start him on Lisinopril. F/u in 2 weeks for BP check. Pt has asthma. Breathing is stable. Continue current meds. F/u with pulmonology as scheduled. Discussed with patient medication/s dosage, instructions, potential S/E, A/R and Drug Interaction  Tylenol or Motrin as needed for pain or fever  Advise to return here if worse or go to nearest ER  Encourage fluids  Pt discharged in stable condition at 15:24      1. Hypertension, essential  -     lisinopril (PRINIVIL;ZESTRIL) 20 MG tablet; Take 1 tablet by mouth daily, Disp-90 tablet, R-1Normal  2. Severe persistent asthma without complication  -     fluticasone-salmeterol (ADVAIR DISKUS) 500-50 MCG/ACT AEPB diskus inhaler; Inhale 1 puff into the lungs in the morning and 1 puff in the evening., Disp-1 each, R-3Normal       No orders of the defined types were placed in this encounter. No follow-ups on file.     Wily Iyer MD, MD    10/3/2022  3:23 PM

## 2022-10-06 ENCOUNTER — HOSPITAL ENCOUNTER (OUTPATIENT)
Dept: PHYSICAL THERAPY | Age: 47
Setting detail: THERAPIES SERIES
Discharge: HOME | End: 2022-10-06
Payer: COMMERCIAL

## 2022-10-06 PROCEDURE — 97110 THERAPEUTIC EXERCISES: CPT

## 2022-10-06 PROCEDURE — 97140 MANUAL THERAPY 1/> REGIONS: CPT

## 2022-10-06 PROCEDURE — 97530 THERAPEUTIC ACTIVITIES: CPT

## 2022-10-06 NOTE — FLOWSHEET NOTE
[facility]     Physical Therapy Treatment Note/ Progress Report:     Date:  10/6/2022    Patient Name:  Josemanuel Augustine    :    MRN: [id number]  Medical/Treatment Diagnosis Information:  Diagnosis: Lumbar discogenic pain syndrome (M51.26), lumbar spondylosis (M47.816)       Treatment diagnosis: Low back pain   Insurance/Certification information:   [facility] - $0 deductible, $0 OOP max, 100% co-insurance, 30 PT visits per calendar year, auth required   Physician Information:  Aury Fritz[address] signed (Y/N): []  Yes [x]  No  Date sent:     Date of Patient follow up with Physician:      Progress Report: [x]  Yes  []  No     Functional Scale:           Date assessed:  FOTO physical FS primary measure score = 23     22  FOTO = 34          22  FOTO = 40          22    Date Range for reporting period:  Beginnin22  Endin22    Progress report due (10 Rx/or 30 days whichever is less):     Recertification due (POC duration/ or 90 days whichever is less): 10/4/22     Visit # Insurance Allowable Auth Needed    16 visits approved 22 - 11/10/22 [x]Yes    []No     Pain level:  4/10     SUBJECTIVE:  Pt reports that his PCP put pt on lisinopril 20 mg 1x/day, pt started taking this Tuesday. Pt states that he has felt better overall since being on meds, no blurred vision or \"tense\" feeling.  Pt reports that his back still has good days and bad days, but overall is still improving with PT.       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; FALL RISK    Treatment based classification:     Exercises/Interventions:     Therapeutic Ex  15' Wt / Resistance sets/sec reps notes   Bike  5'     LTR  1 10    Hand heel rock   1 10 Held today due to soreness in arms   SKTC   15\" 3x ea    DKTC w/red SB  5\" 15    Glut stretch crossover  Piriformis stretch fig 4 30\" 3x ea    Supine clamshells    Pelvic tilts  1 10 In hooklying   Seated lumbar flexion stretch w/SB Red SB 1 10    Seated TB rows  Seated TB high rows Red  red 1  1 10  10    Bridges W/BS 1 10 Decreased ROM   S/L clamshells  1 15 R   Standing hip abd  Standing hip ext 2#  2# 1  1 10  10 Alternating LE   Leg press DL 60# 2 10    Standing lumbar SB  1 10 To L         Therapeutic Activities 15'              Hip hinges w/dowel tony  1 10    Lateral stepping at 1/2 wall orange 1 3 laps Band around knees   Fwd step ups 6\" 1 10x B   Lateral step ups 6\" 1 10x B   Sit to stands  1 10x High low table   [place] carry 4# 1 2 laps    BP readings / assessment 10'             Manual Intervention 15'              Prone PA 10'      GISTM/STM 5'   B lumbar paraspinals (R>L)   Lumbar Mobs in S/L 5'      SI Manip       Hip belt mobs       Hip LA distraction              NMR re-education               Seated SB pelvic tilts  1 5x    Seated SB marches  1 10 Alternating LE                   Pt. Education:  -pt educated on diagnosis, prognosis and expectations for rehab  -all pt questions were answered    Home Exercise Program:  [address] access code [facility]    Therapeutic Exercise and NMR EXR  [x] (69870) Provided verbal/tactile cueing for activities related to strengthening, flexibility, endurance, ROM  for improvements in proximal hip and core control with self care, mobility, lifting and ambulation.  [] (50666) Provided verbal/tactile cueing for activities related to improving balance, coordination, kinesthetic sense, posture, motor skill, proprioception  to assist with core control in self care, mobility, lifting, and ambulation. Therapeutic Activities:    [] (51952 or 17699) Provided verbal/tactile cueing for activities related to improving balance, coordination, kinesthetic sense, posture, motor skill, proprioception and motor activation to allow for proper function  with self care and ADLs  [] (50215) Provided training and instruction to the patient for proper core and proximal hip recruitment and positioning with ambulation re-education     Home Exercise Program:    [x] (01787) Reviewed/Progressed HEP activities related to strengthening, flexibility, endurance, ROM of core, proximal hip and LE for functional self-care, mobility, lifting and ambulation   [] (32183) Reviewed/Progressed HEP activities related to improving balance, coordination, kinesthetic sense, posture, motor skill, proprioception of core, proximal hip and LE for self care, mobility, lifting, and ambulation      Manual Treatments:  PROM / STM / Oscillations-Mobs:  G-I, II, III, IV (PA's, Inf., Post.)  [x] (16199) Provided manual therapy to mobilize proximal hip and LS spine soft tissue/joints for the purpose of modulating pain, promoting relaxation,  increasing ROM, reducing/eliminating soft tissue swelling/inflammation/restriction, improving soft tissue extensibility and allowing for proper ROM for normal function with self care, mobility, lifting and ambulation.      Modalities:     Charges:  Timed Code Treatment Minutes: 45   Total Treatment Minutes: 45       [] EVAL (LOW) 50659 (typically 20 minutes face-to-face)  [] EVAL (MOD) 35565 (typically 30 minutes face-to-face)  [] EVAL (HIGH) 19265 (typically 45 minutes face-to-face)  [] RE-EVAL     [x] XJ(95576) x  1  [] IONTO (63043)  [] NMR (85652) x     [] VASO (68274)  [x] Manual (83117) x  1 [] Other:  [x] TA (19228)x 1    [] Mech Traction (60731)  [] ES(attended) (99042)     [] ES (un) (45444): If BWC Please Indicate Time In/Out and Total Minutes  CPT Code Time in Time out Total Min                                            Goals:   Patient stated goal: \"to decrease my back pain\"  [x] Progressing: [] Met: [] Not Met: [] Adjusted     Therapist goals for Patient:  Short Term Goals: To be achieved in: 2 weeks  1. Independent in HEP and progression per patient tolerance, in order to prevent re-injury. [x] Progressing: [] Met: [] Not Met: [] Adjusted  2. Patient will have a decrease in pain to facilitate improvement in movement, function, and ADLs as indicated by Functional Deficits. [x] Progressing: [] Met: [] Not Met: [] Adjusted     Long Term Goals: To be achieved in: 8 weeks  1. Patient will reach FOTO predicted score of at least 42 to assist with reaching prior level of function. [] Progressing: [x] Met: [] Not Met: [x] Adjusted  2. Patient will demonstrate increased AROM to WNL, good LS mobility, good hip ROM to allow for proper joint functioning as indicated by patients Functional Deficits. [x] Progressing: [] Met: [] Not Met: [] Adjusted  3. Patient will demonstrate an increase in Strength to good proximal hip and core activation to allow for proper functional mobility as indicated by patients Functional Deficits. [x] Progressing: [] Met: [] Not Met: [] Adjusted  4. Patient will return to sleeping for 1 night without disturbances due to increased symptoms or restriction. [x] Progressing: [] Met: [] Not Met: [] Adjusted  5. Patient will be able to sit for 10 minutes without increased symptoms or restriction. [x] Progressing: [] Met: [] Not Met: [] Adjusted        ASSESSMENT:  Returned to full treatment today with monitoring of BP. TTP and tightness still present along R lumbar paraspinals, pt continues to respond well to manual therapy.  Pt was fatigued with return to exercises, and required cues to decrease compensation with glut med exercises, particularly lateral step ups and lateral stepping at 1/2 wall. Pt would continue to benefit from skilled PT services to address lumbar mobility and LE strength deficits in order to return to PLOF. Treatment/Activity Tolerance:  [x] Patient tolerated treatment well [] Patient limited by fatique  [] Patient limited by pain  [] Patient limited by other medical complications  [] Other:     Overall Progression Towards Functional goals/ Treatment Progress Update:  [] Patient is progressing as expected towards functional goals listed. [x] Progression is slowed due to complexities/Impairments listed. [] Progression has been slowed due to co-morbidities. [] Plan just implemented, too soon to assess goals progression <30days   [] Goals require adjustment due to lack of progress  [] Patient is not progressing as expected and requires additional follow up with physician  [] Other:    Prognosis for POC: [] Good [x] Fair  [] Poor    Patient requires continued skilled intervention: [x] Yes  [] No        PLAN: Decrease LBP, improve lumbar/hip mobility and strength as tolerated by pt. [x] Continue per plan of care [] Alter current plan (see comments)  [] Plan of care initiated [x] Hold pending MD visit with PCP regarding high BP [] Discharge    Electronically signed by: Brandyn Gutiérrez, PT    Note: If patient does not return for scheduled/recommended follow up visits, this note will serve as a discharge from care along with the most recent update on progress.

## 2022-10-11 ENCOUNTER — HOSPITAL ENCOUNTER (OUTPATIENT)
Dept: PHYSICAL THERAPY | Age: 47
Setting detail: THERAPIES SERIES
Discharge: HOME | End: 2022-10-11
Payer: COMMERCIAL

## 2022-10-11 PROCEDURE — 97110 THERAPEUTIC EXERCISES: CPT

## 2022-10-11 PROCEDURE — 97530 THERAPEUTIC ACTIVITIES: CPT

## 2022-10-11 PROCEDURE — 97140 MANUAL THERAPY 1/> REGIONS: CPT

## 2022-10-11 NOTE — FLOWSHEET NOTE
[employer]     Physical Therapy Treatment Note/ Progress Report:     Date:  10/11/2022    Patient Name:  Shoaib Rhodes    :    MRN: [id number]  Medical/Treatment Diagnosis Information:  Diagnosis: Lumbar discogenic pain syndrome (M51.26), lumbar spondylosis (M47.816)       Treatment diagnosis: Low back pain   Insurance/Certification information:   [facility] - $0 deductible, $0 OOP max, 100% co-insurance, 30 PT visits per calendar year, auth required   Physician Information:  Chet Patel[address] signed (Y/N): []  Yes [x]  No  Date sent:     Date of Patient follow up with Physician:      Progress Report: [x]  Yes  []  No     Functional Scale:           Date assessed:  FOTO physical FS primary measure score = 23     22  FOTO = 34          22  FOTO = 40          22    Date Range for reporting period:  Beginnin22  Endin22    Progress report due (10 Rx/or 30 days whichever is less):     Recertification due (POC duration/ or 90 days whichever is less): 10/4/22     Visit # Insurance Allowable Auth Needed   16 visits approved 22 - 11/10/22 [x]Yes    []No     Pain level:  4/10     SUBJECTIVE:  Pt reports that his back has been feeling better overall. Pt states that he feels his whole body has been more tense since switching to lisinopril and feels that he should have slowly weaned off previous medication. Pt reports having trouble sleeping the last few nights as a result of this. Pt reports that he took lisinopril about 1 hour ago, which is much later than he typically takes it. Pt does also report having some soreness in L buttocks today.      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; FALL RISK    Treatment based classification:     Exercises/Interventions:     Therapeutic Ex  15' Wt / Resistance sets/sec reps notes   Bike  5'     LTR  1 10    Hand heel rock   1 10 Held today due to soreness in arms   SKTC   15\" 3x ea    DKTC w/red SB  5\" 15    Glut stretch crossover  Piriformis stretch fig 4 30\"  30\" 3x ea  3   Pushing vs pulling  L   Supine clamshells    Pelvic tilts  1 10 In hooklying   Seated lumbar flexion stretch w/SB Red SB 1 10    Seated TB rows  Seated TB high rows Red  red 1  1 10  10    Bridges W/BS 1 10 Decreased ROM   S/L clamshells  1 15 R   Standing hip abd  Standing hip ext 2#  2# 1  1 10  10 Alternating LE   Leg press DL 60# 2 10    Standing lumbar SB  1 10 To L         Therapeutic Activities 15'              Hip hinges w/dowel tony  1 10    Lateral stepping at 1/2 wall orange 1 3 laps Band around knees   Fwd step ups 6\" 1 10x B   Lateral step ups 6\" 1 10x B   Sit to stands  1 10x High low table   [place] carry 4# 1 2 laps    BP readings / assessment 10'             Manual Intervention 15'              Prone PA 10'      GISTM/STM 5'   B lumbar paraspinals (R>L)   Lumbar Mobs in S/L 5'      SI Manip       Hip belt mobs       Hip LA distraction              NMR re-education               Seated SB pelvic tilts  1 5x    Seated SB marches  1 10 Alternating LE                   Pt. Education:  -pt educated on diagnosis, prognosis and expectations for rehab  -all pt questions were answered    Home Exercise Program:  Kristi Covarrubias access code [facility]    Therapeutic Exercise and NMR EXR  [x] (38136) Provided verbal/tactile cueing for activities related to strengthening, flexibility, endurance, ROM  for improvements in proximal hip and core control with self care, mobility, lifting and ambulation.  [] (65861) Provided verbal/tactile cueing for activities related to improving balance, coordination, kinesthetic sense, posture, motor skill, proprioception  to assist with core control in self care, mobility, lifting, and ambulation. Therapeutic Activities:    [] (21279 or 81078) Provided verbal/tactile cueing for activities related to improving balance, coordination, kinesthetic sense, posture, motor skill, proprioception and motor activation to allow for proper function  with self care and ADLs  [] (34543) Provided training and instruction to the patient for proper core and proximal hip recruitment and positioning with ambulation re-education     Home Exercise Program:    [x] (74196) Reviewed/Progressed HEP activities related to strengthening, flexibility, endurance, ROM of core, proximal hip and LE for functional self-care, mobility, lifting and ambulation   [] (51076) Reviewed/Progressed HEP activities related to improving balance, coordination, kinesthetic sense, posture, motor skill, proprioception of core, proximal hip and LE for self care, mobility, lifting, and ambulation      Manual Treatments:  PROM / STM / Oscillations-Mobs:  G-I, II, III, IV (PA's, Inf., Post.)  [x] (54258) Provided manual therapy to mobilize proximal hip and LS spine soft tissue/joints for the purpose of modulating pain, promoting relaxation,  increasing ROM, reducing/eliminating soft tissue swelling/inflammation/restriction, improving soft tissue extensibility and allowing for proper ROM for normal function with self care, mobility, lifting and ambulation.      Modalities:     Charges:  Timed Code Treatment Minutes: 45   Total Treatment Minutes: 45       [] EVAL (LOW) 30267 (typically 20 minutes face-to-face)  [] EVAL (MOD) 77636 (typically 30 minutes face-to-face)  [] EVAL (HIGH) 32438 (typically 45 minutes face-to-face)  [] RE-EVAL     [x] ELSA(14181) x  1  [] IONTO (96214)  [] NMR (10547) x     [] VASO (60911)  [x] Manual (65468) x  1 [] Other:  [x] TA (57423)x 1    [] Mech Traction (19477)  [] ES(attended) (27502)     [] ES (un) (05538): If BWC Please Indicate Time In/Out and Total Minutes  CPT Code Time in Time out Total Min                                            Goals:   Patient stated goal: \"to decrease my back pain\"  [x] Progressing: [] Met: [] Not Met: [] Adjusted     Therapist goals for Patient:  Short Term Goals: To be achieved in: 2 weeks  1. Independent in HEP and progression per patient tolerance, in order to prevent re-injury. [x] Progressing: [] Met: [] Not Met: [] Adjusted  2. Patient will have a decrease in pain to facilitate improvement in movement, function, and ADLs as indicated by Functional Deficits. [x] Progressing: [] Met: [] Not Met: [] Adjusted     Long Term Goals: To be achieved in: 8 weeks  1. Patient will reach FOTO predicted score of at least 42 to assist with reaching prior level of function. [] Progressing: [x] Met: [] Not Met: [x] Adjusted  2. Patient will demonstrate increased AROM to WNL, good LS mobility, good hip ROM to allow for proper joint functioning as indicated by patients Functional Deficits. [x] Progressing: [] Met: [] Not Met: [] Adjusted  3. Patient will demonstrate an increase in Strength to good proximal hip and core activation to allow for proper functional mobility as indicated by patients Functional Deficits. [x] Progressing: [] Met: [] Not Met: [] Adjusted  4. Patient will return to sleeping for 1 night without disturbances due to increased symptoms or restriction. [x] Progressing: [] Met: [] Not Met: [] Adjusted  5. Patient will be able to sit for 10 minutes without increased symptoms or restriction. [x] Progressing: [] Met: [] Not Met: [] Adjusted        ASSESSMENT:  Added crossover piriformis stretch on L today.  Pt

## 2022-10-13 ENCOUNTER — HOSPITAL ENCOUNTER (OUTPATIENT)
Dept: PHYSICAL THERAPY | Age: 47
Setting detail: THERAPIES SERIES
Discharge: HOME | End: 2022-10-13
Payer: COMMERCIAL

## 2022-10-13 PROCEDURE — 97140 MANUAL THERAPY 1/> REGIONS: CPT

## 2022-10-13 PROCEDURE — 97110 THERAPEUTIC EXERCISES: CPT

## 2022-10-13 PROCEDURE — 97530 THERAPEUTIC ACTIVITIES: CPT

## 2022-10-13 NOTE — FLOWSHEET NOTE
[facility]     Physical Therapy Treatment Note/ Progress Report:     Date:  10/13/2022    Patient Name:  Rey Sky    :    MRN: [id number]  Medical/Treatment Diagnosis Information:  Diagnosis: Lumbar discogenic pain syndrome (M51.26), lumbar spondylosis (M47.816)       Treatment diagnosis: Low back pain   Insurance/Certification information:   [facility] - $0 deductible, $0 OOP max, 100% co-insurance, 30 PT visits per calendar year, auth required   Physician Information:  Lakeisha Martin[address] signed (Y/N): []  Yes [x]  No  Date sent:     Date of Patient follow up with Physician:      Progress Report: [x]  Yes  []  No     Functional Scale:           Date assessed:  FOTO physical FS primary measure score = 23     22  FOTO = 34          22  FOTO = 40          22    Date Range for reporting period:  Beginnin22  Endin22    Progress report due (10 Rx/or 30 days whichever is less):     Recertification due (POC duration/ or 90 days whichever is less): 10/4/22     Visit # Insurance Allowable Auth Needed    16 visits approved 22 - 11/10/22 [x]Yes    []No     Pain level:  0-1/10     SUBJECTIVE:  Pt reports that he is feeling better today and very minimal pain in lower back currently, just tightness. Pt reports that his whole body is also finally starting to feel better since switching BP meds. No pain or tightness in L buttocks today.      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; FALL RISK    Treatment based classification:     Exercises/Interventions:     Therapeutic Ex  10' Wt / Resistance sets/sec reps notes   Bike  5'     LTR  1 10    Hand heel rock   1 10 Held today due to soreness in arms   SKTC   15\" 3x ea    DKTC w/red SB  5\" 15    Glut stretch crossover  Piriformis stretch fig 4 30\"  30\" 3x ea  3   Pushing vs pulling  L   Supine clamshells    Pelvic tilts  1 10 In hooklying   Seated lumbar flexion stretch w/SB Red SB 1 10    Seated TB rows  Seated TB high rows Red  red 1  1 10  10    Bridges W/BS 1 10 Decreased ROM   S/L clamshells  1 15 R   Standing hip abd  Standing hip ext 2#  2# 1  1 10  10 Alternating LE   Leg press DL 60# 2 10    Standing lumbar SB  1 10 To L         Therapeutic Activities 20'              Hip hinges w/dowel tony  1 10    Lateral stepping at 1/2 wall orange 1 4 laps Band around knees   Fwd step ups 6\" 1 10x B   Lateral step ups 6\" 1 10x B   Sit to stands  1 10x High low table   [place] carry 5# 1 2 laps    BP readings / assessment 10'      Standing glut med activation orange 1 10x ea    Manual Intervention 15'              Prone PA 10'      GISTM/STM 5'   B lumbar paraspinals (R>L)   Lumbar Mobs in S/L 5'      SI Manip       Hip belt mobs       Hip LA distraction              NMR re-education               Seated SB pelvic tilts  1 5x    Seated SB marches  1 10 Alternating LE                   Pt. Education:  -pt educated on diagnosis, prognosis and expectations for rehab  -all pt questions were answered    Home Exercise Program:  [address] access code [facility]    Therapeutic Exercise and NMR EXR  [x] (99544) Provided verbal/tactile cueing for activities related to strengthening, flexibility, endurance, ROM  for improvements in proximal hip and core control with self care, mobility, lifting and ambulation.  [] (00817) Provided verbal/tactile cueing for activities related to improving balance, coordination, kinesthetic sense, posture, motor skill, proprioception  to assist with core control in self care, mobility, lifting, and ambulation. Therapeutic Activities:    [] (40616 or 36090) Provided verbal/tactile cueing for activities related to improving balance, coordination, kinesthetic sense, posture, motor skill, proprioception and motor activation to allow for proper function  with self care and ADLs  [] (59651) Provided training and instruction to the patient for proper core and proximal hip recruitment and positioning with ambulation re-education     Home Exercise Program:    [x] (45173) Reviewed/Progressed HEP activities related to strengthening, flexibility, endurance, ROM of core, proximal hip and LE for functional self-care, mobility, lifting and ambulation   [] (99280) Reviewed/Progressed HEP activities related to improving balance, coordination, kinesthetic sense, posture, motor skill, proprioception of core, proximal hip and LE for self care, mobility, lifting, and ambulation      Manual Treatments:  PROM / STM / Oscillations-Mobs:  G-I, II, III, IV (PA's, Inf., Post.)  [x] (72971) Provided manual therapy to mobilize proximal hip and LS spine soft tissue/joints for the purpose of modulating pain, promoting relaxation,  increasing ROM, reducing/eliminating soft tissue swelling/inflammation/restriction, improving soft tissue extensibility and allowing for proper ROM for normal function with self care, mobility, lifting and ambulation.      Modalities:     Charges:  Timed Code Treatment Minutes: 45   Total Treatment Minutes: 45       [] EVAL (LOW) 39573 (typically 20 minutes face-to-face)  [] EVAL (MOD) 08180 (typically 30 minutes face-to-face)  [] EVAL (HIGH) 95910 (typically 45 minutes face-to-face)  [] RE-EVAL     [x] MD(00678) x  1  [] IONTO (14284)  [] NMR (90535) x     [] VASO (71707)  [x] Manual (85497) x  1 [] Other:  [x] TA (10290)x 1    [] [facility] Traction (27294)  [] ES(attended) (74188)     [] ES (un) (63520): If BWC Please Indicate Time In/Out and Total Minutes  CPT Code Time in Time out Total Min                                            Goals:   Patient stated goal: \"to decrease my back pain\"  [x] Progressing: [] Met: [] Not Met: [] Adjusted     Therapist goals for Patient:  Short Term Goals: To be achieved in: 2 weeks  1. Independent in HEP and progression per patient tolerance, in order to prevent re-injury. [x] Progressing: [] Met: [] Not Met: [] Adjusted  2. Patient will have a decrease in pain to facilitate improvement in movement, function, and ADLs as indicated by Functional Deficits. [x] Progressing: [] Met: [] Not Met: [] Adjusted     Long Term Goals: To be achieved in: 8 weeks  1. Patient will reach FOTO predicted score of at least 42 to assist with reaching prior level of function. [] Progressing: [x] Met: [] Not Met: [x] Adjusted  2. Patient will demonstrate increased AROM to WNL, good LS mobility, good hip ROM to allow for proper joint functioning as indicated by patients Functional Deficits. [x] Progressing: [] Met: [] Not Met: [] Adjusted  3. Patient will demonstrate an increase in Strength to good proximal hip and core activation to allow for proper functional mobility as indicated by patients Functional Deficits. [x] Progressing: [] Met: [] Not Met: [] Adjusted  4. Patient will return to sleeping for 1 night without disturbances due to increased symptoms or restriction. [x] Progressing: [] Met: [] Not Met: [] Adjusted  5. Patient will be able to sit for 10 minutes without increased symptoms or restriction. [x] Progressing: [] Met: [] Not Met: [] Adjusted        ASSESSMENT:  Pt demonstrates improving lumbar mobility and decreased tightness to lumbar paraspinals. Added glut med activation with significant cues required to maintain appropriate form and upright posture.  Pt demonstrates improved ability to perform side steps with band with decreased trunk compensation noted. Pt would continue to benefit from skilled PT services to address lumbar mobility and LE strength deficits in order to return to PLOF. Treatment/Activity Tolerance:  [x] Patient tolerated treatment well [] Patient limited by fatique  [] Patient limited by pain  [] Patient limited by other medical complications  [] Other:     Overall Progression Towards Functional goals/ Treatment Progress Update:  [] Patient is progressing as expected towards functional goals listed. [x] Progression is slowed due to complexities/Impairments listed. [] Progression has been slowed due to co-morbidities. [] Plan just implemented, too soon to assess goals progression <30days   [] Goals require adjustment due to lack of progress  [] Patient is not progressing as expected and requires additional follow up with physician  [] Other:    Prognosis for POC: [] Good [x] Fair  [] Poor    Patient requires continued skilled intervention: [x] Yes  [] No        PLAN: Decrease LBP, improve lumbar/hip mobility and strength as tolerated by pt. [x] Continue per plan of care [] Alter current plan (see comments)  [] Plan of care initiated [] Hold pending MD visit  [] Discharge    Electronically signed by: Kamlesh Hernandez, PT    Note: If patient does not return for scheduled/recommended follow up visits, this note will serve as a discharge from care along with the most recent update on progress.

## 2022-10-18 ENCOUNTER — HOSPITAL ENCOUNTER (OUTPATIENT)
Dept: PHYSICAL THERAPY | Age: 47
Setting detail: THERAPIES SERIES
Discharge: HOME | End: 2022-10-18
Payer: COMMERCIAL

## 2022-10-18 PROCEDURE — 97530 THERAPEUTIC ACTIVITIES: CPT

## 2022-10-18 PROCEDURE — 97140 MANUAL THERAPY 1/> REGIONS: CPT

## 2022-10-18 PROCEDURE — 97110 THERAPEUTIC EXERCISES: CPT

## 2022-10-18 NOTE — FLOWSHEET NOTE
[facility]     Physical Therapy Treatment Note/ Progress Report:     Date:  10/18/2022    Patient Name:  Jenifer Aaron    :  [birth year]  MRN: [id number]  Medical/Treatment Diagnosis Information:  Diagnosis: Lumbar discogenic pain syndrome (M51.26), lumbar spondylosis (M47.816)       Treatment diagnosis: Low back pain   Insurance/Certification information:   [facility] - $0 deductible, $0 OOP max, 100% co-insurance, 30 PT visits per calendar year, auth required   Physician Information:  [facility] signed (Y/N): []  Yes [x]  No  Date sent:     Date of Patient follow up with Physician:      Progress Report: [x]  Yes  []  No     Functional Scale:           Date assessed:  FOTO physical FS primary measure score = 23     22  FOTO = 34          22  FOTO = 40          22    Date Range for reporting period:  Beginnin22  Endin22    Progress report due (10 Rx/or 30 days whichever is less):     Recertification due (POC duration/ or 90 days whichever is less): 10/4/22     Visit # Insurance Allowable Auth Needed    16 visits approved 22 - 11/10/22 [x]Yes    []No     Pain level:  0-1/10     SUBJECTIVE:  Pt reports that his back continues to feel better overall. Pt reports that he has been able to walk and stand with less pain and that his back feels less tight overall. Pt states that he still is not sleeping well at night, but reports this is more from whole body sxs he has had since changing his BP meds 2 weeks ago. Pt reports he no longer has LBP at night.     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; FALL RISK    Treatment based classification:     Exercises/Interventions:     Therapeutic Ex  10' Wt / Resistance sets/sec reps notes   Bike  5'     LTR  1 10    Hand heel rock   1 10 Held today due to soreness in arms   SKTC   15\" 3x ea    DKTC w/red SB  5\" 15    Glut stretch crossover  Piriformis stretch fig 4 30\"  30\" 3x ea  3   Pushing vs pulling  L   Supine clamshells    Pelvic tilts  1 10 In hooklying   Seated lumbar flexion stretch w/SB Red SB 1 10    Seated TB rows  Seated TB high rows Red  red 1  1 10  10    Bridges W/BS 1 10 Decreased ROM   S/L clamshells  1 15 R   Standing hip abd  Standing hip ext 2#  2# 1  1 10  10 Alternating LE   Leg press DL 60# 2 10    Standing lumbar SB  1 10 To L         Therapeutic Activities 20'              Hip hinges w/dowel tony  1 10    Lateral stepping at 1/2 wall orange 1 4 laps Band around knees   TRX squats  1 15    Fwd step ups 6\" 1 10x B  With min toe touch on opposite LE   Lateral step ups 6\" 1 10x B   Sit to stands  1 10x High low table   [place] carry 5# 1 2 laps    Suitcase carry 5# 1 1 lap ea    BP readings / assessment 5'      Standing glut med activation orange 1 10x ea    Manual Intervention 15'              Prone PA 10'      GISTM/STM 5'   B lumbar paraspinals (R>L)   Lumbar Mobs in S/L 5'      SI Manip       Hip belt mobs       Hip LA distraction              NMR re-education               Seated SB pelvic tilts  1 5x    Seated SB marches  1 10 Alternating LE                   Pt. Education:  -pt educated on diagnosis, prognosis and expectations for rehab  -all pt questions were answered    Home Exercise Program:  [address] access code [facility]    Therapeutic Exercise and NMR EXR  [x] (54883) Provided verbal/tactile cueing for activities related to strengthening, flexibility, endurance, ROM  for improvements in proximal hip and core control with self care, mobility, lifting and ambulation.  [] (30861) Provided verbal/tactile cueing for activities related to improving balance, coordination, kinesthetic sense, posture, motor skill, proprioception  to assist with core control in self care, mobility, lifting, and ambulation. Therapeutic Activities:    [] (56223 or 95788) Provided verbal/tactile cueing for activities related to improving balance, coordination, kinesthetic sense, posture, motor skill, proprioception and motor activation to allow for proper function  with self care and ADLs  [] (10636) Provided training and instruction to the patient for proper core and proximal hip recruitment and positioning with ambulation re-education     Home Exercise Program:    [x] (75734) Reviewed/Progressed HEP activities related to strengthening, flexibility, endurance, ROM of core, proximal hip and LE for functional self-care, mobility, lifting and ambulation   [] (22886) Reviewed/Progressed HEP activities related to improving balance, coordination, kinesthetic sense, posture, motor skill, proprioception of core, proximal hip and LE for self care, mobility, lifting, and ambulation      Manual Treatments:  PROM / STM / Oscillations-Mobs:  G-I, II, III, IV (PA's, Inf., Post.)  [x] (73052) Provided manual therapy to mobilize proximal hip and LS spine soft tissue/joints for the purpose of modulating pain, promoting relaxation,  increasing ROM, reducing/eliminating soft tissue swelling/inflammation/restriction, improving soft tissue extensibility and allowing for proper ROM for normal function with self care, mobility, lifting and ambulation.      Modalities:     Charges:  Timed Code Treatment Minutes: 45   Total Treatment Minutes: 45       [] EVAL (LOW) 37141 (typically 20 minutes face-to-face)  [] EVAL (MOD) 92915 (typically 30 minutes face-to-face)  [] EVAL (HIGH) 81871 (typically 45 minutes face-to-face)  [] RE-EVAL     [x] KT(99724) x  1  [] IONTO (04871)  [] NMR (70120) x     [] VASO (26726)  [x] Manual (93508) x  1 [] Other:  [x] TA (44246)x 1    [] Mech Traction (14505)  [] ES(attended) (88303)     [] ES (un) (76712): If BWC Please Indicate Time In/Out and Total Minutes  CPT Code Time in Time out Total Min                                            Goals:   Patient stated goal: \"to decrease my back pain\"  [x] Progressing: [] Met: [] Not Met: [] Adjusted     Therapist goals for Patient:  Short Term Goals: To be achieved in: 2 weeks  1. Independent in HEP and progression per patient tolerance, in order to prevent re-injury. [x] Progressing: [] Met: [] Not Met: [] Adjusted  2. Patient will have a decrease in pain to facilitate improvement in movement, function, and ADLs as indicated by Functional Deficits. [x] Progressing: [] Met: [] Not Met: [] Adjusted     Long Term Goals: To be achieved in: 8 weeks  1. Patient will reach FOTO predicted score of at least 42 to assist with reaching prior level of function. [] Progressing: [x] Met: [] Not Met: [x] Adjusted  2. Patient will demonstrate increased AROM to WNL, good LS mobility, good hip ROM to allow for proper joint functioning as indicated by patients Functional Deficits. [x] Progressing: [] Met: [] Not Met: [] Adjusted  3. Patient will demonstrate an increase in Strength to good proximal hip and core activation to allow for proper functional mobility as indicated by patients Functional Deficits. [x] Progressing: [] Met: [] Not Met: [] Adjusted  4. Patient will return to sleeping for 1 night without disturbances due to increased symptoms or restriction. [x] Progressing: [] Met: [] Not Met: [] Adjusted  5. Patient will be able to sit for 10 minutes without increased symptoms or restriction.      [x] Progressing: [] Met: [] Not Met: [] Adjusted        ASSESSMENT:  Pt demonstrates improving lumbar mobility and decreased tightness to lumbar paraspinals. Pt required occasional cues with TRX squats to decrease UE assist and to maintain upright posture. Pt was fatigued with addition of suitcase carry but no increased pain noted. Pt demonstrates improving functional strength with improved tolerance to exercise progressions. Pt would continue to benefit from skilled PT services to address lumbar mobility and LE strength deficits in order to return to PLOF. Treatment/Activity Tolerance:  [x] Patient tolerated treatment well [] Patient limited by fatique  [] Patient limited by pain  [] Patient limited by other medical complications  [] Other:     Overall Progression Towards Functional goals/ Treatment Progress Update:  [] Patient is progressing as expected towards functional goals listed. [x] Progression is slowed due to complexities/Impairments listed. [] Progression has been slowed due to co-morbidities. [] Plan just implemented, too soon to assess goals progression <30days   [] Goals require adjustment due to lack of progress  [] Patient is not progressing as expected and requires additional follow up with physician  [] Other:    Prognosis for POC: [] Good [x] Fair  [] Poor    Patient requires continued skilled intervention: [x] Yes  [] No        PLAN: Decrease LBP, improve lumbar/hip mobility and strength as tolerated by pt. [x] Continue per plan of care [] Alter current plan (see comments)  [] Plan of care initiated [] Hold pending MD visit  [] Discharge    Electronically signed by: Meliton Cormier, PT    Note: If patient does not return for scheduled/recommended follow up visits, this note will serve as a discharge from care along with the most recent update on progress.

## 2022-10-20 ENCOUNTER — HOSPITAL ENCOUNTER (OUTPATIENT)
Dept: PHYSICAL THERAPY | Age: 47
Setting detail: THERAPIES SERIES
Discharge: HOME | End: 2022-10-20
Payer: COMMERCIAL

## 2022-10-20 PROCEDURE — 97110 THERAPEUTIC EXERCISES: CPT

## 2022-10-20 PROCEDURE — 97530 THERAPEUTIC ACTIVITIES: CPT

## 2022-10-20 PROCEDURE — 97140 MANUAL THERAPY 1/> REGIONS: CPT

## 2022-10-20 NOTE — PROGRESS NOTES
[facility]     Physical Therapy Re-Certification Plan of Care    Dear Dr. Tay Collier,    We had the pleasure of treating the following patient for physical therapy services at [address] [place] [address]. A summary of our findings can be found in the updated assessment below. This includes our plan of care. If you have any questions or concerns regarding these findings, please do not hesitate to contact me at the office phone number checked above. Thank you for the referral.     Physician Signature:________________________________Date:__________________  By signing above (or electronic signature), therapists plan is approved by physician    Date Range Of Visits: 22 - 10/20/22  Total Visits to Date: 23  Overall Response to Treatment:   [x]Patient is responding well to treatment and improvement is noted with regards  to goals   []Patient should continue to improve in reasonable time if they continue HEP   []Patient has plateaued and is no longer responding to skilled PT intervention    []Patient is getting worse and would benefit from return to referring MD   []Patient unable to adhere to initial POC   [x]Other: Pt reports feeling about 50% improvement in sxs since starting PT. Pt states that he feels much less pain and tightness, and has been able to sleep without disturbances due to LBP. Pt does remain limited with standing > 15 mins and sitting > 20 mins. Pt's c/c at this point is limited functional strength and endurance. Recommend continuing with PT 1-2x/week x 6-8 weeks.       Physical Therapy Treatment Note/ Progress Report:     Date:  10/20/2022    Patient Name:  Darrick Atkinson    :    MRN: [id number]  Medical/Treatment Diagnosis Information:  Diagnosis: Lumbar discogenic pain syndrome (M51.26), lumbar spondylosis (M47.816)       Treatment diagnosis: Low back pain   Insurance/Certification information:   [facility] - $0 deductible, $0 OOP max, 100% co-insurance, 30 PT visits per calendar year, auth required   Physician Information:  Deena Silverman, [facility] [address] signed (Y/N): []  Yes [x]  No  Date sent:     Date of Patient follow up with Physician:      Progress Report: [x]  Yes  []  No     Functional Scale:           Date assessed:  FOTO physical FS primary measure score = 23     22  FOTO = 34          22  FOTO = 40          22  FOTO = 42          10/20/22    Date Range for reporting period:  Beginnin22  Ending:  10/20/22    Progress report due (10 Rx/or 30 days whichever is less):     Recertification due (POC duration/ or 90 days whichever is less): 22     Visit # Insurance Allowable Auth Needed    16 visits approved 22 - 11/10/22 [x]Yes    []No     Pain level:  0-1/10 currently    SUBJECTIVE:  Pt reports feeling about 50% improvement in sxs since starting PT. Pt states that he feels much less pain and tightness, and has been able to sleep without disturbances due to LBP. Pt does remain limited with standing > 15 mins and sitting > 20 mins.      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; FALL RISK    Treatment based classification:     Exercises/Interventions:     Therapeutic Ex  10' Wt / Resistance sets/sec reps notes   Bike  5'     LTR  1 10    Hand heel rock   1 10 Held today due to soreness in arms   [address]   15\" 3x ea    DKTC w/red SB  5\" 15    Glut stretch crossover  Piriformis stretch fig 4 30\"  30\" 3x ea  3   Pushing vs pulling  L   Supine clamshells    Pelvic tilts  1 10 In hooklying   Seated lumbar flexion stretch w/SB Red SB 1 10    Seated TB rows  Seated TB high rows Red  red 1  1 10  10    Bridges W/BS 1 10 Decreased ROM   S/L clamshells orange 1 15 R   Standing hip abd  Standing hip ext 2#  2# 1  1 10  10 Alternating LE   Leg press DL 60# 2 10    Standing lumbar SB  1 10 To L         Therapeutic Activities 20'              Hip hinges w/dowel tony  1 10    Lateral stepping at 1/2 wall orange 1 4 laps Band around knees   TRX squats  1 15    Fwd step ups 6\" 1 10x B  With min toe touch on opposite LE   Lateral step ups 6\" 1 10x B   Sit to stands  1 10x High low table   [place] carry 5# 1 2 laps    Suitcase carry 5# 1 1 lap ea    BP readings / assessment 5'      Standing glut med activation orange 1 10x ea    Manual Intervention 15'              Prone PA 10'      GISTM/STM 5'   B lumbar paraspinals (R>L)   Lumbar Mobs in S/L 5'      SI Manip       Hip belt mobs       Hip LA distraction              NMR re-education               Seated SB pelvic tilts  1 5x    Seated SB marches  1 10 Alternating LE   SLS ground 10\" 3x ea             Pt. Education:  -pt educated on diagnosis, prognosis and expectations for rehab  -all pt questions were answered    Home Exercise Program:  [address] access code [facility]    Therapeutic Exercise and NMR EXR  [x] (48966) Provided verbal/tactile cueing for activities related to strengthening, flexibility, endurance, ROM  for improvements in proximal hip and core control with self care, mobility, lifting and ambulation.  [] (64116) Provided verbal/tactile cueing for activities related to improving balance, coordination, kinesthetic sense, posture, motor skill, proprioception  to assist with core control in self care, mobility, lifting, and ambulation.      Therapeutic Activities:    [] (19028 or 07522) Provided verbal/tactile cueing for activities related to improving balance, coordination, kinesthetic sense, posture, motor skill, proprioception and motor activation to allow for proper function  with self care and ADLs  [] (25770) Provided training and instruction to the patient for proper core and proximal hip recruitment and positioning with ambulation re-education     Home Exercise Program:    [x] (00065) Reviewed/Progressed HEP activities related to strengthening, flexibility, endurance, ROM of core, proximal hip and LE for functional self-care, mobility, lifting and ambulation   [] (13231) Reviewed/Progressed HEP activities related to improving balance, coordination, kinesthetic sense, posture, motor skill, proprioception of core, proximal hip and LE for self care, mobility, lifting, and ambulation      Manual Treatments:  PROM / STM / Oscillations-Mobs:  G-I, II, III, IV (PA's, Inf., Post.)  [x] (43339) Provided manual therapy to mobilize proximal hip and LS spine soft tissue/joints for the purpose of modulating pain, promoting relaxation,  increasing ROM, reducing/eliminating soft tissue swelling/inflammation/restriction, improving soft tissue extensibility and allowing for proper ROM for normal function with self care, mobility, lifting and ambulation. Modalities:     Charges:  Timed Code Treatment Minutes: 45   Total Treatment Minutes: 45       [] EVAL (LOW) 12186 (typically 20 minutes face-to-face)  [] EVAL (MOD) 71948 (typically 30 minutes face-to-face)  [] EVAL (HIGH) 04075 (typically 45 minutes face-to-face)  [] RE-EVAL     [x] QG(09935) x  1  [] IONTO (84566)  [] NMR (38764) x     [] VASO (35544)  [x] Manual (49044) x  1 [] Other:  [x] TA (16499)x 1    [] Mech Traction (64468)  [] ES(attended) (96230)     [] ES (un) (95610):      If BWC Please Indicate Time In/Out and Total Minutes  CPT Code Time in Time out Total Min Goals:   Patient stated goal: \"to decrease my back pain\"  [x] Progressing: [] Met: [] Not Met: [] Adjusted     Therapist goals for Patient:  Short Term Goals: To be achieved in: 2 weeks  1. Independent in HEP and progression per patient tolerance, in order to prevent re-injury. [] Progressing: [x] Met: [] Not Met: [] Adjusted  2. Patient will have a decrease in pain to facilitate improvement in movement, function, and ADLs as indicated by Functional Deficits. [x] Progressing: [] Met: [] Not Met: [] Adjusted     Long Term Goals: To be achieved in: 8 weeks  1. Patient will reach FOTO predicted score of at least 42 to assist with reaching prior level of function. [] Progressing: [x] Met: [] Not Met: [] Adjusted  2. Patient will demonstrate increased AROM to WNL, good LS mobility, good hip ROM to allow for proper joint functioning as indicated by patients Functional Deficits. [x] Progressing: [] Met: [] Not Met: [] Adjusted  3. Patient will demonstrate an increase in Strength to good proximal hip and core activation to allow for proper functional mobility as indicated by patients Functional Deficits. [x] Progressing: [] Met: [] Not Met: [] Adjusted  4. Patient will return to sleeping for 1 night without disturbances due to increased symptoms or restriction. [x] Progressing: [] Met: [] Not Met: [] Adjusted  5. Patient will be able to sit for 10 minutes without increased symptoms or restriction. [x] Progressing: [] Met: [] Not Met: [] Adjusted        ASSESSMENT:  Pt demonstrates improving lumbar mobility and decreased tightness to lumbar paraspinals. Pt demonstrates improving functional strength with there ex today. Pt required tactile cues to maintain appropriate form and decrease hip flexor compensation with S/L clamshells. Proprioception deficits and cues for glut activation with SLS on ground.   Pt would continue to benefit from skilled PT services to address lumbar mobility and LE strength deficits in order to return to PLOF. Treatment/Activity Tolerance:  [x] Patient tolerated treatment well [] Patient limited by fatique  [] Patient limited by pain  [] Patient limited by other medical complications  [] Other:     Overall Progression Towards Functional goals/ Treatment Progress Update:  [] Patient is progressing as expected towards functional goals listed. [x] Progression is slowed due to complexities/Impairments listed. [] Progression has been slowed due to co-morbidities. [] Plan just implemented, too soon to assess goals progression <30days   [] Goals require adjustment due to lack of progress  [] Patient is not progressing as expected and requires additional follow up with physician  [] Other:    Prognosis for POC: [] Good [x] Fair  [] Poor    Patient requires continued skilled intervention: [x] Yes  [] No        PLAN: Decrease LBP, improve lumbar/hip mobility and strength as tolerated by pt. [x] Continue per plan of care [] Alter current plan (see comments)  [] Plan of care initiated [] Hold pending MD visit  [] Discharge    Electronically signed by: Svai Cornejo PT    Note: If patient does not return for scheduled/recommended follow up visits, this note will serve as a discharge from care along with the most recent update on progress.

## 2022-10-25 ENCOUNTER — HOSPITAL ENCOUNTER (OUTPATIENT)
Dept: PHYSICAL THERAPY | Age: 47
Setting detail: THERAPIES SERIES
Discharge: HOME | End: 2022-10-25
Payer: COMMERCIAL

## 2022-10-25 PROCEDURE — 97110 THERAPEUTIC EXERCISES: CPT

## 2022-10-25 PROCEDURE — 97530 THERAPEUTIC ACTIVITIES: CPT

## 2022-10-25 PROCEDURE — 97140 MANUAL THERAPY 1/> REGIONS: CPT

## 2022-10-25 NOTE — PROGRESS NOTES
[employer]     Physical Therapy Treatment Note/ Progress Report:     Date:  10/25/2022    Patient Name:  Karla Neal    :    MRN: [id number]  Medical/Treatment Diagnosis Information:  Diagnosis: Lumbar discogenic pain syndrome (M51.26), lumbar spondylosis (M47.816)       Treatment diagnosis: Low back pain   Insurance/Certification information:   [employer] - $0 deductible, $0 OOP max, 100% co-insurance, 30 PT visits per calendar year, auth required   Physician Information:  Corrie Love [address] [place] [address] signed (Y/N): []  Yes [x]  No  Date sent:     Date of Patient follow up with Physician:      Progress Report: [x]  Yes  []  No     Functional Scale:           Date assessed:  FOTO physical FS primary measure score = 23     22  FOTO = 34          22  FOTO = 40          22  FOTO = 42          10/20/22    Date Range for reporting period:  Beginnin22  Ending:  10/20/22    Progress report due (10 Rx/or 30 days whichever is less):     Recertification due (POC duration/ or 90 days whichever is less): 22     Visit # Insurance Allowable Auth Needed    16 visits approved 22 - 11/10/22 [x]Yes    []No     Pain level:  0-1/10 currently    SUBJECTIVE:  Pt reports that he has been coughing a lot this weekend, which he attributes to his new blood pressure medication. Pt states that his lower back continues to feel better, but mid back has been more sore from all of the coughing.       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; FALL RISK    Treatment based classification:     Exercises/Interventions:     Therapeutic Ex  10' Wt / Resistance sets/sec reps notes   Bike  5'     LTR  1 10    Hand heel rock   1 10 Held today due to soreness in arms   SKTC   15\" 3x ea    DKTC w/red SB  5\" 15    Glut stretch crossover  Piriformis stretch fig 4 30\"  30\" 3x ea  3   Pushing vs pulling  L   Supine clamshells    Pelvic tilts  1 10 In hooklying   Seated lumbar flexion stretch w/SB Red SB 1 10    Seated TB rows  Seated TB high rows Red  red 1  1 10  10    Bridges W/BS 1 10 Decreased ROM   S/L clamshells orange 1 15 R   Standing hip abd  Standing hip ext 2#  2# 1  1 10  10 Alternating LE   Leg press DL 60# 2 10    Standing lumbar SB  1 10 To L         Therapeutic Activities 20'              Hip hinges w/dowel tony  1 10    Lateral stepping at 1/2 wall orange 1 4 laps Band around knees   TRX squats  1 15    Fwd step ups 6\" 1 10x B  With min toe touch on opposite LE   Lateral step ups 6\" 1 10x B   Sit to stands Butt taps 1 10x Chair + airex   [place] carry 10# 1 2 laps    Suitcase carry 6# 1 1 lap ea    BP readings / assessment 5'      Standing glut med activation orange 1 10x ea    Manual Intervention 15'              Prone PA 10'      GISTM/STM 5'   B lumbar paraspinals (R>L)   Lumbar Mobs in S/L 5'      SI Manip       Hip belt mobs       Hip LA distraction              NMR re-education               Seated SB pelvic tilts  1 5x    Seated SB marches  1 10 Alternating LE   SLS ground 10\" 3x ea             Pt. Education:  -pt educated on diagnosis, prognosis and expectations for rehab  -all pt questions were answered    Home Exercise Program:  Jean-Claude Nielsen access code [facility]    Therapeutic Exercise and NMR EXR  [x] (14175) Provided verbal/tactile cueing for activities related to strengthening, flexibility, endurance, ROM  for improvements in proximal hip and core control with self care, mobility, lifting and ambulation.  [] (70769) Provided verbal/tactile cueing for activities related to improving balance, coordination, kinesthetic sense, posture, motor skill, proprioception  to assist with core control in self care, mobility, lifting, and ambulation. Therapeutic Activities:    [] (55351 or 72990) Provided verbal/tactile cueing for activities related to improving balance, coordination, kinesthetic sense, posture, motor skill, proprioception and motor activation to allow for proper function  with self care and ADLs  [] (44908) Provided training and instruction to the patient for proper core and proximal hip recruitment and positioning with ambulation re-education     Home Exercise Program:    [x] (97458) Reviewed/Progressed HEP activities related to strengthening, flexibility, endurance, ROM of core, proximal hip and LE for functional self-care, mobility, lifting and ambulation   [] (12436) Reviewed/Progressed HEP activities related to improving balance, coordination, kinesthetic sense, posture, motor skill, proprioception of core, proximal hip and LE for self care, mobility, lifting, and ambulation      Manual Treatments:  PROM / STM / Oscillations-Mobs:  G-I, II, III, IV (PA's, Inf., Post.)  [x] (68969) Provided manual therapy to mobilize proximal hip and LS spine soft tissue/joints for the purpose of modulating pain, promoting relaxation,  increasing ROM, reducing/eliminating soft tissue swelling/inflammation/restriction, improving soft tissue extensibility and allowing for proper ROM for normal function with self care, mobility, lifting and ambulation.      Modalities:     Charges:  Timed Code Treatment Minutes: 45   Total Treatment Minutes: 45       [] EVAL (LOW) 29289 (typically 20 minutes face-to-face)  [] EVAL (MOD) 36515 (typically 30 minutes face-to-face)  [] EVAL (HIGH) 06242 (typically 45 minutes face-to-face)  [] RE-EVAL     [x] KY(00690) x  1  [] IONTO (48459)  [] NMR (54475) x     [] VASO (79045)  [x] Manual (76920) x  1 [] Other:  [x] TA (37569)x 1    [] Mech Traction (63307)  [] ES(attended) (94830)     [] ES (un) (10619): If BWC Please Indicate Time In/Out and Total Minutes  CPT Code Time in Time out Total Min                                            Goals:   Patient stated goal: \"to decrease my back pain\"  [x] Progressing: [] Met: [] Not Met: [] Adjusted     Therapist goals for Patient:  Short Term Goals: To be achieved in: 2 weeks  1. Independent in HEP and progression per patient tolerance, in order to prevent re-injury. [] Progressing: [x] Met: [] Not Met: [] Adjusted  2. Patient will have a decrease in pain to facilitate improvement in movement, function, and ADLs as indicated by Functional Deficits. [x] Progressing: [] Met: [] Not Met: [] Adjusted     Long Term Goals: To be achieved in: 8 weeks  1. Patient will reach FOTO predicted score of at least 42 to assist with reaching prior level of function. [] Progressing: [x] Met: [] Not Met: [] Adjusted  2. Patient will demonstrate increased AROM to WNL, good LS mobility, good hip ROM to allow for proper joint functioning as indicated by patients Functional Deficits. [x] Progressing: [] Met: [] Not Met: [] Adjusted  3. Patient will demonstrate an increase in Strength to good proximal hip and core activation to allow for proper functional mobility as indicated by patients Functional Deficits. [x] Progressing: [] Met: [] Not Met: [] Adjusted  4. Patient will return to sleeping for 1 night without disturbances due to increased symptoms or restriction. [x] Progressing: [] Met: [] Not Met: [] Adjusted  5. Patient will be able to sit for 10 minutes without increased symptoms or restriction.      [x] Progressing: [] Met: [] Not Met: [] Adjusted        ASSESSMENT:  Pt demonstrates improving lumbar mobility and decreased tightness to lumbar paraspinals. Pt demonstrates improving functional strength with there ex today. Pt required tactile cues to maintain appropriate form and decrease hip flexor compensation with S/L clamshells. Proprioception deficits and cues for glut activation with SLS on ground. Pt would continue to benefit from skilled PT services to address lumbar mobility and LE strength deficits in order to return to PLOF. Treatment/Activity Tolerance:  [x] Patient tolerated treatment well [] Patient limited by fatique  [] Patient limited by pain  [] Patient limited by other medical complications  [] Other:     Overall Progression Towards Functional goals/ Treatment Progress Update:  [] Patient is progressing as expected towards functional goals listed. [x] Progression is slowed due to complexities/Impairments listed. [] Progression has been slowed due to co-morbidities. [] Plan just implemented, too soon to assess goals progression <30days   [] Goals require adjustment due to lack of progress  [] Patient is not progressing as expected and requires additional follow up with physician  [] Other:    Prognosis for POC: [] Good [x] Fair  [] Poor    Patient requires continued skilled intervention: [x] Yes  [] No        PLAN: Decrease LBP, improve lumbar/hip mobility and strength as tolerated by pt. [x] Continue per plan of care [] Alter current plan (see comments)  [] Plan of care initiated [] Hold pending MD visit  [] Discharge    Electronically signed by: Meliton Cormier PT    Note: If patient does not return for scheduled/recommended follow up visits, this note will serve as a discharge from care along with the most recent update on progress.

## 2022-10-26 ENCOUNTER — HOSPITAL ENCOUNTER (OUTPATIENT)
Dept: ONCOLOGY | Age: 47
Setting detail: INFUSION SERIES
Discharge: HOME | End: 2022-10-26
Payer: COMMERCIAL

## 2022-10-26 VITALS
DIASTOLIC BLOOD PRESSURE: 97 MMHG | RESPIRATION RATE: 16 BRPM | TEMPERATURE: 97.8 F | SYSTOLIC BLOOD PRESSURE: 145 MMHG | HEART RATE: 110 BPM

## 2022-10-26 DIAGNOSIS — J45.50 SEVERE PERSISTENT ASTHMA, UNSPECIFIED WHETHER COMPLICATED: Primary | ICD-10-CM

## 2022-10-26 PROCEDURE — 96372 THER/PROPH/DIAG INJ SC/IM: CPT

## 2022-10-26 PROCEDURE — 6360000002 HC RX W HCPCS: Performed by: INTERNAL MEDICINE

## 2022-10-26 PROCEDURE — 99211 OFF/OP EST MAY X REQ PHY/QHP: CPT

## 2022-10-26 RX ADMIN — OMALIZUMAB 300 MG: 150 INJECTION, SOLUTION SUBCUTANEOUS at 13:19

## 2022-10-26 NOTE — PROGRESS NOTES
Patient ambulatory to department for every 2 week Xolair injection. Given 300 mg in two syringes subcutaneously. Tolerated injections well. Patient stayed for 2 hours post injection for observation for 3rd dose of Xolair. No signs of an adverse rx noted. Patient discharged per ambulatory. .   To return in 2 weeks. Patients blood pressure and pulse elavated, patient states he is aware and has a follow up appointment on Friday to address.

## 2022-10-27 ENCOUNTER — APPOINTMENT (OUTPATIENT)
Dept: PHYSICAL THERAPY | Age: 47
End: 2022-10-27
Payer: COMMERCIAL

## 2022-10-28 ENCOUNTER — OFFICE VISIT (OUTPATIENT)
Dept: FAMILY MEDICINE CLINIC | Age: 47
End: 2022-10-28
Payer: COMMERCIAL

## 2022-10-28 VITALS
HEART RATE: 118 BPM | DIASTOLIC BLOOD PRESSURE: 85 MMHG | WEIGHT: 188 LBS | OXYGEN SATURATION: 97 % | SYSTOLIC BLOOD PRESSURE: 125 MMHG | BODY MASS INDEX: 26.23 KG/M2

## 2022-10-28 DIAGNOSIS — I10 HYPERTENSION, ESSENTIAL: Primary | ICD-10-CM

## 2022-10-28 DIAGNOSIS — J45.50 SEVERE PERSISTENT ASTHMA WITHOUT COMPLICATION: ICD-10-CM

## 2022-10-28 PROCEDURE — 1036F TOBACCO NON-USER: CPT | Performed by: FAMILY MEDICINE

## 2022-10-28 PROCEDURE — G8427 DOCREV CUR MEDS BY ELIG CLIN: HCPCS | Performed by: FAMILY MEDICINE

## 2022-10-28 PROCEDURE — G8419 CALC BMI OUT NRM PARAM NOF/U: HCPCS | Performed by: FAMILY MEDICINE

## 2022-10-28 PROCEDURE — 3078F DIAST BP <80 MM HG: CPT | Performed by: FAMILY MEDICINE

## 2022-10-28 PROCEDURE — 3074F SYST BP LT 130 MM HG: CPT | Performed by: FAMILY MEDICINE

## 2022-10-28 PROCEDURE — 90674 CCIIV4 VAC NO PRSV 0.5 ML IM: CPT | Performed by: FAMILY MEDICINE

## 2022-10-28 PROCEDURE — 99214 OFFICE O/P EST MOD 30 MIN: CPT | Performed by: FAMILY MEDICINE

## 2022-10-28 PROCEDURE — 90471 IMMUNIZATION ADMIN: CPT | Performed by: FAMILY MEDICINE

## 2022-10-28 PROCEDURE — G8482 FLU IMMUNIZE ORDER/ADMIN: HCPCS | Performed by: FAMILY MEDICINE

## 2022-10-28 RX ORDER — LOSARTAN POTASSIUM 50 MG/1
50 TABLET ORAL DAILY
Qty: 90 TABLET | Refills: 1 | Status: SHIPPED | OUTPATIENT
Start: 2022-10-28

## 2022-10-28 NOTE — PROGRESS NOTES
[address] Note    Date: 10/28/2022                                               Sona Min:     Chief Complaint   Patient presents with    Blood Pressure Check     Changed meds elevated BP made him miss therapy       HPI  Patient is here for blood pressure check. Currently takes lisinopril. Denies chest pain or shortness of breath. Does report a dry cough since starting Lisinopril. Patient has history of asthma. Currently takes Advair and Spiriva and albuterol as needed. Sees pulmonology. Also takes Zolair. Reports that breathing is stable. Patient Active Problem List    Diagnosis Date Noted    Severe persistent asthma 08/10/2022    Severe persistent asthma without complication 06/40/7201    Environmental allergies 06/29/2022    Gastroesophageal reflux disease without esophagitis 06/29/2022    Elevated diaphragm 06/29/2022    Acute encephalopathy     Partial idiopathic epilepsy with seizures of localized onset, not intractable, without status epilepticus ([facility]     HTN (hypertension), benign     Thrombocytopenia ([facility] 01/13/2020    High anion gap metabolic acidosis 83/88/6142    Lactic acidosis 01/13/2020    Seizure disorder [facility])        Past Medical History:   Diagnosis Date    Asthma     GERD (gastroesophageal reflux disease)     Hypertension     Seizure ([facility]     LAST SEIZURE, JANUARY 2020. FOLLOWS WITH NEUROLOGIST, DR Dolores Chatman       Current Outpatient Medications   Medication Sig Dispense Refill    losartan (COZAAR) 50 MG tablet Take 1 tablet by mouth daily 90 tablet 1    fluticasone-salmeterol (ADVAIR DISKUS) 500-50 MCG/ACT AEPB diskus inhaler Inhale 1 puff into the lungs in the morning and 1 puff in the evening.  1 each 3    tiotropium (SPIRIVA RESPIMAT) 2.5 MCG/ACT AERS inhaler Inhale 2 puffs into the lungs daily      VENTOLIN  (90 Base) MCG/ACT inhaler Inhale 2 puffs into the lungs every 6 hours as needed for Wheezing 18 g 11    celecoxib (CELEBREX) 200 MG capsule Take 1 capsule by mouth daily 30 capsule 1    cyclobenzaprine (FLEXERIL) 10 mg tablet Take 1 tablet by mouth nightly as needed for Muscle spasms 30 tablet 1    albuterol sulfate  (90 Base) MCG/ACT inhaler INHALE TWO PUFFS BY MOUTH FOUR TIMES A DAY AS NEEDED FOR WHEEZING 18 g 0    albuterol (PROVENTIL) (2.5 MG/3ML) 0.083% nebulizer solution USE THREE MILLILITERS VIA NEBULIZATION BY MOUTH EVERY 6 HOURS AS NEEDED FOR WHEEZING 120 each 2    hydrocortisone 2.5 % cream Apply topically 2 times daily. 28 g 0    levETIRAcetam (KEPPRA) 500 MG tablet Take 1 tablet by mouth 2 times daily 60 tablet 3    omeprazole (PRILOSEC) 20 MG delayed release capsule Take 1 capsule by mouth 2 times daily (before meals) 60 capsule 5    fluticasone (FLONASE) 50 MCG/ACT nasal spray 1 spray by Nasal route daily       No current facility-administered medications for this visit. Allergies   Allergen Reactions    Lisinopril Cough    Symbicort [Budesonide-Formoterol Fumarate] Other (See Comments)     Lung infection       Review of Systems  No fever, no vomiting    Vitals:  /85   Pulse (!) 118   Wt 188 lb (85.3 kg)   SpO2 97%   BMI 26.23 kg/m²     Wt Readings from Last 3 Encounters:   10/28/22 188 lb (85.3 kg)   10/03/22 185 lb 9.6 oz (84.2 kg)   09/28/22 180 lb (81.6 kg)        Physical Exam  General:  Well-appearing, NAD, alert, non-toxic  HEENT:  Normocephalic, atraumatic. Pupils equal and round. CHEST/LUNGS: CTAB, no crackles, no wheeze, no rhonchi. Symmetric rise  CARDIOVASCULAR: RRR,  no murmur, no rub  EXTREMETIES: Normal movement of all extremities  SKIN:  No rash, no cellulitis, no bruising, no petechiae/purpura/vesicles/pustules/abscess  PSYCH:  A+O x 3; normal affect  NEURO:  GCS 15, CN2-12 grossly intact, no focal motor/sensory deficits, no cerebellar deficits, normal gait, normal speech      Assessment/Plan     17-year-old male with hypertension.   Blood pressure is at goal, but his cough is likely due to lisinopril. We will switch to losartan. Follow-up in 2 for blood pressure check. Patient has asthma. Symptoms are well controlled. Continue current medicines. Follow-up with pulmonology as scheduled. Discussed with patient medication/s dosage, instructions, potential S/E, A/R and Drug Interaction  Tylenol or Motrin as needed for pain or fever  Advise to return here if worse or go to nearest ER  Encourage fluids  Pt discharged in stable condition. 1. Hypertension, essential  -     losartan (COZAAR) 50 MG tablet; Take 1 tablet by mouth daily, Disp-90 tablet, R-1Normal  2. Severe persistent asthma without complication     Orders Placed This Encounter   Procedures    Influenza, FLUCELVAX, (age 10 mo+), IM, Preservative Free, 0.5 mL         No follow-ups on file.     Jak Cloud MD, MD    10/28/2022  4:30 PM

## 2022-11-01 ENCOUNTER — HOSPITAL ENCOUNTER (OUTPATIENT)
Dept: PHYSICAL THERAPY | Age: 47
Setting detail: THERAPIES SERIES
Discharge: HOME | End: 2022-11-01
Payer: COMMERCIAL

## 2022-11-01 PROCEDURE — 97530 THERAPEUTIC ACTIVITIES: CPT

## 2022-11-01 PROCEDURE — 97140 MANUAL THERAPY 1/> REGIONS: CPT

## 2022-11-01 PROCEDURE — 97110 THERAPEUTIC EXERCISES: CPT

## 2022-11-01 NOTE — FLOWSHEET NOTE
[employer]     Physical Therapy Treatment Note/ Progress Report:     Date:  2022    Patient Name:  Tawanna Delgado    :    MRN: [id number]  Medical/Treatment Diagnosis Information:  Diagnosis: Lumbar discogenic pain syndrome (M51.26), lumbar spondylosis (M47.816)       Treatment diagnosis: Low back pain   Insurance/Certification information:   [facility] - $0 deductible, $0 OOP max, 100% co-insurance, 30 PT visits per calendar year, auth required   Physician Information:  Shanna Frankel, [address] [place] [address] signed (Y/N): []  Yes [x]  No  Date sent:     Date of Patient follow up with Physician:      Progress Report: [x]  Yes  []  No     Functional Scale:           Date assessed:  FOTO physical FS primary measure score = 23     22  FOTO = 34          22  FOTO = 40          22  FOTO = 42          10/20/22    Date Range for reporting period:  Beginnin22  Ending:  10/20/22    Progress report due (10 Rx/or 30 days whichever is less):     Recertification due (POC duration/ or 90 days whichever is less): 22     Visit # Insurance Allowable Auth Needed   10/16 16 visits approved 22 - 11/10/22 [x]Yes    []No     Pain level:  0-1/10 currently    SUBJECTIVE:  Pt reports that his PCP changed his BP meds from lisinopril to losartan. Pt last took lisinopril last Thursday, hasn't started taking losartan yet. Pt states that he has still been coughing a lot, which is keeping him awake at night.        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; FALL RISK    Treatment based classification:     Exercises/Interventions:     Therapeutic Ex  15' Wt / Resistance sets/sec reps notes   Bike  5'     LTR  1 10    Hand heel rock   1 10 Held today due to soreness in arms   SKTC   15\" 3x ea    DKTC w/red SB  5\" 15    Glut stretch crossover  Piriformis stretch fig 4 30\"  30\" 3x ea  3   Pushing vs pulling  L   Supine clamshells    Pelvic tilts  1 10 In hooklying   Seated lumbar flexion stretch w/SB Red SB 1 10    Seated TB rows  Seated TB high rows Red  red 1  1 10  10    Bridges W/BS 1 10 Decreased ROM   S/L clamshells orange 1 15 R   Standing hip abd  Standing hip ext 2#  2# 1  1 10  10 Alternating LE   Leg press DL 60# 2 10    Standing lumbar SB  1 10 To L         Therapeutic Activities 10'              Hip hinges w/dowel tony  1 10    Lateral stepping at 1/2 wall orange 1 4 laps Band around knees   TRX squats  1 15    Fwd step ups 6\" 1 10x B  With min toe touch on opposite LE   Lateral step ups 6\" 1 10x B   Sit to stands Butt taps 1 10x Chair + airex   [place] carry 10# 1 2 laps    Suitcase carry 6# 1 1 lap ea    BP readings / assessment 5'      Standing glut med activation orange 1 10x ea    Manual Intervention 15'              Prone PA 10'      GISTM/STM 5'   B lumbar paraspinals (R>L)   Lumbar Mobs in S/L 5'      SI Manip       Hip belt mobs       Hip LA distraction              NMR re-education               Seated SB pelvic tilts  1 5x    Seated SB marches  1 10 Alternating LE   SLS ground 10\" 3x ea             Pt. Education:  -pt educated on diagnosis, prognosis and expectations for rehab  -all pt questions were answered    Home Exercise Program:  Renee Coley access code [facility]    Therapeutic Exercise and NMR EXR  [x] (94843) Provided verbal/tactile cueing for activities related to strengthening, flexibility, endurance, ROM  for improvements in proximal hip and core control with self care, mobility, lifting and ambulation.  [] (62655) Provided verbal/tactile cueing for activities related to improving balance, coordination, kinesthetic sense, posture, motor skill, proprioception  to assist with core control in self care, mobility, lifting, and ambulation. Therapeutic Activities:    [] (81090 or 74081) Provided verbal/tactile cueing for activities related to improving balance, coordination, kinesthetic sense, posture, motor skill, proprioception and motor activation to allow for proper function  with self care and ADLs  [] (09182) Provided training and instruction to the patient for proper core and proximal hip recruitment and positioning with ambulation re-education     Home Exercise Program:    [x] (26863) Reviewed/Progressed HEP activities related to strengthening, flexibility, endurance, ROM of core, proximal hip and LE for functional self-care, mobility, lifting and ambulation   [] (62555) Reviewed/Progressed HEP activities related to improving balance, coordination, kinesthetic sense, posture, motor skill, proprioception of core, proximal hip and LE for self care, mobility, lifting, and ambulation      Manual Treatments:  PROM / STM / Oscillations-Mobs:  G-I, II, III, IV (PA's, Inf., Post.)  [x] (22628) Provided manual therapy to mobilize proximal hip and LS spine soft tissue/joints for the purpose of modulating pain, promoting relaxation,  increasing ROM, reducing/eliminating soft tissue swelling/inflammation/restriction, improving soft tissue extensibility and allowing for proper ROM for normal function with self care, mobility, lifting and ambulation.      Modalities:     Charges:  Timed Code Treatment Minutes: 40   Total Treatment Minutes: 40       [] EVAL (LOW) 89590 (typically 20 minutes face-to-face)  [] EVAL (MOD) 43387 (typically 30 minutes face-to-face)  [] EVAL (HIGH) 73040 (typically 45 minutes face-to-face)  [] RE-EVAL     [x] KT(98619) x  1  [] IONTO (97289)  [] NMR (15968) x     [] VASO (19876)  [x] Manual (33207) x  1 [] Other:  [x] TA (04490)x 1    [] Mech Traction (78946)  [] ES(attended) (66906)     [] ES (un) (84408): If BWC Please Indicate Time In/Out and Total Minutes  CPT Code Time in Time out Total Min                                            Goals:   Patient stated goal: \"to decrease my back pain\"  [x] Progressing: [] Met: [] Not Met: [] Adjusted     Therapist goals for Patient:  Short Term Goals: To be achieved in: 2 weeks  1. Independent in HEP and progression per patient tolerance, in order to prevent re-injury. [] Progressing: [x] Met: [] Not Met: [] Adjusted  2. Patient will have a decrease in pain to facilitate improvement in movement, function, and ADLs as indicated by Functional Deficits. [x] Progressing: [] Met: [] Not Met: [] Adjusted     Long Term Goals: To be achieved in: 8 weeks  1. Patient will reach FOTO predicted score of at least 42 to assist with reaching prior level of function. [] Progressing: [x] Met: [] Not Met: [] Adjusted  2. Patient will demonstrate increased AROM to WNL, good LS mobility, good hip ROM to allow for proper joint functioning as indicated by patients Functional Deficits. [x] Progressing: [] Met: [] Not Met: [] Adjusted  3. Patient will demonstrate an increase in Strength to good proximal hip and core activation to allow for proper functional mobility as indicated by patients Functional Deficits. [x] Progressing: [] Met: [] Not Met: [] Adjusted  4. Patient will return to sleeping for 1 night without disturbances due to increased symptoms or restriction. [x] Progressing: [] Met: [] Not Met: [] Adjusted  5. Patient will be able to sit for 10 minutes without increased symptoms or restriction.      [x] Progressing: [] Met: [] Not Met: [] Adjusted        ASSESSMENT:  Modified some exercises today due to increased BP and with pt not being on any BP meds currently. Pt was able to perform S/L clamshells bilaterally, but quite a bit of difficulty lying on R side. Pt would continue to benefit from skilled PT services to address lumbar mobility and LE strength deficits in order to return to PLOF. Treatment/Activity Tolerance:  [x] Patient tolerated treatment well [] Patient limited by fatique  [] Patient limited by pain  [] Patient limited by other medical complications  [] Other:     Overall Progression Towards Functional goals/ Treatment Progress Update:  [] Patient is progressing as expected towards functional goals listed. [x] Progression is slowed due to complexities/Impairments listed. [] Progression has been slowed due to co-morbidities. [] Plan just implemented, too soon to assess goals progression <30days   [] Goals require adjustment due to lack of progress  [] Patient is not progressing as expected and requires additional follow up with physician  [] Other:    Prognosis for POC: [] Good [x] Fair  [] Poor    Patient requires continued skilled intervention: [x] Yes  [] No        PLAN: Decrease LBP, improve lumbar/hip mobility and strength as tolerated by pt. [x] Continue per plan of care [] Alter current plan (see comments)  [] Plan of care initiated [] Hold pending MD visit  [] Discharge    Electronically signed by: Kamlesh Hernandez, PT    Note: If patient does not return for scheduled/recommended follow up visits, this note will serve as a discharge from care along with the most recent update on progress.

## 2022-11-03 ENCOUNTER — HOSPITAL ENCOUNTER (OUTPATIENT)
Dept: PHYSICAL THERAPY | Age: 47
Setting detail: THERAPIES SERIES
Discharge: HOME | End: 2022-11-03
Payer: COMMERCIAL

## 2022-11-03 PROCEDURE — 97110 THERAPEUTIC EXERCISES: CPT

## 2022-11-03 NOTE — FLOWSHEET NOTE
[facility]     Physical Therapy Treatment Note/ Progress Report:     Date:  11/3/2022    Patient Name:  Avni Ludwig    :  [id number]  MRN: [id number]  Medical/Treatment Diagnosis Information:  Diagnosis: Lumbar discogenic pain syndrome (M51.26), lumbar spondylosis (M47.816)       Treatment diagnosis: Low back pain   Insurance/Certification information:   [facility] - $0 deductible, $0 OOP max, 100% co-insurance, 30 PT visits per calendar year, auth required   Physician Information:  Tyrone Whitt[address] signed (Y/N): []  Yes [x]  No  Date sent:     Date of Patient follow up with Physician:      Progress Report: [x]  Yes  []  No     Functional Scale:           Date assessed:  FOTO physical FS primary measure score = 23     22  FOTO = 34          22  FOTO = 40          22  FOTO = 42          10/20/22    Date Range for reporting period:  Beginnin22  Ending:  10/20/22    Progress report due (10 Rx/or 30 days whichever is less):     Recertification due (POC duration/ or 90 days whichever is less): 22     Visit # Insurance Allowable Auth Needed    16 visits approved 22 - 11/10/22 [x]Yes    []No     Pain level:  0-1/10 currently    SUBJECTIVE:  Pt reports that he started taking losartan 2 days ago, has felt very anxious the last few days and has been having a lot of trouble sleeping. Pt states that his heart is beating very fast currently due to a road being closed on his way here and feeling like he was going to be late.      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; FALL RISK    Treatment based classification:     Exercises/Interventions:     Therapeutic Ex  0' Wt / Resistance sets/sec reps notes   Bike  5'     LTR  1 10    Hand heel rock   1 10 Held today due to soreness in arms   SKTC   15\" 3x ea    DKTC w/red SB  5\" 15    Glut stretch crossover  Piriformis stretch fig 4 30\"  30\" 3x ea  3   Pushing vs pulling  L   Supine clamshells    Pelvic tilts  1 10 In hooklying   Seated lumbar flexion stretch w/SB Red SB 1 10    Seated TB rows  Seated TB high rows Red  red 1  1 10  10    Bridges W/BS 1 10 Decreased ROM   S/L clamshells orange 1 15 R   Standing hip abd  Standing hip ext 2#  2# 1  1 10  10 Alternating LE   Leg press DL 60# 2 10    Standing lumbar SB  1 10 To L         Therapeutic Activities 5'              Hip hinges w/dowel tony  1 10    Lateral stepping at 1/2 wall orange 1 4 laps Band around knees   TRX squats  1 15    Fwd step ups 6\" 1 10x B  With min toe touch on opposite LE   Lateral step ups 6\" 1 10x B   Sit to stands Butt taps 1 10x Chair + airex   [place] carry 10# 1 2 laps    Suitcase carry 6# 1 1 lap ea    BP readings / assessment 5'      Standing glut med activation orange 1 10x ea    Manual Intervention 15'              Prone PA 10'      GISTM/STM 5'   B lumbar paraspinals (R>L)   Lumbar Mobs in S/L 5'      SI Manip       Hip belt mobs       Hip LA distraction              NMR re-education               Seated SB pelvic tilts  1 5x    Seated SB marches  1 10 Alternating LE   SLS ground 10\" 3x ea             Pt. Education:  -pt educated on diagnosis, prognosis and expectations for rehab  -all pt questions were answered    Home Exercise Program:  [address] access code [facility]    Therapeutic Exercise and NMR EXR  [x] (70115) Provided verbal/tactile cueing for activities related to strengthening, flexibility, endurance, ROM  for improvements in proximal hip and core control with self care, mobility, lifting and ambulation.  [] (71451) Provided verbal/tactile cueing for activities related to improving balance, coordination, kinesthetic sense, posture, motor skill, proprioception  to assist with core control in self care, mobility, lifting, and ambulation. Therapeutic Activities:    [] (83199 or 33917) Provided verbal/tactile cueing for activities related to improving balance, coordination, kinesthetic sense, posture, motor skill, proprioception and motor activation to allow for proper function  with self care and ADLs  [] (83366) Provided training and instruction to the patient for proper core and proximal hip recruitment and positioning with ambulation re-education     Home Exercise Program:    [x] (54567) Reviewed/Progressed HEP activities related to strengthening, flexibility, endurance, ROM of core, proximal hip and LE for functional self-care, mobility, lifting and ambulation   [] (45291) Reviewed/Progressed HEP activities related to improving balance, coordination, kinesthetic sense, posture, motor skill, proprioception of core, proximal hip and LE for self care, mobility, lifting, and ambulation      Manual Treatments:  PROM / STM / Oscillations-Mobs:  G-I, II, III, IV (PA's, Inf., Post.)  [x] (29704) Provided manual therapy to mobilize proximal hip and LS spine soft tissue/joints for the purpose of modulating pain, promoting relaxation,  increasing ROM, reducing/eliminating soft tissue swelling/inflammation/restriction, improving soft tissue extensibility and allowing for proper ROM for normal function with self care, mobility, lifting and ambulation.      Modalities:     Charges:  Timed Code Treatment Minutes: 20   Total Treatment Minutes: 30       [] EVAL (LOW) 98203 (typically 20 minutes face-to-face)  [] EVAL (MOD) 43644 (typically 30 minutes face-to-face)  [] EVAL (HIGH) 32983 (typically 45 minutes face-to-face)  [] RE-EVAL     [] LC(78061) x    [] IONTO (83540)  [] NMR (74828) x     [] VASO (31720)  [x] Manual (35643) x  1 [] Other:  [] TA (91504)x     [] Mech Traction (80589)  [] ES(attended) (45495)     [] ES (un) (77430): If BWC Please Indicate Time In/Out and Total Minutes  CPT Code Time in Time out Total Min                                            Goals:   Patient stated goal: \"to decrease my back pain\"  [x] Progressing: [] Met: [] Not Met: [] Adjusted     Therapist goals for Patient:  Short Term Goals: To be achieved in: 2 weeks  1. Independent in HEP and progression per patient tolerance, in order to prevent re-injury. [] Progressing: [x] Met: [] Not Met: [] Adjusted  2. Patient will have a decrease in pain to facilitate improvement in movement, function, and ADLs as indicated by Functional Deficits. [x] Progressing: [] Met: [] Not Met: [] Adjusted     Long Term Goals: To be achieved in: 8 weeks  1. Patient will reach FOTO predicted score of at least 42 to assist with reaching prior level of function. [] Progressing: [x] Met: [] Not Met: [] Adjusted  2. Patient will demonstrate increased AROM to WNL, good LS mobility, good hip ROM to allow for proper joint functioning as indicated by patients Functional Deficits. [x] Progressing: [] Met: [] Not Met: [] Adjusted  3. Patient will demonstrate an increase in Strength to good proximal hip and core activation to allow for proper functional mobility as indicated by patients Functional Deficits. [x] Progressing: [] Met: [] Not Met: [] Adjusted  4. Patient will return to sleeping for 1 night without disturbances due to increased symptoms or restriction. [x] Progressing: [] Met: [] Not Met: [] Adjusted  5. Patient will be able to sit for 10 minutes without increased symptoms or restriction. [x] Progressing: [] Met: [] Not Met: [] Adjusted        ASSESSMENT:  Held all exercises today due to hypertension. Performed IASTM to lumbar paraspinals and lumbar mobilizations with good response noted. Discussed with pt techniques to decrease stress and anxiety. Also discussed with pt to continue monitoring BP and to contact PCP if systolic BP remains > 196. Treatment/Activity Tolerance:  [] Patient tolerated treatment well [] Patient limited by fatique  [] Patient limited by pain  [] Patient limited by other medical complications  [x] Other: limited by hypertension today    Overall Progression Towards Functional goals/ Treatment Progress Update:  [] Patient is progressing as expected towards functional goals listed. [x] Progression is slowed due to complexities/Impairments listed. [] Progression has been slowed due to co-morbidities. [] Plan just implemented, too soon to assess goals progression <30days   [] Goals require adjustment due to lack of progress  [] Patient is not progressing as expected and requires additional follow up with physician  [] Other:    Prognosis for POC: [] Good [x] Fair  [] Poor    Patient requires continued skilled intervention: [x] Yes  [] No        PLAN: Decrease LBP, improve lumbar/hip mobility and strength as tolerated by pt. [x] Continue per plan of care [] Alter current plan (see comments)  [] Plan of care initiated [] Hold pending MD visit  [] Discharge    Electronically signed by: Rosa Sifuentes PT    Note: If patient does not return for scheduled/recommended follow up visits, this note will serve as a discharge from care along with the most recent update on progress.

## 2022-11-08 ENCOUNTER — HOSPITAL ENCOUNTER (OUTPATIENT)
Dept: PHYSICAL THERAPY | Age: 47
Setting detail: THERAPIES SERIES
Discharge: HOME | End: 2022-11-08
Payer: COMMERCIAL

## 2022-11-08 PROCEDURE — 97140 MANUAL THERAPY 1/> REGIONS: CPT

## 2022-11-08 PROCEDURE — 97110 THERAPEUTIC EXERCISES: CPT

## 2022-11-08 PROCEDURE — 97530 THERAPEUTIC ACTIVITIES: CPT

## 2022-11-08 NOTE — FLOWSHEET NOTE
[facility]     Physical Therapy Treatment Note/ Progress Report:     Date:  2022    Patient Name:  Sharlene Hsieh    :  [id number]  MRN: [id number]  Medical/Treatment Diagnosis Information:  Diagnosis: Lumbar discogenic pain syndrome (M51.26), lumbar spondylosis (M47.816)       Treatment diagnosis: Low back pain   Insurance/Certification information:   [facility] - $0 deductible, $0 OOP max, 100% co-insurance, 30 PT visits per calendar year, auth required   Physician Information:  Theresa Fontaine[address] signed (Y/N): []  Yes [x]  No  Date sent:     Date of Patient follow up with Physician:      Progress Report: [x]  Yes  []  No     Functional Scale:           Date assessed:  FOTO physical FS primary measure score = 23     22  FOTO = 34          22  FOTO = 40          22  FOTO = 42          10/20/22    Date Range for reporting period:  Beginnin22  Ending:  10/20/22    Progress report due (10 Rx/or 30 days whichever is less):     Recertification due (POC duration/ or 90 days whichever is less): 22     Visit # Insurance Allowable Auth Needed    16 visits approved 22 - 11/10/22 [x]Yes    []No     Pain level:  0-1/10 currently    SUBJECTIVE:  Pt reports that his back has been feeling better overall. Pt reports still has tightness in his back, but not as intense and tight as it previously was. C/c currently is pain on R side of lower back when he stands up after bending forwards, especially after he has been in that position for longer periods of time (I.e. doing yardwork).      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; FALL RISK    Treatment based classification:     Exercises/Interventions:     Therapeutic Ex  10' Wt / Resistance sets/sec reps notes   Bike  5'     LTR  1 10    Hand heel rock   1 10 Held today due to soreness in arms   SKTC   15\" 3x ea    DKTC w/red SB  5\" 15    Glut stretch crossover  Piriformis stretch fig 4 30\"  30\" 3x ea  3   Pushing vs pulling  L   Supine clamshells    Pelvic tilts  1 10 In hooklying   Seated lumbar flexion stretch w/SB Red SB 1 10    Seated TB rows  Seated TB high rows Red  red 1  1 10  10    Bridges W/BS 1 10 Decreased ROM   S/L clamshells orange 1 15 R   Standing hip abd  Standing hip ext 2#  2# 1  1 10  10 Alternating LE   Leg press DL 60# 2 10    Standing lumbar SB  1 10 To L         Therapeutic Activities 20'              Deadlifts with KB  To 8\" + 6\" box 1 10 5# KB   Lateral stepping at 1/2 wall orange 1 4 laps Band around knees   TRX squats  1 15    Fwd step ups 6\" 1 10x B  With min toe touch on opposite LE   Lateral step ups 6\" 1 10x B   Sit to stands Butt taps 1 10x Chair + airex   [place] carry 10# 1 2 laps    Suitcase carry 6# 1 1 lap ea    BP readings / assessment 5'      Standing glut med activation orange 1 10x ea    Manual Intervention 15'              Prone PA 10'      GISTM/STM 5'   B lumbar paraspinals (R>L)   Lumbar Mobs in S/L 5'      SI Manip       Hip belt mobs       Hip LA distraction              NMR re-education               Seated SB pelvic tilts  1 5x    Seated SB marches  1 10 Alternating LE   SLS ground 10\" 3x ea             Pt. Education:  -pt educated on diagnosis, prognosis and expectations for rehab  -all pt questions were answered    Home Exercise Program:  [address] access code [facility]    Therapeutic Exercise and NMR EXR  [x] (87772) Provided verbal/tactile cueing for activities related to strengthening, flexibility, endurance, ROM  for improvements in proximal hip and core control with self care, mobility, lifting and ambulation.  [] (62300) Provided verbal/tactile cueing for activities related to improving balance, coordination, kinesthetic sense, posture, motor skill, proprioception  to assist with core control in self care, mobility, lifting, and ambulation. Therapeutic Activities:    [] (21959 or 82333) Provided verbal/tactile cueing for activities related to improving balance, coordination, kinesthetic sense, posture, motor skill, proprioception and motor activation to allow for proper function  with self care and ADLs  [] (87854) Provided training and instruction to the patient for proper core and proximal hip recruitment and positioning with ambulation re-education     Home Exercise Program:    [x] (34033) Reviewed/Progressed HEP activities related to strengthening, flexibility, endurance, ROM of core, proximal hip and LE for functional self-care, mobility, lifting and ambulation   [] (80727) Reviewed/Progressed HEP activities related to improving balance, coordination, kinesthetic sense, posture, motor skill, proprioception of core, proximal hip and LE for self care, mobility, lifting, and ambulation      Manual Treatments:  PROM / STM / Oscillations-Mobs:  G-I, II, III, IV (PA's, Inf., Post.)  [x] (25708) Provided manual therapy to mobilize proximal hip and LS spine soft tissue/joints for the purpose of modulating pain, promoting relaxation,  increasing ROM, reducing/eliminating soft tissue swelling/inflammation/restriction, improving soft tissue extensibility and allowing for proper ROM for normal function with self care, mobility, lifting and ambulation.      Modalities:     Charges:  Timed Code Treatment Minutes: 45   Total Treatment Minutes: 45       [] MADDI (LOW) 56109 (typically 20 minutes face-to-face)  [] EVAL (MOD) 79851 (typically 30 minutes face-to-face)  [] EVAL (HIGH) 84513 (typically 45 minutes face-to-face)  [] RE-EVAL     [x] IX(57346) x 1   [] IONTO (57044)  [] NMR (25973) x     [] VASO (55312)  [x] Manual (44788) x  1 [] Other:  [x] TA (60312)x 1    [] Mech Traction (13277)  [] ES(attended) (64998)     [] ES (un) (68836): If BWC Please Indicate Time In/Out and Total Minutes  CPT Code Time in Time out Total Min                                            Goals:   Patient stated goal: \"to decrease my back pain\"  [x] Progressing: [] Met: [] Not Met: [] Adjusted     Therapist goals for Patient:  Short Term Goals: To be achieved in: 2 weeks  1. Independent in HEP and progression per patient tolerance, in order to prevent re-injury. [] Progressing: [x] Met: [] Not Met: [] Adjusted  2. Patient will have a decrease in pain to facilitate improvement in movement, function, and ADLs as indicated by Functional Deficits. [x] Progressing: [] Met: [] Not Met: [] Adjusted     Long Term Goals: To be achieved in: 8 weeks  1. Patient will reach FOTO predicted score of at least 42 to assist with reaching prior level of function. [] Progressing: [x] Met: [] Not Met: [] Adjusted  2. Patient will demonstrate increased AROM to WNL, good LS mobility, good hip ROM to allow for proper joint functioning as indicated by patients Functional Deficits. [x] Progressing: [] Met: [] Not Met: [] Adjusted  3. Patient will demonstrate an increase in Strength to good proximal hip and core activation to allow for proper functional mobility as indicated by patients Functional Deficits. [x] Progressing: [] Met: [] Not Met: [] Adjusted  4. Patient will return to sleeping for 1 night without disturbances due to increased symptoms or restriction. [x] Progressing: [] Met: [] Not Met: [] Adjusted  5. Patient will be able to sit for 10 minutes without increased symptoms or restriction.      [x] Progressing: [] Met: [] Not Met: [] Adjusted        ASSESSMENT:  Pt tolerated return to exercises well today, but remains fatigued with glut strengthening exercises. Added deadlifts with KB with cues required to maintain neutral spine and for appropriate LE positioning. Pt would continue to benefit from skilled PT services to address mobility and functional strength deficits in order to return to PLOF. Treatment/Activity Tolerance:  [x] Patient tolerated treatment well [x] Patient limited by fatique  [] Patient limited by pain  [] Patient limited by other medical complications  [] Other:     Overall Progression Towards Functional goals/ Treatment Progress Update:  [] Patient is progressing as expected towards functional goals listed. [x] Progression is slowed due to complexities/Impairments listed. [] Progression has been slowed due to co-morbidities. [] Plan just implemented, too soon to assess goals progression <30days   [] Goals require adjustment due to lack of progress  [] Patient is not progressing as expected and requires additional follow up with physician  [] Other:    Prognosis for POC: [] Good [x] Fair  [] Poor    Patient requires continued skilled intervention: [x] Yes  [] No        PLAN: Decrease LBP, improve lumbar/hip mobility and strength as tolerated by pt. [x] Continue per plan of care [] Alter current plan (see comments)  [] Plan of care initiated [] Hold pending MD visit  [] Discharge    Electronically signed by: Orlando Tamayo PT    Note: If patient does not return for scheduled/recommended follow up visits, this note will serve as a discharge from care along with the most recent update on progress.

## 2022-11-10 ENCOUNTER — HOSPITAL ENCOUNTER (OUTPATIENT)
Dept: PHYSICAL THERAPY | Age: 47
Setting detail: THERAPIES SERIES
Discharge: HOME | End: 2022-11-10
Payer: COMMERCIAL

## 2022-11-10 PROCEDURE — 97110 THERAPEUTIC EXERCISES: CPT

## 2022-11-10 PROCEDURE — 97530 THERAPEUTIC ACTIVITIES: CPT

## 2022-11-10 PROCEDURE — 97140 MANUAL THERAPY 1/> REGIONS: CPT

## 2022-11-10 NOTE — PROGRESS NOTES
[facility]     Physical Therapy Treatment Note/ Progress Report:     Date:  11/10/2022    Patient Name:  Donell Gooden    :  [birth year]  MRN: [id number]  Medical/Treatment Diagnosis Information:  Diagnosis: Lumbar discogenic pain syndrome (M51.26), lumbar spondylosis (M47.816)       Treatment diagnosis: Low back pain   Insurance/Certification information:   [facility] - $0 deductible, $0 OOP max, 100% co-insurance, 30 PT visits per calendar year, auth required   Physician Information:  Giancarlo Phelps [address] [place] [address] signed (Y/N): []  Yes [x]  No  Date sent:     Date of Patient follow up with Physician:      Progress Report: [x]  Yes  []  No     Functional Scale:           Date assessed:  FOTO physical FS primary measure score = 23     22  FOTO = 34          22  FOTO = 40          22  FOTO = 42          10/20/22  FOTO = 44          11/10/22    Date Range for reporting period:  Beginnin22  Endin/10/22    Progress report due (10 Rx/or 30 days whichever is less): 69    Recertification due (POC duration/ or 90 days whichever is less): 22     Visit # Insurance Allowable Auth Needed    16 visits approved 22 - 11/10/22 [x]Yes    []No     Pain level:  0-1/10 currently    SUBJECTIVE:  Pt reports feeling about 50% improvement in sxs since starting PT. Pt reports he no longer has disturbances while sleeping due to LBP. Pt states that his c/c is pain on R side of lower back when he stands up after bending forwards, especially after being in that position for prolonged period of time (I.e. yardwork). Pt also has difficulty walking > about 10 mins.       OBJECTIVE: See eval  Observation:   Test measurements:    22:  Lumbar AROM: flex = 40 *pn, ext = 10 *pn, SB = 50% B \"tight\" on R with L SB  22:  BP readings:  164/109, 157/104 on small portable unit; 147/97 beginning of session on larger unit; 152/96 after manual therapy, 144/93 after 3 min rest break  10/20/22:  Lumbar AROM: flex = 55, ext = 10, SB = 50% B  11/10/22  BP readings:  142/99 at beginning of session  140/87 at end of session  Lumbar AROM: flex =60, ext = 15, SB = 75% B         Strength  LEFT RIGHT   HIP Flexors (L1-2) 4+ 4   HIP Abductors 4 4-   HIP extension 4- 4- *pn   Knee EXT (L3) 4+ 4+   Knee Flex (S1) 4+ 4+       RESTRICTIONS/PRECAUTIONS: 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; FALL RISK    Treatment based classification:     Exercises/Interventions:     Therapeutic Ex  10' Wt / Resistance sets/sec reps notes   Bike  5'     LTR  1 10    Hand heel rock   1 10 Held today due to soreness in arms   SKTC   15\" 3x ea    DKTC w/red SB  5\" 15    Glut stretch crossover  Piriformis stretch fig 4 30\"  30\" 3x ea  3   Pushing vs pulling  L   Supine clamshells    Pelvic tilts  1 10 In hooklying   Seated lumbar flexion stretch w/SB Red SB 1 10    Seated TB rows  Seated TB high rows Red  red 1  1 10  10    Bridges W/BS 1 10 Decreased ROM   S/L clamshells orange 1 15 R   Prone hip extension 0# 1 10 B   Leg press DL 60# 2 10 Requires assist getting out of machine   Standing lumbar SB  1 10 To L         Therapeutic Activities 20'              Deadlifts with KB  To 8\" + 6\" box 2 10 5# KB   Lateral stepping at 1/2 wall orange 1 4 laps Band around knees   TRX squats  1 15    Fwd step ups 6\" 1 10x B  With min toe touch on opposite LE   Lateral step ups 6\" 1 10x B   Sit to stands Butt taps 1 10x Chair + airex   [place] carry 10# 1 2 laps    Suitcase carry 6# 1 1 lap ea    BP readings / assessment 5'      Standing glut med activation orange 1 10x ea    Manual Intervention 15'              Prone PA 10'      GISTM/STM 5'   B lumbar paraspinals (R>L)   Lumbar Mobs in S/L 5'      SI Manip       Hip belt mobs       Hip LA distraction              NMR re-education               Seated SB pelvic tilts  1 5x    Seated SB marches  1 10 Alternating LE   SLS ground 10\" 3x ea             Pt. Education:  -pt educated on diagnosis, prognosis and expectations for rehab  -all pt questions were answered    Home Exercise Program:  Willy Tamayorel access code [facility]    Therapeutic Exercise and NMR EXR  [x] (29874) Provided verbal/tactile cueing for activities related to strengthening, flexibility, endurance, ROM  for improvements in proximal hip and core control with self care, mobility, lifting and ambulation.  [] (99408) Provided verbal/tactile cueing for activities related to improving balance, coordination, kinesthetic sense, posture, motor skill, proprioception  to assist with core control in self care, mobility, lifting, and ambulation.      Therapeutic Activities:    [] (75037 or 59530) Provided verbal/tactile cueing for activities related to improving balance, coordination, kinesthetic sense, posture, motor skill, proprioception and motor activation to allow for proper function  with self care and ADLs  [] (72833) Provided training and instruction to the patient for proper core and proximal hip recruitment and positioning with ambulation re-education     Home Exercise Program:    [x] (81927) Reviewed/Progressed HEP activities related to strengthening, flexibility, endurance, ROM of core, proximal hip and LE for functional self-care, mobility, lifting and ambulation   [] (69095) Reviewed/Progressed HEP activities related to improving balance, coordination, kinesthetic sense, posture, motor skill, proprioception of core, proximal hip and LE for self care, mobility, lifting, and ambulation      Manual Treatments:  PROM / STM / Oscillations-Mobs:  G-I, II, III, IV (PA's, Inf., Post.)  [x] (50370) Provided manual therapy to mobilize proximal hip and LS spine soft tissue/joints for the purpose of modulating pain, promoting relaxation,  increasing ROM, reducing/eliminating soft tissue swelling/inflammation/restriction, improving soft tissue extensibility and allowing for proper ROM for normal function with self care, mobility, lifting and ambulation. Modalities:     Charges:  Timed Code Treatment Minutes: 45   Total Treatment Minutes: 45       [] EVAL (LOW) 96404 (typically 20 minutes face-to-face)  [] EVAL (MOD) 60114 (typically 30 minutes face-to-face)  [] EVAL (HIGH) 52648 (typically 45 minutes face-to-face)  [] RE-EVAL     [x] LV(35060) x 1   [] IONTO (79261)  [] NMR (55235) x     [] VASO (32931)  [x] Manual (59992) x  1 [] Other:  [x] TA (75317)x 1    [] Mech Traction (89465)  [] ES(attended) (52150)     [] ES (un) (57769): If BWC Please Indicate Time In/Out and Total Minutes  CPT Code Time in Time out Total Min                                            Goals:   Patient stated goal: \"to decrease my back pain\"  [x] Progressing: [] Met: [] Not Met: [] Adjusted     Therapist goals for Patient:  Short Term Goals: To be achieved in: 2 weeks  1. Independent in HEP and progression per patient tolerance, in order to prevent re-injury. [] Progressing: [x] Met: [] Not Met: [] Adjusted  2. Patient will have a decrease in pain to facilitate improvement in movement, function, and ADLs as indicated by Functional Deficits. [x] Progressing: [] Met: [] Not Met: [] Adjusted     Long Term Goals: To be achieved in: 8 weeks  1. Patient will reach FOTO predicted score of at least 42 to assist with reaching prior level of function. [] Progressing: [x] Met: [] Not Met: [] Adjusted  2. Patient will demonstrate increased AROM to WNL, good LS mobility, good hip ROM to allow for proper joint functioning as indicated by patients Functional Deficits. [x] Progressing: [] Met: [] Not Met: [] Adjusted  3. Patient will demonstrate an increase in Strength to good proximal hip and core activation to allow for proper functional mobility as indicated by patients Functional Deficits. [x] Progressing: [] Met: [] Not Met: [] Adjusted  4.  Patient will be able to bend forwards to pick something up and stand up without increased symptoms or restriction. [] Progressing: [x] Met: [] Not Met: [x] Adjusted  5. Patient will be able to walk for 15 minutes without increased symptoms or restriction. [] Progressing: [x] Met: [] Not Met: [x] Adjusted        ASSESSMENT:  Pt demonstrates improved lumbar AROM and hip strength, showing progress towards LTG's. Pt continues to demonstrate glut med and glut max strength deficits with frequent verbal and tactile cues required to maintain upright posture and appropriate form with exercises. Significant cues required for improved form with KB deadlifts. Pt would continue to benefit from skilled PT services to address mobility and functional strength deficits in order to return to PLOF, including walking and squatting to pick something up off of the floor. Treatment/Activity Tolerance:  [x] Patient tolerated treatment well [x] Patient limited by fatique  [] Patient limited by pain  [] Patient limited by other medical complications  [] Other:     Overall Progression Towards Functional goals/ Treatment Progress Update:  [x] Patient is progressing as expected towards functional goals listed. [] Progression is slowed due to complexities/Impairments listed. [] Progression has been slowed due to co-morbidities. [] Plan just implemented, too soon to assess goals progression <30days   [] Goals require adjustment due to lack of progress  [] Patient is not progressing as expected and requires additional follow up with physician  [] Other:    Prognosis for POC: [] Good [x] Fair  [] Poor    Patient requires continued skilled intervention: [x] Yes  [] No        PLAN: Decrease LBP, improve lumbar/hip mobility and strength as tolerated by pt.    [x] Continue per plan of care [] Alter current plan (see comments)  [] Plan of care initiated [x] Hold pending insurance auth [] Discharge    Electronically signed by: Zoila Richmond PT    Note: If patient does not return for scheduled/recommended follow up visits, this note will serve as a discharge from care along with the most recent update on progress.

## 2022-11-14 ENCOUNTER — OFFICE VISIT (OUTPATIENT)
Dept: PULMONOLOGY | Age: 47
End: 2022-11-14
Payer: COMMERCIAL

## 2022-11-14 VITALS — OXYGEN SATURATION: 98 % | DIASTOLIC BLOOD PRESSURE: 86 MMHG | SYSTOLIC BLOOD PRESSURE: 139 MMHG | HEART RATE: 98 BPM

## 2022-11-14 DIAGNOSIS — K21.9 GASTROESOPHAGEAL REFLUX DISEASE WITHOUT ESOPHAGITIS: ICD-10-CM

## 2022-11-14 DIAGNOSIS — Z91.09 ENVIRONMENTAL ALLERGIES: ICD-10-CM

## 2022-11-14 DIAGNOSIS — J98.6 ELEVATED DIAPHRAGM: ICD-10-CM

## 2022-11-14 DIAGNOSIS — J45.50 SEVERE PERSISTENT ASTHMA WITHOUT COMPLICATION: Primary | ICD-10-CM

## 2022-11-14 PROCEDURE — G8427 DOCREV CUR MEDS BY ELIG CLIN: HCPCS | Performed by: INTERNAL MEDICINE

## 2022-11-14 PROCEDURE — G8419 CALC BMI OUT NRM PARAM NOF/U: HCPCS | Performed by: INTERNAL MEDICINE

## 2022-11-14 PROCEDURE — 1036F TOBACCO NON-USER: CPT | Performed by: INTERNAL MEDICINE

## 2022-11-14 PROCEDURE — 99214 OFFICE O/P EST MOD 30 MIN: CPT | Performed by: INTERNAL MEDICINE

## 2022-11-14 PROCEDURE — G8482 FLU IMMUNIZE ORDER/ADMIN: HCPCS | Performed by: INTERNAL MEDICINE

## 2022-11-14 PROCEDURE — 3078F DIAST BP <80 MM HG: CPT | Performed by: INTERNAL MEDICINE

## 2022-11-14 PROCEDURE — 3074F SYST BP LT 130 MM HG: CPT | Performed by: INTERNAL MEDICINE

## 2022-11-14 NOTE — PROGRESS NOTES
[facility]  Consult Note  Julissa Lay MD       Yordan Bolanos   YOB: 1975    Date of Visit:  11/14/2022    Assessment/Plan:  1. Severe persistent asthma without complication  PFT 2/14:  Spirometry reveals decreased FVC at 2.28 liters, which is 43% predicted. FEV1 is decreased at 1.43 liters, which is 35% predicted. FEV1/FVC  ratio is reduced at 63%. Expiratory flow rates are also reduced. There  is a 32% improvement in FEV1 after inhaled bronchodilators. Lung  volumes reveal decreased total lung capacity at 77% predicted, vital  capacity is decreased at 55% predicted, residual volume is normal,  diffusion capacity is normal.     IMPRESSION:  Severe obstructive lung disease with a good response to  inhaled bronchodilators. Reduced total lung capacity may indicate an  element of restriction as well. Medication Management:  The patient benefits from the medical regimen and reports no complications. Advair ==> Trelegy 200 ==> Advair and Spiriva 2/2 insurance coverage. Trelegy worked well  Tracy Sony has really helped. 2. Environmental allergies  He does not have significant eosinophilia  He does however have elevated IgE and reacts to cat and dog dander. Now on Advair. 3. Gastroesophageal reflux disease without esophagitis  Currently not symptomatic from GERD  Discussed the relationship between GERD and poorly controlled asthma    4. 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 3 months    Chief Complaint   Patient presents with    Asthma     Getting Xolair at [facility] and can feel they are working  Been dealing with > HTN  PCP addressing     Discuss Medications     Insurance has changed and would like to have Trelegy sent to Pharmacy to see if it will be approved        HPI  He presents for a chief complaint of shortness of breath related to asthma. He has mild associated cough.  He has started on Xolair and that is helping. He likes Trelegy but has insurance issues with coverage so is now on Advair and Spiriva. Review of Systems  No Chest pain, Nausea or vomiting reported      History  I have reviewed past medical, surgical, social and family history. This is documented elsewhere in themedical record. Physical Exam:  Well developed, well nourished  Alert and oriented  Sclera is clear  No cervical adenopathy  No JVD. Chest examination is clear. Cardiac examination reveals regular rate and rhythm without murmur, gallop or rub. The abdomen is soft, nontender and nondistended. There is no clubbing, cyanosis or edema of the extremities. There is no obvious skin rash. No focal neuro deficicts  Normal mood and affect      Allergies   Allergen Reactions    Lisinopril Cough    Symbicort [Budesonide-Formoterol Fumarate] Other (See Comments)     Lung infection     Prior to Visit Medications    Medication Sig Taking? Authorizing Provider   losartan (COZAAR) 50 MG tablet Take 1 tablet by mouth daily  Stephen Clements MD   fluticasone-salmeterol (ADVAIR DISKUS) 500-50 MCG/ACT AEPB diskus inhaler Inhale 1 puff into the lungs in the morning and 1 puff in the evening.   Stephen Clements MD   tiotropium (SPIRIVA RESPIMAT) 2.5 MCG/ACT AERS inhaler Inhale 2 puffs into the lungs daily  Historical Provider, MD   VENTOLIN  (90 Base) MCG/ACT inhaler Inhale 2 puffs into the lungs every 6 hours as needed for Wheezing  Mandienia MD Carlyn   celecoxib (CELEBREX) 200 MG capsule Take 1 capsule by mouth daily  Narendra Crain MD   cyclobenzaprine (FLEXERIL) 10 mg tablet Take 1 tablet by mouth nightly as needed for Muscle spasms  Narendra Crain MD   albuterol sulfate  (90 Base) MCG/ACT inhaler INHALE TWO PUFFS BY MOUTH FOUR TIMES A DAY AS NEEDED FOR WHEEZING  BRUCE Elena CNP   albuterol (PROVENTIL) (2.5 MG/3ML) 0.083% nebulizer solution USE THREE MILLILITERS VIA NEBULIZATION BY MOUTH EVERY 6 HOURS AS NEEDED FOR WHEEZING  Inocencia Dye MD   hydrocortisone 2.5 % cream Apply topically 2 times daily. Inocencia Dye MD   levETIRAcetam (KEPPRA) 500 MG tablet Take 1 tablet by mouth 2 times daily  Markus Hernández MD   omeprazole (PRILOSEC) 20 MG delayed release capsule Take 1 capsule by mouth 2 times daily (before meals)  Chelsea Cool MD   fluticasone Janis Leeroy) 50 MCG/ACT nasal spray 1 spray by Nasal route daily  Historical Provider, MD       Vitals:    11/14/22 1443   BP: 139/86   Pulse: 98   SpO2: 98%     There is no height or weight on file to calculate BMI.      Wt Readings from Last 3 Encounters:   10/28/22 188 lb (85.3 kg)   10/03/22 185 lb 9.6 oz (84.2 kg)   09/28/22 180 lb (81.6 kg)     BP Readings from Last 3 Encounters:   11/14/22 139/86   10/28/22 125/85   10/26/22 (!) 145/97        Social History     Tobacco Use   Smoking Status Never   Smokeless Tobacco Never

## 2022-11-16 ENCOUNTER — HOSPITAL ENCOUNTER (OUTPATIENT)
Dept: PHYSICAL THERAPY | Age: 47
Setting detail: THERAPIES SERIES
Discharge: HOME | End: 2022-11-16
Payer: COMMERCIAL

## 2022-11-16 PROCEDURE — 97110 THERAPEUTIC EXERCISES: CPT

## 2022-11-16 PROCEDURE — 97530 THERAPEUTIC ACTIVITIES: CPT

## 2022-11-16 PROCEDURE — 97140 MANUAL THERAPY 1/> REGIONS: CPT

## 2022-11-16 NOTE — FLOWSHEET NOTE
[employer]     Physical Therapy Treatment Note/ Progress Report:     Date:  2022    Patient Name:  Sid Prince    :    MRN: [id number]  Medical/Treatment Diagnosis Information:  Diagnosis: Lumbar discogenic pain syndrome (M51.26), lumbar spondylosis (M47.816)       Treatment diagnosis: Low back pain   Insurance/Certification information:   [facility] - $0 deductible, $0 OOP max, 100% co-insurance, 30 PT visits per calendar year, auth required   Physician Information:  Adin Brink, [address] [place] [address] signed (Y/N): []  Yes [x]  No  Date sent:     Date of Patient follow up with Physician:      Progress Report: []  Yes  [x]  No     Functional Scale:           Date assessed:  FOTO physical FS primary measure score = 23     22  FOTO = 34          22  FOTO = 40          22  FOTO = 42          10/20/22  FOTO = 44          11/10/22    Date Range for reporting period:  Beginnin22  Endin/10/22    Progress report due (10 Rx/or 30 days whichever is less):     Recertification due (POC duration/ or 90 days whichever is less): 22     Visit # Insurance Allowable Auth Needed    16 visits approved 22 - 11/10/22 [x]Yes    []No     Pain level:  0-1/10 currently    SUBJECTIVE:  Pt reports he sat for a long time yesterday and had some pain in his R lower back when he went to stand up. Pt reports that he did some of his stretches and HEP and felt that this helped.      OBJECTIVE: See eval  Observation:   Test measurements:    22:  Lumbar AROM: flex = 40 *pn, ext = 10 *pn, SB = 50% B \"tight\" on R with L SB  22:  BP readings:  164/109, 157/104 on small portable unit; 147/97 beginning of session on larger unit; 152/96 after manual therapy, 144/93 after 3 min rest break  10/20/22:  Lumbar AROM: flex = 55, ext = 10, SB = 50% B  22  BP readings:  120/86 at beginning of session  131/84 at end of session  Lumbar AROM: flex =60, ext = 15, SB = 75% B         Strength  LEFT RIGHT   HIP Flexors (L1-2) 4+ 4   HIP Abductors 4 4-   HIP extension 4- 4- *pn   Knee EXT (L3) 4+ 4+   Knee Flex (S1) 4+ 4+       RESTRICTIONS/PRECAUTIONS: 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; FALL RISK    Treatment based classification:     Exercises/Interventions:     Therapeutic Ex  10' Wt / Resistance sets/sec reps notes   Bike  5'     LTR  1 10    Hand heel rock   1 10 Held today due to soreness in arms   SKTC   15\" 3x ea    DKTC w/red SB  5\" 15    Glut stretch crossover  Piriformis stretch fig 4 30\"  30\" 3x ea  3   Pushing vs pulling  L   Supine clamshells    Pelvic tilts  1 10 In hooklying   Seated lumbar flexion stretch w/SB Red SB 1 10    Seated TB rows  Seated TB high rows Red  red 1  1 10  10    Bridges  1 10    S/L clamshells orange 1 15 R   Prone hip extension 0# 1 10 B   Leg press DL 60# 2 10 Requires assist getting out of machine   Standing lumbar SB  1 10 To L         Therapeutic Activities 20'              Deadlifts with KB  To 8\" + 6\" box 2 10 5# KB   Lateral stepping at 1/2 wall orange 2 4 laps Circuit  Band around ankles   TRX squats  1 15    Fwd step ups 6\" 1 10x B  With min toe touch on opposite LE   Lateral step ups 6\" 1 10x B   Sit to stands 4# MB 2 10x circuit   Retro slider lunges  2 10 circuit   [place] carry 10# 1 2 laps    Suitcase carry 6# 1 1 lap ea    BP readings / assessment 5'      Standing glut med activation orange 1 10x ea    Manual Intervention 15'              Prone PA 5'      GISTM/STM 5'   B lumbar paraspinals (R>L)   Lumbar Mobs in S/L 5'      SI Manip       Hip belt mobs       Hip LA distraction              NMR re-education               Seated SB pelvic tilts  1 5x    Seated SB marches  1 10 Alternating LE   SLS ground 10\" 3x ea             Pt. Education:  -pt educated on diagnosis, prognosis and expectations for rehab  -all pt questions were answered    Home Exercise Program:  [address] access code [facility]    Therapeutic Exercise and NMR EXR  [x] (14097) Provided verbal/tactile cueing for activities related to strengthening, flexibility, endurance, ROM  for improvements in proximal hip and core control with self care, mobility, lifting and ambulation.  [] (75945) Provided verbal/tactile cueing for activities related to improving balance, coordination, kinesthetic sense, posture, motor skill, proprioception  to assist with core control in self care, mobility, lifting, and ambulation. Therapeutic Activities:    [] (43255 or 58213) Provided verbal/tactile cueing for activities related to improving balance, coordination, kinesthetic sense, posture, motor skill, proprioception and motor activation to allow for proper function  with self care and ADLs  [] (28138) Provided training and instruction to the patient for proper core and proximal hip recruitment and positioning with ambulation re-education     Home Exercise Program:    [x] (62078) Reviewed/Progressed HEP activities related to strengthening, flexibility, endurance, ROM of core, proximal hip and LE for functional self-care, mobility, lifting and ambulation   [] (71602) Reviewed/Progressed HEP activities related to improving balance, coordination, kinesthetic sense, posture, motor skill, proprioception of core, proximal hip and LE for self care, mobility, lifting, and ambulation      Manual Treatments:  PROM / STM / Oscillations-Mobs:  G-I, II, III, IV (PA's, Inf., Post.)  [x] (73134) Provided manual therapy to mobilize proximal hip and LS spine soft tissue/joints for the purpose of modulating pain, promoting relaxation,  increasing ROM, reducing/eliminating soft tissue swelling/inflammation/restriction, improving soft tissue extensibility and allowing for proper ROM for normal function with self care, mobility, lifting and ambulation.      Modalities:     Charges:  Timed Code Treatment Minutes: 45 Total Treatment Minutes: 45       [] EVAL (LOW) 96936 (typically 20 minutes face-to-face)  [] EVAL (MOD) 30814 (typically 30 minutes face-to-face)  [] EVAL (HIGH) 21868 (typically 45 minutes face-to-face)  [] RE-EVAL     [x] OL(34235) x 1   [] IONTO (63388)  [] NMR (13787) x     [] VASO (48602)  [x] Manual (53739) x  1 [] Other:  [x] TA (39555)x 1    [] Mech Traction (64654)  [] ES(attended) (21791)     [] ES (un) (00181): If BWC Please Indicate Time In/Out and Total Minutes  CPT Code Time in Time out Total Min                                            Goals:   Patient stated goal: \"to decrease my back pain\"  [x] Progressing: [] Met: [] Not Met: [] Adjusted     Therapist goals for Patient:  Short Term Goals: To be achieved in: 2 weeks  1. Independent in HEP and progression per patient tolerance, in order to prevent re-injury. [] Progressing: [x] Met: [] Not Met: [] Adjusted  2. Patient will have a decrease in pain to facilitate improvement in movement, function, and ADLs as indicated by Functional Deficits. [x] Progressing: [] Met: [] Not Met: [] Adjusted     Long Term Goals: To be achieved in: 8 weeks  1. Patient will reach FOTO predicted score of at least 42 to assist with reaching prior level of function. [] Progressing: [x] Met: [] Not Met: [] Adjusted  2. Patient will demonstrate increased AROM to WNL, good LS mobility, good hip ROM to allow for proper joint functioning as indicated by patients Functional Deficits. [x] Progressing: [] Met: [] Not Met: [] Adjusted  3. Patient will demonstrate an increase in Strength to good proximal hip and core activation to allow for proper functional mobility as indicated by patients Functional Deficits. [x] Progressing: [] Met: [] Not Met: [] Adjusted  4. Patient will be able to bend forwards to pick something up and stand up without increased symptoms or restriction. [] Progressing: [x] Met: [] Not Met: [x] Adjusted  5.  Patient will be able to walk for 15 minutes without increased symptoms or restriction. [] Progressing: [x] Met: [] Not Met: [x] Adjusted        ASSESSMENT:  Pt demonstrated improved functional endurance today with ability to tolerate circuit training. Pt was very fatigued at end of session. Pt required cues mainly with retro slider lunges and lateral bandwalking to maintain appropriate form and to decrease hip flexor compensation. Pt would continue to benefit from skilled PT services to address mobility and functional strength deficits in order to return to PLOF, including walking and squatting to pick something up off of the floor. Treatment/Activity Tolerance:  [x] Patient tolerated treatment well [x] Patient limited by fatique  [] Patient limited by pain  [] Patient limited by other medical complications  [] Other:     Overall Progression Towards Functional goals/ Treatment Progress Update:  [x] Patient is progressing as expected towards functional goals listed. [] Progression is slowed due to complexities/Impairments listed. [] Progression has been slowed due to co-morbidities. [] Plan just implemented, too soon to assess goals progression <30days   [] Goals require adjustment due to lack of progress  [] Patient is not progressing as expected and requires additional follow up with physician  [] Other:    Prognosis for POC: [] Good [x] Fair  [] Poor    Patient requires continued skilled intervention: [x] Yes  [] No        PLAN: Decrease LBP, improve lumbar/hip mobility and strength as tolerated by pt. [x] Continue per plan of care [] Alter current plan (see comments)  [] Plan of care initiated [x] Hold pending insurance auth [] Discharge    Electronically signed by: Jose J Adams PT    Note: If patient does not return for scheduled/recommended follow up visits, this note will serve as a discharge from care along with the most recent update on progress.

## 2022-11-22 ENCOUNTER — HOSPITAL ENCOUNTER (OUTPATIENT)
Dept: PHYSICAL THERAPY | Age: 47
Setting detail: THERAPIES SERIES
Discharge: HOME | End: 2022-11-22
Payer: COMMERCIAL

## 2022-11-22 PROCEDURE — 97110 THERAPEUTIC EXERCISES: CPT

## 2022-11-22 PROCEDURE — 97140 MANUAL THERAPY 1/> REGIONS: CPT

## 2022-11-22 PROCEDURE — 97530 THERAPEUTIC ACTIVITIES: CPT

## 2022-11-22 NOTE — FLOWSHEET NOTE
[employer]     Physical Therapy Treatment Note/ Progress Report:     Date:  2022    Patient Name:  Josemanuel Augustine    :    MRN: [id number]  Medical/Treatment Diagnosis Information:  Diagnosis: Lumbar discogenic pain syndrome (M51.26), lumbar spondylosis (M47.816)       Treatment diagnosis: Low back pain   Insurance/Certification information:   [facility] - $0 deductible, $0 OOP max, 100% co-insurance, 30 PT visits per calendar year, auth required   Physician Information:  Aury rFitz, [address] [place] [address] signed (Y/N): []  Yes [x]  No  Date sent:     Date of Patient follow up with Physician:      Progress Report: []  Yes  [x]  No     Functional Scale:           Date assessed:  FOTO physical FS primary measure score = 23     22  FOTO = 34          22  FOTO = 40          22  FOTO = 42          10/20/22  FOTO = 44          11/10/22    Date Range for reporting period:  Beginnin22  Endin/10/22    Progress report due (10 Rx/or 30 days whichever is less):     Recertification due (POC duration/ or 90 days whichever is less): 22     Visit # Insurance Allowable Auth Needed    16 visits approved 11/10/22 - 22 [x]Yes    []No     Pain level:  0-1/10 currently    SUBJECTIVE:  Pt reports that his back is feeling a little tight, but overall doing well.       OBJECTIVE: See eval  Observation:   Test measurements:    22:  Lumbar AROM: flex = 40 *pn, ext = 10 *pn, SB = 50% B \"tight\" on R with L SB  22:  BP readings:  164/109, 157/104 on small portable unit; 147/97 beginning of session on larger unit; 152/96 after manual therapy, 144/93 after 3 min rest break  10/20/22:  Lumbar AROM: flex = 55, ext = 10, SB = 50% B  22  BP readings:  126/84 at beginning of session  105/81 at end of session  Lumbar AROM: flex =60, ext = 15, SB = 75% B         Strength  LEFT RIGHT   HIP Flexors (L1-2) 4+ 4   HIP Abductors 4 4-   HIP extension 4- 4- *pn   Knee EXT (L3) 4+ 4+   Knee Flex (S1) 4+ 4+       RESTRICTIONS/PRECAUTIONS: 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; FALL RISK    Treatment based classification:     Exercises/Interventions:     Therapeutic Ex  10' Wt / Resistance sets/sec reps notes   Bike  5'     LTR  1 10    Hand heel rock   1 10 Held today due to soreness in arms   SKTC   15\" 3x ea    DKTC w/red SB  5\" 15    Glut stretch crossover  Piriformis stretch fig 4 30\"  30\" 3x ea  3   Pushing vs pulling  L   Supine clamshells    Pelvic tilts  1 10 In hooklying   Seated lumbar flexion stretch w/SB Red SB 1 10    Seated TB rows  Seated TB high rows Red  red 1  1 10  10    Bridges  1 10    S/L clamshells orange 1 15 R   Prone hip extension 0# 1 10 B   Leg press DL 60# 2 10 Requires assist getting out of machine   Standing lumbar SB  1 10 To L         Therapeutic Activities 20'              Deadlifts with KB  To 8\" + 6\" box 2 10 5# KB   Lateral stepping at 1/2 wall orange 2 4 laps Circuit  Band around ankles   TRX squats  1 15    Fwd step ups 6\" 1 10x B  With min toe touch on opposite LE   Lateral step ups 6\" 1 10x B   Sit to stands 4# MB 2 10x circuit   Retro slider lunges  2 10 circuit   [place] carry 10# 2 2 laps circuit   Suitcase carry 6# 1 1 lap ea    BP readings / assessment 5'      Standing glut med activation orange 1 10x ea    Manual Intervention 15'              Prone PA 5'      GISTM/STM 5'   B lumbar paraspinals (R>L)   Lumbar Mobs in S/L 5'      SI Manip       Hip belt mobs       Hip LA distraction              NMR re-education               Seated SB pelvic tilts  1 5x    Seated SB marches  1 10 Alternating LE   SLS ground 10\" 3x ea             Pt. Education:  -pt educated on diagnosis, prognosis and expectations for rehab  -all pt questions were answered    Home Exercise Program:  [facility] access code [facility]    Therapeutic Exercise and NMR EXR  [x] (91824) Provided verbal/tactile cueing for activities related to strengthening, flexibility, endurance, ROM  for improvements in proximal hip and core control with self care, mobility, lifting and ambulation.  [] (49320) Provided verbal/tactile cueing for activities related to improving balance, coordination, kinesthetic sense, posture, motor skill, proprioception  to assist with core control in self care, mobility, lifting, and ambulation. Therapeutic Activities:    [] (31315 or 59640) Provided verbal/tactile cueing for activities related to improving balance, coordination, kinesthetic sense, posture, motor skill, proprioception and motor activation to allow for proper function  with self care and ADLs  [] (13478) Provided training and instruction to the patient for proper core and proximal hip recruitment and positioning with ambulation re-education     Home Exercise Program:    [x] (84152) Reviewed/Progressed HEP activities related to strengthening, flexibility, endurance, ROM of core, proximal hip and LE for functional self-care, mobility, lifting and ambulation   [] (80517) Reviewed/Progressed HEP activities related to improving balance, coordination, kinesthetic sense, posture, motor skill, proprioception of core, proximal hip and LE for self care, mobility, lifting, and ambulation      Manual Treatments:  PROM / STM / Oscillations-Mobs:  G-I, II, III, IV (PA's, Inf., Post.)  [x] (28031) Provided manual therapy to mobilize proximal hip and LS spine soft tissue/joints for the purpose of modulating pain, promoting relaxation,  increasing ROM, reducing/eliminating soft tissue swelling/inflammation/restriction, improving soft tissue extensibility and allowing for proper ROM for normal function with self care, mobility, lifting and ambulation.      Modalities:     Charges:  Timed Code Treatment Minutes: 45   Total Treatment Minutes: 45       [] EVAL (LOW) 39097 (typically 20 minutes face-to-face)  [] EVAL (MOD) 85448 (typically 30 minutes face-to-face)  [] EVAL (HIGH) 82447 (typically 45 minutes face-to-face)  [] RE-EVAL     [x] VQ(47214) x 1   [] IONTO (07708)  [] NMR (13575) x     [] VASO (72563)  [x] Manual (56818) x  1 [] Other:  [x] TA (61384)x 1    [] Mech Traction (22792)  [] ES(attended) (94602)     [] ES (un) (54533): If BWC Please Indicate Time In/Out and Total Minutes  CPT Code Time in Time out Total Min                                            Goals:   Patient stated goal: \"to decrease my back pain\"  [x] Progressing: [] Met: [] Not Met: [] Adjusted     Therapist goals for Patient:  Short Term Goals: To be achieved in: 2 weeks  1. Independent in HEP and progression per patient tolerance, in order to prevent re-injury. [] Progressing: [x] Met: [] Not Met: [] Adjusted  2. Patient will have a decrease in pain to facilitate improvement in movement, function, and ADLs as indicated by Functional Deficits. [x] Progressing: [] Met: [] Not Met: [] Adjusted     Long Term Goals: To be achieved in: 8 weeks  1. Patient will reach FOTO predicted score of at least 42 to assist with reaching prior level of function. [] Progressing: [x] Met: [] Not Met: [] Adjusted  2. Patient will demonstrate increased AROM to WNL, good LS mobility, good hip ROM to allow for proper joint functioning as indicated by patients Functional Deficits. [x] Progressing: [] Met: [] Not Met: [] Adjusted  3. Patient will demonstrate an increase in Strength to good proximal hip and core activation to allow for proper functional mobility as indicated by patients Functional Deficits. [x] Progressing: [] Met: [] Not Met: [] Adjusted  4. Patient will be able to bend forwards to pick something up and stand up without increased symptoms or restriction. [] Progressing: [x] Met: [] Not Met: [x] Adjusted  5. Patient will be able to walk for 15 minutes without increased symptoms or restriction.      [] Progressing: [x] Met: [] Not Met: [x] Adjusted ASSESSMENT:  Pt demonstrated improved functional endurance today with ability to tolerate circuit training. Pt requires frequent verbal and tactile cues to use appropriate form with retro slider lunges and to maintain upright posture. Pt would continue to benefit from skilled PT services to address mobility and functional strength deficits in order to return to PLOF, including walking and squatting to pick something up off of the floor. Treatment/Activity Tolerance:  [x] Patient tolerated treatment well [x] Patient limited by fatique  [] Patient limited by pain  [] Patient limited by other medical complications  [] Other:     Overall Progression Towards Functional goals/ Treatment Progress Update:  [x] Patient is progressing as expected towards functional goals listed. [] Progression is slowed due to complexities/Impairments listed. [] Progression has been slowed due to co-morbidities. [] Plan just implemented, too soon to assess goals progression <30days   [] Goals require adjustment due to lack of progress  [] Patient is not progressing as expected and requires additional follow up with physician  [] Other:    Prognosis for POC: [] Good [x] Fair  [] Poor    Patient requires continued skilled intervention: [x] Yes  [] No        PLAN: Decrease LBP, improve lumbar/hip mobility and strength as tolerated by pt. [x] Continue per plan of care [] Alter current plan (see comments)  [] Plan of care initiated [x] Hold pending insurance auth [] Discharge    Electronically signed by: Adi Flores PT    Note: If patient does not return for scheduled/recommended follow up visits, this note will serve as a discharge from care along with the most recent update on progress.

## 2022-11-23 ENCOUNTER — HOSPITAL ENCOUNTER (OUTPATIENT)
Dept: ONCOLOGY | Age: 47
Setting detail: INFUSION SERIES
Discharge: HOME | End: 2022-11-23
Payer: COMMERCIAL

## 2022-11-23 VITALS
TEMPERATURE: 97.2 F | SYSTOLIC BLOOD PRESSURE: 153 MMHG | DIASTOLIC BLOOD PRESSURE: 98 MMHG | RESPIRATION RATE: 16 BRPM | HEART RATE: 103 BPM

## 2022-11-23 DIAGNOSIS — J45.50 SEVERE PERSISTENT ASTHMA, UNSPECIFIED WHETHER COMPLICATED: Primary | ICD-10-CM

## 2022-11-23 PROCEDURE — 99211 OFF/OP EST MAY X REQ PHY/QHP: CPT

## 2022-11-23 PROCEDURE — 6360000002 HC RX W HCPCS: Performed by: INTERNAL MEDICINE

## 2022-11-23 PROCEDURE — 96372 THER/PROPH/DIAG INJ SC/IM: CPT

## 2022-11-23 RX ADMIN — OMALIZUMAB 300 MG: 150 INJECTION, SOLUTION SUBCUTANEOUS at 13:32

## 2022-11-23 NOTE — PROGRESS NOTES
Patient ambulatory to department for every 4 week Xolair injection. AVS printed and reviewed. Given 300 mg in two syringes subcutaneously, 150 mg given in each arm. Tolerated injections well. Pt monitored 30 minutes post injection. Patient discharged per ambulatory. Scheduled to return in 4 weeks.

## 2022-11-29 ENCOUNTER — HOSPITAL ENCOUNTER (OUTPATIENT)
Dept: PHYSICAL THERAPY | Age: 47
Setting detail: THERAPIES SERIES
Discharge: HOME | End: 2022-11-29
Payer: COMMERCIAL

## 2022-11-29 PROCEDURE — 97110 THERAPEUTIC EXERCISES: CPT

## 2022-11-29 PROCEDURE — 97530 THERAPEUTIC ACTIVITIES: CPT

## 2022-11-29 PROCEDURE — 97140 MANUAL THERAPY 1/> REGIONS: CPT

## 2022-11-29 NOTE — FLOWSHEET NOTE
[employer]     Physical Therapy Treatment Note/ Progress Report:     Date:  2022    Patient Name:  Sai Peñaloza    :  1972  MRN: [id number]  Medical/Treatment Diagnosis Information:  Diagnosis: Lumbar discogenic pain syndrome (M51.26), lumbar spondylosis (M47.816)       Treatment diagnosis: Low back pain   Insurance/Certification information:   [employer] - $0 deductible, $0 OOP max, 100% co-insurance, 30 PT visits per calendar year, auth required   Physician Information:  Estrella Fonseca [address] [place] [address] signed (Y/N): []  Yes [x]  No  Date sent:     Date of Patient follow up with Physician:      Progress Report: []  Yes  [x]  No     Functional Scale:           Date assessed:  FOTO physical FS primary measure score = 23     22  FOTO = 34          22  FOTO = 40          22  FOTO = 42          10/20/22  FOTO = 44          11/10/22    Date Range for reporting period:  Beginnin22  Endin/10/22    Progress report due (10 Rx/or 30 days whichever is less):     Recertification due (POC duration/ or 90 days whichever is less): 22     Visit # Insurance Allowable Auth Needed    16 visits approved 11/10/22 - 22 [x]Yes    []No     Pain level:  0-1/10 currently    SUBJECTIVE:  Pt reports that his back was a little sore yesterday, unsure why. Pt states that performing LTR seems to help though.       OBJECTIVE: See eval  Observation:   Test measurements:    22:  Lumbar AROM: flex = 40 *pn, ext = 10 *pn, SB = 50% B \"tight\" on R with L SB  22:  BP readings:  164/109, 157/104 on small portable unit; 147/97 beginning of session on larger unit; 152/96 after manual therapy, 144/93 after 3 min rest break  10/20/22:  Lumbar AROM: flex = 55, ext = 10, SB = 50% B  22  BP readings:  114/77 at beginning of session  138/85 at end of session  Lumbar AROM: flex =60, ext = 15, SB = 75% B Strength  LEFT RIGHT   HIP Flexors (L1-2) 4+ 4   HIP Abductors 4 4-   HIP extension 4- 4- *pn   Knee EXT (L3) 4+ 4+   Knee Flex (S1) 4+ 4+       RESTRICTIONS/PRECAUTIONS: 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; FALL RISK    Treatment based classification:     Exercises/Interventions:     Therapeutic Ex  10' Wt / Resistance sets/sec reps notes   Bike  5'     LTR  1 10    Hand heel rock   1 10 Held today due to soreness in arms   SKTC   15\" 3x ea    DKTC w/red SB  5\" 15    Glut stretch crossover  Piriformis stretch fig 4 30\"  30\" 3x ea  3   Pushing vs pulling  L   Supine clamshells    Pelvic tilts  1 10 In hooklying   Seated lumbar flexion stretch w/SB Red SB 1 10    Seated TB rows  Seated TB high rows Red  red 1  1 10  10    Bridges  1 10    S/L clamshells orange 1 15 R   Prone hip extension 0# 1 10 B   Leg press DL 60# 2 10 Requires assist getting out of machine   Standing lumbar SB  1 10 To L         Therapeutic Activities 20'              Deadlifts with KB  To 8\" + 6\" box 2 10 10# KB   Lateral stepping at 1/2 wall teal 1 4 laps Circuit  Band around ankles   TRX squats  1 15    Fwd step downs 4\" 1 10x B   Lateral step downs 4\" 1 10x B   Sit to stands 4# MB 2 10x circuit   Retro slider lunges  2 10 circuit   [place] carry 10# 1 2 laps circuit   Suitcase carry 7.5# KB 1 1 lap ea    BP readings / assessment 5'      Standing glut med activation orange 1 10x ea    Manual Intervention 15'              Prone PA 5'      GISTM/STM 5'   B lumbar paraspinals (R>L)   Lumbar Mobs in S/L 5'      SI Manip       Hip belt mobs       Hip LA distraction              NMR re-education               Seated SB pelvic tilts  1 5x    Seated SB marches  1 10 Alternating LE   SLS ground 10\" 3x ea             Pt. Education:  -pt educated on diagnosis, prognosis and expectations for rehab  -all pt questions were answered    Home Exercise Program:  [address] access code [facility]    Therapeutic Exercise and NMR EXR  [x] (55947) Provided verbal/tactile cueing for activities related to strengthening, flexibility, endurance, ROM  for improvements in proximal hip and core control with self care, mobility, lifting and ambulation.  [] (78991) Provided verbal/tactile cueing for activities related to improving balance, coordination, kinesthetic sense, posture, motor skill, proprioception  to assist with core control in self care, mobility, lifting, and ambulation. Therapeutic Activities:    [] (40708 or 79680) Provided verbal/tactile cueing for activities related to improving balance, coordination, kinesthetic sense, posture, motor skill, proprioception and motor activation to allow for proper function  with self care and ADLs  [] (66501) Provided training and instruction to the patient for proper core and proximal hip recruitment and positioning with ambulation re-education     Home Exercise Program:    [x] (77992) Reviewed/Progressed HEP activities related to strengthening, flexibility, endurance, ROM of core, proximal hip and LE for functional self-care, mobility, lifting and ambulation   [] (35699) Reviewed/Progressed HEP activities related to improving balance, coordination, kinesthetic sense, posture, motor skill, proprioception of core, proximal hip and LE for self care, mobility, lifting, and ambulation      Manual Treatments:  PROM / STM / Oscillations-Mobs:  G-I, II, III, IV (PA's, Inf., Post.)  [x] (25535) Provided manual therapy to mobilize proximal hip and LS spine soft tissue/joints for the purpose of modulating pain, promoting relaxation,  increasing ROM, reducing/eliminating soft tissue swelling/inflammation/restriction, improving soft tissue extensibility and allowing for proper ROM for normal function with self care, mobility, lifting and ambulation.      Modalities:     Charges:  Timed Code Treatment Minutes: 45   Total Treatment Minutes: 45       [] EVAL (LOW) 14295 (typically 20 minutes face-to-face)  [] EVAL (MOD) 01311 (typically 30 minutes face-to-face)  [] EVAL (HIGH) 03902 (typically 45 minutes face-to-face)  [] RE-EVAL     [x] SZ(37529) x 1   [] IONTO (36429)  [] NMR (15253) x     [] VASO (37842)  [x] Manual (63543) x  1 [] Other:  [x] TA (33067)x 1    [] Mech Traction (89658)  [] ES(attended) (65361)     [] ES (un) (77636): If BWC Please Indicate Time In/Out and Total Minutes  CPT Code Time in Time out Total Min                                            Goals:   Patient stated goal: \"to decrease my back pain\"  [x] Progressing: [] Met: [] Not Met: [] Adjusted     Therapist goals for Patient:  Short Term Goals: To be achieved in: 2 weeks  1. Independent in HEP and progression per patient tolerance, in order to prevent re-injury. [] Progressing: [x] Met: [] Not Met: [] Adjusted  2. Patient will have a decrease in pain to facilitate improvement in movement, function, and ADLs as indicated by Functional Deficits. [x] Progressing: [] Met: [] Not Met: [] Adjusted     Long Term Goals: To be achieved in: 8 weeks  1. Patient will reach FOTO predicted score of at least 42 to assist with reaching prior level of function. [] Progressing: [x] Met: [] Not Met: [] Adjusted  2. Patient will demonstrate increased AROM to WNL, good LS mobility, good hip ROM to allow for proper joint functioning as indicated by patients Functional Deficits. [x] Progressing: [] Met: [] Not Met: [] Adjusted  3. Patient will demonstrate an increase in Strength to good proximal hip and core activation to allow for proper functional mobility as indicated by patients Functional Deficits. [x] Progressing: [] Met: [] Not Met: [] Adjusted  4. Patient will be able to bend forwards to pick something up and stand up without increased symptoms or restriction. [] Progressing: [x] Met: [] Not Met: [x] Adjusted  5. Patient will be able to walk for 15 minutes without increased symptoms or restriction.      [] Progressing: [x] Met: [] Not Met: [x] Adjusted        ASSESSMENT:  Pt demonstrated improved functional endurance today with ability to tolerate circuit training. Added forward and lateral step downs with cues required initially to decrease trunk compensation and to maintain appropriate hip alignment. Increased resistance with lateral band walking as well with fatigue noted. Pt would continue to benefit from skilled PT services to address mobility and functional strength deficits in order to return to PLOF, including walking and squatting to pick something up off of the floor. Treatment/Activity Tolerance:  [x] Patient tolerated treatment well [x] Patient limited by fatique  [] Patient limited by pain  [] Patient limited by other medical complications  [] Other:     Overall Progression Towards Functional goals/ Treatment Progress Update:  [x] Patient is progressing as expected towards functional goals listed. [] Progression is slowed due to complexities/Impairments listed. [] Progression has been slowed due to co-morbidities. [] Plan just implemented, too soon to assess goals progression <30days   [] Goals require adjustment due to lack of progress  [] Patient is not progressing as expected and requires additional follow up with physician  [] Other:    Prognosis for POC: [] Good [x] Fair  [] Poor    Patient requires continued skilled intervention: [x] Yes  [] No        PLAN: Decrease LBP, improve lumbar/hip mobility and strength as tolerated by pt. [x] Continue per plan of care [] Alter current plan (see comments)  [] Plan of care initiated [x] Hold pending insurance auth [] Discharge    Electronically signed by: Kaia Rao PT    Note: If patient does not return for scheduled/recommended follow up visits, this note will serve as a discharge from care along with the most recent update on progress.

## 2022-12-05 ENCOUNTER — HOSPITAL ENCOUNTER (OUTPATIENT)
Dept: PHYSICAL THERAPY | Age: 47
Setting detail: THERAPIES SERIES
Discharge: HOME | End: 2022-12-05
Payer: COMMERCIAL

## 2022-12-05 PROCEDURE — 97530 THERAPEUTIC ACTIVITIES: CPT

## 2022-12-05 PROCEDURE — 97110 THERAPEUTIC EXERCISES: CPT

## 2022-12-05 PROCEDURE — 97140 MANUAL THERAPY 1/> REGIONS: CPT

## 2022-12-05 NOTE — FLOWSHEET NOTE
[employer]     Physical Therapy Treatment Note/ Progress Report:     Date:  2022    Patient Name:  Nabor Dye    :    MRN: [id number]  Medical/Treatment Diagnosis Information:  Diagnosis: Lumbar discogenic pain syndrome (M51.26), lumbar spondylosis (M47.816)       Treatment diagnosis: Low back pain   Insurance/Certification information:   [facility] - $0 deductible, $0 OOP max, 100% co-insurance, 30 PT visits per calendar year, auth required   Physician Information:  Sandrine Sanchez [address] [place] [address] signed (Y/N): []  Yes [x]  No  Date sent:     Date of Patient follow up with Physician:      Progress Report: []  Yes  [x]  No     Functional Scale:           Date assessed:  FOTO physical FS primary measure score = 23     22  FOTO = 34          22  FOTO = 40          22  FOTO = 42          10/20/22  FOTO = 44          11/10/22    Date Range for reporting period:  Beginnin22  Endin/10/22    Progress report due (10 Rx/or 30 days whichever is less):     Recertification due (POC duration/ or 90 days whichever is less): 22     Visit # Insurance Allowable Auth Needed   3/16 16 visits approved 11/10/22 - 22 [x]Yes    []No     Pain level:  0-1/10 currently    SUBJECTIVE:  Pt reports still having some soreness on R side of his lower back, but overall not as painful as it was prior to starting PT.        OBJECTIVE: See eval  Observation:   Test measurements:    22:  Lumbar AROM: flex = 40 *pn, ext = 10 *pn, SB = 50% B \"tight\" on R with L SB  22:  BP readings:  164/109, 157/104 on small portable unit; 147/97 beginning of session on larger unit; 152/96 after manual therapy, 144/93 after 3 min rest break  10/20/22:  Lumbar AROM: flex = 55, ext = 10, SB = 50% B  22  BP readings:  131/91 at beginning of session  119/77 at end of session  Lumbar AROM: flex =60, ext = 15, SB = 75% B         Strength  LEFT RIGHT   HIP Flexors (L1-2) 4+ 4   HIP Abductors 4 4-   HIP extension 4- 4- *pn   Knee EXT (L3) 4+ 4+   Knee Flex (S1) 4+ 4+       RESTRICTIONS/PRECAUTIONS: 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; FALL RISK    Treatment based classification:     Exercises/Interventions:     Therapeutic Ex  10' Wt / Resistance sets/sec reps notes   Bike  5'     LTR  1 10    Hand heel rock   1 10 Held today due to soreness in arms   SKTC   15\" 3x ea    DKTC w/red SB  5\" 15    Glut stretch crossover  Piriformis stretch fig 4 30\"  30\" 3x ea  3   Pushing vs pulling  L   Supine clamshells    Pelvic tilts  1 10 In hooklying   Seated lumbar flexion stretch w/SB Red SB 1 10    Seated TB rows  Seated TB high rows Red  red 1  1 10  10    Bridges  1 10    S/L clamshells orange 1 15 R   Prone hip extension 0# 1 10 B   Leg press DL 60# 2 10 Requires assist getting out of machine   Standing lumbar SB  1 10 To L         Therapeutic Activities 20'              Deadlifts with KB  To 8\" + 6\" box 2 10 10# KB   Lateral stepping  teal 1 3 laps Circuit  Band around ankles   TRX squats  1 15    Fwd step downs 4\" 1 10x B   Lateral step downs 4\" 1 10x B   Sit to stands 4# MB 2 10x circuit   Retro slider lunges  2 10 circuit   [place] carry 10# 1 2 laps circuit   Suitcase carry 7.5# KB 1 1 lap ea    BP readings / assessment 5'      Standing glut med activation orange 1 10x ea    Manual Intervention 15'              Prone PA 5'      GISTM/STM 5'   B lumbar paraspinals (R>L)   Lumbar Mobs in S/L 5'      SI Manip       Hip belt mobs       Hip LA distraction              NMR re-education               Seated SB pelvic tilts  1 5x    Seated SB marches  1 10 Alternating LE   SLS ground 10\" 3x ea             Pt. Education:  -pt educated on diagnosis, prognosis and expectations for rehab  -all pt questions were answered    Home Exercise Program:  [address] access code [facility]    Therapeutic Exercise and NMR EXR  [x] (11354) Provided verbal/tactile cueing for activities related to strengthening, flexibility, endurance, ROM  for improvements in proximal hip and core control with self care, mobility, lifting and ambulation.  [] (33775) Provided verbal/tactile cueing for activities related to improving balance, coordination, kinesthetic sense, posture, motor skill, proprioception  to assist with core control in self care, mobility, lifting, and ambulation. Therapeutic Activities:    [] (66161 or 97841) Provided verbal/tactile cueing for activities related to improving balance, coordination, kinesthetic sense, posture, motor skill, proprioception and motor activation to allow for proper function  with self care and ADLs  [] (94837) Provided training and instruction to the patient for proper core and proximal hip recruitment and positioning with ambulation re-education     Home Exercise Program:    [x] (01903) Reviewed/Progressed HEP activities related to strengthening, flexibility, endurance, ROM of core, proximal hip and LE for functional self-care, mobility, lifting and ambulation   [] (57806) Reviewed/Progressed HEP activities related to improving balance, coordination, kinesthetic sense, posture, motor skill, proprioception of core, proximal hip and LE for self care, mobility, lifting, and ambulation      Manual Treatments:  PROM / STM / Oscillations-Mobs:  G-I, II, III, IV (PA's, Inf., Post.)  [x] (80527) Provided manual therapy to mobilize proximal hip and LS spine soft tissue/joints for the purpose of modulating pain, promoting relaxation,  increasing ROM, reducing/eliminating soft tissue swelling/inflammation/restriction, improving soft tissue extensibility and allowing for proper ROM for normal function with self care, mobility, lifting and ambulation.      Modalities:     Charges:  Timed Code Treatment Minutes: 45   Total Treatment Minutes: 45       [] EVAL (LOW) 41849 (typically 20 minutes face-to-face)  [] EVAL (MOD) 92396 (typically 30 minutes face-to-face)  [] EVAL (HIGH) 75463 (typically 45 minutes face-to-face)  [] RE-EVAL     [x] QJ(15813) x 1   [] IONTO (92767)  [] NMR (43328) x     [] VASO (58081)  [x] Manual (84660) x  1 [] Other:  [x] TA (77632)x 1    [] Mech Traction (78920)  [] ES(attended) (82122)     [] ES (un) (12587): If BWC Please Indicate Time In/Out and Total Minutes  CPT Code Time in Time out Total Min                                            Goals:   Patient stated goal: \"to decrease my back pain\"  [x] Progressing: [] Met: [] Not Met: [] Adjusted     Therapist goals for Patient:  Short Term Goals: To be achieved in: 2 weeks  1. Independent in HEP and progression per patient tolerance, in order to prevent re-injury. [] Progressing: [x] Met: [] Not Met: [] Adjusted  2. Patient will have a decrease in pain to facilitate improvement in movement, function, and ADLs as indicated by Functional Deficits. [x] Progressing: [] Met: [] Not Met: [] Adjusted     Long Term Goals: To be achieved in: 8 weeks  1. Patient will reach FOTO predicted score of at least 42 to assist with reaching prior level of function. [] Progressing: [x] Met: [] Not Met: [] Adjusted  2. Patient will demonstrate increased AROM to WNL, good LS mobility, good hip ROM to allow for proper joint functioning as indicated by patients Functional Deficits. [x] Progressing: [] Met: [] Not Met: [] Adjusted  3. Patient will demonstrate an increase in Strength to good proximal hip and core activation to allow for proper functional mobility as indicated by patients Functional Deficits. [x] Progressing: [] Met: [] Not Met: [] Adjusted  4. Patient will be able to bend forwards to pick something up and stand up without increased symptoms or restriction. [] Progressing: [x] Met: [] Not Met: [x] Adjusted  5. Patient will be able to walk for 15 minutes without increased symptoms or restriction.      [] Progressing: [x] Met: [] Not Met: [x] Adjusted        ASSESSMENT:  Pt required occasional rest breaks due to new mask guidance / pt having more difficulty breathing. Held exercise progression today as a result of this. Pt required initial cues with LSD's to decrease hip drop compensation, but was able to use appropriate form once cues were given. Pt would continue to benefit from skilled PT services to address mobility and functional strength deficits in order to return to PLOF, including walking and squatting to pick something up off of the floor. Treatment/Activity Tolerance:  [x] Patient tolerated treatment well [x] Patient limited by fatique  [] Patient limited by pain  [] Patient limited by other medical complications  [] Other:     Overall Progression Towards Functional goals/ Treatment Progress Update:  [x] Patient is progressing as expected towards functional goals listed. [] Progression is slowed due to complexities/Impairments listed. [] Progression has been slowed due to co-morbidities. [] Plan just implemented, too soon to assess goals progression <30days   [] Goals require adjustment due to lack of progress  [] Patient is not progressing as expected and requires additional follow up with physician  [] Other:    Prognosis for POC: [] Good [x] Fair  [] Poor    Patient requires continued skilled intervention: [x] Yes  [] No        PLAN: Decrease LBP, improve lumbar/hip mobility and strength as tolerated by pt. [x] Continue per plan of care [] Alter current plan (see comments)  [] Plan of care initiated [x] Hold pending insurance auth [] Discharge    Electronically signed by: Israel Martinez PT    Note: If patient does not return for scheduled/recommended follow up visits, this note will serve as a discharge from care along with the most recent update on progress.

## 2022-12-07 ENCOUNTER — HOSPITAL ENCOUNTER (OUTPATIENT)
Dept: PHYSICAL THERAPY | Age: 47
Setting detail: THERAPIES SERIES
Discharge: HOME | End: 2022-12-07
Payer: COMMERCIAL

## 2022-12-07 PROCEDURE — 97140 MANUAL THERAPY 1/> REGIONS: CPT

## 2022-12-07 PROCEDURE — 97110 THERAPEUTIC EXERCISES: CPT

## 2022-12-07 PROCEDURE — 97530 THERAPEUTIC ACTIVITIES: CPT

## 2022-12-07 NOTE — FLOWSHEET NOTE
[employer]     Physical Therapy Treatment Note/ Progress Report:     Date:  2022    Patient Name:  Madeleine Sanchez    :    MRN: [id number]  Medical/Treatment Diagnosis Information:  Diagnosis: Lumbar discogenic pain syndrome (M51.26), lumbar spondylosis (M47.816)       Treatment diagnosis: Low back pain   Insurance/Certification information:   [facility] - $0 deductible, $0 OOP max, 100% co-insurance, 30 PT visits per calendar year, auth required   Physician Information:  [facility] [address] [place] [address] signed (Y/N): []  Yes [x]  No  Date sent:     Date of Patient follow up with Physician:      Progress Report: []  Yes  [x]  No     Functional Scale:           Date assessed:  FOTO physical FS primary measure score = 23     22  FOTO = 34          22  FOTO = 40          22  FOTO = 42          10/20/22  FOTO = 44          11/10/22    Date Range for reporting period:  Beginnin22  Endin/10/22    Progress report due (10 Rx/or 30 days whichever is less):     Recertification due (POC duration/ or 90 days whichever is less): 22     Visit # Insurance Allowable Auth Needed    16 visits approved 11/10/22 - 22 [x]Yes    []No     Pain level:  0-1/10 currently    SUBJECTIVE:  Pt reports that he feels much better overall since starting PT. Pt no longer has disturbances while sleeping due to LBP, but continues to have difficulty with bending forwards and standing back up.        OBJECTIVE: See eval  Observation:   Test measurements:    22:  Lumbar AROM: flex = 40 *pn, ext = 10 *pn, SB = 50% B \"tight\" on R with L SB  22:  BP readings:  164/109, 157/104 on small portable unit; 147/97 beginning of session on larger unit; 152/96 after manual therapy, 144/93 after 3 min rest break  10/20/22:  Lumbar AROM: flex = 55, ext = 10, SB = 50% B  22  BP readings:  132/84 at beginning of session  139/87 at end of session  Lumbar AROM: flex =60, ext = 15, SB = 75% B         Strength  LEFT RIGHT   HIP Flexors (L1-2) 4+ 4   HIP Abductors 4 4-   HIP extension 4- 4- *pn   Knee EXT (L3) 4+ 4+   Knee Flex (S1) 4+ 4+       RESTRICTIONS/PRECAUTIONS: 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; FALL RISK    Treatment based classification:     Exercises/Interventions:     Therapeutic Ex  15' Wt / Resistance sets/sec reps notes   Bike  5'     LTR  1 10    Hand heel rock   1 10 Held today due to soreness in arms   SKTC   15\" 3x ea    DKTC w/red SB  5\" 15    Glut stretch crossover  Piriformis stretch fig 4 30\"  30\" 3x ea  3   Pushing vs pulling  L   Supine clamshells    Pelvic tilts  1 10 In hooklying   Seated lumbar flexion stretch w/SB Red SB 1 10    Seated TB rows  Seated TB high rows Red  red 1  1 10  10    Bridges  1 10    S/L clamshells orange 1 15 R   Prone hip extension 0# 1 10 B   Leg press DL 70# 2 10 Requires assist getting out of machine   HS curls DL 30# 2 10          Therapeutic Activities 20'              Deadlifts with KB  To 8\" + 4\" box 2 10 10# KB   Lateral stepping  teal 1 3 laps Circuit  Band around ankles   TRX squats  1 15    Fwd step downs 4\" 1 10x B   Lateral step downs 4\" 1 15x B   Sit to stands 4# MB 2 10x circuit   Retro slider lunges  2 10 circuit   [place] carry 12# 1 2 laps circuit   Suitcase carry 7.5# KB 1 1 lap ea    BP readings / assessment 5'      Standing glut med activation orange 1 10x ea    Manual Intervention 15'              Prone PA 5'      GISTM/STM 5'   B lumbar paraspinals (R>L)   Lumbar Mobs in S/L 5'      SI Manip       Hip belt mobs       Hip LA distraction              NMR re-education               Seated SB pelvic tilts  1 5x    Seated SB marches  1 10 Alternating LE   SLS ground 10\" 3x ea             Pt. Education:  -pt educated on diagnosis, prognosis and expectations for rehab  -all pt questions were answered    Home Exercise Program:  [facility]queen access code [facility]    Therapeutic Exercise and NMR EXR  [x] (53284) Provided verbal/tactile cueing for activities related to strengthening, flexibility, endurance, ROM  for improvements in proximal hip and core control with self care, mobility, lifting and ambulation.  [] (99530) Provided verbal/tactile cueing for activities related to improving balance, coordination, kinesthetic sense, posture, motor skill, proprioception  to assist with core control in self care, mobility, lifting, and ambulation. Therapeutic Activities:    [] (14232 or 88950) Provided verbal/tactile cueing for activities related to improving balance, coordination, kinesthetic sense, posture, motor skill, proprioception and motor activation to allow for proper function  with self care and ADLs  [] (45770) Provided training and instruction to the patient for proper core and proximal hip recruitment and positioning with ambulation re-education     Home Exercise Program:    [x] (73241) Reviewed/Progressed HEP activities related to strengthening, flexibility, endurance, ROM of core, proximal hip and LE for functional self-care, mobility, lifting and ambulation   [] (37468) Reviewed/Progressed HEP activities related to improving balance, coordination, kinesthetic sense, posture, motor skill, proprioception of core, proximal hip and LE for self care, mobility, lifting, and ambulation      Manual Treatments:  PROM / STM / Oscillations-Mobs:  G-I, II, III, IV (PA's, Inf., Post.)  [x] (80835) Provided manual therapy to mobilize proximal hip and LS spine soft tissue/joints for the purpose of modulating pain, promoting relaxation,  increasing ROM, reducing/eliminating soft tissue swelling/inflammation/restriction, improving soft tissue extensibility and allowing for proper ROM for normal function with self care, mobility, lifting and ambulation.      Modalities:     Charges:  Timed Code Treatment Minutes: 50   Total Treatment Minutes: 50       [] EVAL (LOW) 74334 (typically 20 minutes face-to-face)  [] EVAL (MOD) 40871 (typically 30 minutes face-to-face)  [] EVAL (HIGH) 06575 (typically 45 minutes face-to-face)  [] RE-EVAL     [x] YK(41570) x 1   [] IONTO (96181)  [] NMR (68371) x     [] VASO (50000)  [x] Manual (68051) x  1 [] Other:  [x] TA (09563)x 1    [] Mech Traction (16080)  [] ES(attended) (04834)     [] ES (un) (69076): If BWC Please Indicate Time In/Out and Total Minutes  CPT Code Time in Time out Total Min                                            Goals:   Patient stated goal: \"to decrease my back pain\"  [x] Progressing: [] Met: [] Not Met: [] Adjusted     Therapist goals for Patient:  Short Term Goals: To be achieved in: 2 weeks  1. Independent in HEP and progression per patient tolerance, in order to prevent re-injury. [] Progressing: [x] Met: [] Not Met: [] Adjusted  2. Patient will have a decrease in pain to facilitate improvement in movement, function, and ADLs as indicated by Functional Deficits. [x] Progressing: [] Met: [] Not Met: [] Adjusted     Long Term Goals: To be achieved in: 8 weeks  1. Patient will reach FOTO predicted score of at least 42 to assist with reaching prior level of function. [] Progressing: [x] Met: [] Not Met: [] Adjusted  2. Patient will demonstrate increased AROM to WNL, good LS mobility, good hip ROM to allow for proper joint functioning as indicated by patients Functional Deficits. [x] Progressing: [] Met: [] Not Met: [] Adjusted  3. Patient will demonstrate an increase in Strength to good proximal hip and core activation to allow for proper functional mobility as indicated by patients Functional Deficits. [x] Progressing: [] Met: [] Not Met: [] Adjusted  4. Patient will be able to bend forwards to pick something up and stand up without increased symptoms or restriction. [] Progressing: [x] Met: [] Not Met: [x] Adjusted  5.  Patient will be able to walk for 15 minutes without increased symptoms or restriction. [] Progressing: [x] Met: [] Not Met: [x] Adjusted        ASSESSMENT:  Pt continues to respond well to manual therapy. Improved functional glut strength noted with decreased hip compensation today. Pt demonstrated improved tolerance to exercise progressions today with fatigue noted at end of session. Pt would continue to benefit from skilled PT services to address mobility and functional strength deficits in order to return to PLOF, including walking and squatting to pick something up off of the floor. Treatment/Activity Tolerance:  [x] Patient tolerated treatment well [x] Patient limited by fatique  [] Patient limited by pain  [] Patient limited by other medical complications  [] Other:     Overall Progression Towards Functional goals/ Treatment Progress Update:  [x] Patient is progressing as expected towards functional goals listed. [] Progression is slowed due to complexities/Impairments listed. [] Progression has been slowed due to co-morbidities. [] Plan just implemented, too soon to assess goals progression <30days   [] Goals require adjustment due to lack of progress  [] Patient is not progressing as expected and requires additional follow up with physician  [] Other:    Prognosis for POC: [] Good [x] Fair  [] Poor    Patient requires continued skilled intervention: [x] Yes  [] No        PLAN: Decrease LBP, improve lumbar/hip mobility and strength as tolerated by pt. [x] Continue per plan of care [] Alter current plan (see comments)  [] Plan of care initiated [x] Hold pending insurance auth [] Discharge    Electronically signed by: Leeanne Steiner PT    Note: If patient does not return for scheduled/recommended follow up visits, this note will serve as a discharge from care along with the most recent update on progress.

## 2022-12-12 ENCOUNTER — HOSPITAL ENCOUNTER (OUTPATIENT)
Dept: PHYSICAL THERAPY | Age: 47
Setting detail: THERAPIES SERIES
Discharge: HOME | End: 2022-12-12
Payer: COMMERCIAL

## 2022-12-12 PROCEDURE — 97140 MANUAL THERAPY 1/> REGIONS: CPT

## 2022-12-12 PROCEDURE — 97530 THERAPEUTIC ACTIVITIES: CPT

## 2022-12-12 PROCEDURE — 97110 THERAPEUTIC EXERCISES: CPT

## 2022-12-12 NOTE — FLOWSHEET NOTE
[facility]     Physical Therapy Treatment Note/ Progress Report:     Date:  2022    Patient Name:  Wes Jimenez    :  [age]  MRN: [id number]  Medical/Treatment Diagnosis Information:  Diagnosis: Lumbar discogenic pain syndrome (M51.26), lumbar spondylosis (M47.816)       Treatment diagnosis: Low back pain   Insurance/Certification information:   [facility] - $0 deductible, $0 OOP max, 100% co-insurance, 30 PT visits per calendar year, auth required   Physician Information:  [address] signed (Y/N): []  Yes [x]  No  Date sent:     Date of Patient follow up with Physician:      Progress Report: []  Yes  [x]  No     Functional Scale:           Date assessed:  FOTO physical FS primary measure score = 23     22  FOTO = 34          22  FOTO = 40          22  FOTO = 42          10/20/22  FOTO = 44          11/10/22    Date Range for reporting period:  Beginnin22  Endin/10/22    Progress report due (10 Rx/or 30 days whichever is less):     Recertification due (POC duration/ or 90 days whichever is less): 22     Visit # Insurance Allowable Auth Needed    16 visits approved 11/10/22 - 22 [x]Yes    []No     Pain level:  0-1/10 currently    SUBJECTIVE:  Pt reports that he still has difficulty bending forwards and standing back up due to tightness in his back. Pt plans to look into a membership at [employer].        OBJECTIVE: See eval  Observation:   Test measurements:    22:  Lumbar AROM: flex = 40 *pn, ext = 10 *pn, SB = 50% B \"tight\" on R with L SB  22:  BP readings:  164/109, 157/104 on small portable unit; 147/97 beginning of session on larger unit; 152/96 after manual therapy, 144/93 after 3 min rest break  10/20/22:  Lumbar AROM: flex = 55, ext = 10, SB = 50% B  22  BP = 126/74  Lumbar AROM: flex =60, ext = 15, SB = 75% B         Strength LEFT RIGHT   HIP Flexors (L1-2) 4+ 4   HIP Abductors 4 4-   HIP extension 4- 4- *pn   Knee EXT (L3) 4+ 4+   Knee Flex (S1) 4+ 4+       RESTRICTIONS/PRECAUTIONS: 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; FALL RISK    Treatment based classification:     Exercises/Interventions:     Therapeutic Ex  15' Wt / Resistance sets/sec reps notes   Bike  5'     LTR  1 10    Hand heel rock   1 10    SKTC   15\" 3x ea    DKTC w/red SB  5\" 15    Glut stretch crossover  Piriformis stretch fig 4 30\"  30\" 3x ea  3   Pushing vs pulling  L   Supine clamshells    Pelvic tilts  1 10 In hooklying   Seated lumbar flexion stretch w/SB Red SB 1 10    Seated TB rows  Seated TB high rows Red  red 1  1 10  10    Bridges  1 10    S/L clamshells orange 1 15 R   Prone hip extension 0# 1 10 B   Leg press DL 70# 2 10 Requires assist getting out of machine   HS curls DL 30# 2 10    MARE abduction 45# 2 10x ea          Therapeutic Activities 20'              Deadlifts with KB  To 8\" + 4\" box 2 10 15# KB   Lateral stepping  teal 1 3 laps Circuit  Band around ankles   TRX squats  1 15    Fwd step downs 4\" 1 10x B   Lateral step downs 4\" 1 15x B   Sit to stands 4# MB 2 10x circuit   Retro slider lunges  2 10 circuit   [place] carry 12# 1 2 laps circuit   Suitcase carry 7.5# KB 1 1 lap ea    BP readings / assessment 5'      Standing glut med activation orange 1 10x ea    Manual Intervention 15'              Prone PA 5'      GISTM/STM 5'   B lumbar paraspinals (R>L)   Lumbar Mobs in S/L 5'      SI Manip       Hip belt mobs       Hip LA distraction              NMR re-education               Seated SB pelvic tilts  1 5x    Seated SB marches  1 10 Alternating LE   SLS ground 10\" 3x ea             Pt. Education:  -pt educated on diagnosis, prognosis and expectations for rehab  -all pt questions were answered    Home Exercise Program:  [address] access code [facility]    Therapeutic Exercise and NMR EXR  [x] (00903) Provided verbal/tactile cueing for activities related to strengthening, flexibility, endurance, ROM  for improvements in proximal hip and core control with self care, mobility, lifting and ambulation.  [] (61261) Provided verbal/tactile cueing for activities related to improving balance, coordination, kinesthetic sense, posture, motor skill, proprioception  to assist with core control in self care, mobility, lifting, and ambulation. Therapeutic Activities:    [] (15328 or 06250) Provided verbal/tactile cueing for activities related to improving balance, coordination, kinesthetic sense, posture, motor skill, proprioception and motor activation to allow for proper function  with self care and ADLs  [] (66289) Provided training and instruction to the patient for proper core and proximal hip recruitment and positioning with ambulation re-education     Home Exercise Program:    [x] (73357) Reviewed/Progressed HEP activities related to strengthening, flexibility, endurance, ROM of core, proximal hip and LE for functional self-care, mobility, lifting and ambulation   [] (72431) Reviewed/Progressed HEP activities related to improving balance, coordination, kinesthetic sense, posture, motor skill, proprioception of core, proximal hip and LE for self care, mobility, lifting, and ambulation      Manual Treatments:  PROM / STM / Oscillations-Mobs:  G-I, II, III, IV (PA's, Inf., Post.)  [x] (13744) Provided manual therapy to mobilize proximal hip and LS spine soft tissue/joints for the purpose of modulating pain, promoting relaxation,  increasing ROM, reducing/eliminating soft tissue swelling/inflammation/restriction, improving soft tissue extensibility and allowing for proper ROM for normal function with self care, mobility, lifting and ambulation.      Modalities:     Charges:  Timed Code Treatment Minutes: 50   Total Treatment Minutes: 50       [] EVAL (LOW) 67883 (typically 20 minutes face-to-face)  [] EVAL (MOD) 74430 (typically 30 minutes face-to-face)  [] EVAL (HIGH) 49783 (typically 45 minutes face-to-face)  [] RE-EVAL     [x] UE(70161) x 1   [] IONTO (80116)  [] NMR (02914) x     [] VASO (56145)  [x] Manual (93378) x  1 [] Other:  [x] TA (69584)x 1    [] Mech Traction (89436)  [] ES(attended) (21649)     [] ES (un) (06331): If BWC Please Indicate Time In/Out and Total Minutes  CPT Code Time in Time out Total Min                                            Goals:   Patient stated goal: \"to decrease my back pain\"  [x] Progressing: [] Met: [] Not Met: [] Adjusted     Therapist goals for Patient:  Short Term Goals: To be achieved in: 2 weeks  1. Independent in HEP and progression per patient tolerance, in order to prevent re-injury. [] Progressing: [x] Met: [] Not Met: [] Adjusted  2. Patient will have a decrease in pain to facilitate improvement in movement, function, and ADLs as indicated by Functional Deficits. [x] Progressing: [] Met: [] Not Met: [] Adjusted     Long Term Goals: To be achieved in: 8 weeks  1. Patient will reach FOTO predicted score of at least 42 to assist with reaching prior level of function. [] Progressing: [x] Met: [] Not Met: [] Adjusted  2. Patient will demonstrate increased AROM to WNL, good LS mobility, good hip ROM to allow for proper joint functioning as indicated by patients Functional Deficits. [x] Progressing: [] Met: [] Not Met: [] Adjusted  3. Patient will demonstrate an increase in Strength to good proximal hip and core activation to allow for proper functional mobility as indicated by patients Functional Deficits. [x] Progressing: [] Met: [] Not Met: [] Adjusted  4. Patient will be able to bend forwards to pick something up and stand up without increased symptoms or restriction. [] Progressing: [x] Met: [] Not Met: [x] Adjusted  5. Patient will be able to walk for 15 minutes without increased symptoms or restriction.      [] Progressing: [x] Met: [] Not Met: [x] Adjusted        ASSESSMENT:  Pt continues to respond well to manual therapy. Improved functional glut strength noted with decreased hip compensation today. Pt was very fatigued with addition of MARE abduction. Discussed with pt about benefit of joining gym to continue to progress strength. Pt would continue to benefit from skilled PT services to address mobility and functional strength deficits in order to return to PLOF, including walking and squatting to pick something up off of the floor. Treatment/Activity Tolerance:  [x] Patient tolerated treatment well [x] Patient limited by fatique  [] Patient limited by pain  [] Patient limited by other medical complications  [] Other:     Overall Progression Towards Functional goals/ Treatment Progress Update:  [x] Patient is progressing as expected towards functional goals listed. [] Progression is slowed due to complexities/Impairments listed. [] Progression has been slowed due to co-morbidities. [] Plan just implemented, too soon to assess goals progression <30days   [] Goals require adjustment due to lack of progress  [] Patient is not progressing as expected and requires additional follow up with physician  [] Other:    Prognosis for POC: [] Good [x] Fair  [] Poor    Patient requires continued skilled intervention: [x] Yes  [] No        PLAN: Decrease LBP, improve lumbar/hip mobility and strength as tolerated by pt. [x] Continue per plan of care [] Alter current plan (see comments)  [] Plan of care initiated [x] Hold pending insurance auth [] Discharge    Electronically signed by: Aracely Sibley PT    Note: If patient does not return for scheduled/recommended follow up visits, this note will serve as a discharge from care along with the most recent update on progress.

## 2022-12-14 ENCOUNTER — HOSPITAL ENCOUNTER (OUTPATIENT)
Dept: PHYSICAL THERAPY | Age: 47
Setting detail: THERAPIES SERIES
Discharge: HOME | End: 2022-12-14
Payer: COMMERCIAL

## 2022-12-14 PROCEDURE — 97110 THERAPEUTIC EXERCISES: CPT

## 2022-12-14 PROCEDURE — 97530 THERAPEUTIC ACTIVITIES: CPT

## 2022-12-14 PROCEDURE — 97140 MANUAL THERAPY 1/> REGIONS: CPT

## 2022-12-14 NOTE — FLOWSHEET NOTE
[employer]     Physical Therapy Treatment Note/ Progress Report:     Date:  2022    Patient Name:  Lobo Baugh    :  [birth year]  MRN: [id number]  Medical/Treatment Diagnosis Information:  Diagnosis: Lumbar discogenic pain syndrome (M51.26), lumbar spondylosis (M47.816)       Treatment diagnosis: Low back pain   Insurance/Certification information:   [facility] - $0 deductible, $0 OOP max, 100% co-insurance, 30 PT visits per calendar year, auth required   Physician Information:  Ania Soria, [address] [place] [address] signed (Y/N): []  Yes [x]  No  Date sent:     Date of Patient follow up with Physician:      Progress Report: []  Yes  [x]  No     Functional Scale:           Date assessed:  FOTO physical FS primary measure score = 23     22  FOTO = 34          22  FOTO = 40          22  FOTO = 42          10/20/22  FOTO = 44          11/10/22    Date Range for reporting period:  Beginnin22  Endin/10/22    Progress report due (10 Rx/or 30 days whichever is less):     Recertification due (POC duration/ or 90 days whichever is less): 22     Visit # Insurance Allowable Auth Needed    16 visits approved 11/10/22 - 22 [x]Yes    []No     Pain level:  0/10 currently    SUBJECTIVE:  Pt reports he was fatigued after last session, but slept well that night and his back feels great today.        OBJECTIVE: See eval  Observation:   Test measurements:    22:  Lumbar AROM: flex = 40 *pn, ext = 10 *pn, SB = 50% B \"tight\" on R with L SB  22:  BP readings:  164/109, 157/104 on small portable unit; 147/97 beginning of session on larger unit; 152/96 after manual therapy, 144/93 after 3 min rest break  10/20/22:  Lumbar AROM: flex = 55, ext = 10, SB = 50% B  22  BP:  150/93 at beginning of session (pt reports being stuck in traffic)  After 3 mins rest: 114/92  139/86 at end of session  Lumbar AROM: flex =60, ext = 15, SB = 75% B         Strength  LEFT RIGHT   HIP Flexors (L1-2) 4+ 4   HIP Abductors 4 4-   HIP extension 4- 4- *pn   Knee EXT (L3) 4+ 4+   Knee Flex (S1) 4+ 4+       RESTRICTIONS/PRECAUTIONS: 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; FALL RISK    Treatment based classification:     Exercises/Interventions:     Therapeutic Ex  15' Wt / Resistance sets/sec reps notes   Bike  5'     LTR  1 10    Hand heel rock   1 10    SKTC   15\" 3x ea    DKTC w/red SB  5\" 15    Glut stretch crossover  Piriformis stretch fig 4 30\"  30\" 3x ea  3   Pushing vs pulling  L   Supine clamshells    Pelvic tilts  1 10 In hooklying   Seated lumbar flexion stretch w/SB Red SB 1 10    Seated TB rows  Seated TB high rows Red  red 1  1 10  10    Bridges  1 10    S/L clamshells orange 1 15 R   Prone hip extension 0# 1 10 B   Leg press DL 70# 2 10 Requires assist getting out of machine   HS curls DL 30# 2 10    MARE abduction 45# 2 10x ea          Therapeutic Activities 20'              Deadlifts with KB  To 8\" + 4\" box 2 10 15# KB   Lateral stepping  teal 1 3 laps Circuit  Band around ankles   TRX squats  1 15    Fwd step downs 4\" 1 10x B   Lateral step downs 4\" 1 15x B   Sit to stands 4# MB 2 10x circuit   Retro slider lunges  2 10 circuit   [place] carry 12# 1 2 laps circuit   Suitcase carry 7.5# KB 1 1 lap ea    BP readings / assessment 5'      Standing glut med activation orange 1 10x ea    Manual Intervention 15'              Prone PA 5'      GISTM/STM 5'   B lumbar paraspinals (R>L)   Lumbar Mobs in S/L 5'      SI Manip       Hip belt mobs       Hip LA distraction              NMR re-education               Seated SB pelvic tilts  1 5x    Seated SB marches  1 10 Alternating LE   SLS ground 10\" 3x ea             Pt. Education:  -pt educated on diagnosis, prognosis and expectations for rehab  -all pt questions were answered    Home Exercise Program:  Dorean Bowels access code [facility] Exercise and NMR EXR  [x] (89727) Provided verbal/tactile cueing for activities related to strengthening, flexibility, endurance, ROM  for improvements in proximal hip and core control with self care, mobility, lifting and ambulation.  [] (79330) Provided verbal/tactile cueing for activities related to improving balance, coordination, kinesthetic sense, posture, motor skill, proprioception  to assist with core control in self care, mobility, lifting, and ambulation. Therapeutic Activities:    [] (10970 or 73666) Provided verbal/tactile cueing for activities related to improving balance, coordination, kinesthetic sense, posture, motor skill, proprioception and motor activation to allow for proper function  with self care and ADLs  [] (12968) Provided training and instruction to the patient for proper core and proximal hip recruitment and positioning with ambulation re-education     Home Exercise Program:    [x] (76292) Reviewed/Progressed HEP activities related to strengthening, flexibility, endurance, ROM of core, proximal hip and LE for functional self-care, mobility, lifting and ambulation   [] (42239) Reviewed/Progressed HEP activities related to improving balance, coordination, kinesthetic sense, posture, motor skill, proprioception of core, proximal hip and LE for self care, mobility, lifting, and ambulation      Manual Treatments:  PROM / STM / Oscillations-Mobs:  G-I, II, III, IV (PA's, Inf., Post.)  [x] (52406) Provided manual therapy to mobilize proximal hip and LS spine soft tissue/joints for the purpose of modulating pain, promoting relaxation,  increasing ROM, reducing/eliminating soft tissue swelling/inflammation/restriction, improving soft tissue extensibility and allowing for proper ROM for normal function with self care, mobility, lifting and ambulation.      Modalities:     Charges:  Timed Code Treatment Minutes: 50   Total Treatment Minutes: 50       [] EVAL (LOW) 16872 (typically 20 minutes face-to-face)  [] EVAL (MOD) 07486 (typically 30 minutes face-to-face)  [] EVAL (HIGH) 14195 (typically 45 minutes face-to-face)  [] RE-EVAL     [x] RF(97865) x 1   [] IONTO (29428)  [] NMR (33525) x     [] VASO (81060)  [x] Manual (96597) x  1 [] Other:  [x] TA (51138)x 1    [] Mech Traction (15546)  [] ES(attended) (97335)     [] ES (un) (86859): If BWC Please Indicate Time In/Out and Total Minutes  CPT Code Time in Time out Total Min                                            Goals:   Patient stated goal: \"to decrease my back pain\"  [x] Progressing: [] Met: [] Not Met: [] Adjusted     Therapist goals for Patient:  Short Term Goals: To be achieved in: 2 weeks  1. Independent in HEP and progression per patient tolerance, in order to prevent re-injury. [] Progressing: [x] Met: [] Not Met: [] Adjusted  2. Patient will have a decrease in pain to facilitate improvement in movement, function, and ADLs as indicated by Functional Deficits. [x] Progressing: [] Met: [] Not Met: [] Adjusted     Long Term Goals: To be achieved in: 8 weeks  1. Patient will reach FOTO predicted score of at least 42 to assist with reaching prior level of function. [] Progressing: [x] Met: [] Not Met: [] Adjusted  2. Patient will demonstrate increased AROM to WNL, good LS mobility, good hip ROM to allow for proper joint functioning as indicated by patients Functional Deficits. [x] Progressing: [] Met: [] Not Met: [] Adjusted  3. Patient will demonstrate an increase in Strength to good proximal hip and core activation to allow for proper functional mobility as indicated by patients Functional Deficits. [x] Progressing: [] Met: [] Not Met: [] Adjusted  4. Patient will be able to bend forwards to pick something up and stand up without increased symptoms or restriction. [] Progressing: [x] Met: [] Not Met: [x] Adjusted  5. Patient will be able to walk for 15 minutes without increased symptoms or restriction.      [] Progressing: [x] Met: [] Not Met: [x] Adjusted        ASSESSMENT:  Pt continues to respond well to manual therapy. Improved mobility and decreased tightness noted in R side of lumbar spine today. Held exercise progression due to good response to last session and pt being appropriately fatigued at end of session. Pt would continue to benefit from skilled PT services to address mobility and functional strength deficits in order to return to PLOF, including walking and squatting to pick something up off of the floor. Treatment/Activity Tolerance:  [x] Patient tolerated treatment well [x] Patient limited by fatique  [] Patient limited by pain  [] Patient limited by other medical complications  [] Other:     Overall Progression Towards Functional goals/ Treatment Progress Update:  [x] Patient is progressing as expected towards functional goals listed. [] Progression is slowed due to complexities/Impairments listed. [] Progression has been slowed due to co-morbidities. [] Plan just implemented, too soon to assess goals progression <30days   [] Goals require adjustment due to lack of progress  [] Patient is not progressing as expected and requires additional follow up with physician  [] Other:    Prognosis for POC: [] Good [x] Fair  [] Poor    Patient requires continued skilled intervention: [x] Yes  [] No        PLAN: Decrease LBP, improve lumbar/hip mobility and strength as tolerated by pt. [x] Continue per plan of care [] Alter current plan (see comments)  [] Plan of care initiated [x] Hold pending insurance auth [] Discharge    Electronically signed by: Dorita Evans PT    Note: If patient does not return for scheduled/recommended follow up visits, this note will serve as a discharge from care along with the most recent update on progress.

## 2022-12-19 ENCOUNTER — HOSPITAL ENCOUNTER (OUTPATIENT)
Dept: PHYSICAL THERAPY | Age: 47
Setting detail: THERAPIES SERIES
Discharge: HOME | End: 2022-12-19
Payer: COMMERCIAL

## 2022-12-19 PROCEDURE — 97530 THERAPEUTIC ACTIVITIES: CPT

## 2022-12-19 PROCEDURE — 97110 THERAPEUTIC EXERCISES: CPT

## 2022-12-19 PROCEDURE — 97140 MANUAL THERAPY 1/> REGIONS: CPT

## 2022-12-19 NOTE — FLOWSHEET NOTE
[employer]     Physical Therapy Treatment Note/ Progress Report:     Date:  2022    Patient Name:  Sharlene Hsieh    :    MRN: [id number]  Medical/Treatment Diagnosis Information:  Diagnosis: Lumbar discogenic pain syndrome (M51.26), lumbar spondylosis (M47.816)       Treatment diagnosis: Low back pain   Insurance/Certification information:   [facility] - $0 deductible, $0 OOP max, 100% co-insurance, 30 PT visits per calendar year, auth required   Physician Information:  Theresa Fontaine, [address] [place] [address] signed (Y/N): []  Yes [x]  No  Date sent:     Date of Patient follow up with Physician:      Progress Report: []  Yes  [x]  No     Functional Scale:           Date assessed:  FOTO physical FS primary measure score = 23     22  FOTO = 34          22  FOTO = 40          22  FOTO = 42          10/20/22  FOTO = 44          11/10/22    Date Range for reporting period:  Beginnin22  Endin/10/22    Progress report due (10 Rx/or 30 days whichever is less): 60/    Recertification due (POC duration/ or 90 days whichever is less): 22     Visit # Insurance Allowable Auth Needed    16 visits approved 11/10/22 - 22 [x]Yes    []No     Pain level:  0/10 currently    SUBJECTIVE:  Pt reports that his back has been a little sore the last 2-3 days, unsure why. Overall, pt feels his back has been much better since starting PT though.        OBJECTIVE: See eval  Observation:   Test measurements:    22:  Lumbar AROM: flex = 40 *pn, ext = 10 *pn, SB = 50% B \"tight\" on R with L SB  22:  BP readings:  164/109, 157/104 on small portable unit; 147/97 beginning of session on larger unit; 152/96 after manual therapy, 144/93 after 3 min rest break  10/20/22:  Lumbar AROM: flex = 55, ext = 10, SB = 50% B  22  BP:  139/83 at beginning of session   140/89 at end of session  Lumbar AROM: flex =60, ext = 15, SB = 75% B         Strength  LEFT RIGHT   HIP Flexors (L1-2) 4+ 4   HIP Abductors 4 4-   HIP extension 4- 4- *pn   Knee EXT (L3) 4+ 4+   Knee Flex (S1) 4+ 4+       RESTRICTIONS/PRECAUTIONS: 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; FALL RISK    Treatment based classification:     Exercises/Interventions:     Therapeutic Ex  15' Wt / Resistance sets/sec reps notes   Bike  5'     LTR  1 10    Hand heel rock   1 10    SKTC   15\" 3x ea    DKTC w/red SB  5\" 15    Glut stretch crossover  Piriformis stretch fig 4 30\"  30\" 3x ea  3   Pushing vs pulling  L   Supine clamshells    Pelvic tilts  1 10 In hooklying   Seated lumbar flexion stretch w/SB Red SB 1 10    Seated TB rows  Seated TB high rows Red  red 1  1 10  10    Bridges  1 10    S/L clamshells orange 1 15 R   Prone hip extension 0# 1 10 B   Leg press DL 70# 2 10 Requires assist getting out of machine   HS curls DL 30# 2 10    MARE abduction  MARE extension 45#  45# 2  2 10x ea          Therapeutic Activities 20'              Deadlifts with KB  To 8\" + 4\" box 2 10 15# KB   Lateral stepping  teal 1 3 laps Circuit  Band around ankles   TRX squats  1 15    Fwd step downs 4\" 1 10x B   Lateral step downs 4\" 1 15x B   Sit to stands 10# KB 2 10x    Retro slider lunges  2 10 circuit   [place] carry 12# 1 2 laps circuit   Suitcase carry 7.5# KB 1 1 lap ea    BP readings / assessment 5'      Standing glut med activation orange 1 10x ea    Manual Intervention 15'              Prone PA 5'      GISTM/STM 5'   B lumbar paraspinals (R>L)   Lumbar Mobs in S/L 5'      SI Manip       Hip belt mobs       Hip LA distraction              NMR re-education               Seated SB pelvic tilts  1 5x    Seated SB marches  1 10 Alternating LE   SLS ground 10\" 3x ea             Pt. Education:  -pt educated on diagnosis, prognosis and expectations for rehab  -all pt questions were answered    Home Exercise Program:  Jean-Claude Nielsen access code [facility]    Therapeutic Exercise and NMR EXR  [x] (89772) Provided verbal/tactile cueing for activities related to strengthening, flexibility, endurance, ROM  for improvements in proximal hip and core control with self care, mobility, lifting and ambulation.  [] (12526) Provided verbal/tactile cueing for activities related to improving balance, coordination, kinesthetic sense, posture, motor skill, proprioception  to assist with core control in self care, mobility, lifting, and ambulation. Therapeutic Activities:    [] (55751 or 89166) Provided verbal/tactile cueing for activities related to improving balance, coordination, kinesthetic sense, posture, motor skill, proprioception and motor activation to allow for proper function  with self care and ADLs  [] (99734) Provided training and instruction to the patient for proper core and proximal hip recruitment and positioning with ambulation re-education     Home Exercise Program:    [x] (41638) Reviewed/Progressed HEP activities related to strengthening, flexibility, endurance, ROM of core, proximal hip and LE for functional self-care, mobility, lifting and ambulation   [] (87687) Reviewed/Progressed HEP activities related to improving balance, coordination, kinesthetic sense, posture, motor skill, proprioception of core, proximal hip and LE for self care, mobility, lifting, and ambulation      Manual Treatments:  PROM / STM / Oscillations-Mobs:  G-I, II, III, IV (PA's, Inf., Post.)  [x] (24132) Provided manual therapy to mobilize proximal hip and LS spine soft tissue/joints for the purpose of modulating pain, promoting relaxation,  increasing ROM, reducing/eliminating soft tissue swelling/inflammation/restriction, improving soft tissue extensibility and allowing for proper ROM for normal function with self care, mobility, lifting and ambulation.      Modalities:     Charges:  Timed Code Treatment Minutes: 50   Total Treatment Minutes: 50       [] MADDI (LOW) 59425 (typically 20 minutes face-to-face)  [] EVAL (MOD) 64428 (typically 30 minutes face-to-face)  [] EVAL (HIGH) 52544 (typically 45 minutes face-to-face)  [] RE-EVAL     [x] JN(05444) x 1   [] IONTO (58398)  [] NMR (26516) x     [] VASO (13077)  [x] Manual (58525) x  1 [] Other:  [x] TA (87247)x 1    [] Mech Traction (40235)  [] ES(attended) (13329)     [] ES (un) (72784): If BWC Please Indicate Time In/Out and Total Minutes  CPT Code Time in Time out Total Min                                            Goals:   Patient stated goal: \"to decrease my back pain\"  [x] Progressing: [] Met: [] Not Met: [] Adjusted     Therapist goals for Patient:  Short Term Goals: To be achieved in: 2 weeks  1. Independent in HEP and progression per patient tolerance, in order to prevent re-injury. [] Progressing: [x] Met: [] Not Met: [] Adjusted  2. Patient will have a decrease in pain to facilitate improvement in movement, function, and ADLs as indicated by Functional Deficits. [x] Progressing: [] Met: [] Not Met: [] Adjusted     Long Term Goals: To be achieved in: 8 weeks  1. Patient will reach FOTO predicted score of at least 42 to assist with reaching prior level of function. [] Progressing: [x] Met: [] Not Met: [] Adjusted  2. Patient will demonstrate increased AROM to WNL, good LS mobility, good hip ROM to allow for proper joint functioning as indicated by patients Functional Deficits. [x] Progressing: [] Met: [] Not Met: [] Adjusted  3. Patient will demonstrate an increase in Strength to good proximal hip and core activation to allow for proper functional mobility as indicated by patients Functional Deficits. [x] Progressing: [] Met: [] Not Met: [] Adjusted  4. Patient will be able to bend forwards to pick something up and stand up without increased symptoms or restriction. [] Progressing: [x] Met: [] Not Met: [x] Adjusted  5. Patient will be able to walk for 15 minutes without increased symptoms or restriction.      [] Progressing: [x] Met: [] Not Met: [x] Adjusted        ASSESSMENT:  Pt continues to respond well to manual therapy. Progressed functional strengthening today with quite a bit of fatigue noted with hip extension [facility]. Pt would continue to benefit from skilled PT services to address mobility and functional strength deficits in order to return to PLOF, including walking and squatting to pick something up off of the floor. Treatment/Activity Tolerance:  [x] Patient tolerated treatment well [x] Patient limited by fatique  [] Patient limited by pain  [] Patient limited by other medical complications  [] Other:     Overall Progression Towards Functional goals/ Treatment Progress Update:  [x] Patient is progressing as expected towards functional goals listed. [] Progression is slowed due to complexities/Impairments listed. [] Progression has been slowed due to co-morbidities. [] Plan just implemented, too soon to assess goals progression <30days   [] Goals require adjustment due to lack of progress  [] Patient is not progressing as expected and requires additional follow up with physician  [] Other:    Prognosis for POC: [] Good [x] Fair  [] Poor    Patient requires continued skilled intervention: [x] Yes  [] No        PLAN: Decrease LBP, improve lumbar/hip mobility and strength as tolerated by pt. [x] Continue per plan of care [] Alter current plan (see comments)  [] Plan of care initiated [] Hold pending insurance auth [] Discharge    Electronically signed by: Amy Allred PT    Note: If patient does not return for scheduled/recommended follow up visits, this note will serve as a discharge from care along with the most recent update on progress.

## 2022-12-21 ENCOUNTER — APPOINTMENT (OUTPATIENT)
Dept: PHYSICAL THERAPY | Age: 47
End: 2022-12-21
Payer: COMMERCIAL

## 2022-12-21 ENCOUNTER — HOSPITAL ENCOUNTER (OUTPATIENT)
Dept: ONCOLOGY | Age: 47
Setting detail: INFUSION SERIES
Discharge: HOME | End: 2022-12-21
Payer: COMMERCIAL

## 2022-12-21 VITALS
TEMPERATURE: 97 F | HEART RATE: 72 BPM | RESPIRATION RATE: 16 BRPM | SYSTOLIC BLOOD PRESSURE: 146 MMHG | DIASTOLIC BLOOD PRESSURE: 94 MMHG

## 2022-12-21 DIAGNOSIS — J45.50 SEVERE PERSISTENT ASTHMA, UNSPECIFIED WHETHER COMPLICATED: Primary | ICD-10-CM

## 2022-12-21 PROCEDURE — 96372 THER/PROPH/DIAG INJ SC/IM: CPT

## 2022-12-21 PROCEDURE — 6360000002 HC RX W HCPCS: Performed by: INTERNAL MEDICINE

## 2022-12-21 PROCEDURE — 99211 OFF/OP EST MAY X REQ PHY/QHP: CPT

## 2022-12-21 RX ADMIN — OMALIZUMAB 300 MG: 150 INJECTION, SOLUTION SUBCUTANEOUS at 13:41

## 2022-12-28 ENCOUNTER — APPOINTMENT (OUTPATIENT)
Dept: PHYSICAL THERAPY | Age: 47
End: 2022-12-28
Payer: COMMERCIAL

## 2022-12-29 ENCOUNTER — HOSPITAL ENCOUNTER (OUTPATIENT)
Dept: PHYSICAL THERAPY | Age: 47
Setting detail: THERAPIES SERIES
Discharge: HOME | End: 2022-12-29
Payer: COMMERCIAL

## 2022-12-29 PROCEDURE — 97530 THERAPEUTIC ACTIVITIES: CPT

## 2022-12-29 PROCEDURE — 97140 MANUAL THERAPY 1/> REGIONS: CPT

## 2022-12-29 PROCEDURE — 97110 THERAPEUTIC EXERCISES: CPT

## 2022-12-29 NOTE — PROGRESS NOTES
[employer]     Physical Therapy Re-Certification Plan of Care    Dear Dr. Alonzo Singh ,    We had the pleasure of treating the following patient for physical therapy services at [address] [place] [address]. A summary of our findings can be found in the updated assessment below. This includes our plan of care. If you have any questions or concerns regarding these findings, please do not hesitate to contact me at the office phone number checked above. Thank you for the referral.     Physician Signature:________________________________Date:__________________  By signing above (or electronic signature), therapists plan is approved by physician    Date Range Of Visits: 22 - 22  Total Visits to Date: 39  Overall Response to Treatment:   [x]Patient is responding well to treatment and improvement is noted with regards  to goals   []Patient should continue to improve in reasonable time if they continue HEP   []Patient has plateaued and is no longer responding to skilled PT intervention    []Patient is getting worse and would benefit from return to referring MD   []Patient unable to adhere to initial POC   [x]Other: Pt reports feeling at least 50% improvement in sxs since starting PT. Pt reports that he feels he is able to do so much more than he previously was able to do. Pt reports he has not been having as much difficulty sleeping and is able to walk 10-15 mins before he needs to take a break. Pt reports still having difficulty with bending forwards and then standing back up. Pt demonstrates improving lumbar AROM and strength, showing progress towards LTG's but continues to lack functional strength deficits. Recommend continuing with PT 1-2x/week.       Physical Therapy Treatment Note/ Progress Report:     Date:  2022    Patient Name:  Issac Bunch    :    MRN: [id number]  Medical/Treatment Diagnosis Information:  Diagnosis: Lumbar discogenic pain syndrome (M51.26), lumbar spondylosis (M47.816)       Treatment diagnosis: Low back pain   Insurance/Certification information:   [facility] - $0 deductible, $0 OOP max, 100% co-insurance, 30 PT visits per calendar year, auth required   Physician Information:  Rodney Elia, [address] [place] [address] signed (Y/N): []  Yes [x]  No  Date sent:     Date of Patient follow up with Physician:      Progress Report: [x]  Yes  []  No     Functional Scale:           Date assessed:  FOTO physical FS primary measure score = 23     22  FOTO = 34          22  FOTO = 40          22  FOTO = 42          10/20/22  FOTO = 44          11/10/22  FOTO = 44          22    Date Range for reporting period:  Beginnin22  Endin22    Progress report due (10 Rx/or 30 days whichever is less): 3/23/95    Recertification due (POC duration/ or 90 days whichever is less): 23     Visit # Insurance Allowable Auth Needed    16 visits approved 11/10/22 - 22 [x]Yes    []No     Pain level:  0/10 currently    SUBJECTIVE:  Pt reports that he feels at least 50% improvement in sxs since starting PT. Pt reports that he feels he is able to do so much more than he previously was able to do. Pt reports he has not been having as much difficulty sleeping and is able to walk 10-15 mins before he needs to take a break. Pt reports still having difficulty with bending forwards and then standing back up.       OBJECTIVE: See eval  Observation:   Test measurements:    22:  Lumbar AROM: flex = 40 *pn, ext = 10 *pn, SB = 50% B \"tight\" on R with L SB  22:  BP readings:  164/109, 157/104 on small portable unit; 147/97 beginning of session on larger unit; 152/96 after manual therapy, 144/93 after 3 min rest break  10/20/22:  Lumbar AROM: flex = 55, ext = 10, SB = 50% B  22  BP:   135/90 at beginning of session   113/77 at end of session  Lumbar AROM: flex =60, Exercise and NMR EXR  [x] (71169) Provided verbal/tactile cueing for activities related to strengthening, flexibility, endurance, ROM  for improvements in proximal hip and core control with self care, mobility, lifting and ambulation.  [] (78452) Provided verbal/tactile cueing for activities related to improving balance, coordination, kinesthetic sense, posture, motor skill, proprioception  to assist with core control in self care, mobility, lifting, and ambulation. Therapeutic Activities:    [] (13341 or 47673) Provided verbal/tactile cueing for activities related to improving balance, coordination, kinesthetic sense, posture, motor skill, proprioception and motor activation to allow for proper function  with self care and ADLs  [] (62619) Provided training and instruction to the patient for proper core and proximal hip recruitment and positioning with ambulation re-education     Home Exercise Program:    [x] (51588) Reviewed/Progressed HEP activities related to strengthening, flexibility, endurance, ROM of core, proximal hip and LE for functional self-care, mobility, lifting and ambulation   [] (68733) Reviewed/Progressed HEP activities related to improving balance, coordination, kinesthetic sense, posture, motor skill, proprioception of core, proximal hip and LE for self care, mobility, lifting, and ambulation      Manual Treatments:  PROM / STM / Oscillations-Mobs:  G-I, II, III, IV (PA's, Inf., Post.)  [x] (10441) Provided manual therapy to mobilize proximal hip and LS spine soft tissue/joints for the purpose of modulating pain, promoting relaxation,  increasing ROM, reducing/eliminating soft tissue swelling/inflammation/restriction, improving soft tissue extensibility and allowing for proper ROM for normal function with self care, mobility, lifting and ambulation.      Modalities:     Charges:  Timed Code Treatment Minutes: 50   Total Treatment Minutes: 50       [] EVAL (LOW) 79921 (typically 20 minutes face-to-face)  [] EVAL (MOD) 57511 (typically 30 minutes face-to-face)  [] EVAL (HIGH) 47182 (typically 45 minutes face-to-face)  [] RE-EVAL     [x] BN(18816) x 1   [] IONTO (75957)  [] NMR (05487) x     [] VASO (98327)  [x] Manual (72127) x  1 [] Other:  [x] TA (36776)x 1    [] Mech Traction (18097)  [] ES(attended) (14927)     [] ES (un) (96645): If BWC Please Indicate Time In/Out and Total Minutes  CPT Code Time in Time out Total Min                                            Goals:   Patient stated goal: \"to decrease my back pain\"  [x] Progressing: [] Met: [] Not Met: [] Adjusted     Therapist goals for Patient:  Short Term Goals: To be achieved in: 2 weeks  1. Independent in HEP and progression per patient tolerance, in order to prevent re-injury. [] Progressing: [x] Met: [] Not Met: [] Adjusted  2. Patient will have a decrease in pain to facilitate improvement in movement, function, and ADLs as indicated by Functional Deficits. [x] Progressing: [] Met: [] Not Met: [] Adjusted     Long Term Goals: To be achieved in: 8 weeks  1. Patient will reach FOTO predicted score of at least 42 to assist with reaching prior level of function. [] Progressing: [x] Met: [] Not Met: [] Adjusted  2. Patient will demonstrate increased AROM to WNL, good LS mobility, good hip ROM to allow for proper joint functioning as indicated by patients Functional Deficits. [x] Progressing: [] Met: [] Not Met: [] Adjusted  3. Patient will demonstrate an increase in Strength to good proximal hip and core activation to allow for proper functional mobility as indicated by patients Functional Deficits. [x] Progressing: [] Met: [] Not Met: [] Adjusted  4. Patient will be able to bend forwards to pick something up and stand up without increased symptoms or restriction. [x] Progressing: [] Met: [] Not Met: [] Adjusted  5. Patient will be able to walk for 15 minutes without increased symptoms or restriction.      [x] Progressing: [] Met: [] Not Met: [] Adjusted        ASSESSMENT:  Pt demonstrates improving lumbar AROM and LE strength, showing progress towards LTG's. Pt continues to demonstrate more weakness in L LE and more tightness in R side of lumbar spine. Pt continues to respond well to manual therapy. Progressed weight with leg press today. Pt would continue to benefit from skilled PT services to address mobility and functional strength deficits in order to return to PLOF, including walking and squatting to pick something up off of the floor. Treatment/Activity Tolerance:  [x] Patient tolerated treatment well [x] Patient limited by fatique  [] Patient limited by pain  [] Patient limited by other medical complications  [] Other:     Overall Progression Towards Functional goals/ Treatment Progress Update:  [x] Patient is progressing as expected towards functional goals listed. [] Progression is slowed due to complexities/Impairments listed. [] Progression has been slowed due to co-morbidities. [] Plan just implemented, too soon to assess goals progression <30days   [] Goals require adjustment due to lack of progress  [] Patient is not progressing as expected and requires additional follow up with physician  [] Other:    Prognosis for POC: [] Good [x] Fair  [] Poor    Patient requires continued skilled intervention: [x] Yes  [] No        PLAN: Decrease LBP, improve lumbar/hip mobility and strength as tolerated by pt. Discussed with pt about option of joining a gym - he would not like to do this due to being fearful of overdoing it and re-injuring himself. [x] Continue per plan of care [] Alter current plan (see comments)  [] Plan of care initiated [x] Hold pending insurance auth [] Discharge    Electronically signed by: Evaristo Caicedo PT    Note: If patient does not return for scheduled/recommended follow up visits, this note will serve as a discharge from care along with the most recent update on progress.

## 2023-01-10 ENCOUNTER — HOSPITAL ENCOUNTER (OUTPATIENT)
Dept: PHYSICAL THERAPY | Age: 48
Setting detail: THERAPIES SERIES
Discharge: HOME | End: 2023-01-10
Payer: COMMERCIAL

## 2023-01-10 PROCEDURE — 97110 THERAPEUTIC EXERCISES: CPT

## 2023-01-10 PROCEDURE — 97530 THERAPEUTIC ACTIVITIES: CPT

## 2023-01-10 PROCEDURE — 97140 MANUAL THERAPY 1/> REGIONS: CPT

## 2023-01-10 NOTE — FLOWSHEET NOTE
[employer]     Physical Therapy Treatment Note/ Progress Report:     Date:  1/10/2023    Patient Name:  Jahaira Charles    :    MRN: [id number]  Medical/Treatment Diagnosis Information:  Diagnosis: Lumbar discogenic pain syndrome (M51.26), lumbar spondylosis (M47.816)       Treatment diagnosis: Low back pain   Insurance/Certification information:   [facility] - $0 deductible, $0 OOP max, 100% co-insurance, 30 PT visits per calendar year, auth required   Physician Information:  Deena Silverman [address] [place] [address] signed (Y/N): []  Yes [x]  No  Date sent:     Date of Patient follow up with Physician:      Progress Report: []  Yes  [x]  No     Functional Scale:           Date assessed:  FOTO physical FS primary measure score = 23     22  FOTO = 34          22  FOTO = 40          22  FOTO = 42          10/20/22  FOTO = 44          11/10/22  FOTO = 44          22    Date Range for reporting period:  Beginnin22  Endin22    Progress report due (10 Rx/or 30 days whichever is less):     Recertification due (POC duration/ or 90 days whichever is less): 23     Visit # Insurance Allowable Auth Needed   12 visits approved 23 - 23 [x]Yes    []No     Pain level:  4/10 currently    SUBJECTIVE:  Pt reports that his back was pretty sore last Friday and Saturday, unsure why, but felt pain across lower back even on the L, not just on R side.      OBJECTIVE: See eval  Observation:   Test measurements:    22:  Lumbar AROM: flex = 40 *pn, ext = 10 *pn, SB = 50% B \"tight\" on R with L SB  22:  BP readings:  164/109, 157/104 on small portable unit; 147/97 beginning of session on larger unit; 152/96 after manual therapy, 144/93 after 3 min rest break  10/20/22:  Lumbar AROM: flex = 55, ext = 10, SB = 50% B  1/10/23:  BP:   147/91 at beginning of session   137/84 at end of session  Lumbar AROM: flex =60, ext = 15, SB = 75% B         Strength  LEFT RIGHT   HIP Flexors (L1-2) 4+ 4+   HIP Abductors 4 4   HIP extension 4 4   Knee EXT (L3) 5 5   Knee Flex (S1) 4+ 4+       RESTRICTIONS/PRECAUTIONS: 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; FALL RISK    Treatment based classification:     Exercises/Interventions:     Therapeutic Ex  15' Wt / Resistance sets/sec reps notes   Bike  5'     LTR  1 10    Hand heel rock   1 10    SKTC   15\" 3x ea    DKTC w/red SB  5\" 15    Glut stretch crossover  Piriformis stretch fig 4 30\"  30\" 3x ea  3   Pushing vs pulling  L   Supine clamshells    Pelvic tilts  1 10 In hooklying   Seated lumbar flexion stretch w/SB Red SB 1 10    Seated TB rows  Seated TB high rows Red  red 1  1 10  10    Bridges  1 10    S/L clamshells orange 1 15 R   Prone hip extension 0# 1 10 B   Leg press DL 80# 2 10 Requires assist getting out of machine   HS curls DL 30# 2 10    Knee extension DL 20# 2 10    MARE abduction  MARE extension 45#  45# 2  2 10x ea          Therapeutic Activities 20'              Deadlifts with KB  To 8\" + 4\" box 2 10 15# KB   Lateral stepping  teal 1 3 laps Circuit  Band around ankles   TRX squats  1 15    Fwd step downs 4\" 1 10x B   Lateral step downs 4\" 1 15x B   Sit to stands 10# KB 2 10x    Retro slider lunges  2 10 circuit   Ravia carry 12# 1 2 laps circuit   Suitcase carry 10# KB 1 1 lap ea    BP readings / assessment 5'      Standing glut med activation orange 1 10x ea    Manual Intervention 15'              Prone PA 5'      GISTM/STM 5'   B lumbar paraspinals (R>L)   Lumbar Mobs in S/L 5'      SI Manip       Hip belt mobs       Hip LA distraction              NMR re-education               Seated SB pelvic tilts  1 5x    Seated SB marches  1 10 Alternating LE   SLS ground 10\" 3x ea             Pt. Education:  -pt educated on diagnosis, prognosis and expectations for rehab  -all pt questions were answered    Home Exercise Program:  [employer] access code [facility]    Therapeutic Exercise and NMR EXR  [x] (61582) Provided verbal/tactile cueing for activities related to strengthening, flexibility, endurance, ROM  for improvements in proximal hip and core control with self care, mobility, lifting and ambulation.  [] (54621) Provided verbal/tactile cueing for activities related to improving balance, coordination, kinesthetic sense, posture, motor skill, proprioception  to assist with core control in self care, mobility, lifting, and ambulation. Therapeutic Activities:    [] (58098 or 17140) Provided verbal/tactile cueing for activities related to improving balance, coordination, kinesthetic sense, posture, motor skill, proprioception and motor activation to allow for proper function  with self care and ADLs  [] (70853) Provided training and instruction to the patient for proper core and proximal hip recruitment and positioning with ambulation re-education     Home Exercise Program:    [x] (86235) Reviewed/Progressed HEP activities related to strengthening, flexibility, endurance, ROM of core, proximal hip and LE for functional self-care, mobility, lifting and ambulation   [] (49345) Reviewed/Progressed HEP activities related to improving balance, coordination, kinesthetic sense, posture, motor skill, proprioception of core, proximal hip and LE for self care, mobility, lifting, and ambulation      Manual Treatments:  PROM / STM / Oscillations-Mobs:  G-I, II, III, IV (PA's, Inf., Post.)  [x] (54676) Provided manual therapy to mobilize proximal hip and LS spine soft tissue/joints for the purpose of modulating pain, promoting relaxation,  increasing ROM, reducing/eliminating soft tissue swelling/inflammation/restriction, improving soft tissue extensibility and allowing for proper ROM for normal function with self care, mobility, lifting and ambulation.      Modalities:     Charges:  Timed Code Treatment Minutes: 50   Total Treatment Minutes: 50       [] EVAL (LOW) 84719 (typically 20 minutes face-to-face)  [] EVAL (MOD) 84547 (typically 30 minutes face-to-face)  [] EVAL (HIGH) 63785 (typically 45 minutes face-to-face)  [] RE-EVAL     [x] HF(79346) x 1   [] IONTO (38463)  [] NMR (01629) x     [] VASO (60570)  [x] Manual (19982) x  1 [] Other:  [x] TA (43496)x 1    [] Mech Traction (36585)  [] ES(attended) (36881)     [] ES (un) (59833): If BWC Please Indicate Time In/Out and Total Minutes  CPT Code Time in Time out Total Min                                            Goals:   Patient stated goal: \"to decrease my back pain\"  [x] Progressing: [] Met: [] Not Met: [] Adjusted     Therapist goals for Patient:  Short Term Goals: To be achieved in: 2 weeks  1. Independent in HEP and progression per patient tolerance, in order to prevent re-injury. [] Progressing: [x] Met: [] Not Met: [] Adjusted  2. Patient will have a decrease in pain to facilitate improvement in movement, function, and ADLs as indicated by Functional Deficits. [x] Progressing: [] Met: [] Not Met: [] Adjusted     Long Term Goals: To be achieved in: 8 weeks  1. Patient will reach FOTO predicted score of at least 42 to assist with reaching prior level of function. [] Progressing: [x] Met: [] Not Met: [] Adjusted  2. Patient will demonstrate increased AROM to WNL, good LS mobility, good hip ROM to allow for proper joint functioning as indicated by patients Functional Deficits. [x] Progressing: [] Met: [] Not Met: [] Adjusted  3. Patient will demonstrate an increase in Strength to good proximal hip and core activation to allow for proper functional mobility as indicated by patients Functional Deficits. [x] Progressing: [] Met: [] Not Met: [] Adjusted  4. Patient will be able to bend forwards to pick something up and stand up without increased symptoms or restriction. [x] Progressing: [] Met: [] Not Met: [] Adjusted  5.  Patient will be able to walk for 15 minutes without increased symptoms or restriction. [x] Progressing: [] Met: [] Not Met: [] Adjusted        ASSESSMENT:  Decreased stiffness with prone PA mobs today, but continues to demonstrate some hypomobility, especially with R UPA mobs. Pt was very fatigued with circuit training today and with addition of knee extensions, but no increased LBP noted. Pt would continue to benefit from skilled PT services to address mobility and functional strength deficits in order to return to PLOF, including walking and squatting to pick something up off of the floor. Treatment/Activity Tolerance:  [x] Patient tolerated treatment well [x] Patient limited by fatique  [] Patient limited by pain  [] Patient limited by other medical complications  [] Other:     Overall Progression Towards Functional goals/ Treatment Progress Update:  [x] Patient is progressing as expected towards functional goals listed. [] Progression is slowed due to complexities/Impairments listed. [] Progression has been slowed due to co-morbidities. [] Plan just implemented, too soon to assess goals progression <30days   [] Goals require adjustment due to lack of progress  [] Patient is not progressing as expected and requires additional follow up with physician  [] Other:    Prognosis for POC: [] Good [x] Fair  [] Poor    Patient requires continued skilled intervention: [x] Yes  [] No        PLAN: Decrease LBP, improve lumbar/hip mobility and strength as tolerated by pt. Discussed with pt about option of joining a gym - he would not like to do this due to being fearful of overdoing it and re-injuring himself.    [x] Continue per plan of care [] Alter current plan (see comments)  [] Plan of care initiated [] Hold pending insurance auth [] Discharge    Electronically signed by: Leeanne Steiner PT    Note: If patient does not return for scheduled/recommended follow up visits, this note will serve as a discharge from care along with the most recent update on progress.

## 2023-01-12 ENCOUNTER — HOSPITAL ENCOUNTER (OUTPATIENT)
Dept: PHYSICAL THERAPY | Age: 48
Setting detail: THERAPIES SERIES
Discharge: HOME | End: 2023-01-12
Payer: COMMERCIAL

## 2023-01-12 PROCEDURE — 97140 MANUAL THERAPY 1/> REGIONS: CPT

## 2023-01-12 PROCEDURE — 97530 THERAPEUTIC ACTIVITIES: CPT

## 2023-01-12 PROCEDURE — 97110 THERAPEUTIC EXERCISES: CPT

## 2023-01-12 NOTE — FLOWSHEET NOTE
[employer]     Physical Therapy Treatment Note/ Progress Report:     Date:  2023    Patient Name:  Ag Lozano    :    MRN: [id number]  Medical/Treatment Diagnosis Information:  Diagnosis: Lumbar discogenic pain syndrome (M51.26), lumbar spondylosis (M47.816)       Treatment diagnosis: Low back pain   Insurance/Certification information:   [facility] - $0 deductible, $0 OOP max, 100% co-insurance, 30 PT visits per calendar year, auth required   Physician Information:  Suzette Chavez[address] signed (Y/N): []  Yes [x]  No  Date sent:     Date of Patient follow up with Physician:      Progress Report: []  Yes  [x]  No     Functional Scale:           Date assessed:  FOTO physical FS primary measure score = 23     22  FOTO = 34          22  FOTO = 40          22  FOTO = 42          10/20/22  FOTO = 44          11/10/22  FOTO = 44          22    Date Range for reporting period:  Beginnin22  Endin22    Progress report due (10 Rx/or 30 days whichever is less):     Recertification due (POC duration/ or 90 days whichever is less): 23     Visit # Insurance Allowable Auth Needed   12 visits approved 23 - 23 [x]Yes    []No     Pain level:  2/10 currently    SUBJECTIVE:  Pt reports lower back feels pretty good today. C/o mild tightness on R side, but no sxs on L side today.       OBJECTIVE: See eval  Observation:   Test measurements:    22:  Lumbar AROM: flex = 40 *pn, ext = 10 *pn, SB = 50% B \"tight\" on R with L SB  22:  BP readings:  164/109, 157/104 on small portable unit; 147/97 beginning of session on larger unit; 152/96 after manual therapy, 144/93 after 3 min rest break  10/20/22:  Lumbar AROM: flex = 55, ext = 10, SB = 50% B  23:  BP:   133/82 at beginning of session   133/81 at end of session  Lumbar AROM: flex =60, ext = 15, SB = 75% B Strength  LEFT RIGHT   HIP Flexors (L1-2) 4+ 4+   HIP Abductors 4 4   HIP extension 4 4   Knee EXT (L3) 5 5   Knee Flex (S1) 4+ 4+       RESTRICTIONS/PRECAUTIONS: 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; FALL RISK    Treatment based classification:     Exercises/Interventions:     Therapeutic Ex  15' Wt / Resistance sets/sec reps notes   Bike  5'     LTR  1 10    Hand heel rock   1 10    SKTC   15\" 3x ea    DKTC w/red SB  5\" 15    Glut stretch crossover  Piriformis stretch fig 4 30\"  30\" 3x ea  3   Pushing vs pulling  L   Supine clamshells    Pelvic tilts  1 10 In hooklying   Seated lumbar flexion stretch w/SB Red SB 1 10    Seated TB rows  Seated TB high rows Red  red 1  1 10  10    Bridges  1 10    S/L clamshells orange 1 15 R   Prone hip extension 0# 1 10 B   Leg press DL 90# 2 10 Requires assist getting out of machine   HS curls DL 30# 2 10    Knee extension DL 20# 2 10    MARE abduction  MARE extension 45#  45# 2  2 10x ea          Therapeutic Activities 20'              Deadlifts with KB  To 8\" + 4\" box 2 10 15# KB   Lateral stepping  teal 1 3 laps Circuit  Band around ankles   Squats + press with earthquake bar + 10# ankle weight attached  2 10    Fwd step downs 4\" 1 10x B   Lateral step downs 6\" 1 15x B   Sit to stands 10# KB 2 10x    Retro slider lunges  1 10x ea    [place] carry 12# 1 2 laps    Suitcase carry 10# KB 1 1 lap ea    BP readings / assessment 5'      Standing glut med activation orange 1 10x ea    Manual Intervention 15'              Prone PA 5'      GISTM/STM 5'   B lumbar paraspinals (R>L)   Lumbar Mobs in S/L 5'      SI Manip       Hip belt mobs       Hip LA distraction              NMR re-education               Seated SB pelvic tilts  1 5x    Seated SB marches  1 10 Alternating LE   SLS ground 10\" 3x ea             Pt. Education:  -pt educated on diagnosis, prognosis and expectations for rehab  -all pt questions were answered    Home Exercise Program:  Yoana Oas access code [facility]    Therapeutic Exercise and NMR EXR  [x] (99111) Provided verbal/tactile cueing for activities related to strengthening, flexibility, endurance, ROM  for improvements in proximal hip and core control with self care, mobility, lifting and ambulation.  [] (00603) Provided verbal/tactile cueing for activities related to improving balance, coordination, kinesthetic sense, posture, motor skill, proprioception  to assist with core control in self care, mobility, lifting, and ambulation. Therapeutic Activities:    [] (10958 or 37889) Provided verbal/tactile cueing for activities related to improving balance, coordination, kinesthetic sense, posture, motor skill, proprioception and motor activation to allow for proper function  with self care and ADLs  [] (56911) Provided training and instruction to the patient for proper core and proximal hip recruitment and positioning with ambulation re-education     Home Exercise Program:    [x] (40047) Reviewed/Progressed HEP activities related to strengthening, flexibility, endurance, ROM of core, proximal hip and LE for functional self-care, mobility, lifting and ambulation   [] (91886) Reviewed/Progressed HEP activities related to improving balance, coordination, kinesthetic sense, posture, motor skill, proprioception of core, proximal hip and LE for self care, mobility, lifting, and ambulation      Manual Treatments:  PROM / STM / Oscillations-Mobs:  G-I, II, III, IV (PA's, Inf., Post.)  [x] (64226) Provided manual therapy to mobilize proximal hip and LS spine soft tissue/joints for the purpose of modulating pain, promoting relaxation,  increasing ROM, reducing/eliminating soft tissue swelling/inflammation/restriction, improving soft tissue extensibility and allowing for proper ROM for normal function with self care, mobility, lifting and ambulation.      Modalities:     Charges:  Timed Code Treatment Minutes: 50   Total Treatment Minutes: 50       [] EVAL (LOW) 23853 (typically 20 minutes face-to-face)  [] EVAL (MOD) 65649 (typically 30 minutes face-to-face)  [] EVAL (HIGH) 11203 (typically 45 minutes face-to-face)  [] RE-EVAL     [x] RA(84382) x 1   [] IONTO (05216)  [] NMR (15149) x     [] VASO (14067)  [x] Manual (08505) x  1 [] Other:  [x] TA (04364)x 1    [] Mech Traction (95210)  [] ES(attended) (40335)     [] ES (un) (95476): If BWC Please Indicate Time In/Out and Total Minutes  CPT Code Time in Time out Total Min                                            Goals:   Patient stated goal: \"to decrease my back pain\"  [x] Progressing: [] Met: [] Not Met: [] Adjusted     Therapist goals for Patient:  Short Term Goals: To be achieved in: 2 weeks  1. Independent in HEP and progression per patient tolerance, in order to prevent re-injury. [] Progressing: [x] Met: [] Not Met: [] Adjusted  2. Patient will have a decrease in pain to facilitate improvement in movement, function, and ADLs as indicated by Functional Deficits. [x] Progressing: [] Met: [] Not Met: [] Adjusted     Long Term Goals: To be achieved in: 8 weeks  1. Patient will reach FOTO predicted score of at least 42 to assist with reaching prior level of function. [] Progressing: [x] Met: [] Not Met: [] Adjusted  2. Patient will demonstrate increased AROM to WNL, good LS mobility, good hip ROM to allow for proper joint functioning as indicated by patients Functional Deficits. [x] Progressing: [] Met: [] Not Met: [] Adjusted  3. Patient will demonstrate an increase in Strength to good proximal hip and core activation to allow for proper functional mobility as indicated by patients Functional Deficits. [x] Progressing: [] Met: [] Not Met: [] Adjusted  4. Patient will be able to bend forwards to pick something up and stand up without increased symptoms or restriction. [x] Progressing: [] Met: [] Not Met: [] Adjusted  5.  Patient will be able to walk for 15 minutes without increased symptoms or restriction. [x] Progressing: [] Met: [] Not Met: [] Adjusted        ASSESSMENT:  Decreased stiffness with prone PA mobs today, but continues to demonstrate some hypomobility, especially with R UPA mobs. Pt requires frequent cues to maintain appropriate form with retro slider lunges and KB deadlifts. Pt was fatigued with addition of squats + press with earthquake bar and with increased step height on LSD's today. Pt would continue to benefit from skilled PT services to address mobility and functional strength deficits in order to return to PLOF, including walking and squatting to pick something up off of the floor. Treatment/Activity Tolerance:  [x] Patient tolerated treatment well [x] Patient limited by fatique  [] Patient limited by pain  [] Patient limited by other medical complications  [] Other:     Overall Progression Towards Functional goals/ Treatment Progress Update:  [x] Patient is progressing as expected towards functional goals listed. [] Progression is slowed due to complexities/Impairments listed. [] Progression has been slowed due to co-morbidities. [] Plan just implemented, too soon to assess goals progression <30days   [] Goals require adjustment due to lack of progress  [] Patient is not progressing as expected and requires additional follow up with physician  [] Other:    Prognosis for POC: [] Good [x] Fair  [] Poor    Patient requires continued skilled intervention: [x] Yes  [] No        PLAN: Decrease LBP, improve lumbar/hip mobility and strength as tolerated by pt. Discussed with pt about option of joining a gym - he would not like to do this due to being fearful of overdoing it and re-injuring himself.    [x] Continue per plan of care [] Alter current plan (see comments)  [] Plan of care initiated [] Hold pending insurance auth [] Discharge    Electronically signed by: Orlando Tamayo PT    Note: If patient does not return for scheduled/recommended follow up visits, this note will serve as a discharge from care along with the most recent update on progress.

## 2023-01-17 ENCOUNTER — HOSPITAL ENCOUNTER (OUTPATIENT)
Dept: PHYSICAL THERAPY | Age: 48
Setting detail: THERAPIES SERIES
Discharge: HOME | End: 2023-01-17
Payer: COMMERCIAL

## 2023-01-17 PROCEDURE — 97530 THERAPEUTIC ACTIVITIES: CPT

## 2023-01-17 PROCEDURE — 97110 THERAPEUTIC EXERCISES: CPT

## 2023-01-17 PROCEDURE — 97140 MANUAL THERAPY 1/> REGIONS: CPT

## 2023-01-17 NOTE — FLOWSHEET NOTE
[employer]     Physical Therapy Treatment Note/ Progress Report:     Date:  2023    Patient Name:  Dixie Yuen    :    MRN: [id number]  Medical/Treatment Diagnosis Information:  Diagnosis: Lumbar discogenic pain syndrome (M51.26), lumbar spondylosis (M47.816)       Treatment diagnosis: Low back pain   Insurance/Certification information:   [facility] - $0 deductible, $0 OOP max, 100% co-insurance, 30 PT visits per calendar year, auth required   Physician Information:  Duncan Martinez [address] [place] [address] signed (Y/N): []  Yes [x]  No  Date sent:     Date of Patient follow up with Physician:      Progress Report: []  Yes  [x]  No     Functional Scale:           Date assessed:  FOTO physical FS primary measure score = 23     22  FOTO = 34          22  FOTO = 40          22  FOTO = 42          10/20/22  FOTO = 44          11/10/22  FOTO = 44          22    Date Range for reporting period:  Beginnin22  Endin22    Progress report due (10 Rx/or 30 days whichever is less): 93    Recertification due (POC duration/ or 90 days whichever is less): 23     Visit # Insurance Allowable Auth Needed    12 visits approved 23 - 23 [x]Yes    []No     Pain level:  10 currently    SUBJECTIVE:  Pt reports lower back feels pretty good today. C/o mild tightness on R side, but no sxs on L side today.       OBJECTIVE: See eval  Observation:   Test measurements:    22:  Lumbar AROM: flex = 40 *pn, ext = 10 *pn, SB = 50% B \"tight\" on R with L SB  22:  BP readings:  164/109, 157/104 on small portable unit; 147/97 beginning of session on larger unit; 152/96 after manual therapy, 144/93 after 3 min rest break  10/20/22:  Lumbar AROM: flex = 55, ext = 10, SB = 50% B  23:  BP:   138/89 at beginning of session   150/89 at end of session  138/89 after 2 min rest  Lumbar AROM: flex =60, ext = 15, SB = 75% B         Strength  LEFT RIGHT   HIP Flexors (L1-2) 4+ 4+   HIP Abductors 4 4   HIP extension 4 4   Knee EXT (L3) 5 5   Knee Flex (S1) 4+ 4+       RESTRICTIONS/PRECAUTIONS: 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; FALL RISK    Treatment based classification:     Exercises/Interventions:     Therapeutic Ex  15' Wt / Resistance sets/sec reps notes   Bike  5'     LTR  1 10    Hand heel rock   1 10    SKTC   15\" 3x ea    DKTC w/red SB  5\" 15    Glut stretch crossover  Piriformis stretch fig 4 30\"  30\" 3x ea  3   Pushing vs pulling  L   Supine clamshells    Pelvic tilts  1 10 In hooklying   Seated lumbar flexion stretch w/SB Red SB 1 10    Seated TB rows  Seated TB high rows Red  red 1  1 10  10    Bridges  1 10    S/L clamshells orange 1 15 R   Prone hip extension 0# 1 10 B   Leg press SL 60# 2 10 Requires assist getting out of machine   HS curls ecc 20# 2 10    Knee extension DL 20# 2 10    MARE abduction  MARE extension 60#  45# 2  2 10x ea    1/2 kneeling TB chops blue 1 15x ea          Therapeutic Activities 20'              Deadlifts with KB  To 8\" + 4\" box 2 10 15# KB  circuit   Lateral stepping  teal 1 3 laps Circuit  Band around ankles   Squats + press with earthquake bar + 10# ankle weight attached  2 10    Fwd step downs 4\" 1 10x B   Lateral step downs 6\" 1 15x B   Sit to stands 10# KB 2 10x circuit   Retro slider lunges  1 10x ea    [place] carry 12# 1 2 laps    Suitcase carry 10# KB 2 1 lap ea circuit   BP readings / assessment 5'      Standing glut med activation orange 1 10x ea    Manual Intervention 15'              Prone PA 5'      GISTM/STM 5'   B lumbar paraspinals (R>L)   Lumbar Mobs in S/L 5'      SI Manip       Hip belt mobs       Hip LA distraction              NMR re-education               Seated SB pelvic tilts  1 5x    Seated SB marches  1 10 Alternating LE   SLS ground 10\" 3x ea             Pt. Education:  -pt educated on diagnosis, prognosis and expectations for rehab  -all pt questions were answered    Home Exercise Program:  Queenie Zeng access code [facility]    Therapeutic Exercise and NMR EXR  [x] (01612) Provided verbal/tactile cueing for activities related to strengthening, flexibility, endurance, ROM  for improvements in proximal hip and core control with self care, mobility, lifting and ambulation.  [] (76376) Provided verbal/tactile cueing for activities related to improving balance, coordination, kinesthetic sense, posture, motor skill, proprioception  to assist with core control in self care, mobility, lifting, and ambulation. Therapeutic Activities:    [] (82380 or 34262) Provided verbal/tactile cueing for activities related to improving balance, coordination, kinesthetic sense, posture, motor skill, proprioception and motor activation to allow for proper function  with self care and ADLs  [] (09223) Provided training and instruction to the patient for proper core and proximal hip recruitment and positioning with ambulation re-education     Home Exercise Program:    [x] (27461) Reviewed/Progressed HEP activities related to strengthening, flexibility, endurance, ROM of core, proximal hip and LE for functional self-care, mobility, lifting and ambulation   [] (93009) Reviewed/Progressed HEP activities related to improving balance, coordination, kinesthetic sense, posture, motor skill, proprioception of core, proximal hip and LE for self care, mobility, lifting, and ambulation      Manual Treatments:  PROM / STM / Oscillations-Mobs:  G-I, II, III, IV (PA's, Inf., Post.)  [x] (49631) Provided manual therapy to mobilize proximal hip and LS spine soft tissue/joints for the purpose of modulating pain, promoting relaxation,  increasing ROM, reducing/eliminating soft tissue swelling/inflammation/restriction, improving soft tissue extensibility and allowing for proper ROM for normal function with self care, mobility, lifting and ambulation.      Modalities: Charges:  Timed Code Treatment Minutes: 50   Total Treatment Minutes: 50       [] EVAL (LOW) 09003 (typically 20 minutes face-to-face)  [] EVAL (MOD) 31068 (typically 30 minutes face-to-face)  [] EVAL (HIGH) 58037 (typically 45 minutes face-to-face)  [] RE-EVAL     [x] IY(79728) x 1   [] IONTO (76861)  [] NMR (36646) x     [] VASO (93458)  [x] Manual (86681) x  1 [] Other:  [x] TA (16697)x 1    [] Mech Traction (67969)  [] ES(attended) (02577)     [] ES (un) (25721): If BWC Please Indicate Time In/Out and Total Minutes  CPT Code Time in Time out Total Min                                            Goals:   Patient stated goal: \"to decrease my back pain\"  [x] Progressing: [] Met: [] Not Met: [] Adjusted     Therapist goals for Patient:  Short Term Goals: To be achieved in: 2 weeks  1. Independent in HEP and progression per patient tolerance, in order to prevent re-injury. [] Progressing: [x] Met: [] Not Met: [] Adjusted  2. Patient will have a decrease in pain to facilitate improvement in movement, function, and ADLs as indicated by Functional Deficits. [x] Progressing: [] Met: [] Not Met: [] Adjusted     Long Term Goals: To be achieved in: 8 weeks  1. Patient will reach FOTO predicted score of at least 42 to assist with reaching prior level of function. [] Progressing: [x] Met: [] Not Met: [] Adjusted  2. Patient will demonstrate increased AROM to WNL, good LS mobility, good hip ROM to allow for proper joint functioning as indicated by patients Functional Deficits. [x] Progressing: [] Met: [] Not Met: [] Adjusted  3. Patient will demonstrate an increase in Strength to good proximal hip and core activation to allow for proper functional mobility as indicated by patients Functional Deficits. [x] Progressing: [] Met: [] Not Met: [] Adjusted  4. Patient will be able to bend forwards to pick something up and stand up without increased symptoms or restriction.    [x] Progressing: [] Met: [] Not Met: [] Adjusted  5. Patient will be able to walk for 15 minutes without increased symptoms or restriction. [x] Progressing: [] Met: [] Not Met: [] Adjusted        ASSESSMENT:  Decreased stiffness with prone PA mobs today, but continues to demonstrate some hypomobility, especially with R UPA mobs. Progressed leg press to SL and hamstring curls to eccentric today with mild fatigue noted, but no increased LBP. Pt was challenged by 1/2 kneeling TB chops, requiring frequent verbal and visual cues to use appropriate form and to decrease trunk lean compensation. PPt would continue to benefit from skilled PT services to address mobility and functional strength deficits in order to return to PLOF, including walking and squatting to pick something up off of the floor. Treatment/Activity Tolerance:  [x] Patient tolerated treatment well [x] Patient limited by fatique  [] Patient limited by pain  [] Patient limited by other medical complications  [] Other:     Overall Progression Towards Functional goals/ Treatment Progress Update:  [x] Patient is progressing as expected towards functional goals listed. [] Progression is slowed due to complexities/Impairments listed. [] Progression has been slowed due to co-morbidities. [] Plan just implemented, too soon to assess goals progression <30days   [] Goals require adjustment due to lack of progress  [] Patient is not progressing as expected and requires additional follow up with physician  [] Other:    Prognosis for POC: [] Good [x] Fair  [] Poor    Patient requires continued skilled intervention: [x] Yes  [] No        PLAN: Decrease LBP, improve lumbar/hip mobility and strength as tolerated by pt. Discussed with pt about option of joining a gym - he would not like to do this due to being fearful of overdoing it and re-injuring himself.    [x] Continue per plan of care [] Alter current plan (see comments)  [] Plan of care initiated [] Hold pending insurance auth [] Discharge    Electronically signed by: Rebecca Brar PT    Note: If patient does not return for scheduled/recommended follow up visits, this note will serve as a discharge from care along with the most recent update on progress.

## 2023-01-18 ENCOUNTER — HOSPITAL ENCOUNTER (OUTPATIENT)
Dept: ONCOLOGY | Age: 48
Setting detail: INFUSION SERIES
Discharge: HOME | End: 2023-01-18

## 2023-01-18 DIAGNOSIS — J45.50 SEVERE PERSISTENT ASTHMA, UNSPECIFIED WHETHER COMPLICATED: Primary | ICD-10-CM

## 2023-01-19 ENCOUNTER — HOSPITAL ENCOUNTER (OUTPATIENT)
Dept: ONCOLOGY | Age: 48
Setting detail: INFUSION SERIES
Discharge: HOME | End: 2023-01-19
Payer: COMMERCIAL

## 2023-01-19 VITALS
RESPIRATION RATE: 16 BRPM | DIASTOLIC BLOOD PRESSURE: 69 MMHG | HEART RATE: 70 BPM | SYSTOLIC BLOOD PRESSURE: 129 MMHG | TEMPERATURE: 97.4 F

## 2023-01-19 DIAGNOSIS — J45.50 SEVERE PERSISTENT ASTHMA, UNSPECIFIED WHETHER COMPLICATED: Primary | ICD-10-CM

## 2023-01-19 PROCEDURE — 96372 THER/PROPH/DIAG INJ SC/IM: CPT

## 2023-01-19 PROCEDURE — 99211 OFF/OP EST MAY X REQ PHY/QHP: CPT

## 2023-01-19 PROCEDURE — 6360000002 HC RX W HCPCS: Performed by: INTERNAL MEDICINE

## 2023-01-19 RX ADMIN — OMALIZUMAB 300 MG: 150 INJECTION, SOLUTION SUBCUTANEOUS at 13:30

## 2023-01-24 ENCOUNTER — HOSPITAL ENCOUNTER (OUTPATIENT)
Dept: PHYSICAL THERAPY | Age: 48
Setting detail: THERAPIES SERIES
Discharge: HOME | End: 2023-01-24
Payer: COMMERCIAL

## 2023-01-24 PROCEDURE — 97530 THERAPEUTIC ACTIVITIES: CPT

## 2023-01-24 PROCEDURE — 97140 MANUAL THERAPY 1/> REGIONS: CPT

## 2023-01-24 PROCEDURE — 97110 THERAPEUTIC EXERCISES: CPT

## 2023-01-24 NOTE — FLOWSHEET NOTE
[employer]     Physical Therapy Treatment Note/ Progress Report:     Date:  2023    Patient Name:  Cuauhtemoc Hercules    :  [birth year]  MRN: [id number]  Medical/Treatment Diagnosis Information:  Diagnosis: Lumbar discogenic pain syndrome (M51.26), lumbar spondylosis (M47.816)       Treatment diagnosis: Low back pain   Insurance/Certification information:   [facility] - $0 deductible, $0 OOP max, 100% co-insurance, 30 PT visits per calendar year, auth required   Physician Information:  Minus Christians, [facility] [address] signed (Y/N): []  Yes [x]  No  Date sent:     Date of Patient follow up with Physician:      Progress Report: []  Yes  [x]  No     Functional Scale:           Date assessed:  FOTO physical FS primary measure score = 23     22  FOTO = 34          22  FOTO = 40          22  FOTO = 42          10/20/22  FOTO = 44          11/10/22  FOTO = 44          22    Date Range for reporting period:  Beginnin22  Endin22    Progress report due (10 Rx/or 30 days whichever is less):     Recertification due (POC duration/ or 90 days whichever is less): 23     Visit # Insurance Allowable Auth Needed   3/12 12 visits approved 23 - 23 [x]Yes    []No     Pain level:  2/10 currently    SUBJECTIVE:  Pt reports lower back feels pretty good today. Pt reports back was bothering him a little on Saturday, unsure why.       OBJECTIVE: See eval  Observation:   Test measurements:    22:  Lumbar AROM: flex = 40 *pn, ext = 10 *pn, SB = 50% B \"tight\" on R with L SB  22:  BP readings:  164/109, 157/104 on small portable unit; 147/97 beginning of session on larger unit; 152/96 after manual therapy, 144/93 after 3 min rest break  10/20/22:  Lumbar AROM: flex = 55, ext = 10, SB = 50% B  23:  BP:   141/91 at beginning of session   133/81 at end of session  Lumbar AROM: flex =60, ext = 15, SB = 75% B         Strength  LEFT RIGHT   HIP Flexors (L1-2) 4+ 4+   HIP Abductors 4 4   HIP extension 4 4   Knee EXT (L3) 5 5   Knee Flex (S1) 4+ 4+       RESTRICTIONS/PRECAUTIONS: 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; FALL RISK    Treatment based classification:     Exercises/Interventions:     Therapeutic Ex  15' Wt / Resistance sets/sec reps notes   Bike  5'     LTR  1 10    Hand heel rock   1 10    SKTC   15\" 3x ea    DKTC w/red SB  5\" 15    Glut stretch crossover  Piriformis stretch fig 4 30\"  30\" 3x ea  3   Pushing vs pulling  L   Supine clamshells    Pelvic tilts  1 10 In hooklying   Seated lumbar flexion stretch w/SB Red SB 1 10    Seated TB rows  Seated TB high rows Red  red 1  1 10  10    Bridges  1 10    S/L clamshells orange 1 15 R   Prone hip extension 0# 1 10 B   Leg press SL 60# 2 10 Requires assist getting out of machine   HS curls ecc 20# 2 10    Knee extension DL 20# 2 10    MARE abduction  MARE extension 60#  45# 2  2 10x ea    1/2 kneeling TB chops blue 1 15x ea          Therapeutic Activities 20'              Deadlifts with KB  To 8\" + 4\" box 2 10 15# KB  circuit   Lateral stepping  teal 1 3 laps Circuit  Band around ankles   Squats + press with landmine  2 10    Fwd step downs 4\" 1 10x B   Lateral step downs 6\" 1 15x B   Sit to stands 10# KB 2 10x circuit   Retro slider lunges  1 10x ea    [place] carry 12# 1 2 laps    Suitcase carry 12# 2 1 lap ea circuit   BP readings / assessment 5'      Standing glut med activation orange 1 10x ea    Manual Intervention 15'              Prone PA 5'      GISTM/STM 5'   B lumbar paraspinals (R>L)   Lumbar Mobs in S/L 5'      SI Manip       Hip belt mobs       Hip LA distraction              NMR re-education               Seated SB pelvic tilts  1 5x    Seated SB marches  1 10 Alternating LE   SLS ground 10\" 3x ea    Bosu lunges fwd  2 10x      Pt.  Education:  -pt educated on diagnosis, prognosis and expectations for rehab  -all pt questions were answered    Home Exercise Program:  [facility] access code [facility]    Therapeutic Exercise and NMR EXR  [x] (48027) Provided verbal/tactile cueing for activities related to strengthening, flexibility, endurance, ROM  for improvements in proximal hip and core control with self care, mobility, lifting and ambulation.  [] (03647) Provided verbal/tactile cueing for activities related to improving balance, coordination, kinesthetic sense, posture, motor skill, proprioception  to assist with core control in self care, mobility, lifting, and ambulation. Therapeutic Activities:    [] (71312 or 94424) Provided verbal/tactile cueing for activities related to improving balance, coordination, kinesthetic sense, posture, motor skill, proprioception and motor activation to allow for proper function  with self care and ADLs  [] (51339) Provided training and instruction to the patient for proper core and proximal hip recruitment and positioning with ambulation re-education     Home Exercise Program:    [x] (92441) Reviewed/Progressed HEP activities related to strengthening, flexibility, endurance, ROM of core, proximal hip and LE for functional self-care, mobility, lifting and ambulation   [] (76477) Reviewed/Progressed HEP activities related to improving balance, coordination, kinesthetic sense, posture, motor skill, proprioception of core, proximal hip and LE for self care, mobility, lifting, and ambulation      Manual Treatments:  PROM / STM / Oscillations-Mobs:  G-I, II, III, IV (PA's, Inf., Post.)  [x] (84487) Provided manual therapy to mobilize proximal hip and LS spine soft tissue/joints for the purpose of modulating pain, promoting relaxation,  increasing ROM, reducing/eliminating soft tissue swelling/inflammation/restriction, improving soft tissue extensibility and allowing for proper ROM for normal function with self care, mobility, lifting and ambulation.      Modalities:     Charges:  Timed Code Treatment Minutes: 50   Total Treatment Minutes: 50       [] EVAL (LOW) 78991 (typically 20 minutes face-to-face)  [] EVAL (MOD) 71702 (typically 30 minutes face-to-face)  [] EVAL (HIGH) 06138 (typically 45 minutes face-to-face)  [] RE-EVAL     [x] RO(99382) x 1   [] IONTO (88147)  [] NMR (70299) x     [] VASO (69604)  [x] Manual (00380) x  1 [] Other:  [x] TA (58891)x 1    [] Mech Traction (91876)  [] ES(attended) (01226)     [] ES (un) (94766): If BWC Please Indicate Time In/Out and Total Minutes  CPT Code Time in Time out Total Min                                            Goals:   Patient stated goal: \"to decrease my back pain\"  [x] Progressing: [] Met: [] Not Met: [] Adjusted     Therapist goals for Patient:  Short Term Goals: To be achieved in: 2 weeks  1. Independent in HEP and progression per patient tolerance, in order to prevent re-injury. [] Progressing: [x] Met: [] Not Met: [] Adjusted  2. Patient will have a decrease in pain to facilitate improvement in movement, function, and ADLs as indicated by Functional Deficits. [x] Progressing: [] Met: [] Not Met: [] Adjusted     Long Term Goals: To be achieved in: 8 weeks  1. Patient will reach FOTO predicted score of at least 42 to assist with reaching prior level of function. [] Progressing: [x] Met: [] Not Met: [] Adjusted  2. Patient will demonstrate increased AROM to WNL, good LS mobility, good hip ROM to allow for proper joint functioning as indicated by patients Functional Deficits. [x] Progressing: [] Met: [] Not Met: [] Adjusted  3. Patient will demonstrate an increase in Strength to good proximal hip and core activation to allow for proper functional mobility as indicated by patients Functional Deficits. [x] Progressing: [] Met: [] Not Met: [] Adjusted  4. Patient will be able to bend forwards to pick something up and stand up without increased symptoms or restriction. [x] Progressing: [] Met: [] Not Met: [] Adjusted  5.  Patient will be able to walk for 15 minutes without increased symptoms or restriction. [x] Progressing: [] Met: [] Not Met: [] Adjusted        ASSESSMENT:  Decreased stiffness with prone PA mobs today and improved mobility with sidelying lumbar mobs noted today. Pt was fatigued with bosu lunges and landmine squats + press today. Pt would continue to benefit from skilled PT services to address mobility and functional strength deficits in order to return to PLOF, including walking and squatting to pick something up off of the floor. Treatment/Activity Tolerance:  [x] Patient tolerated treatment well [x] Patient limited by fatique  [] Patient limited by pain  [] Patient limited by other medical complications  [] Other:     Overall Progression Towards Functional goals/ Treatment Progress Update:  [x] Patient is progressing as expected towards functional goals listed. [] Progression is slowed due to complexities/Impairments listed. [] Progression has been slowed due to co-morbidities. [] Plan just implemented, too soon to assess goals progression <30days   [] Goals require adjustment due to lack of progress  [] Patient is not progressing as expected and requires additional follow up with physician  [] Other:    Prognosis for POC: [] Good [x] Fair  [] Poor    Patient requires continued skilled intervention: [x] Yes  [] No        PLAN: Decrease LBP, improve lumbar/hip mobility and strength as tolerated by pt. Discussed with pt about option of joining a gym - he would not like to do this due to being fearful of overdoing it and re-injuring himself. [x] Continue per plan of care [] Alter current plan (see comments)  [] Plan of care initiated [] Hold pending insurance auth [] Discharge    Electronically signed by: Billy Jones PT    Note: If patient does not return for scheduled/recommended follow up visits, this note will serve as a discharge from care along with the most recent update on progress.

## 2023-01-26 ENCOUNTER — HOSPITAL ENCOUNTER (OUTPATIENT)
Dept: PHYSICAL THERAPY | Age: 48
Setting detail: THERAPIES SERIES
Discharge: HOME | End: 2023-01-26
Payer: COMMERCIAL

## 2023-01-26 PROCEDURE — 97140 MANUAL THERAPY 1/> REGIONS: CPT

## 2023-01-26 PROCEDURE — 97530 THERAPEUTIC ACTIVITIES: CPT

## 2023-01-26 PROCEDURE — 97110 THERAPEUTIC EXERCISES: CPT

## 2023-01-26 NOTE — FLOWSHEET NOTE
[employer]     Physical Therapy Treatment Note/ Progress Report:     Date:  2023    Patient Name:  Fabio Villafuerte    :    MRN: [id number]  Medical/Treatment Diagnosis Information:  Diagnosis: Lumbar discogenic pain syndrome (M51.26), lumbar spondylosis (M47.816)       Treatment diagnosis: Low back pain   Insurance/Certification information:   [facility] - $0 deductible, $0 OOP max, 100% co-insurance, 30 PT visits per calendar year, auth required   Physician Information:  Elaine Gross, [address] [place] [address] signed (Y/N): []  Yes [x]  No  Date sent:     Date of Patient follow up with Physician:      Progress Report: []  Yes  [x]  No     Functional Scale:           Date assessed:  FOTO physical FS primary measure score = 23     22  FOTO = 34          22  FOTO = 40          22  FOTO = 42          10/20/22  FOTO = 44          11/10/22  FOTO = 44          22    Date Range for reporting period:  Beginnin22  Endin22    Progress report due (10 Rx/or 30 days whichever is less):     Recertification due (POC duration/ or 90 days whichever is less): 23     Visit # Insurance Allowable Auth Needed    12 visits approved 23 - 23 [x]Yes    []No     Pain level:  2/10 currently    SUBJECTIVE:  Pt reports lower back feels pretty good today.      OBJECTIVE: See eval  Observation: 23: /90 at beginning of session,   Test measurements:    22:  Lumbar AROM: flex = 40 *pn, ext = 10 *pn, SB = 50% B \"tight\" on R with L SB  22:  BP readings:  164/109, 157/104 on small portable unit; 147/97 beginning of session on larger unit; 152/96 after manual therapy, 144/93 after 3 min rest break  10/20/22:  Lumbar AROM: flex = 55, ext = 10, SB = 50% B  23:  BP:   147/98 at beginning of session   141/91 after 2 min rest  129/81 at end of session  Lumbar AROM: flex =60, ext = 15, SB = 75% B         Strength  LEFT RIGHT   HIP Flexors (L1-2) 4+ 4+   HIP Abductors 4 4   HIP extension 4 4   Knee EXT (L3) 5 5   Knee Flex (S1) 4+ 4+       RESTRICTIONS/PRECAUTIONS: 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; FALL RISK    Treatment based classification:     Exercises/Interventions:     Therapeutic Ex  10' Wt / Resistance sets/sec reps notes   Bike  5'     LTR  1 10    Hand heel rock   1 10    Thread the needle in quadruped  1 10x ea R   DKTC w/red SB  5\" 15    Glut stretch crossover  Piriformis stretch fig 4 30\"  30\" 3x ea  3   Pushing vs pulling  L   Supine clamshells    Pelvic tilts  1 10 In hooklying   Seated lumbar flexion stretch w/SB Red SB 1 10    Seated TB rows  Seated TB high rows Red  red 1  1 10  10    Bridges  1 10    S/L clamshells orange 1 15 R   Prone hip extension 0# 1 10 B   Leg press SL 60# 2 10 Requires assist getting out of machine   HS curls SL 20# 2 10    Knee extension SL 20# 2 10    MARE abduction  MARE extension 60#  45# 2  2 10x ea    1/2 kneeling TB chops blue 1 15x ea          Therapeutic Activities 20'              Deadlifts with KB  To 8\" + 4\" box 2 10 15# KB  circuit   Lateral stepping  teal 1 3 laps Circuit  Band around ankles   Squats + press with landmine  2 10    Fwd step downs 4\" 1 10x B   Lateral step downs 6\" 1 15x B   Sit to stands 10# KB 2 10x circuit   Retro slider lunges  1 10x ea    [place] carry 12# 1 2 laps    Suitcase carry 15# 1 2 lap ea circuit   BP readings / assessment 5'      Standing glut med activation orange 1 10x ea    Manual Intervention 20'              Prone PA 10'      GISTM/STM 5'   B lumbar paraspinals (R>L)   Lumbar Mobs in S/L 5'      SI Manip       Hip belt mobs       Hip LA distraction              NMR re-education               Seated SB pelvic tilts  1 5x    Seated SB marches  1 10 Alternating LE   SLS ground 10\" 3x ea    Bosu lunges fwd  2 10x      Pt.  Education:  -pt educated on diagnosis, prognosis and expectations for rehab  -all pt questions were answered    Home Exercise Program:  [place] access code [email or web address]    Therapeutic Exercise and NMR EXR  [x] (09315) Provided verbal/tactile cueing for activities related to strengthening, flexibility, endurance, ROM  for improvements in proximal hip and core control with self care, mobility, lifting and ambulation.  [] (11436) Provided verbal/tactile cueing for activities related to improving balance, coordination, kinesthetic sense, posture, motor skill, proprioception  to assist with core control in self care, mobility, lifting, and ambulation.     Therapeutic Activities:    [] (42861 or 28351) Provided verbal/tactile cueing for activities related to improving balance, coordination, kinesthetic sense, posture, motor skill, proprioception and motor activation to allow for proper function  with self care and ADLs  [] (22454) Provided training and instruction to the patient for proper core and proximal hip recruitment and positioning with ambulation re-education     Home Exercise Program:    [x] (23890) Reviewed/Progressed HEP activities related to strengthening, flexibility, endurance, ROM of core, proximal hip and LE for functional self-care, mobility, lifting and ambulation   [] (52969) Reviewed/Progressed HEP activities related to improving balance, coordination, kinesthetic sense, posture, motor skill, proprioception of core, proximal hip and LE for self care, mobility, lifting, and ambulation      Manual Treatments:  PROM / STM / Oscillations-Mobs:  G-I, II, III, IV (PA's, Inf., Post.)  [x] (04781) Provided manual therapy to mobilize proximal hip and LS spine soft tissue/joints for the purpose of modulating pain, promoting relaxation,  increasing ROM, reducing/eliminating soft tissue swelling/inflammation/restriction, improving soft tissue extensibility and allowing for proper ROM for normal function with self care, mobility, lifting and ambulation.     Modalities:  Charges:  Timed Code Treatment Minutes: 50   Total Treatment Minutes: 50       [] EVAL (LOW) 20536 (typically 20 minutes face-to-face)  [] EVAL (MOD) 14822 (typically 30 minutes face-to-face)  [] EVAL (HIGH) 29127 (typically 45 minutes face-to-face)  [] RE-EVAL     [x] HM(65190) x 1   [] IONTO (32635)  [] NMR (64170) x     [] VASO (76830)  [x] Manual (05159) x  1 [] Other:  [x] TA (67523)x 1    [] Mech Traction (91254)  [] ES(attended) (86329)     [] ES (un) (22539): If BWC Please Indicate Time In/Out and Total Minutes  CPT Code Time in Time out Total Min                                            Goals:   Patient stated goal: \"to decrease my back pain\"  [x] Progressing: [] Met: [] Not Met: [] Adjusted     Therapist goals for Patient:  Short Term Goals: To be achieved in: 2 weeks  1. Independent in HEP and progression per patient tolerance, in order to prevent re-injury. [] Progressing: [x] Met: [] Not Met: [] Adjusted  2. Patient will have a decrease in pain to facilitate improvement in movement, function, and ADLs as indicated by Functional Deficits. [x] Progressing: [] Met: [] Not Met: [] Adjusted     Long Term Goals: To be achieved in: 8 weeks  1. Patient will reach FOTO predicted score of at least 42 to assist with reaching prior level of function. [] Progressing: [x] Met: [] Not Met: [] Adjusted  2. Patient will demonstrate increased AROM to WNL, good LS mobility, good hip ROM to allow for proper joint functioning as indicated by patients Functional Deficits. [x] Progressing: [] Met: [] Not Met: [] Adjusted  3. Patient will demonstrate an increase in Strength to good proximal hip and core activation to allow for proper functional mobility as indicated by patients Functional Deficits. [x] Progressing: [] Met: [] Not Met: [] Adjusted  4. Patient will be able to bend forwards to pick something up and stand up without increased symptoms or restriction.    [x] Progressing: [] Met: [] Not Met: [] Adjusted  5. Patient will be able to walk for 15 minutes without increased symptoms or restriction. [x] Progressing: [] Met: [] Not Met: [] Adjusted        ASSESSMENT:  Decreased stiffness with prone PA mobs today and improved mobility with sidelying lumbar mobs noted today. Pt continues to respond well to manual therapy. Pt was appropriately fatigued with exercise progressions today, but no increased pain noted. Pt required cues to use appropriate form with KB deadlifts. Pt would continue to benefit from skilled PT services to address mobility and functional strength deficits in order to return to PLOF, including walking and squatting to pick something up off of the floor. Treatment/Activity Tolerance:  [x] Patient tolerated treatment well [x] Patient limited by fatique  [] Patient limited by pain  [] Patient limited by other medical complications  [] Other:     Overall Progression Towards Functional goals/ Treatment Progress Update:  [x] Patient is progressing as expected towards functional goals listed. [] Progression is slowed due to complexities/Impairments listed. [] Progression has been slowed due to co-morbidities. [] Plan just implemented, too soon to assess goals progression <30days   [] Goals require adjustment due to lack of progress  [] Patient is not progressing as expected and requires additional follow up with physician  [] Other:    Prognosis for POC: [] Good [x] Fair  [] Poor    Patient requires continued skilled intervention: [x] Yes  [] No        PLAN: Decrease LBP, improve lumbar/hip mobility and strength as tolerated by pt. Discussed with pt about option of joining a gym - he would not like to do this due to being fearful of overdoing it and re-injuring himself.    [x] Continue per plan of care [] Alter current plan (see comments)  [] Plan of care initiated [] Hold pending insurance auth [] Discharge    Electronically signed by: Jorgito Roberson PT    Note: If patient does not return for scheduled/recommended follow up visits, this note will serve as a discharge from care along with the most recent update on progress.

## 2023-01-31 ENCOUNTER — HOSPITAL ENCOUNTER (OUTPATIENT)
Dept: PHYSICAL THERAPY | Age: 48
Setting detail: THERAPIES SERIES
Discharge: HOME | End: 2023-01-31
Payer: COMMERCIAL

## 2023-01-31 PROCEDURE — 97110 THERAPEUTIC EXERCISES: CPT

## 2023-01-31 PROCEDURE — 97140 MANUAL THERAPY 1/> REGIONS: CPT

## 2023-01-31 PROCEDURE — 97530 THERAPEUTIC ACTIVITIES: CPT

## 2023-01-31 NOTE — PROGRESS NOTES
[employer]     Physical Therapy Treatment Note/ Progress Report:     Date:  2023    Patient Name:  Susy Salter    :  [birth year]  MRN: [id number]  Medical/Treatment Diagnosis Information:  Diagnosis: Lumbar discogenic pain syndrome (M51.26), lumbar spondylosis (M47.816)       Treatment diagnosis: Low back pain   Insurance/Certification information:   [facility] - $0 deductible, $0 OOP max, 100% co-insurance, 30 PT visits per calendar year, auth required   Physician Information:  Zeeshan Navas [address] [place] [address] signed (Y/N): []  Yes [x]  No  Date sent:     Date of Patient follow up with Physician:      Progress Report: []  Yes  [x]  No     Functional Scale:           Date assessed:  FOTO physical FS primary measure score = 23     22  FOTO = 34          22  FOTO = 40          22  FOTO = 42          10/20/22  FOTO = 44          11/10/22  FOTO = 44          22  FOTO - needs to fill out at next visit    Date Range for reporting period:  Beginnin22  Endin23    Progress report due (10 Rx/or 30 days whichever is less): 3/57/65    Recertification due (POC duration/ or 90 days whichever is less): 23     Visit # Insurance Allowable Auth Needed    12 visits approved 23 - 23 [x]Yes    []No     Pain level:  1/10 currently    SUBJECTIVE:  Pt reports that lower back feels pretty good today. Pt reports that he feels about 50% improvement in sxs since starting PT. Pt reports that he has been doing LTR at home, but does not have enough space to perform the rest of HEP, so has not been doing any of the strengthening exercises.      OBJECTIVE: See eval  Observation:  Test measurements:    22:  Lumbar AROM: flex = 40 *pn, ext = 10 *pn, SB = 50% B \"tight\" on R with L SB  22:  BP readings:  164/109, 157/104 on small portable unit; 147/97 beginning of session on larger unit; 152/96 after manual therapy, 144/93 after 3 min rest break  10/20/22:  Lumbar AROM: flex = 55, ext = 10, SB = 50% B  1/31/23:  BP:   155/95 at beginning of session  128/80 after 3' rest  Lumbar AROM: flex =88, ext = 13, SB = 70% B         Strength  LEFT RIGHT   HIP Flexors (L1-2) 5 5 *pn   HIP Abductors 4+ 4 *pn   HIP extension 4 4   Knee EXT (L3) 5 5   Knee Flex (S1) 5 5       RESTRICTIONS/PRECAUTIONS: 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; FALL RISK    Treatment based classification:     Exercises/Interventions:     Therapeutic Ex  10' Wt / Resistance sets/sec reps notes   Bike  5'     LTR  1 10    Hand heel rock   1 10    Thread the needle in quadruped  1 10x ea B   DKTC w/red SB  5\" 15    Glut stretch crossover  Piriformis stretch fig 4 30\"  30\" 3x ea  3   Pushing vs pulling  L   Supine clamshells    Pelvic tilts  1 10 In hooklying   Seated lumbar flexion stretch w/SB Red SB 1 10    Seated TB rows  Seated TB high rows Red  red 1  1 10  10    Bridges  1 10    S/L clamshells orange 1 15 R   Prone hip extension 0# 1 10 B   Leg press SL 60# 2 10 Requires assist getting out of machine   HS curls SL 20# 2 10    Knee extension SL 20# 2 10    MARE abduction  MARE extension 60#  45# 2  2 10x ea    Palloff press green 1 15x ea    1/2 kneeling TB chops blue 1 15x ea          Therapeutic Activities 20'              Deadlifts with KB  To 8\" + 4\" box 2 10 15# KB  circuit   Lateral stepping  teal 1 3 laps Circuit  Band around ankles   Squats + press with landmine  2 10    Fwd step downs 4\" 1 10x B   Lateral step downs 6\" 1 15x B   Sit to stands 10# KB 2 10x circuit   Retro slider lunges  1 10x ea    [place] carry 12# 1 2 laps    Suitcase carry 15# 1 2 lap ea circuit   BP readings / assessment 5'      Standing glut med activation orange 1 10x ea    Manual Intervention 20'              Prone PA 10'      GISTM/STM 5'   B lumbar paraspinals (R>L)   Lumbar Mobs in S/L 5'      SI Manip       Hip belt mobs       Hip LA distraction              NMR re-education               Seated SB pelvic tilts  1 5x    Seated SB marches  1 10 Alternating LE   SLS ground 10\" 3x ea    Staru lunges fwd  2 10x      Pt. Education:  -pt educated on diagnosis, prognosis and expectations for rehab  -all pt questions were answered    Home Exercise Program:  Alice Garcia access code [facility]    Therapeutic Exercise and NMR EXR  [x] (36014) Provided verbal/tactile cueing for activities related to strengthening, flexibility, endurance, ROM  for improvements in proximal hip and core control with self care, mobility, lifting and ambulation.  [] (92009) Provided verbal/tactile cueing for activities related to improving balance, coordination, kinesthetic sense, posture, motor skill, proprioception  to assist with core control in self care, mobility, lifting, and ambulation.      Therapeutic Activities:    [] (42745 or 63794) Provided verbal/tactile cueing for activities related to improving balance, coordination, kinesthetic sense, posture, motor skill, proprioception and motor activation to allow for proper function  with self care and ADLs  [] (38882) Provided training and instruction to the patient for proper core and proximal hip recruitment and positioning with ambulation re-education     Home Exercise Program:    [x] (11204) Reviewed/Progressed HEP activities related to strengthening, flexibility, endurance, ROM of core, proximal hip and LE for functional self-care, mobility, lifting and ambulation   [] (32554) Reviewed/Progressed HEP activities related to improving balance, coordination, kinesthetic sense, posture, motor skill, proprioception of core, proximal hip and LE for self care, mobility, lifting, and ambulation      Manual Treatments:  PROM / STM / Oscillations-Mobs:  G-I, II, III, IV (PA's, Inf., Post.)  [x] (60927) Provided manual therapy to mobilize proximal hip and LS spine soft tissue/joints for the purpose of modulating pain, promoting relaxation,  increasing ROM, reducing/eliminating soft tissue swelling/inflammation/restriction, improving soft tissue extensibility and allowing for proper ROM for normal function with self care, mobility, lifting and ambulation. Modalities:     Charges:  Timed Code Treatment Minutes: 50   Total Treatment Minutes: 50       [] EVAL (LOW) 54764 (typically 20 minutes face-to-face)  [] EVAL (MOD) 49862 (typically 30 minutes face-to-face)  [] EVAL (HIGH) 70870 (typically 45 minutes face-to-face)  [] RE-EVAL     [x] OD(78809) x 1   [] IONTO (10321)  [] NMR (85183) x     [] VASO (67682)  [x] Manual (50745) x  1 [] Other:  [x] TA (17473)x 1    [] Mech Traction (20138)  [] ES(attended) (57461)     [] ES (un) (25558): If BWC Please Indicate Time In/Out and Total Minutes  CPT Code Time in Time out Total Min                                            Goals:   Patient stated goal: \"to decrease my back pain\"  [x] Progressing: [] Met: [] Not Met: [] Adjusted     Therapist goals for Patient:  Short Term Goals: To be achieved in: 2 weeks  1. Independent in HEP and progression per patient tolerance, in order to prevent re-injury. [x] Progressing: [] Met: [] Not Met: [] Adjusted  2. Patient will have a decrease in pain to facilitate improvement in movement, function, and ADLs as indicated by Functional Deficits. [x] Progressing: [] Met: [] Not Met: [] Adjusted     Long Term Goals: To be achieved in: 8 weeks  1. Patient will reach FOTO predicted score of at least 42 to assist with reaching prior level of function. [] Progressing: [x] Met: [] Not Met: [] Adjusted  2. Patient will demonstrate increased AROM to WNL, good LS mobility, good hip ROM to allow for proper joint functioning as indicated by patients Functional Deficits. [x] Progressing: [] Met: [] Not Met: [] Adjusted  3.  Patient will demonstrate an increase in Strength to good proximal hip and core activation to allow for proper functional mobility as indicated by patients Functional Deficits. [x] Progressing: [] Met: [] Not Met: [] Adjusted  4. Patient will be able to bend forwards to pick something up and stand up without increased symptoms or restriction. [x] Progressing: [] Met: [] Not Met: [] Adjusted  5. Patient will be able to walk for 15 minutes without increased symptoms or restriction. [x] Progressing: [] Met: [] Not Met: [] Adjusted        ASSESSMENT:  Pt continues to respond well to manual therapy. Pt was appropriately fatigued with exercise progressions today, but no increased pain noted. Pt required cues to use appropriate form with KB deadlifts and to decrease compensation with LSD's. Emphasized importance of performing HEP consistently and again discussed with pt about looking into joining a gym to be able to utilize machines to increase strength. Pt would continue to benefit from skilled PT services to address mobility and functional strength deficits in order to return to PLOF, including walking and squatting to pick something up off of the floor. Treatment/Activity Tolerance:  [x] Patient tolerated treatment well [x] Patient limited by fatique  [] Patient limited by pain  [] Patient limited by other medical complications  [] Other:     Overall Progression Towards Functional goals/ Treatment Progress Update:  [x] Patient is progressing as expected towards functional goals listed. [] Progression is slowed due to complexities/Impairments listed. [] Progression has been slowed due to co-morbidities. [] Plan just implemented, too soon to assess goals progression <30days   [] Goals require adjustment due to lack of progress  [] Patient is not progressing as expected and requires additional follow up with physician  [] Other:    Prognosis for POC: [] Good [x] Fair  [] Poor    Patient requires continued skilled intervention: [x] Yes  [] No        PLAN: Decrease LBP, improve lumbar/hip mobility and strength as tolerated by pt.  Discussed with pt about option of joining a gym - he would not like to do this due to being fearful of overdoing it and re-injuring himself. [x] Continue per plan of care [] Alter current plan (see comments)  [] Plan of care initiated [] Hold pending insurance auth [] Discharge    Electronically signed by: Linda Bass PT    Note: If patient does not return for scheduled/recommended follow up visits, this note will serve as a discharge from care along with the most recent update on progress.

## 2023-02-02 ENCOUNTER — HOSPITAL ENCOUNTER (OUTPATIENT)
Dept: PHYSICAL THERAPY | Age: 48
Setting detail: THERAPIES SERIES
Discharge: HOME | End: 2023-02-02
Payer: COMMERCIAL

## 2023-02-02 PROCEDURE — 97530 THERAPEUTIC ACTIVITIES: CPT

## 2023-02-02 PROCEDURE — 97140 MANUAL THERAPY 1/> REGIONS: CPT

## 2023-02-02 PROCEDURE — 97110 THERAPEUTIC EXERCISES: CPT

## 2023-02-02 NOTE — PROGRESS NOTES
[facility] [employer]     Physical Therapy Treatment Note/ Progress Report:     Date:  2023    Patient Name:  Lobo Baugh    :  [birth year]  MRN: [id number]  Medical/Treatment Diagnosis Information:  Diagnosis: Lumbar discogenic pain syndrome (M51.26), lumbar spondylosis (M47.816)       Treatment diagnosis: Low back pain   Insurance/Certification information:   [facility] - $0 deductible, $0 OOP max, 100% co-insurance, 30 PT visits per calendar year, auth required   Physician Information:  Ania Soria, [address] [place] [address] signed (Y/N): []  Yes [x]  No  Date sent:     Date of Patient follow up with Physician:      Progress Report: []  Yes  [x]  No     Functional Scale:           Date assessed:  FOTO physical FS primary measure score = 23     22  FOTO = 34          22  FOTO = 40          22  FOTO = 42          10/20/22  FOTO = 44          11/10/22  FOTO = 44          22  FOTO = 43          23    Date Range for reporting period:  Beginnin22  Endin23    Progress report due (10 Rx/or 30 days whichever is less):     Recertification due (POC duration/ or 90 days whichever is less): 23     Visit # Insurance Allowable Auth Needed    12 visits approved 23 - 23 [x]Yes    []No     Pain level:  1/10 currently    SUBJECTIVE:  Pt reports that lower back continues to feel pretty good overall. Pt hasn't done HEP yet because he has been trying to find a good place to do them at his house. Pt has considered joining [employer] though.      OBJECTIVE: See eval  Observation:  Test measurements:    22:  Lumbar AROM: flex = 40 *pn, ext = 10 *pn, SB = 50% B \"tight\" on R with L SB  22:  BP readings:  164/109, 157/104 on small portable unit; 147/97 beginning of session on larger unit; 152/96 after manual therapy, 144/93 after 3 min rest break  10/20/22:  Lumbar AROM: flex = 55, ext = 10, SB = 50% B  2/2/23:  BP:   145/94 at beginning of session  138/82 at end of session  Lumbar AROM: flex =88, ext = 13, SB = 70% B         Strength  LEFT RIGHT   HIP Flexors (L1-2) 5 5 *pn   HIP Abductors 4+ 4 *pn   HIP extension 4 4   Knee EXT (L3) 5 5   Knee Flex (S1) 5 5       RESTRICTIONS/PRECAUTIONS: 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; FALL RISK    Treatment based classification:     Exercises/Interventions:     Therapeutic Ex  15' Wt / Resistance sets/sec reps notes   TM walk    NPV   Bike  5'     LTR  1 10    Hand heel rock   1 10    Thread the needle in quadruped  1 10x ea B   DKTC w/red SB  5\" 15    Glut stretch crossover  Piriformis stretch fig 4 30\"  30\" 3x ea  3   Pushing vs pulling  L   Supine clamshells    Pelvic tilts  1 10 In hooklying   Seated lumbar flexion stretch w/SB Red SB 1 10    Seated TB rows  Seated TB high rows Red  red 1  1 10  10    Bridges  1 10    S/L clamshells orange 1 15 R   Prone hip extension 0# 1 10 B   Leg press SL 65# 2 10 Requires assist getting out of machine   HS curls SL 20# 2 10    Knee extension SL 20# 2 10    MARE abduction  MARE extension 60#  45# 2  2 10x ea    Palloff press green 1 15x ea    1/2 kneeling TB chops blue 1 15x ea          Therapeutic Activities 15'              Deadlifts with KB  15# KB 2 10    Lateral stepping  teal 1 3 laps Circuit  Band around ankles   Squats + press with landmine  2 10    Fwd step downs 4\" 1 10x B   Lateral step downs 6\" 1 15x B   Sit to stands 10# KB 2 10x circuit   Retro slider lunges 5# KB 1 10x ea    [place] carry 12# 1 2 laps    Suitcase carry 15# 1 2 lap ea circuit   BP readings / assessment 5'      Standing glut med activation orange 1 10x ea    Manual Intervention 15'              Prone PA 10'      GISTM/STM 5'   B lumbar paraspinals (R>L)   Lumbar Mobs in S/L 5'      SI Manip       Hip belt mobs       Hip LA distraction              NMR re-education 5'       MB rebounder chest pass in semi-tandem stance  2 10x ea On ground, 4# MB   Seated SB pelvic tilts  1 5x    Seated SB marches  1 10 Alternating LE   SLS ground 10\" 3x ea    Bosu lungmara fwd  2 10x      Pt. Education:  -pt educated on diagnosis, prognosis and expectations for rehab  -all pt questions were answered    Home Exercise Program:  Renee Shorten access code [facility]    Therapeutic Exercise and NMR EXR  [x] (79351) Provided verbal/tactile cueing for activities related to strengthening, flexibility, endurance, ROM  for improvements in proximal hip and core control with self care, mobility, lifting and ambulation.  [] (35149) Provided verbal/tactile cueing for activities related to improving balance, coordination, kinesthetic sense, posture, motor skill, proprioception  to assist with core control in self care, mobility, lifting, and ambulation.      Therapeutic Activities:    [] (88076 or 44463) Provided verbal/tactile cueing for activities related to improving balance, coordination, kinesthetic sense, posture, motor skill, proprioception and motor activation to allow for proper function  with self care and ADLs  [] (12249) Provided training and instruction to the patient for proper core and proximal hip recruitment and positioning with ambulation re-education     Home Exercise Program:    [x] (58012) Reviewed/Progressed HEP activities related to strengthening, flexibility, endurance, ROM of core, proximal hip and LE for functional self-care, mobility, lifting and ambulation   [] (82969) Reviewed/Progressed HEP activities related to improving balance, coordination, kinesthetic sense, posture, motor skill, proprioception of core, proximal hip and LE for self care, mobility, lifting, and ambulation      Manual Treatments:  PROM / STM / Oscillations-Mobs:  G-I, II, III, IV (PA's, Inf., Post.)  [x] (60463) Provided manual therapy to mobilize proximal hip and LS spine soft tissue/joints for the purpose of modulating pain, promoting relaxation,  increasing ROM, reducing/eliminating soft tissue swelling/inflammation/restriction, improving soft tissue extensibility and allowing for proper ROM for normal function with self care, mobility, lifting and ambulation. Modalities:     Charges:  Timed Code Treatment Minutes: 50   Total Treatment Minutes: 50       [] EVAL (LOW) 29116 (typically 20 minutes face-to-face)  [] EVAL (MOD) 73022 (typically 30 minutes face-to-face)  [] EVAL (HIGH) 63018 (typically 45 minutes face-to-face)  [] RE-EVAL     [x] ZH(61855) x 1   [] IONTO (05060)  [] NMR (20015) x     [] VASO (43036)  [x] Manual (37365) x  1 [] Other:  [x] TA (73442)x 1    [] Mech Traction (70233)  [] ES(attended) (24255)     [] ES (un) (43281): If BWC Please Indicate Time In/Out and Total Minutes  CPT Code Time in Time out Total Min                                            Goals:   Patient stated goal: \"to decrease my back pain\"  [x] Progressing: [] Met: [] Not Met: [] Adjusted     Therapist goals for Patient:  Short Term Goals: To be achieved in: 2 weeks  1. Independent in HEP and progression per patient tolerance, in order to prevent re-injury. [x] Progressing: [] Met: [] Not Met: [] Adjusted  2. Patient will have a decrease in pain to facilitate improvement in movement, function, and ADLs as indicated by Functional Deficits. [x] Progressing: [] Met: [] Not Met: [] Adjusted     Long Term Goals: To be achieved in: 8 weeks  1. Patient will reach FOTO predicted score of at least 42 to assist with reaching prior level of function. [] Progressing: [x] Met: [] Not Met: [] Adjusted  2. Patient will demonstrate increased AROM to WNL, good LS mobility, good hip ROM to allow for proper joint functioning as indicated by patients Functional Deficits. [x] Progressing: [] Met: [] Not Met: [] Adjusted  3.  Patient will demonstrate an increase in Strength to good proximal hip and core activation to allow for proper functional mobility as indicated by patients Functional Deficits. [x] Progressing: [] Met: [] Not Met: [] Adjusted  4. Patient will be able to bend forwards to pick something up and stand up without increased symptoms or restriction. [x] Progressing: [] Met: [] Not Met: [] Adjusted  5. Patient will be able to walk for 15 minutes without increased symptoms or restriction. [x] Progressing: [] Met: [] Not Met: [] Adjusted        ASSESSMENT:  Pt continues to respond well to manual therapy. Pt was appropriately fatigued with exercise progressions today, but no increased pain noted. Proprioception deficits noted with rebounder chest pass in semi-tandem stance more with R LE in front compared to L LE. Emphasized importance of performing HEP consistently and again discussed with pt about looking into joining a gym to be able to utilize machines to increase strength. Pt would continue to benefit from skilled PT services to address mobility and functional strength deficits in order to return to PLOF, including walking and squatting to pick something up off of the floor. Treatment/Activity Tolerance:  [x] Patient tolerated treatment well [x] Patient limited by fatique  [] Patient limited by pain  [] Patient limited by other medical complications  [] Other:     Overall Progression Towards Functional goals/ Treatment Progress Update:  [x] Patient is progressing as expected towards functional goals listed. [] Progression is slowed due to complexities/Impairments listed. [] Progression has been slowed due to co-morbidities. [] Plan just implemented, too soon to assess goals progression <30days   [] Goals require adjustment due to lack of progress  [] Patient is not progressing as expected and requires additional follow up with physician  [] Other:    Prognosis for POC: [] Good [x] Fair  [] Poor    Patient requires continued skilled intervention: [x] Yes  [] No        PLAN: Decrease LBP, improve lumbar/hip mobility and strength as tolerated by pt.  Discussed with pt about option of joining a gym - he would not like to do this due to being fearful of overdoing it and re-injuring himself. [x] Continue per plan of care [] Alter current plan (see comments)  [] Plan of care initiated [] Hold pending insurance auth [] Discharge    Electronically signed by: Raysa Joshi PT    Note: If patient does not return for scheduled/recommended follow up visits, this note will serve as a discharge from care along with the most recent update on progress.

## 2023-02-07 ENCOUNTER — HOSPITAL ENCOUNTER (OUTPATIENT)
Dept: PHYSICAL THERAPY | Age: 48
Setting detail: THERAPIES SERIES
Discharge: HOME | End: 2023-02-07
Payer: COMMERCIAL

## 2023-02-07 NOTE — FLOWSHEET NOTE
[facility]    Physical Therapy  Cancellation/No-show Note  Patient Name:  Ene Pelletier  :  [birth year]   Date:  2023  Cancelled visits to date: 1  No-shows to date: 0    For today's appointment patient:  [x]  Cancelled  []  Rescheduled appointment  []  No-show     Reason given by patient:  [x]  Patient ill  []  Conflicting appointment  []  No transportation    []  Conflict with work  []  No reason given  []  Other:     Comments:      Phone call information:   []  Phone call made today to patient at _ time at number provided:      []  Patient answered, conversation as follows:    []  Patient did not answer, message left as follows:  []  Phone call not made today  [x]  Phone call not needed - pt contacted us to cancel and provided reason for cancellation.      Electronically signed by:  Logan Solorzano, PT, DPT

## 2023-02-09 ENCOUNTER — HOSPITAL ENCOUNTER (OUTPATIENT)
Dept: PHYSICAL THERAPY | Age: 48
Setting detail: THERAPIES SERIES
Discharge: HOME | End: 2023-02-09
Payer: COMMERCIAL

## 2023-02-09 PROCEDURE — 97140 MANUAL THERAPY 1/> REGIONS: CPT

## 2023-02-09 PROCEDURE — 97110 THERAPEUTIC EXERCISES: CPT

## 2023-02-09 NOTE — PROGRESS NOTES
[employer]     Physical Therapy Treatment Note/ Progress Report:     Date:  2023    Patient Name:  Cally Wilkerson    :    MRN: [id number]  Medical/Treatment Diagnosis Information:  Diagnosis: Lumbar discogenic pain syndrome (M51.26), lumbar spondylosis (M47.816)       Treatment diagnosis: Low back pain   Insurance/Certification information:   [facility] - $0 deductible, $0 OOP max, 100% co-insurance, 30 PT visits per calendar year, auth required   Physician Information:  Lior Brandon[address] signed (Y/N): []  Yes [x]  No  Date sent:     Date of Patient follow up with Physician:      Progress Report: []  Yes  [x]  No     Functional Scale:           Date assessed:  FOTO physical FS primary measure score = 23     22  FOTO = 34          22  FOTO = 40          22  FOTO = 42          10/20/22  FOTO = 44          11/10/22  FOTO = 44          22  FOTO = 43          23    Date Range for reporting period:  Beginnin22  Endin23    Progress report due (10 Rx/or 30 days whichever is less):     Recertification due (POC duration/ or 90 days whichever is less): 23     Visit # Insurance Allowable Auth Needed    12 visits approved 23 - 23 [x]Yes    []No     Pain level:  2/10 currently    SUBJECTIVE:  Pt reports that he is feeling pretty good today with no new complaints. Pt was having stiffness in his back today but not a lot of pain. Pt said he was feeling more pain today with CPA and UPA on the left today.     OBJECTIVE: See eval  Observation:  Test measurements:    22:  Lumbar AROM: flex = 40 *pn, ext = 10 *pn, SB = 50% B \"tight\" on R with L SB  22:  BP readings:  164/109, 157/104 on small portable unit; 147/97 beginning of session on larger unit; 152/96 after manual therapy, 144/93 after 3 min rest break  10/20/22:  Lumbar AROM: flex = 55, ext = 10, SB = 50% B  2/2/23:  BP:   145/94 at beginning of session  138/82 at end of session  Lumbar AROM: flex =88, ext = 13, SB = 70% B         Strength  LEFT RIGHT   HIP Flexors (L1-2) 5 5 *pn   HIP Abductors 4+ 4 *pn   HIP extension 4 4   Knee EXT (L3) 5 5   Knee Flex (S1) 5 5   2/9/23: BP at beginning of session: 127/89   BP at end of session: 117/80    RESTRICTIONS/PRECAUTIONS: 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; FALL RISK    Treatment based classification:     Exercises/Interventions:     Therapeutic Ex  25' Wt / Resistance sets/sec reps notes   TM walk  5'  1.7-1.9 mph   Bike  5'     LTR  1 10    Hand heel rock   1 10    Thread the needle in quadruped  1 10x ea B   DKTC w/red SB  5\" 15    Glut stretch crossover  Piriformis stretch fig 4 30\"  30\" 3x ea  3   Pushing vs pulling  L   Supine clamshells    Pelvic tilts  1 10 In hooklying   Seated lumbar flexion stretch w/SB Red SB 1 10    Seated TB rows  Seated TB high rows Red  red 1  1 10  10    Bridges  1 10    S/L clamshells orange 1 15 R   Prone hip extension 0# 1 10 B   Leg press SL  Leg press DL 65#  100# 2  2 10  10 Requires assist getting out of machine   HS curls SL 20# 2 10 B   Knee extension SL 20# 2 10    MARE abduction  MARE extension 60#  45# 2  2 10x ea    Palloff press green 5\" 15x ea    1/2 kneeling TB chops blue 1 15x ea          Therapeutic Activities 3'              Deadlifts with KB  15# KB 2 10    Lateral stepping  teal 1 3 laps Circuit  Band around ankles   Squats + press with landmine  2 10    Fwd step downs 4\" 1 10x B   Lateral step downs 6\" 1 15x B   Sit to stands 10# KB 2 10x circuit   Retro slider lunges 5# KB 1 10x ea    [place] carry 12# 1 2 laps    Suitcase carry 15# 1 2 lap ea circuit   BP readings / assessment 5'      Standing glut med activation orange 1 10x ea    Manual Intervention 23'              Prone PA 15'      GISTM/STM 5'   B lumbar paraspinals (R>L)   Lumbar Mobs in S/L 8'      SI Manip       Hip belt mobs       Hip LA distraction              NMR re-education       MB rebounder chest pass in semi-tandem stance  2 10x ea On ground, 4# MB   Seated SB pelvic tilts  1 5x    Seated SB marches  1 10 Alternating LE   SLS ground 10\" 3x ea    Bosu lunges fwd  2 10x      Pt. Education:  -pt educated on diagnosis, prognosis and expectations for rehab  -all pt questions were answered    Home Exercise Program:  [facility] access code [facility]    Therapeutic Exercise and NMR EXR  [x] (11787) Provided verbal/tactile cueing for activities related to strengthening, flexibility, endurance, ROM  for improvements in proximal hip and core control with self care, mobility, lifting and ambulation.  [] (83801) Provided verbal/tactile cueing for activities related to improving balance, coordination, kinesthetic sense, posture, motor skill, proprioception  to assist with core control in self care, mobility, lifting, and ambulation.      Therapeutic Activities:    [] (09713 or 72211) Provided verbal/tactile cueing for activities related to improving balance, coordination, kinesthetic sense, posture, motor skill, proprioception and motor activation to allow for proper function  with self care and ADLs  [] (43233) Provided training and instruction to the patient for proper core and proximal hip recruitment and positioning with ambulation re-education     Home Exercise Program:    [x] (22392) Reviewed/Progressed HEP activities related to strengthening, flexibility, endurance, ROM of core, proximal hip and LE for functional self-care, mobility, lifting and ambulation   [] (00782) Reviewed/Progressed HEP activities related to improving balance, coordination, kinesthetic sense, posture, motor skill, proprioception of core, proximal hip and LE for self care, mobility, lifting, and ambulation      Manual Treatments:  PROM / STM / Oscillations-Mobs:  G-I, II, III, IV (PA's, Inf., Post.)  [x] (29093) Provided manual therapy to mobilize proximal hip and LS spine soft tissue/joints for the purpose of modulating pain, promoting relaxation,  increasing ROM, reducing/eliminating soft tissue swelling/inflammation/restriction, improving soft tissue extensibility and allowing for proper ROM for normal function with self care, mobility, lifting and ambulation. Modalities:     Charges:  Timed Code Treatment Minutes: 51   Total Treatment Minutes: 51       [] EVAL (LOW) 52546 (typically 20 minutes face-to-face)  [] EVAL (MOD) 49537 (typically 30 minutes face-to-face)  [] EVAL (HIGH) 29519 (typically 45 minutes face-to-face)  [] RE-EVAL     [x] PE(91964) x 2   [] IONTO (07990)  [] NMR (33471) x     [] VASO (70116)  [x] Manual (06375) x  1 [] Other:  [] TA (77569)x     [] Mech Traction (95726)  [] ES(attended) (08144)     [] ES (un) (02904): If BWC Please Indicate Time In/Out and Total Minutes  CPT Code Time in Time out Total Min                                            Goals:   Patient stated goal: \"to decrease my back pain\"  [x] Progressing: [] Met: [] Not Met: [] Adjusted     Therapist goals for Patient:  Short Term Goals: To be achieved in: 2 weeks  1. Independent in HEP and progression per patient tolerance, in order to prevent re-injury. [x] Progressing: [] Met: [] Not Met: [] Adjusted  2. Patient will have a decrease in pain to facilitate improvement in movement, function, and ADLs as indicated by Functional Deficits. [x] Progressing: [] Met: [] Not Met: [] Adjusted     Long Term Goals: To be achieved in: 8 weeks  1. Patient will reach FOTO predicted score of at least 42 to assist with reaching prior level of function. [] Progressing: [x] Met: [] Not Met: [] Adjusted  2. Patient will demonstrate increased AROM to WNL, good LS mobility, good hip ROM to allow for proper joint functioning as indicated by patients Functional Deficits. [x] Progressing: [] Met: [] Not Met: [] Adjusted  3.  Patient will demonstrate an increase in Strength to good proximal hip and core activation to allow for proper functional mobility as indicated by patients Functional Deficits.  [x] Progressing: [] Met: [] Not Met: [] Adjusted  4. Patient will be able to bend forwards to pick something up and stand up without increased symptoms or restriction.   [x] Progressing: [] Met: [] Not Met: [] Adjusted  5. Patient will be able to walk for 15 minutes without increased symptoms or restriction.     [x] Progressing: [] Met: [] Not Met: [] Adjusted        ASSESSMENT:  Pt continues to respond well to manual therapy. Pt was appropriately fatigued with exercise and needs continued cueing to keep his back straight with the MARE abduction, and the paloff press. Pt was able to walk on the treadmill for 5 minutes without increased pain. Pt would continue to benefit from skilled PT services to address mobility and functional strength deficits in order to return to PLOF, including walking and squatting to pick something up off of the floor.      Treatment/Activity Tolerance:  [x] Patient tolerated treatment well [x] Patient limited by fatique  [] Patient limited by pain  [] Patient limited by other medical complications  [] Other:     Overall Progression Towards Functional goals/ Treatment Progress Update:  [x] Patient is progressing as expected towards functional goals listed.    [] Progression is slowed due to complexities/Impairments listed.  [] Progression has been slowed due to co-morbidities.  [] Plan just implemented, too soon to assess goals progression <30days   [] Goals require adjustment due to lack of progress  [] Patient is not progressing as expected and requires additional follow up with physician  [] Other:    Prognosis for POC: [] Good [x] Fair  [] Poor    Patient requires continued skilled intervention: [x] Yes  [] No        PLAN: Decrease LBP, improve lumbar/hip mobility and strength as tolerated by pt. Discussed with pt about option of joining a gym - he would not like to do this due to  being fearful of overdoing it and re-injuring himself. [x] Continue per plan of care [] Alter current plan (see comments)  [] Plan of care initiated [] Hold pending insurance auth [] Discharge    Electronically signed by: Rafi Cavazos, PT  Therapist was present, directed the patient's care, made skilled judgement, and was responsible for assessment and treatment of the patient. Antonio Stiles SPT     Note: If patient does not return for scheduled/recommended follow up visits, this note will serve as a discharge from care along with the most recent update on progress.

## 2023-02-14 ENCOUNTER — HOSPITAL ENCOUNTER (OUTPATIENT)
Dept: PHYSICAL THERAPY | Age: 48
Setting detail: THERAPIES SERIES
Discharge: HOME | End: 2023-02-14
Payer: COMMERCIAL

## 2023-02-14 PROCEDURE — 97530 THERAPEUTIC ACTIVITIES: CPT

## 2023-02-14 PROCEDURE — 97110 THERAPEUTIC EXERCISES: CPT

## 2023-02-14 PROCEDURE — 97140 MANUAL THERAPY 1/> REGIONS: CPT

## 2023-02-14 NOTE — PROGRESS NOTES
[employer]     Physical Therapy Treatment Note/ Progress Report:     Date:  2023    Patient Name:  Tanya Seay    :  [id number]  MRN: [id number]  Medical/Treatment Diagnosis Information:  Diagnosis: Lumbar discogenic pain syndrome (M51.26), lumbar spondylosis (M47.816)       Treatment diagnosis: Low back pain   Insurance/Certification information:   [facility] - $0 deductible, $0 OOP max, 100% co-insurance, 30 PT visits per calendar year, auth required   Physician Information:  Tien Claudio, [address] [place] [address] signed (Y/N): []  Yes [x]  No  Date sent:     Date of Patient follow up with Physician:      Progress Report: []  Yes  [x]  No     Functional Scale:           Date assessed:  FOTO physical FS primary measure score = 23     22  FOTO = 34          22  FOTO = 40          22  FOTO = 42          10/20/22  FOTO = 44          11/10/22  FOTO = 44          22  FOTO = 43          23    Date Range for reporting period:  Beginnin22  Endin23    Progress report due (10 Rx/or 30 days whichever is less):     Recertification due (POC duration/ or 90 days whichever is less): 23     Visit # Insurance Allowable Auth Needed    12 visits approved 23 - 23 [x]Yes    []No     Pain level:  210 currently    SUBJECTIVE:  Pt reports back has been feeling pretty good overall. Pt reports that he did have difficulty sitting in a hard outdoor chair at his friend's house this past weekend. Pt reports that he got up quickly this morning and had to hurry to bring garbage can out to the street, and carrying this caused some increased pain in his lower back. Pt reports that he looked into joining [employer], but isn't completely decided on this or another gym yet.       OBJECTIVE: See eval  Observation:  Test measurements:    22:  Lumbar AROM: flex = 40 *pn, ext = 10 *pn, SB = 50% B \"tight\" on R with L SB  9/29/22:  BP readings:  164/109, 157/104 on small portable unit; 147/97 beginning of session on larger unit; 152/96 after manual therapy, 144/93 after 3 min rest break  10/20/22:  Lumbar AROM: flex = 55, ext = 10, SB = 50% B  2/2/23:  BP:   145/94 at beginning of session  138/82 at end of session  Lumbar AROM: flex =88, ext = 13, SB = 70% B         Strength  LEFT RIGHT   HIP Flexors (L1-2) 5 5 *pn   HIP Abductors 4+ 4 *pn   HIP extension 4 4   Knee EXT (L3) 5 5   Knee Flex (S1) 5 5   2/9/23: BP at beginning of session: 127/89   BP at end of session: 117/80    RESTRICTIONS/PRECAUTIONS: 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; FALL RISK    Treatment based classification:     Exercises/Interventions:     Therapeutic Ex  25' Wt / Resistance sets/sec reps notes   TM walk  5'  1.7-1.9 mph  Pt defer    Bike  5'     LTR  1 10    Hand heel rock   1 10    Thread the needle in quadruped  1 10x ea B   DKTC w/red SB  5\" 15    Glut stretch crossover  Piriformis stretch fig 4 30\"  30\" 3x ea  3   Pushing vs pulling  L   Supine clamshells    Pelvic tilts  1 10 In hooklying   Seated lumbar flexion stretch w/SB Red SB 1 10    Seated TB rows  Seated TB high rows Red  red 1  1 10  10    Bridges  1 10    S/L clamshells orange 1 15 R   Prone hip extension 0# 1 10 B   Leg press SL  Leg press DL 70#  100# 2  2 10  10 Requires assist getting out of machine   HS curls SL 25# 2 10 B   Knee extension SL 25# 2 10    MARE abduction  MARE extension 60#  45# 2  2 10x ea    Palloff press green 5\" 15x ea    1/2 kneeling TB chops blue 1 15x ea          Therapeutic Activities 10'              Deadlifts with KB  15# KB 2 10    Lateral stepping  teal 1 3 laps Circuit  Band around ankles   Squats + press with landmine  2 10    Fwd step downs 4\" 1 10x B   Lateral step downs 6\" 1 15x B   Sit to stands 10# KB 2 10x circuit   Retro slider lunges 5# KB 1 10x ea    [place] carry 12# 1 2 laps    Suitcase carry 15# 1 2 lap ea circuit   BP readings / assessment 5'      Box lifting 17# box 1 10 Table<>ground   Standing glut med activation orange 1 10x ea    Manual Intervention 10'              Prone PA 5'      GISTM/STM 5'   B lumbar paraspinals (R>L)   Lumbar Mobs in S/L 5'      SI Manip       Hip belt mobs       Hip LA distraction              NMR re-education       MB rebounder chest pass in semi-tandem stance  2 10x ea On ground, 4# MB   Seated SB pelvic tilts  1 5x    Seated SB marches  1 10 Alternating LE   SLS ground 10\" 3x ea    Bosu lunges fwd  2 10x      Pt. Education:  -pt educated on diagnosis, prognosis and expectations for rehab  -all pt questions were answered    Home Exercise Program:  [employer] access code [email or web address]    Therapeutic Exercise and NMR EXR  [x] (37762) Provided verbal/tactile cueing for activities related to strengthening, flexibility, endurance, ROM  for improvements in proximal hip and core control with self care, mobility, lifting and ambulation.  [] (14063) Provided verbal/tactile cueing for activities related to improving balance, coordination, kinesthetic sense, posture, motor skill, proprioception  to assist with core control in self care, mobility, lifting, and ambulation.     Therapeutic Activities:    [] (13909 or 88575) Provided verbal/tactile cueing for activities related to improving balance, coordination, kinesthetic sense, posture, motor skill, proprioception and motor activation to allow for proper function  with self care and ADLs  [] (83367) Provided training and instruction to the patient for proper core and proximal hip recruitment and positioning with ambulation re-education     Home Exercise Program:    [x] (27275) Reviewed/Progressed HEP activities related to strengthening, flexibility, endurance, ROM of core, proximal hip and LE for functional self-care, mobility, lifting and ambulation   [] (05893) Reviewed/Progressed HEP activities related to improving balance, coordination,  kinesthetic sense, posture, motor skill, proprioception of core, proximal hip and LE for self care, mobility, lifting, and ambulation      Manual Treatments:  PROM / STM / Oscillations-Mobs:  G-I, II, III, IV (PA's, Inf., Post.)  [x] (75467) Provided manual therapy to mobilize proximal hip and LS spine soft tissue/joints for the purpose of modulating pain, promoting relaxation,  increasing ROM, reducing/eliminating soft tissue swelling/inflammation/restriction, improving soft tissue extensibility and allowing for proper ROM for normal function with self care, mobility, lifting and ambulation. Modalities:     Charges:  Timed Code Treatment Minutes: 45   Total Treatment Minutes: 45       [] EVAL (LOW) 62122 (typically 20 minutes face-to-face)  [] EVAL (MOD) 92891 (typically 30 minutes face-to-face)  [] EVAL (HIGH) 39514 (typically 45 minutes face-to-face)  [] RE-EVAL     [x] RT(12844) x 1   [] IONTO (39848)  [] NMR (22784) x     [] VASO (47451)  [x] Manual (77284) x  1 [] Other:  [x] TA (39937)x 1    [] Mech Traction (69923)  [] ES(attended) (22496)     [] ES (un) (95962): If BWC Please Indicate Time In/Out and Total Minutes  CPT Code Time in Time out Total Min                                            Goals:   Patient stated goal: \"to decrease my back pain\"  [x] Progressing: [] Met: [] Not Met: [] Adjusted     Therapist goals for Patient:  Short Term Goals: To be achieved in: 2 weeks  1. Independent in HEP and progression per patient tolerance, in order to prevent re-injury. [x] Progressing: [] Met: [] Not Met: [] Adjusted  2. Patient will have a decrease in pain to facilitate improvement in movement, function, and ADLs as indicated by Functional Deficits. [x] Progressing: [] Met: [] Not Met: [] Adjusted     Long Term Goals: To be achieved in: 8 weeks  1. Patient will reach FOTO predicted score of at least 42 to assist with reaching prior level of function.   [] Progressing: [x] Met: [] Not Met: [] Adjusted  2. Patient will demonstrate increased AROM to WNL, good LS mobility, good hip ROM to allow for proper joint functioning as indicated by patients Functional Deficits. [x] Progressing: [] Met: [] Not Met: [] Adjusted  3. Patient will demonstrate an increase in Strength to good proximal hip and core activation to allow for proper functional mobility as indicated by patients Functional Deficits. [x] Progressing: [] Met: [] Not Met: [] Adjusted  4. Patient will be able to bend forwards to pick something up and stand up without increased symptoms or restriction. [x] Progressing: [] Met: [] Not Met: [] Adjusted  5. Patient will be able to walk for 15 minutes without increased symptoms or restriction. [x] Progressing: [] Met: [] Not Met: [] Adjusted        ASSESSMENT:  Pt continues to respond well to manual therapy. Improved mobility noted with PA mobs in lower thoracic and lumbar spine. Added box lifts with frequent cues required to maintain neutral spine and to keep box closer to his body. Pt would continue to benefit from skilled PT services to address mobility and functional strength deficits in order to return to PLOF, including walking and squatting to pick something up off of the floor. Treatment/Activity Tolerance:  [x] Patient tolerated treatment well [x] Patient limited by fatique  [] Patient limited by pain  [] Patient limited by other medical complications  [] Other:     Overall Progression Towards Functional goals/ Treatment Progress Update:  [x] Patient is progressing as expected towards functional goals listed. [] Progression is slowed due to complexities/Impairments listed. [] Progression has been slowed due to co-morbidities.   [] Plan just implemented, too soon to assess goals progression <30days   [] Goals require adjustment due to lack of progress  [] Patient is not progressing as expected and requires additional follow up with physician  [] Other:    Prognosis for POC: [] Good [x] Fair  [] Poor    Patient requires continued skilled intervention: [x] Yes  [] No        PLAN: Decrease LBP, improve lumbar/hip mobility and strength as tolerated by pt. Discussed with pt about option of joining a gym - he would not like to do this due to being fearful of overdoing it and re-injuring himself. [x] Continue per plan of care [] Alter current plan (see comments)  [] Plan of care initiated [] Hold pending insurance auth [] Discharge    Electronically signed by: Berna Mclean, PT    Note: If patient does not return for scheduled/recommended follow up visits, this note will serve as a discharge from care along with the most recent update on progress.

## 2023-02-15 ENCOUNTER — HOSPITAL ENCOUNTER (OUTPATIENT)
Dept: ONCOLOGY | Age: 48
Setting detail: INFUSION SERIES
Discharge: HOME | End: 2023-02-15
Payer: COMMERCIAL

## 2023-02-15 VITALS
HEART RATE: 80 BPM | RESPIRATION RATE: 16 BRPM | TEMPERATURE: 97.9 F | SYSTOLIC BLOOD PRESSURE: 144 MMHG | DIASTOLIC BLOOD PRESSURE: 97 MMHG

## 2023-02-15 DIAGNOSIS — J45.50 SEVERE PERSISTENT ASTHMA, UNSPECIFIED WHETHER COMPLICATED: Primary | ICD-10-CM

## 2023-02-15 PROCEDURE — 96372 THER/PROPH/DIAG INJ SC/IM: CPT

## 2023-02-15 PROCEDURE — 99211 OFF/OP EST MAY X REQ PHY/QHP: CPT

## 2023-02-15 PROCEDURE — 6360000002 HC RX W HCPCS: Performed by: INTERNAL MEDICINE

## 2023-02-15 RX ADMIN — OMALIZUMAB 300 MG: 150 INJECTION, SOLUTION SUBCUTANEOUS at 13:51

## 2023-02-16 ENCOUNTER — HOSPITAL ENCOUNTER (OUTPATIENT)
Dept: PHYSICAL THERAPY | Age: 48
Setting detail: THERAPIES SERIES
Discharge: HOME | End: 2023-02-16
Payer: COMMERCIAL

## 2023-02-16 PROCEDURE — 97140 MANUAL THERAPY 1/> REGIONS: CPT

## 2023-02-16 PROCEDURE — 97110 THERAPEUTIC EXERCISES: CPT

## 2023-02-16 PROCEDURE — 97530 THERAPEUTIC ACTIVITIES: CPT

## 2023-02-16 NOTE — FLOWSHEET NOTE
[facility] [employer]     Physical Therapy Treatment Note/ Progress Report:     Date:  2023    Patient Name:  Kierra Patterson    :    MRN: [id number]  Medical/Treatment Diagnosis Information:  Diagnosis: Lumbar discogenic pain syndrome (M51.26), lumbar spondylosis (M47.816)       Treatment diagnosis: Low back pain   Insurance/Certification information:   [facility] - $0 deductible, $0 OOP max, 100% co-insurance, 30 PT visits per calendar year, auth required   Physician Information:  Reggie Hoover [address] [place] [address] signed (Y/N): []  Yes [x]  No  Date sent:     Date of Patient follow up with Physician:      Progress Report: []  Yes  [x]  No     Functional Scale:           Date assessed:  FOTO physical FS primary measure score = 23     22  FOTO = 34          22  FOTO = 40          22  FOTO = 42          10/20/22  FOTO = 44          11/10/22  FOTO = 44          22  FOTO = 43          23    Date Range for reporting period:  Beginnin22  Endin23    Progress report due (10 Rx/or 30 days whichever is less):     Recertification due (POC duration/ or 90 days whichever is less): 23     Visit # Insurance Allowable Auth Needed    12 visits approved 23 - 23 [x]Yes    []No     Pain level:  2/10 currently    SUBJECTIVE:  Pt reports back is feeling pretty good overall. Pt spoke to friend about joining [employer], he is planning to hopefully be able to go sometime next week.       OBJECTIVE: See eval  Observation:  Test measurements:    22:  Lumbar AROM: flex = 40 *pn, ext = 10 *pn, SB = 50% B \"tight\" on R with L SB  22:  BP readings:  164/109, 157/104 on small portable unit; 147/97 beginning of session on larger unit; 152/96 after manual therapy, 144/93 after 3 min rest break  10/20/22:  Lumbar AROM: flex = 55, ext = 10, SB = 50% B  23:  BP:   145/94 at beginning of session  138/82 at end of session  Lumbar AROM: flex =88, ext = 13, SB = 70% B         Strength  LEFT RIGHT   HIP Flexors (L1-2) 5 5 *pn   HIP Abductors 4+ 4 *pn   HIP extension 4 4   Knee EXT (L3) 5 5   Knee Flex (S1) 5 5   2/9/23: BP at beginning of session: 127/89   BP at end of session: 117/80    RESTRICTIONS/PRECAUTIONS: 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; FALL RISK    Treatment based classification:     Exercises/Interventions:     Therapeutic Ex  25' Wt / Resistance sets/sec reps notes   TM walk  5'  1.7-1.9 mph  Pt defer    Bike  5'     LTR  1 10    Hand heel rock   1 10    Thread the needle in quadruped  1 10x ea B   DKTC w/red SB  5\" 15    Glut stretch crossover  Piriformis stretch fig 4 30\"  30\" 3x ea  3   Pushing vs pulling  L   Supine clamshells    Pelvic tilts  1 10 In hooklying   Seated lumbar flexion stretch w/SB Red SB 1 10    Seated TB rows  Seated TB high rows Red  red 1  1 10  10    Bridges  1 10    S/L clamshells orange 1 15 R   Prone hip extension 0# 1 10 B   Leg press SL  Leg press DL 70#  100# 2  2 10  10 Requires assist getting out of machine   HS curls SL 25# 2 10 B   Knee extension SL 25# 2 10    MARE abduction  MARE extension 60#  45# 2  2 10x ea    Palloff press green 5\" 15x ea    1/2 kneeling TB chops blue 1 15x ea          Therapeutic Activities 10'              Deadlifts with KB  15# KB 2 10    Lateral stepping  teal 1 3 laps Circuit  Band around ankles   Squats + press with landmine  2 10    Fwd step downs 4\" 1 10x B   Lateral step downs 6\" 1 15x B   Sit to stands 10# KB 2 10x W/butt taps   Retro slider lunges 5# KB 1 10x ea    Turney carry 12# 1 2 laps    Suitcase carry 15# 1 2 lap ea circuit   BP readings / assessment 5'      Box lifting 17# box 1 10 Table<>ground   Standing glut med activation orange 1 10x ea    Manual Intervention 10'              Prone PA 5'      GISTM/STM 5'   B lumbar paraspinals (R>L)   Lumbar Mobs in S/L 5'      SI Manip Hip belt mobs       Hip LA distraction              NMR re-education       MB rebounder chest pass in semi-tandem stance  2 10x ea On ground, 4# MB   Seated SB pelvic tilts  1 5x    Seated SB marches  1 10 Alternating LE   SLS ground 10\" 3x ea    Bosu lunges fwd  2 10x      Pt. Education:  -pt educated on diagnosis, prognosis and expectations for rehab  -all pt questions were answered    Home Exercise Program:  Allyson Alessia access code [facility]    Therapeutic Exercise and NMR EXR  [x] (21539) Provided verbal/tactile cueing for activities related to strengthening, flexibility, endurance, ROM  for improvements in proximal hip and core control with self care, mobility, lifting and ambulation.  [] (16058) Provided verbal/tactile cueing for activities related to improving balance, coordination, kinesthetic sense, posture, motor skill, proprioception  to assist with core control in self care, mobility, lifting, and ambulation.      Therapeutic Activities:    [] (83797 or 46843) Provided verbal/tactile cueing for activities related to improving balance, coordination, kinesthetic sense, posture, motor skill, proprioception and motor activation to allow for proper function  with self care and ADLs  [] (42707) Provided training and instruction to the patient for proper core and proximal hip recruitment and positioning with ambulation re-education     Home Exercise Program:    [x] (47475) Reviewed/Progressed HEP activities related to strengthening, flexibility, endurance, ROM of core, proximal hip and LE for functional self-care, mobility, lifting and ambulation   [] (60446) Reviewed/Progressed HEP activities related to improving balance, coordination, kinesthetic sense, posture, motor skill, proprioception of core, proximal hip and LE for self care, mobility, lifting, and ambulation      Manual Treatments:  PROM / STM / Oscillations-Mobs:  G-I, II, III, IV (PA's, Inf., Post.)  [x] (61251) Provided manual therapy to mobilize proximal hip and LS spine soft tissue/joints for the purpose of modulating pain, promoting relaxation,  increasing ROM, reducing/eliminating soft tissue swelling/inflammation/restriction, improving soft tissue extensibility and allowing for proper ROM for normal function with self care, mobility, lifting and ambulation. Modalities:     Charges:  Timed Code Treatment Minutes: 45   Total Treatment Minutes: 45       [] EVAL (LOW) 58195 (typically 20 minutes face-to-face)  [] EVAL (MOD) 95438 (typically 30 minutes face-to-face)  [] EVAL (HIGH) 68667 (typically 45 minutes face-to-face)  [] RE-EVAL     [x] UANG(65767) x 1   [] IONTO (18667)  [] NMR (20633) x     [] VASO (30202)  [x] Manual (05937) x  1 [] Other:  [x] TA (66275)x 1    [] Mech Traction (28682)  [] ES(attended) (97260)     [] ES (un) (73049): If BWC Please Indicate Time In/Out and Total Minutes  CPT Code Time in Time out Total Min                                            Goals:   Patient stated goal: \"to decrease my back pain\"  [x] Progressing: [] Met: [] Not Met: [] Adjusted     Therapist goals for Patient:  Short Term Goals: To be achieved in: 2 weeks  1. Independent in HEP and progression per patient tolerance, in order to prevent re-injury. [x] Progressing: [] Met: [] Not Met: [] Adjusted  2. Patient will have a decrease in pain to facilitate improvement in movement, function, and ADLs as indicated by Functional Deficits. [x] Progressing: [] Met: [] Not Met: [] Adjusted     Long Term Goals: To be achieved in: 8 weeks  1. Patient will reach FOTO predicted score of at least 42 to assist with reaching prior level of function. [] Progressing: [x] Met: [] Not Met: [] Adjusted  2. Patient will demonstrate increased AROM to WNL, good LS mobility, good hip ROM to allow for proper joint functioning as indicated by patients Functional Deficits. [x] Progressing: [] Met: [] Not Met: [] Adjusted  3.  Patient will demonstrate an increase in Strength to good proximal hip and core activation to allow for proper functional mobility as indicated by patients Functional Deficits. [x] Progressing: [] Met: [] Not Met: [] Adjusted  4. Patient will be able to bend forwards to pick something up and stand up without increased symptoms or restriction. [x] Progressing: [] Met: [] Not Met: [] Adjusted  5. Patient will be able to walk for 15 minutes without increased symptoms or restriction. [x] Progressing: [] Met: [] Not Met: [] Adjusted        ASSESSMENT:  Pt continues to respond well to manual therapy. Decreased stiffness with lumbar PA mobs today. Pt was very fatigued with box lifts and sit to stands with butt taps today. Pt requires frequent cues with box lifts to keep box close to his body for proper lifting mechanics. Pt would continue to benefit from skilled PT services to address mobility and functional strength deficits in order to return to PLOF, including walking and squatting to pick something up off of the floor. Treatment/Activity Tolerance:  [x] Patient tolerated treatment well [x] Patient limited by fatique  [] Patient limited by pain  [] Patient limited by other medical complications  [] Other:     Overall Progression Towards Functional goals/ Treatment Progress Update:  [x] Patient is progressing as expected towards functional goals listed. [] Progression is slowed due to complexities/Impairments listed. [] Progression has been slowed due to co-morbidities. [] Plan just implemented, too soon to assess goals progression <30days   [] Goals require adjustment due to lack of progress  [] Patient is not progressing as expected and requires additional follow up with physician  [] Other:    Prognosis for POC: [] Good [x] Fair  [] Poor    Patient requires continued skilled intervention: [x] Yes  [] No        PLAN: Decrease LBP, improve lumbar/hip mobility and strength as tolerated by pt.  Discussed with pt about option of joining a gym - he would not like to do this due to being fearful of overdoing it and re-injuring himself. [x] Continue per plan of care [] Alter current plan (see comments)  [] Plan of care initiated [] Hold pending insurance auth [] Discharge    Electronically signed by: [facility] PT    Note: If patient does not return for scheduled/recommended follow up visits, this note will serve as a discharge from care along with the most recent update on progress.

## 2023-02-21 ENCOUNTER — HOSPITAL ENCOUNTER (OUTPATIENT)
Dept: PHYSICAL THERAPY | Age: 48
Setting detail: THERAPIES SERIES
Discharge: HOME | End: 2023-02-21
Payer: COMMERCIAL

## 2023-02-21 PROCEDURE — 97530 THERAPEUTIC ACTIVITIES: CPT

## 2023-02-21 PROCEDURE — 97110 THERAPEUTIC EXERCISES: CPT

## 2023-02-21 PROCEDURE — 97140 MANUAL THERAPY 1/> REGIONS: CPT

## 2023-02-21 NOTE — FLOWSHEET NOTE
[facility]     Physical Therapy Treatment Note/ Progress Report:     Date:  2023    Patient Name:  Karla Neal    :    MRN: [id number]  Medical/Treatment Diagnosis Information:  Diagnosis: Lumbar discogenic pain syndrome (M51.26), lumbar spondylosis (M47.816)       Treatment diagnosis: Low back pain   Insurance/Certification information:   [facility] - $0 deductible, $0 OOP max, 100% co-insurance, 30 PT visits per calendar year, auth required   Physician Information:  Corrie Love [address] [place] [address] signed (Y/N): []  Yes [x]  No  Date sent:     Date of Patient follow up with Physician:      Progress Report: []  Yes  [x]  No     Functional Scale:           Date assessed:  FOTO physical FS primary measure score = 23     22  FOTO = 34          22  FOTO = 40          22  FOTO = 42          10/20/22  FOTO = 44          11/10/22  FOTO = 44          22  FOTO = 43          23    Date Range for reporting period:  Beginnin22  Endin23    Progress report due (10 Rx/or 30 days whichever is less):     Recertification due (POC duration/ or 90 days whichever is less): 23     Visit # Insurance Allowable Auth Needed    12 visits approved 23 - 23 [x]Yes    []No     Pain level:  210 currently    SUBJECTIVE:  Pt reports back is painful higher up today than it has been. Pt reported tightness R>L with PA's in the lower thoracic area into his upper lumbar spine. Pt spoke to friend about joining [employer], however he has not gone to visit the facility yet.      OBJECTIVE:   Observation:  Test measurements:    22:  Lumbar AROM: flex = 40 *pn, ext = 10 *pn, SB = 50% B \"tight\" on R with L SB  22:  BP readings:  164/109, 157/104 on small portable unit; 147/97 beginning of session on larger unit; 152/96 after manual therapy, 144/93 after 3 min rest break  10/20/22:  Lumbar AROM: flex = 55, ext = 10, SB = 50% B  2/2/23:  BP:   145/94 at beginning of session  138/82 at end of session  Lumbar AROM: flex =88, ext = 13, SB = 70% B         Strength  LEFT RIGHT   HIP Flexors (L1-2) 5 5 *pn   HIP Abductors 4+ 4 *pn   HIP extension 4 4   Knee EXT (L3) 5 5   Knee Flex (S1) 5 5   2/9/23: BP at beginning of session: 127/89   BP at end of session: 117/80  2/21/23: BP at beginning of session: 125/86   BP at end of session: 120/82    RESTRICTIONS/PRECAUTIONS: 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; FALL RISK    Treatment based classification:     Exercises/Interventions:     Therapeutic Ex  25' Wt / Resistance sets/sec reps notes   TM walk  5'  1.7-1.9 mph  Pt defer    Bike  5'     LTR  1 10    Hand heel rock   1 10    Thread the needle in quadruped  1 10x ea B   DKTC w/red SB  5\" 15    Glut stretch crossover  Piriformis stretch fig 4 30\"  30\" 3x ea  3   Pushing vs pulling  L   Supine clamshells    Pelvic tilts  1 10 In hooklying   Seated lumbar flexion stretch w/SB Red SB 10\" 10    Seated TB rows  Seated TB high rows Red  red 1  1 10  10    Bridges  1 10    S/L clamshells orange 1 15 R   Prone hip extension 0# 1 10 B   Leg press SL  Leg press DL 70#  100# 2  2 10  10 Requires assist getting out of machine   HS curls SL 25# 2 10 B   Knee extension SL 25# 2 10 B   MARE abduction  MARE extension 45#  45# 1  2 15x ea B, facing the gym   Palloff press green 5\" 15x ea    1/2 kneeling TB chops blue 1 15x ea          Therapeutic Activities 10'              Deadlifts with KB  15# KB 2 10 Tap to table   Lateral stepping  teal 1 3 laps Circuit  Band around ankles   Squats + press with landmine  2 10    Fwd step downs 4\" 1 10x B   Lateral step downs 6\" 1 15x B   Sit to stands 10# KB 2 10x W/butt taps   Retro slider lunges 5# KB 1 10x ea    Fertile carry 12# 1 2 laps    Suitcase carry 20# 1 2 lap ea circuit   BP readings / assessment 5'      Box lifting 17# box 1 10 Table<>ground   Standing glut med activation orange 1 10x ea    Manual Intervention 15'              Prone PA 10'   Lumbar/thoracic   GISTM/STM 5'   B lumbar paraspinals (R>L)   Lumbar Mobs in S/L 5'      SI Manip       Hip belt mobs       Hip LA distraction              NMR re-education       MB rebounder chest pass in semi-tandem stance  2 10x ea On ground, 4# MB   Seated SB pelvic tilts  1 5x    Seated SB marches  1 10 Alternating LE   SLS ground 10\" 3x ea    Bosu lunges fwd  2 10x      Pt. Education:  -pt educated on diagnosis, prognosis and expectations for rehab  -all pt questions were answered    Home Exercise Program:  [facility] [employer] access code [facility]    Therapeutic Exercise and NMR EXR  [x] (67666) Provided verbal/tactile cueing for activities related to strengthening, flexibility, endurance, ROM  for improvements in proximal hip and core control with self care, mobility, lifting and ambulation.  [] (78980) Provided verbal/tactile cueing for activities related to improving balance, coordination, kinesthetic sense, posture, motor skill, proprioception  to assist with core control in self care, mobility, lifting, and ambulation.      Therapeutic Activities:    [x] (33212 or 98322) Provided verbal/tactile cueing for activities related to improving balance, coordination, kinesthetic sense, posture, motor skill, proprioception and motor activation to allow for proper function  with self care and ADLs  [] (05937) Provided training and instruction to the patient for proper core and proximal hip recruitment and positioning with ambulation re-education     Home Exercise Program:    [x] (88006) Reviewed/Progressed HEP activities related to strengthening, flexibility, endurance, ROM of core, proximal hip and LE for functional self-care, mobility, lifting and ambulation   [] (69985) Reviewed/Progressed HEP activities related to improving balance, coordination, kinesthetic sense, posture, motor skill, proprioception of core, proximal hip and LE for self care, mobility, lifting, and ambulation      Manual Treatments:  PROM / STM / Oscillations-Mobs:  G-I, II, III, IV (PA's, Inf., Post.)  [x] (24593) Provided manual therapy to mobilize proximal hip and LS spine soft tissue/joints for the purpose of modulating pain, promoting relaxation,  increasing ROM, reducing/eliminating soft tissue swelling/inflammation/restriction, improving soft tissue extensibility and allowing for proper ROM for normal function with self care, mobility, lifting and ambulation. Modalities:     Charges:  Timed Code Treatment Minutes: 50   Total Treatment Minutes: 50       [] EVAL (LOW) 61722 (typically 20 minutes face-to-face)  [] EVAL (MOD) 33400 (typically 30 minutes face-to-face)  [] EVAL (HIGH) 72946 (typically 45 minutes face-to-face)  [] RE-EVAL     [x] WA(59227) x 1   [] IONTO (54096)  [] NMR (03512) x     [] VASO (12866)  [x] Manual (69972) x  1 [] Other:  [x] TA (98720)x 1    [] Mech Traction (72605)  [] ES(attended) (27541)     [] ES (un) (91777): If BWC Please Indicate Time In/Out and Total Minutes  CPT Code Time in Time out Total Min                                            Goals:   Patient stated goal: \"to decrease my back pain\"  [x] Progressing: [] Met: [] Not Met: [] Adjusted     Therapist goals for Patient:  Short Term Goals: To be achieved in: 2 weeks  1. Independent in HEP and progression per patient tolerance, in order to prevent re-injury. [x] Progressing: [] Met: [] Not Met: [] Adjusted  2. Patient will have a decrease in pain to facilitate improvement in movement, function, and ADLs as indicated by Functional Deficits. [x] Progressing: [] Met: [] Not Met: [] Adjusted     Long Term Goals: To be achieved in: 8 weeks  1. Patient will reach FOTO predicted score of at least 42 to assist with reaching prior level of function. [] Progressing: [x] Met: [] Not Met: [] Adjusted  2.  Patient will demonstrate increased AROM to WNL, good LS mobility, good hip ROM to allow for proper joint functioning as indicated by patients Functional Deficits. [x] Progressing: [] Met: [] Not Met: [] Adjusted  3. Patient will demonstrate an increase in Strength to good proximal hip and core activation to allow for proper functional mobility as indicated by patients Functional Deficits. [x] Progressing: [] Met: [] Not Met: [] Adjusted  4. Patient will be able to bend forwards to pick something up and stand up without increased symptoms or restriction. [x] Progressing: [] Met: [] Not Met: [] Adjusted  5. Patient will be able to walk for 15 minutes without increased symptoms or restriction. [x] Progressing: [] Met: [] Not Met: [] Adjusted        ASSESSMENT:  Pt continues to respond well to manual therapy and thinks it is his favorite part. Decreased stiffness with thoracic/lumbar PA mobs today. Pt was very fatigued with [facility] and says it bothers his back most of the time so the weight was reduced this visit and cues were given to keep from side bending his back with compensation. Pt reported back pain with dead lifts today and required a break in between reps. Pt requires frequent cues with box lifts to keep box close to his body for proper lifting mechanics and to lower himself as close to the ground as much as possible. Pt would continue to benefit from skilled PT services to address mobility and functional strength deficits in order to return to PLOF, including walking and squatting to pick something up off of the floor. Treatment/Activity Tolerance:  [] Patient tolerated treatment well [] Patient limited by fatique  [x] Patient limited by pain  [] Patient limited by other medical complications  [] Other:     Overall Progression Towards Functional goals/ Treatment Progress Update:  [x] Patient is progressing as expected towards functional goals listed. [] Progression is slowed due to complexities/Impairments listed.   [] Progression has been slowed due to co-morbidities. [] Plan just implemented, too soon to assess goals progression <30days   [] Goals require adjustment due to lack of progress  [] Patient is not progressing as expected and requires additional follow up with physician  [] Other:    Prognosis for POC: [] Good [x] Fair  [] Poor    Patient requires continued skilled intervention: [x] Yes  [] No        PLAN: Decrease LBP, improve lumbar/hip mobility and strength as tolerated by pt. Again discussed with pt about importance of joining gym. [x] Continue per plan of care [] Alter current plan (see comments)  [] Plan of care initiated [] Hold pending insurance auth [] Discharge    Electronically signed by: Demi Cruz, PT  Therapist was present, directed the patient's care, made skilled judgement, and was responsible for assessment and treatment of the patient. Chito SANDOVAL     Note: If patient does not return for scheduled/recommended follow up visits, this note will serve as a discharge from care along with the most recent update on progress.

## 2023-02-23 ENCOUNTER — HOSPITAL ENCOUNTER (OUTPATIENT)
Dept: PHYSICAL THERAPY | Age: 48
Setting detail: THERAPIES SERIES
Discharge: HOME | End: 2023-02-23
Payer: COMMERCIAL

## 2023-02-23 NOTE — FLOWSHEET NOTE
[facility]    Physical Therapy  Cancellation/No-show Note  Patient Name:  Avni Ludwig  :     Date:  2023  Cancelled visits to date: 1  No-shows to date: 0    For today's appointment patient:  [x]  Cancelled  []  Rescheduled appointment  []  No-show     Reason given by patient:  [x]  Patient ill  []  Conflicting appointment  []  No transportation    []  Conflict with work  []  No reason given  []  Other:     Comments:      Phone call information:   []  Phone call made today to patient at _ time at number provided:      []  Patient answered, conversation as follows:    []  Patient did not answer, message left as follows:  []  Phone call not made today  [x]  Phone call not needed - pt contacted us to cancel and provided reason for cancellation.      Electronically signed by:  Savi Cornejo, PT, DPT

## 2023-03-10 RX ORDER — PROPRANOLOL HYDROCHLORIDE 40 MG/1
TABLET ORAL
Qty: 60 TABLET | Refills: 0 | Status: SHIPPED | OUTPATIENT
Start: 2023-03-10

## 2023-03-15 ENCOUNTER — HOSPITAL ENCOUNTER (OUTPATIENT)
Dept: ONCOLOGY | Age: 48
Setting detail: INFUSION SERIES
Discharge: HOME | End: 2023-03-15
Payer: COMMERCIAL

## 2023-03-15 VITALS
TEMPERATURE: 97.7 F | SYSTOLIC BLOOD PRESSURE: 126 MMHG | WEIGHT: 190 LBS | DIASTOLIC BLOOD PRESSURE: 90 MMHG | BODY MASS INDEX: 26.51 KG/M2 | HEART RATE: 82 BPM | RESPIRATION RATE: 16 BRPM

## 2023-03-15 DIAGNOSIS — J45.50 SEVERE PERSISTENT ASTHMA, UNSPECIFIED WHETHER COMPLICATED: Primary | ICD-10-CM

## 2023-03-15 PROCEDURE — 6360000002 HC RX W HCPCS: Performed by: INTERNAL MEDICINE

## 2023-03-15 PROCEDURE — 96372 THER/PROPH/DIAG INJ SC/IM: CPT

## 2023-03-15 PROCEDURE — 99211 OFF/OP EST MAY X REQ PHY/QHP: CPT

## 2023-03-15 RX ADMIN — OMALIZUMAB 300 MG: 150 INJECTION, SOLUTION SUBCUTANEOUS at 13:42

## 2023-04-12 ENCOUNTER — HOSPITAL ENCOUNTER (OUTPATIENT)
Dept: ONCOLOGY | Age: 48
Setting detail: INFUSION SERIES
Discharge: HOME | End: 2023-04-12
Payer: COMMERCIAL

## 2023-04-12 VITALS
HEART RATE: 80 BPM | SYSTOLIC BLOOD PRESSURE: 138 MMHG | TEMPERATURE: 97.6 F | RESPIRATION RATE: 16 BRPM | DIASTOLIC BLOOD PRESSURE: 94 MMHG

## 2023-04-12 DIAGNOSIS — J45.50 SEVERE PERSISTENT ASTHMA, UNSPECIFIED WHETHER COMPLICATED: Primary | ICD-10-CM

## 2023-04-12 PROCEDURE — 6360000002 HC RX W HCPCS: Performed by: INTERNAL MEDICINE

## 2023-04-12 PROCEDURE — 96372 THER/PROPH/DIAG INJ SC/IM: CPT

## 2023-04-12 PROCEDURE — 99211 OFF/OP EST MAY X REQ PHY/QHP: CPT

## 2023-04-12 RX ADMIN — OMALIZUMAB 300 MG: 150 INJECTION, SOLUTION SUBCUTANEOUS at 14:00

## 2023-04-12 NOTE — PROGRESS NOTES
Patient ambulatory to department for every 4 week Xolair injection. AVS printed and reviewed. Pt had experienced some dizziness after discharge with last injection. Pt is aware that dizziness can be a side effect. Pt is in agreement to receive today's injection. Pt will monitor for dizziness and discuss with Dr Tonya Arreguin if dizziness recurs. Pt given 300 mg in two syringes subcutaneously, 150 mg given in each arm. Tolerated injections well. Pt monitored 50 minutes post injection. No dizziness noted. Patient discharged per ambulatory. Scheduled to return in 4 weeks.

## 2023-04-18 ENCOUNTER — TELEPHONE (OUTPATIENT)
Dept: FAMILY MEDICINE CLINIC | Age: 48
End: 2023-04-18

## 2023-04-19 ENCOUNTER — OFFICE VISIT (OUTPATIENT)
Dept: FAMILY MEDICINE CLINIC | Age: 48
End: 2023-04-19
Payer: COMMERCIAL

## 2023-04-19 VITALS
RESPIRATION RATE: 16 BRPM | HEIGHT: 70 IN | SYSTOLIC BLOOD PRESSURE: 130 MMHG | DIASTOLIC BLOOD PRESSURE: 88 MMHG | HEART RATE: 82 BPM | WEIGHT: 194 LBS | BODY MASS INDEX: 27.77 KG/M2 | OXYGEN SATURATION: 97 %

## 2023-04-19 DIAGNOSIS — J45.50 SEVERE PERSISTENT ASTHMA WITHOUT COMPLICATION: ICD-10-CM

## 2023-04-19 DIAGNOSIS — R21 RASH: ICD-10-CM

## 2023-04-19 DIAGNOSIS — F41.9 ANXIETY: Primary | ICD-10-CM

## 2023-04-19 DIAGNOSIS — G40.909 SEIZURE DISORDER (HCC): ICD-10-CM

## 2023-04-19 DIAGNOSIS — H61.21 IMPACTED CERUMEN, RIGHT EAR: ICD-10-CM

## 2023-04-19 PROCEDURE — 3079F DIAST BP 80-89 MM HG: CPT | Performed by: FAMILY MEDICINE

## 2023-04-19 PROCEDURE — G8427 DOCREV CUR MEDS BY ELIG CLIN: HCPCS | Performed by: FAMILY MEDICINE

## 2023-04-19 PROCEDURE — 69209 REMOVE IMPACTED EAR WAX UNI: CPT | Performed by: FAMILY MEDICINE

## 2023-04-19 PROCEDURE — 3075F SYST BP GE 130 - 139MM HG: CPT | Performed by: FAMILY MEDICINE

## 2023-04-19 PROCEDURE — 99214 OFFICE O/P EST MOD 30 MIN: CPT | Performed by: FAMILY MEDICINE

## 2023-04-19 PROCEDURE — G8419 CALC BMI OUT NRM PARAM NOF/U: HCPCS | Performed by: FAMILY MEDICINE

## 2023-04-19 PROCEDURE — 1036F TOBACCO NON-USER: CPT | Performed by: FAMILY MEDICINE

## 2023-04-19 RX ORDER — PROPRANOLOL HYDROCHLORIDE 40 MG/1
40 TABLET ORAL DAILY
Qty: 90 TABLET | Refills: 1 | Status: SHIPPED | OUTPATIENT
Start: 2023-04-19

## 2023-04-19 RX ORDER — PROPRANOLOL HYDROCHLORIDE 40 MG/1
40 TABLET ORAL 2 TIMES DAILY
Qty: 60 TABLET | Refills: 0 | Status: CANCELLED | OUTPATIENT
Start: 2023-04-19

## 2023-04-19 SDOH — ECONOMIC STABILITY: FOOD INSECURITY: WITHIN THE PAST 12 MONTHS, YOU WORRIED THAT YOUR FOOD WOULD RUN OUT BEFORE YOU GOT MONEY TO BUY MORE.: NEVER TRUE

## 2023-04-19 SDOH — ECONOMIC STABILITY: HOUSING INSECURITY
IN THE LAST 12 MONTHS, WAS THERE A TIME WHEN YOU DID NOT HAVE A STEADY PLACE TO SLEEP OR SLEPT IN A SHELTER (INCLUDING NOW)?: NO

## 2023-04-19 SDOH — ECONOMIC STABILITY: FOOD INSECURITY: WITHIN THE PAST 12 MONTHS, THE FOOD YOU BOUGHT JUST DIDN'T LAST AND YOU DIDN'T HAVE MONEY TO GET MORE.: NEVER TRUE

## 2023-04-19 SDOH — ECONOMIC STABILITY: INCOME INSECURITY: HOW HARD IS IT FOR YOU TO PAY FOR THE VERY BASICS LIKE FOOD, HOUSING, MEDICAL CARE, AND HEATING?: NOT HARD AT ALL

## 2023-04-19 ASSESSMENT — PATIENT HEALTH QUESTIONNAIRE - PHQ9
SUM OF ALL RESPONSES TO PHQ QUESTIONS 1-9: 0
SUM OF ALL RESPONSES TO PHQ9 QUESTIONS 1 & 2: 0
SUM OF ALL RESPONSES TO PHQ QUESTIONS 1-9: 0
2. FEELING DOWN, DEPRESSED OR HOPELESS: 0
1. LITTLE INTEREST OR PLEASURE IN DOING THINGS: 0

## 2023-04-19 NOTE — PROGRESS NOTES
explaining the costs and risk and benefits of removal, the canal was cleared successfully with irrigation only. Patient tolerated procedure well without complications. This was the likely cause of his symptoms. Patient advised to return if symptoms do not resolve in the next day or so. Patient has anxiety. Is well controlled with propranolol once a day. We will continue present management. Patient has seizure disorder. Has been seizure-free for several years. Recommend continuing Keppra and following up with neurology as scheduled. Patient has intermittent dermatitis of the scalp. Continue hydrocortisone cream as needed. Discharged in stable condition at 11:05 AM.    1. Anxiety  2. Seizure disorder ([facility] [address]  3. Severe persistent asthma without complication  4. Rash  -     hydrocortisone 2.5 % cream; Apply topically 2 times daily. , Disp-28 g, R-0, Normal  5. Impacted cerumen, right ear  -     IL REMOVAL IMPACTED CERUMEN IRRIGATION/LVG UNILAT       Orders Placed This Encounter   Procedures    IL REMOVAL IMPACTED CERUMEN IRRIGATION/LVG UNILAT       No follow-ups on file.     Jonas Majano MD, MD    4/19/2023  11:21 AM

## 2023-05-10 ENCOUNTER — HOSPITAL ENCOUNTER (OUTPATIENT)
Dept: ONCOLOGY | Age: 48
Setting detail: INFUSION SERIES
Discharge: HOME | End: 2023-05-10
Payer: COMMERCIAL

## 2023-05-10 VITALS
TEMPERATURE: 97.4 F | DIASTOLIC BLOOD PRESSURE: 81 MMHG | HEART RATE: 91 BPM | SYSTOLIC BLOOD PRESSURE: 134 MMHG | RESPIRATION RATE: 16 BRPM

## 2023-05-10 DIAGNOSIS — J45.50 SEVERE PERSISTENT ASTHMA, UNSPECIFIED WHETHER COMPLICATED: Primary | ICD-10-CM

## 2023-05-10 PROCEDURE — 6360000002 HC RX W HCPCS: Performed by: INTERNAL MEDICINE

## 2023-05-10 PROCEDURE — 99211 OFF/OP EST MAY X REQ PHY/QHP: CPT

## 2023-05-10 PROCEDURE — 96372 THER/PROPH/DIAG INJ SC/IM: CPT

## 2023-05-10 RX ADMIN — OMALIZUMAB 300 MG: 150 INJECTION, SOLUTION SUBCUTANEOUS at 13:45

## 2023-05-15 ENCOUNTER — OFFICE VISIT (OUTPATIENT)
Dept: PULMONOLOGY | Age: 48
End: 2023-05-15
Payer: COMMERCIAL

## 2023-05-15 VITALS — HEART RATE: 78 BPM | OXYGEN SATURATION: 98 %

## 2023-05-15 DIAGNOSIS — K21.9 GASTROESOPHAGEAL REFLUX DISEASE WITHOUT ESOPHAGITIS: ICD-10-CM

## 2023-05-15 DIAGNOSIS — Z91.09 ENVIRONMENTAL ALLERGIES: ICD-10-CM

## 2023-05-15 DIAGNOSIS — J98.6 ELEVATED DIAPHRAGM: ICD-10-CM

## 2023-05-15 DIAGNOSIS — J45.50 SEVERE PERSISTENT ASTHMA WITHOUT COMPLICATION: Primary | ICD-10-CM

## 2023-05-15 PROCEDURE — G8427 DOCREV CUR MEDS BY ELIG CLIN: HCPCS | Performed by: INTERNAL MEDICINE

## 2023-05-15 PROCEDURE — 99214 OFFICE O/P EST MOD 30 MIN: CPT | Performed by: INTERNAL MEDICINE

## 2023-05-15 PROCEDURE — G8419 CALC BMI OUT NRM PARAM NOF/U: HCPCS | Performed by: INTERNAL MEDICINE

## 2023-05-15 PROCEDURE — 1036F TOBACCO NON-USER: CPT | Performed by: INTERNAL MEDICINE

## 2023-05-15 NOTE — PROGRESS NOTES
[facility]  Consult Note  MD Nabor Maciel Talco   YOB: 1975    Date of Visit:  5/15/2023    Assessment/Plan:  1. Severe persistent asthma without complication  PFT 3/83:  Spirometry reveals decreased FVC at 2.28 liters, which is 43% predicted. FEV1 is decreased at 1.43 liters, which is 35% predicted. FEV1/FVC  ratio is reduced at 63%. Expiratory flow rates are also reduced. There  is a 32% improvement in FEV1 after inhaled bronchodilators. Lung  volumes reveal decreased total lung capacity at 77% predicted, vital  capacity is decreased at 55% predicted, residual volume is normal,  diffusion capacity is normal.     IMPRESSION:  Severe obstructive lung disease with a good response to  inhaled bronchodilators. Reduced total lung capacity may indicate an  element of restriction as well. Medication Management:    Advair ==> Trelegy 200 ==> Advair and Spiriva ==> Trelegy which does work better for him. Xolair ==> Making him dizzy and makes his back break out. I think we need to stop the Xolair. 2. Environmental allergies  He does not have significant eosinophilia  He does however have elevated IgE and reacts to cat and dog dander. Now on Advair. 3. Gastroesophageal reflux disease without esophagitis  Currently not symptomatic from GERD  Discussed the relationship between GERD and poorly controlled asthma    4. 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 3 months    Chief Complaint   Patient presents with    Asthma     Been getting really dizzy after his Xolair Injections lately. Lasting 3 days usually and this last time the dizziness was really severe. Also his back is broke out and has been for several months now. Looks like acne. Injection sites get bumps around  it as well. HPI  He presents for a chief complaint of shortness of breath related to asthma.  He has mild

## 2023-05-16 ENCOUNTER — OFFICE VISIT (OUTPATIENT)
Dept: ORTHOPEDIC SURGERY | Age: 48
End: 2023-05-16
Payer: COMMERCIAL

## 2023-05-16 VITALS — BODY MASS INDEX: 27.39 KG/M2 | WEIGHT: 190.9 LBS

## 2023-05-16 DIAGNOSIS — M51.26 DISC DISPLACEMENT, LUMBAR: Primary | ICD-10-CM

## 2023-05-16 DIAGNOSIS — R21 RASH: ICD-10-CM

## 2023-05-16 DIAGNOSIS — M47.816 LUMBAR SPONDYLOSIS: ICD-10-CM

## 2023-05-16 PROCEDURE — G8428 CUR MEDS NOT DOCUMENT: HCPCS | Performed by: INTERNAL MEDICINE

## 2023-05-16 PROCEDURE — G8419 CALC BMI OUT NRM PARAM NOF/U: HCPCS | Performed by: INTERNAL MEDICINE

## 2023-05-16 PROCEDURE — 1036F TOBACCO NON-USER: CPT | Performed by: INTERNAL MEDICINE

## 2023-05-16 PROCEDURE — 99214 OFFICE O/P EST MOD 30 MIN: CPT | Performed by: INTERNAL MEDICINE

## 2023-05-19 NOTE — PROGRESS NOTES
Chief Complaint:   Chief Complaint   Patient presents with    Lower Back Pain     PT has been helpful, but needs more visits approved, not able to take muscle relaxers or NSAIDs          History of Present Illness:       Patient is a 50 y.o. male returns follow up for the above complaint. The patient was lost to follow-up and last seen approximately 2 yearsago. The symptoms been problematic since the last visit. The patient has had no further testing for the problem. Back: leg pain 100:0. Pain in back is aching or tightness in quality. Pain levels:6.  Back pain is right-sided. He would like to resume PT. The patient denies new onset or progressive weakness of the lower extremities. The patient denies new onset bowel or bladder dysfunction. No reliance on NSAIDs or other analgesics. Past Medical History:        Past Medical History:   Diagnosis Date    Asthma     GERD (gastroesophageal reflux disease)     Hypertension     Seizure ([address]     LAST SEIZURE, JANUARY 2020. FOLLOWS WITH NEUROLOGIST, DR Chase Bush        Present Medications:         Current Outpatient Medications   Medication Sig Dispense Refill    omalizumab 150 MG injection Inject 300 mg into the skin every 28 days      propranolol (INDERAL) 40 MG tablet Take 1 tablet by mouth daily 90 tablet 1    hydrocortisone 2.5 % cream Apply topically 2 times daily.  28 g 0    Fluticasone-Umeclidin-Vilant (TRELEGY ELLIPTA) 200-62.5-25 MCG/INH AEPB Inhale 1 puff into the lungs daily 1 each 6    losartan (COZAAR) 50 MG tablet Take 1 tablet by mouth daily 90 tablet 1    VENTOLIN  (90 Base) MCG/ACT inhaler Inhale 2 puffs into the lungs every 6 hours as needed for Wheezing 18 g 11    celecoxib (CELEBREX) 200 MG capsule Take 1 capsule by mouth daily 30 capsule 1    cyclobenzaprine (FLEXERIL) 10 mg tablet Take 1 tablet by mouth nightly as needed for Muscle spasms 30 tablet 1    albuterol sulfate  (90 Base) MCG/ACT inhaler INHALE

## 2023-05-29 DIAGNOSIS — I10 HYPERTENSION, ESSENTIAL: ICD-10-CM

## 2023-05-30 RX ORDER — LOSARTAN POTASSIUM 50 MG/1
TABLET ORAL
Qty: 90 TABLET | Refills: 1 | Status: SHIPPED | OUTPATIENT
Start: 2023-05-30

## 2023-05-31 ENCOUNTER — HOSPITAL ENCOUNTER (OUTPATIENT)
Dept: PHYSICAL THERAPY | Age: 48
Setting detail: THERAPIES SERIES
Discharge: HOME | End: 2023-05-31
Payer: COMMERCIAL

## 2023-05-31 DIAGNOSIS — G89.29 CHRONIC BILATERAL LOW BACK PAIN WITHOUT SCIATICA: Primary | ICD-10-CM

## 2023-05-31 DIAGNOSIS — M54.50 CHRONIC BILATERAL LOW BACK PAIN WITHOUT SCIATICA: Primary | ICD-10-CM

## 2023-05-31 PROCEDURE — 97140 MANUAL THERAPY 1/> REGIONS: CPT

## 2023-05-31 PROCEDURE — 97110 THERAPEUTIC EXERCISES: CPT

## 2023-05-31 PROCEDURE — 97161 PT EVAL LOW COMPLEX 20 MIN: CPT

## 2023-05-31 NOTE — PLAN OF CARE
a total of 1-2 or more elements   [x] a total of 3 or more elements   [] a total of 4 or more elements   [x] A clinical presentation with:  [x] stable and/or uncomplicated characteristics   [] evolving clinical presentation with changing characteristics  [] unstable and unpredictable characteristics;   [x] Clinical decision making of [x] low, [] moderate, [] high complexity using standardized patient assessment instrument and/or measurable assessment of functional outcome. [x] EVAL (LOW) 76200 (typically 30 minutes face-to-face)  [] EVAL (MOD) 14642 (typically 30 minutes face-to-face)  [] EVAL (HIGH) 86327 (typically 45 minutes face-to-face)  [] RE-EVAL     PLAN: Begin PT focusing on: proximal hip mobilizations, LB mobs, LB core activation, proximal hip activation, and HEP    Frequency/Duration:  1-2 days per week for 8 Weeks:  Interventions:  [x]  Therapeutic exercise including: strength training, ROM, for LE, Glutes and core   [x]  NMR activation and proprioception for glutes , LE and Core   [x]  Manual therapy as indicated for Hip complex, LE and spine to include: Dry Needling/IASTM, STM, PROM, Gr I-IV mobilizations, manipulation. [x]  Modalities as needed that may include: thermal agents, E-stim, Biofeedback, US, iontophoresis as indicated  [x]  Patient education on joint protection, postural re-education, activity modification, progression of HEP. HEP instruction: Written HEP instructions provided and reviewed. GOALS:  Patient stated goal: \"To not have as much pain in my back\"    [] Progressing: [] Met: [] Not Met: [] Adjusted    Therapist goals for Patient:   Short Term Goals: To be achieved in: 2 weeks  1. Independent in HEP and progression per patient tolerance, in order to prevent re-injury. [] Progressing: [] Met: [] Not Met: [] Adjusted  2. Patient will have a decrease in pain to <3/10 to facilitate improvement in movement, function, and ADLs as indicated by Functional Deficits.   []

## 2023-05-31 NOTE — FLOWSHEET NOTE
[facility]    Physical Therapy Treatment Note/ Progress Report:     Date:  2023    Patient Name:  Marino West    :    MRN: [id number]    Physician Information:  Duarte Jaeger*    Medical Diagnosis:  Disc displacement, lumbar [M51.26]  Lumbar spondylosis [M47.816]  Treatment Diagnosis:    ICD-10-CM    1. Chronic bilateral low back pain without sciatica  M54.50     G89.29         Insurance information:   Primary: Payor: Donita Brannon / Plan: Benjy Douglas / Product Type: *No Product type* /    Secondary (if applicable):     Plan of care signed (Y/N): []  Yes [x]  No  Date sent:     Date of Patient follow up with Physician: prn     Progress Report: []  Yes  [x]  No     Functional Scale:    Date assessed:  RAFA 58 % disability    23    Date Range for reporting period:  Beginnin23  Ending:      Progress report due (10 Rx/or 30 days whichever is less): 8/15/31     Recertification due (POC duration/ or 90 days whichever is less): 23     Visit # Insurance Allowable Auth required?  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; HTN; asthma; anxiety    Treatment based classification:     Exercises/Interventions:     Therapeutic Ex 20' Wt / Resistance sets/sec reps notes   Bike  5'  For ROM   LTR  1 10    Bridges w/BS  1 10    S/L clamshells No resistance 1 10 R side only   Prone hip extension  1 10 B  Table flexed                                                   Therapeutic Activities                                                               Manual Intervention 10'              Prone PA 5'   Gr II-III   GISTM/STM       Lumbar Mobs in S/L 5'      SI Manip       Hip belt mobs       Hip LA distraction              NMR

## 2023-06-08 ENCOUNTER — HOSPITAL ENCOUNTER (OUTPATIENT)
Dept: PHYSICAL THERAPY | Age: 48
Setting detail: THERAPIES SERIES
Discharge: HOME | End: 2023-06-08
Payer: COMMERCIAL

## 2023-06-08 PROCEDURE — 97530 THERAPEUTIC ACTIVITIES: CPT

## 2023-06-08 PROCEDURE — 97110 THERAPEUTIC EXERCISES: CPT

## 2023-06-08 PROCEDURE — 97140 MANUAL THERAPY 1/> REGIONS: CPT

## 2023-06-08 NOTE — FLOWSHEET NOTE
and ambulation      Manual Treatments:  PROM / STM / Oscillations-Mobs:  G-I, II, III, IV (PA's, Inf., Post.)  [x] (95454) Provided manual therapy to mobilize proximal hip and LS spine soft tissue/joints for the purpose of modulating pain, promoting relaxation,  increasing ROM, reducing/eliminating soft tissue swelling/inflammation/restriction, improving soft tissue extensibility and allowing for proper ROM for normal function with self care, mobility, lifting and ambulation. Modalities:       Charges:  Timed Code Treatment Minutes: 40   Total Treatment Minutes: 40       [] EVAL (LOW) 53147 (typically 20 minutes face-to-face)  [] EVAL (MOD) 93909 (typically 30 minutes face-to-face)  [] EVAL (HIGH) 63406 (typically 45 minutes face-to-face)  [] RE-EVAL     [x] GQ(10452) x  1   [] IONTO (15553)  [] NMR (83364) x     [] VASO (93410)  [x] Manual (01048) x  1   [] Other:  [x] TA (60817)x  1   [] Mech Traction (00422)  [] ES(attended) (05040)     [] ES (un) (60827): If BWC Please Indicate Time In/Out and Total Minutes  CPT Code Time in Time out Total Min                                            Goals:   Patient stated goal: \"To not have as much pain in my back\"     [] Progressing: [] Met: [] Not Met: [] Adjusted     Therapist goals for Patient:   Short Term Goals: To be achieved in: 2 weeks  1. Independent in HEP and progression per patient tolerance, in order to prevent re-injury. [] Progressing: [] Met: [] Not Met: [] Adjusted  2. Patient will have a decrease in pain to <3/10 to facilitate improvement in movement, function, and ADLs as indicated by Functional Deficits. [] Progressing: [] Met: [] Not Met: [] Adjusted     Long Term Goals: To be achieved in: 8 weeks  1. Patient will reach RAFA functional score of less than or equal to 30% to assist with reaching prior level of function with activities such as walking, standing and bending forwards. [] Progressing: [] Met: [] Not Met: [] Adjusted  2.  Patient

## 2023-06-20 ENCOUNTER — HOSPITAL ENCOUNTER (OUTPATIENT)
Dept: PHYSICAL THERAPY | Age: 48
Setting detail: THERAPIES SERIES
Discharge: HOME | End: 2023-06-20
Payer: COMMERCIAL

## 2023-06-20 PROCEDURE — 97140 MANUAL THERAPY 1/> REGIONS: CPT

## 2023-06-20 PROCEDURE — 97530 THERAPEUTIC ACTIVITIES: CPT

## 2023-06-20 PROCEDURE — 97110 THERAPEUTIC EXERCISES: CPT

## 2023-06-20 NOTE — FLOWSHEET NOTE
level of function with activities such as walking, standing and bending forwards. [] Progressing: [] Met: [] Not Met: [] Adjusted  2. Patient will demonstrate increased AROM to WNL, good LS mobility, good hip ROM to allow for proper joint functioning as indicated by patients Functional Deficits. [] Progressing: [] Met: [] Not Met: [] Adjusted  3. Patient will demonstrate an increase in Strength to good proximal hip and core activation to allow for proper functional mobility as indicated by patients Functional Deficits. [] Progressing: [] Met: [] Not Met: [] Adjusted  4. Patient will return to walking up 1 flight of steps with mild restriction or symptoms. [] Progressing: [] Met: [] Not Met: [] Adjusted  5. Patient will be able to squat to pick something up off of the floor without increased symptoms or restriction. [] Progressing: [] Met: [] Not Met: [] Adjusted           ASSESSMENT:  Pt demonstrates improved thoracic and lumbar mobility noted with manual therapy today. Held exercise progression due to significant fatigue with current program. Held farmer's walk due to fatigue at end of session today. Pt continues to require significant core, glut and LE strength and endurance deficits. Pt would continue to benefit from skilled PT services to address mobility and strength deficits in order to return to PLOF. Treatment/Activity Tolerance:  [] Patient tolerated treatment well [] Patient limited by fatique  [x] Patient limited by pain  [] Patient limited by other medical complications  [] Other:     Overall Progression Towards Functional goals/ Treatment Progress Update:  [] Patient is progressing as expected towards functional goals listed. [] Progression is slowed due to complexities/Impairments listed. [] Progression has been slowed due to co-morbidities.   [x] Plan just implemented, too soon to assess goals progression <30days   [] Goals require adjustment due to lack of progress  [] Patient is

## 2023-06-22 ENCOUNTER — HOSPITAL ENCOUNTER (OUTPATIENT)
Dept: PHYSICAL THERAPY | Age: 48
Setting detail: THERAPIES SERIES
Discharge: HOME | End: 2023-06-22
Payer: COMMERCIAL

## 2023-06-22 PROCEDURE — 97110 THERAPEUTIC EXERCISES: CPT

## 2023-06-22 PROCEDURE — 97530 THERAPEUTIC ACTIVITIES: CPT

## 2023-06-22 PROCEDURE — 97140 MANUAL THERAPY 1/> REGIONS: CPT

## 2023-06-22 NOTE — FLOWSHEET NOTE
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 Chronic bilateral low back pain without sciatica  M54.50     G89.29         Insurance information:   Primary: Payor: Katie Haas / Plan: Art Priya / Product Type: *No Product type* /    Secondary (if applicable):     Plan of care signed (Y/N): []  Yes [x]  No  Date sent:     Date of Patient follow up with Physician: prn     Progress Report: []  Yes  [x]  No     Functional Scale:    Date assessed:  RAFA 58 % disability    23    Date Range for reporting period:  Beginnin23  Ending:      Progress report due (10 Rx/or 30 days whichever is less):      Recertification due (POC duration/ or 90 days whichever is less): 23     Visit # Insurance Allowable Auth required? Date Range   6 16 [x]  Yes  []  No 23 - 23     Pain level:  5-6/10     SUBJECTIVE:  Pt reports that he was not as sore after last visit as he thought he might be. Pt reports that he had to lift ~20# package that he received in the mail and carry it inside and is a little more sore today as a result.       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

## 2023-06-26 ENCOUNTER — HOSPITAL ENCOUNTER (OUTPATIENT)
Dept: PHYSICAL THERAPY | Age: 48
Setting detail: THERAPIES SERIES
Discharge: HOME | End: 2023-06-26
Payer: COMMERCIAL

## 2023-06-26 PROCEDURE — 97530 THERAPEUTIC ACTIVITIES: CPT

## 2023-06-26 PROCEDURE — 97140 MANUAL THERAPY 1/> REGIONS: CPT

## 2023-06-26 PROCEDURE — 97110 THERAPEUTIC EXERCISES: CPT

## 2023-06-28 ENCOUNTER — HOSPITAL ENCOUNTER (OUTPATIENT)
Dept: PHYSICAL THERAPY | Age: 48
Setting detail: THERAPIES SERIES
Discharge: HOME | End: 2023-06-28
Payer: COMMERCIAL

## 2023-06-28 PROCEDURE — 97140 MANUAL THERAPY 1/> REGIONS: CPT

## 2023-06-28 PROCEDURE — 97530 THERAPEUTIC ACTIVITIES: CPT

## 2023-06-28 PROCEDURE — 97110 THERAPEUTIC EXERCISES: CPT

## 2023-07-03 ENCOUNTER — HOSPITAL ENCOUNTER (OUTPATIENT)
Dept: PHYSICAL THERAPY | Age: 48
Setting detail: THERAPIES SERIES
Discharge: HOME | End: 2023-07-03
Payer: COMMERCIAL

## 2023-07-03 PROCEDURE — 97110 THERAPEUTIC EXERCISES: CPT

## 2023-07-03 PROCEDURE — 97140 MANUAL THERAPY 1/> REGIONS: CPT

## 2023-07-03 PROCEDURE — 97530 THERAPEUTIC ACTIVITIES: CPT

## 2023-07-03 NOTE — FLOWSHEET NOTE
ambulation re-education     Home Exercise Program:    [x] (14481) Reviewed/Progressed HEP activities related to strengthening, flexibility, endurance, ROM of core, proximal hip and LE for functional self-care, mobility, lifting and ambulation   [] (99685) Reviewed/Progressed HEP activities related to improving balance, coordination, kinesthetic sense, posture, motor skill, proprioception of core, proximal hip and LE for self care, mobility, lifting, and ambulation      Manual Treatments:  PROM / STM / Oscillations-Mobs:  G-I, II, III, IV (PA's, Inf., Post.)  [x] (98478) Provided manual therapy to mobilize proximal hip and LS spine soft tissue/joints for the purpose of modulating pain, promoting relaxation,  increasing ROM, reducing/eliminating soft tissue swelling/inflammation/restriction, improving soft tissue extensibility and allowing for proper ROM for normal function with self care, mobility, lifting and ambulation. Modalities:       Charges:  Timed Code Treatment Minutes: 40   Total Treatment Minutes: 40       [] EVAL (LOW) 83726 (typically 20 minutes face-to-face)  [] EVAL (MOD) 20215 (typically 30 minutes face-to-face)  [] EVAL (HIGH) 55232 (typically 45 minutes face-to-face)  [] RE-EVAL     [x] HK(78587) x  1   [] IONTO (49910)  [] NMR (93421) x     [] VASO (26772)  [x] Manual (68970) x  1   [] Other:  [x] TA (72439)x  1   [] Mech Traction (79997)  [] ES(attended) (22302)     [] ES (un) (80222): If BWC Please Indicate Time In/Out and Total Minutes  CPT Code Time in Time out Total Min                                            Goals:   Patient stated goal: \"To not have as much pain in my back\"     [] Progressing: [] Met: [] Not Met: [] Adjusted     Therapist goals for Patient:   Short Term Goals: To be achieved in: 2 weeks  1. Independent in HEP and progression per patient tolerance, in order to prevent re-injury. [] Progressing: [] Met: [] Not Met: [] Adjusted  2.  Patient will have a decrease in

## 2023-07-05 ENCOUNTER — HOSPITAL ENCOUNTER (OUTPATIENT)
Dept: PHYSICAL THERAPY | Age: 48
Setting detail: THERAPIES SERIES
Discharge: HOME | End: 2023-07-05
Payer: COMMERCIAL

## 2023-07-05 PROCEDURE — 97140 MANUAL THERAPY 1/> REGIONS: CPT

## 2023-07-05 PROCEDURE — 97530 THERAPEUTIC ACTIVITIES: CPT

## 2023-07-05 PROCEDURE — 97110 THERAPEUTIC EXERCISES: CPT

## 2023-07-05 NOTE — PROGRESS NOTES
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 Chronic bilateral low back pain without sciatica  M54.50     G89.29         Insurance information:   Primary: Payor: Darin Israel / Plan: Sarahi Fuller / Product Type: *No Product type* /    Secondary (if applicable):     Plan of care signed (Y/N): []  Yes [x]  No  Date sent:     Date of Patient follow up with Physician: prn     Progress Report: [x]  Yes  []  No     Functional Scale:    Date assessed:  RAFA 58 % disability    23  RAFA 58% disability   23    Date Range for reporting period:  Beginnin23  Endin23    Progress report due (10 Rx/or 30 days whichever is less):      Recertification due (POC duration/ or 90 days whichever is less): 23     Visit # Insurance Allowable Auth required? Date Range   10 16 [x]  Yes  []  No 23 - 23     Pain level:  3/10     SUBJECTIVE:  Pt reports he hasn't done much the last few days so his back has not been bothering him much. Bending forwards is still his c/c.       OBJECTIVE: See eval  Observation:   Test measurements:   23:  BP at beginning of session: 164/94; 3 mins later: 148/95, at end of session: 142/90   23: BP = 120/78  6/15/23: BP at beg of session = 130/77, end of session = 120/78  23: BP = 138/87  23: BP = 133/82  23: BP = 145/94 at beginning of session; 147/89 at end of session  7/3/23: BP = 154/105 at beginning of session, 160/103 after 3 mins, 156/96 after bike, 139/101 after manual therapy, 154/100 after table exercises  23: BP = 137/83  ROM       Lumbar Flexion Fingertips to mid shins *LBP

## 2023-07-11 ENCOUNTER — HOSPITAL ENCOUNTER (OUTPATIENT)
Dept: PHYSICAL THERAPY | Age: 48
Setting detail: THERAPIES SERIES
Discharge: HOME | End: 2023-07-11
Payer: COMMERCIAL

## 2023-07-11 PROCEDURE — 97110 THERAPEUTIC EXERCISES: CPT

## 2023-07-11 PROCEDURE — 97140 MANUAL THERAPY 1/> REGIONS: CPT

## 2023-07-11 NOTE — FLOWSHEET NOTE
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 Chronic bilateral low back pain without sciatica  M54.50     G89.29         Insurance information:   Primary: Payor: Laura Funk / Plan: Devin Bates / Product Type: *No Product type* /    Secondary (if applicable):     Plan of care signed (Y/N): []  Yes [x]  No  Date sent:     Date of Patient follow up with Physician: prn     Progress Report: [x]  Yes  []  No     Functional Scale:    Date assessed:  RAFA 58 % disability    23  RAFA 58% disability   23    Date Range for reporting period:  Beginnin23  Endin23    Progress report due (10 Rx/or 30 days whichever is less): 81     Recertification due (POC duration/ or 90 days whichever is less): 23     Visit # Insurance Allowable Auth required? 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 Pt reports that he did not feel any increased pain at the time, but at about 5 am this morning woke up in excruciating pain that radiated from left side of lower back into L buttocks and down posterior aspect of L leg. Pt also reports having intermittent pain on dorsum of L foot.      OBJECTIVE: See eval  Observation:   Test measurements:   23:  BP at beginning of session: 164/94; 3 mins later: 148/95, at end of session: 142/90   23: BP = 120/78  6/15/23: BP at beg of session = 130/77, end of session = 120/78  23: BP = 138/87  23: BP =

## 2023-07-13 ENCOUNTER — HOSPITAL ENCOUNTER (OUTPATIENT)
Dept: PHYSICAL THERAPY | Age: 48
Setting detail: THERAPIES SERIES
Discharge: HOME | End: 2023-07-13
Payer: COMMERCIAL

## 2023-07-13 NOTE — FLOWSHEET NOTE
[address], [facility]    Physical Therapy  Cancellation/No-show Note  Patient Name:  Evelyn Avendaño  :  [id number]   Date:  2023  Cancelled visits to date: 1  No-shows to date: 0    For today's appointment patient:  [x]  Cancelled  []  Rescheduled appointment  []  No-show     Reason given by patient:  [x]  Patient ill  []  Conflicting appointment  []  No transportation    []  Conflict with work  []  No reason given  []  Other:     Comments:      Phone call information:   []  Phone call made today to patient at _ time at number provided:      []  Patient answered, conversation as follows:    []  Patient did not answer, message left as follows:  []  Phone call not made today  [x]  Phone call not needed - pt contacted us to cancel and provided reason for cancellation.      Electronically signed by:  Devon Salazar, PT, DPT

## 2023-07-18 ENCOUNTER — HOSPITAL ENCOUNTER (OUTPATIENT)
Dept: PHYSICAL THERAPY | Age: 48
Setting detail: THERAPIES SERIES
Discharge: HOME | End: 2023-07-18
Payer: COMMERCIAL

## 2023-07-18 PROCEDURE — 97140 MANUAL THERAPY 1/> REGIONS: CPT

## 2023-07-18 PROCEDURE — 97530 THERAPEUTIC ACTIVITIES: CPT

## 2023-07-18 PROCEDURE — 97110 THERAPEUTIC EXERCISES: CPT

## 2023-07-18 NOTE — FLOWSHEET NOTE
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 Chronic bilateral low back pain without sciatica  M54.50     G89.29         Insurance information:   Primary: Payor: Enzo Hannah / Plan: Irma Mederos / Product Type: *No Product type* /    Secondary (if applicable):     Plan of care signed (Y/N): []  Yes [x]  No  Date sent:     Date of Patient follow up with Physician: prn     Progress Report: [x]  Yes  []  No     Functional Scale:    Date assessed:  RAFA 58 % disability    23  RAFA 58% disability   23    Date Range for reporting period:  Beginnin23  Endin23    Progress report due (10 Rx/or 30 days whichever is less): 29     Recertification due (POC duration/ or 90 days whichever is less): 23     Visit # Insurance Allowable Auth required? Date Range   12 16 [x]  Yes  []  No 23 - 23     Pain level:  5/10     SUBJECTIVE:  Pt reports that he is no longer having pain radiating down L leg into his foot, pain is across lower back R>L. Pt reports that he has had GI issues the last week or so, so has not been doing much as a result.        OBJECTIVE: See eval  Observation:   Test measurements:   23:  BP at beginning of session: 164/94; 3 mins later: 148/95, at end of session: 142/90   23: BP = 120/78  6/15/23: BP at beg of session = 130/77, end of session = 120/78  23: BP = 138/87  23: BP = 133/82  23: BP = 145/94 at beginning of session; 147/89 at end of session  7/3/23: BP = 154/105 at beginning of session, 160/103 after 3 mins, 156/96 after bike, 139/101 after manual therapy, 154/100 after table

## 2023-07-20 ENCOUNTER — HOSPITAL ENCOUNTER (OUTPATIENT)
Dept: PHYSICAL THERAPY | Age: 48
Setting detail: THERAPIES SERIES
Discharge: HOME | End: 2023-07-20
Payer: COMMERCIAL

## 2023-07-20 PROCEDURE — 97110 THERAPEUTIC EXERCISES: CPT

## 2023-07-20 PROCEDURE — 97140 MANUAL THERAPY 1/> REGIONS: CPT

## 2023-07-20 PROCEDURE — 97530 THERAPEUTIC ACTIVITIES: CPT

## 2023-07-20 NOTE — FLOWSHEET NOTE
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 Chronic bilateral low back pain without sciatica  M54.50     G89.29         Insurance information:   Primary: Payor: Simran Porras / Plan: Shashi Diss / Product Type: *No Product type* /    Secondary (if applicable):     Plan of care signed (Y/N): []  Yes [x]  No  Date sent:     Date of Patient follow up with Physician: prn     Progress Report: [x]  Yes  []  No     Functional Scale:    Date assessed:  RAFA 58 % disability    23  RAFA 58% disability   23    Date Range for reporting period:  Beginnin23  Endin23    Progress report due (10 Rx/or 30 days whichever is less): 43     Recertification due (POC duration/ or 90 days whichever is less): 23     Visit # Insurance Allowable Auth required? 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 Pt reports L side of his lower back is no longer bothering him, just feeling his R side.         OBJECTIVE: See eval  Observation:   Test measurements:   23:  BP at beginning of session: 164/94; 3 mins later: 148/95, at end of session: 142/90   23: BP = 120/78  6/15/23: BP at beg of session = 130/77, end of session = 120/78  23: BP = 138/87  23: BP = 133/82  23: BP = 145/94 at beginning of session; 147/89 at end of session  7/3/23: BP = 154/105 at beginning of session, 160/103 after 3 mins, 156/96 after bike, 139/101 after manual therapy, 154/100 after table

## 2023-07-24 ENCOUNTER — HOSPITAL ENCOUNTER (OUTPATIENT)
Dept: PHYSICAL THERAPY | Age: 48
Setting detail: THERAPIES SERIES
Discharge: HOME | End: 2023-07-24
Payer: COMMERCIAL

## 2023-07-24 PROCEDURE — 97110 THERAPEUTIC EXERCISES: CPT

## 2023-07-24 PROCEDURE — 97530 THERAPEUTIC ACTIVITIES: CPT

## 2023-07-24 PROCEDURE — 97140 MANUAL THERAPY 1/> REGIONS: CPT

## 2023-07-24 NOTE — FLOWSHEET NOTE
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 Chronic bilateral low back pain without sciatica  M54.50     G89.29         Insurance information:   Primary: Payor: Alexa Look / Plan: Hitesh Roach / Product Type: *No Product type* /    Secondary (if applicable):     Plan of care signed (Y/N): []  Yes [x]  No  Date sent:     Date of Patient follow up with Physician: prn     Progress Report: [x]  Yes  []  No     Functional Scale:    Date assessed:  RAFA 58 % disability    23  RAFA 58% disability   23    Date Range for reporting period:  Beginnin23  Endin23    Progress report due (10 Rx/or 30 days whichever is less): 59     Recertification due (POC duration/ or 90 days whichever is less): 23     Visit # Insurance Allowable Auth required? 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 Pt reports that PT has been helping his pain and tolerance to activities, but he still has constant soreness/achiness in his R side of lower back.       OBJECTIVE: See eval  Observation:   Test measurements:   23:  BP at beginning of session: 164/94; 3 mins later: 148/95, at end of session: 142/90   23: BP = 120/78  6/15/23: BP at beg of session = 130/77, end of session = 120/78  23: BP = 138/87  23: BP = 133/82  23: BP = 145/94 at beginning of session; 147/89 at end of session  7/3/23: BP = 154/105 at beginning of session, 160/103

## 2023-07-26 ENCOUNTER — HOSPITAL ENCOUNTER (OUTPATIENT)
Dept: PHYSICAL THERAPY | Age: 48
Setting detail: THERAPIES SERIES
Discharge: HOME | End: 2023-07-26
Payer: COMMERCIAL

## 2023-07-26 PROCEDURE — 97140 MANUAL THERAPY 1/> REGIONS: CPT

## 2023-07-26 PROCEDURE — 97110 THERAPEUTIC EXERCISES: CPT

## 2023-07-26 PROCEDURE — 97530 THERAPEUTIC ACTIVITIES: CPT

## 2023-08-01 RX ORDER — ALBUTEROL SULFATE 90 UG/1
AEROSOL, METERED RESPIRATORY (INHALATION)
Qty: 18 G | Refills: 11 | Status: SHIPPED | OUTPATIENT
Start: 2023-08-01

## 2023-08-03 ENCOUNTER — HOSPITAL ENCOUNTER (OUTPATIENT)
Dept: PHYSICAL THERAPY | Age: 48
Setting detail: THERAPIES SERIES
Discharge: HOME | End: 2023-08-03
Payer: COMMERCIAL

## 2023-08-03 PROCEDURE — 97110 THERAPEUTIC EXERCISES: CPT

## 2023-08-03 PROCEDURE — 97140 MANUAL THERAPY 1/> REGIONS: CPT

## 2023-08-03 PROCEDURE — 97530 THERAPEUTIC ACTIVITIES: CPT

## 2023-08-03 NOTE — FLOWSHEET NOTE
60   Total Treatment Minutes: 60       [] EVAL (LOW) 75390 (typically 20 minutes face-to-face)  [] EVAL (MOD) 60503 (typically 30 minutes face-to-face)  [] EVAL (HIGH) 04948 (typically 45 minutes face-to-face)  [] RE-EVAL     [x] LM(95068) x  2   [] IONTO (84078)  [] NMR (28064) x     [] VASO (37055)  [x] Manual (57544) x  1   [] Other:  [x] TA (94904)x  1   [] Mech Traction (94131)  [] ES(attended) (79250)     [] ES (un) (14326): If BWC Please Indicate Time In/Out and Total Minutes  CPT Code Time in Time out Total Min                                            Goals:   Patient stated goal: \"To not have as much pain in my back\"     [x] Progressing: [] Met: [] Not Met: [] Adjusted     Therapist goals for Patient:   Short Term Goals: To be achieved in: 2 weeks  1. Independent in HEP and progression per patient tolerance, in order to prevent re-injury. [x] Progressing: [] Met: [] Not Met: [] Adjusted  2. Patient will have a decrease in pain to <3/10 to facilitate improvement in movement, function, and ADLs as indicated by Functional Deficits. [x] Progressing: [] Met: [] Not Met: [] Adjusted     Long Term Goals: To be achieved in: 8 weeks  1. Patient will reach RAFA functional score of less than or equal to 30% to assist with reaching prior level of function with activities such as walking, standing and bending forwards. [] Progressing: [] Met: [x] Not Met: [] Adjusted  2. Patient will demonstrate increased AROM to WNL, good LS mobility, good hip ROM to allow for proper joint functioning as indicated by patients Functional Deficits. [x] Progressing: [] Met: [] Not Met: [] Adjusted  3. Patient will demonstrate an increase in Strength to good proximal hip and core activation to allow for proper functional mobility as indicated by patients Functional Deficits. [x] Progressing: [] Met: [] Not Met: [] Adjusted  4. Patient will return to walking up 1 flight of steps with mild restriction or symptoms.     [x]

## 2023-08-09 ENCOUNTER — HOSPITAL ENCOUNTER (OUTPATIENT)
Dept: PHYSICAL THERAPY | Age: 48
Setting detail: THERAPIES SERIES
Discharge: HOME | End: 2023-08-09
Payer: COMMERCIAL

## 2023-08-09 PROCEDURE — 97110 THERAPEUTIC EXERCISES: CPT

## 2023-08-09 PROCEDURE — 97530 THERAPEUTIC ACTIVITIES: CPT

## 2023-08-09 PROCEDURE — 97140 MANUAL THERAPY 1/> REGIONS: CPT

## 2023-08-09 NOTE — FLOWSHEET NOTE
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 Chronic bilateral low back pain without sciatica  M54.50     G89.29         Insurance information:   Primary: Payor: Katherine Sousa / Plan: Kaykay Mccloud / Product Type: *No Product type* /    Secondary (if applicable):     Plan of care signed (Y/N): []  Yes [x]  No  Date sent:     Date of Patient follow up with Physician: prn     Progress Report: [x]  Yes  []  No     Functional Scale:    Date assessed:  RAFA 58 % disability    23  RAFA 58% disability   23  RAFA 58% disability   23    Date Range for reporting period:  Beginnin23  Endin23    Progress report due (10 Rx/or 30 days whichever is less): 99     Recertification due (POC duration/ or 90 days whichever is less): 23     Visit # Insurance Allowable Auth required? Date Range   15/16  2/12 16   [x]  Yes  []  No 23 - 23 - 23     Pain level:  5/10     SUBJECTIVE:  Pt reports that he was very sore about 3 days after last session.       OBJECTIVE: See eval  Observation:   Test measurements:   23:  BP at beginning of session: 164/94; 3 mins later: 148/95, at end of session: 142/90   23: BP = 120/78  6/15/23: BP at beg of session = 130/77, end of session = 120/78  23: BP = 138/87  23: BP = 133/82  23: BP = 145/94 at beginning of session; 147/89 at end of session  7/3/23: BP = 154/105 at beginning of session, 160/103 after 3 mins, 156/96 after bike, 139/101 after manual therapy, 154/100 after table exercises  23: BP = 140/83  ROM       Lumbar Flexion Fingertips to mid shins *LBP

## 2023-08-14 ENCOUNTER — HOSPITAL ENCOUNTER (OUTPATIENT)
Dept: PHYSICAL THERAPY | Age: 48
Setting detail: THERAPIES SERIES
Discharge: HOME | End: 2023-08-14
Payer: COMMERCIAL

## 2023-08-14 PROCEDURE — 97530 THERAPEUTIC ACTIVITIES: CPT

## 2023-08-14 PROCEDURE — 97110 THERAPEUTIC EXERCISES: CPT

## 2023-08-14 PROCEDURE — 97140 MANUAL THERAPY 1/> REGIONS: CPT

## 2023-08-15 ENCOUNTER — OFFICE VISIT (OUTPATIENT)
Dept: PULMONOLOGY | Age: 48
End: 2023-08-15
Payer: COMMERCIAL

## 2023-08-15 VITALS — HEART RATE: 81 BPM | OXYGEN SATURATION: 97 %

## 2023-08-15 DIAGNOSIS — J45.50 SEVERE PERSISTENT ASTHMA WITHOUT COMPLICATION: Primary | ICD-10-CM

## 2023-08-15 DIAGNOSIS — Z91.09 ENVIRONMENTAL ALLERGIES: ICD-10-CM

## 2023-08-15 DIAGNOSIS — K21.9 GASTROESOPHAGEAL REFLUX DISEASE WITHOUT ESOPHAGITIS: ICD-10-CM

## 2023-08-15 DIAGNOSIS — J98.6 ELEVATED DIAPHRAGM: ICD-10-CM

## 2023-08-15 PROCEDURE — G8427 DOCREV CUR MEDS BY ELIG CLIN: HCPCS | Performed by: INTERNAL MEDICINE

## 2023-08-15 PROCEDURE — 99214 OFFICE O/P EST MOD 30 MIN: CPT | Performed by: INTERNAL MEDICINE

## 2023-08-15 PROCEDURE — 3017F COLORECTAL CA SCREEN DOC REV: CPT | Performed by: INTERNAL MEDICINE

## 2023-08-15 PROCEDURE — G8419 CALC BMI OUT NRM PARAM NOF/U: HCPCS | Performed by: INTERNAL MEDICINE

## 2023-08-15 PROCEDURE — 1036F TOBACCO NON-USER: CPT | Performed by: INTERNAL MEDICINE

## 2023-08-15 RX ORDER — FLUTICASONE FUROATE, UMECLIDINIUM BROMIDE AND VILANTEROL TRIFENATATE 200; 62.5; 25 UG/1; UG/1; UG/1
1 POWDER RESPIRATORY (INHALATION) DAILY
Qty: 1 EACH | Refills: 5 | Status: SHIPPED | OUTPATIENT
Start: 2023-08-15

## 2023-08-15 NOTE — PROGRESS NOTES
Andie Suarez MD   levETIRAcetam (KEPPRA) 500 MG tablet Take 1 tablet by mouth 2 times daily  Patient taking differently: Take 1 tablet by mouth 2 times daily Seizures Yes Mary aKy Barcenas MD   omeprazole (PRILOSEC) 20 MG delayed release capsule Take 1 capsule by mouth 2 times daily (before meals) Yes Cyndi Candelario MD   fluticasone (FLONASE) 50 MCG/ACT nasal spray 1 spray by Nasal route daily Yes Historical Provider, MD   albuterol sulfate  (90 Base) MCG/ACT inhaler INHALE TWO PUFFS BY MOUTH FOUR TIMES A DAY AS NEEDED FOR WHEEZING  Patient not taking: Reported on 8/15/2023  BRUCE Narayanan - CNP   albuterol (PROVENTIL) (2.5 MG/3ML) 0.083% nebulizer solution USE THREE MILLILITERS VIA NEBULIZATION BY MOUTH EVERY 6 HOURS AS NEEDED FOR WHEEZING  Patient not taking: Reported on 8/15/2023  Zeke Starr MD       Vitals:    08/15/23 1348   Pulse: 81   SpO2: 97%     There is no height or weight on file to calculate BMI.      Wt Readings from Last 3 Encounters:   05/16/23 190 lb 14.4 oz (86.6 kg)   04/19/23 194 lb (88 kg)   03/15/23 190 lb (86.2 kg)     BP Readings from Last 3 Encounters:   05/10/23 134/81   04/19/23 130/88   04/12/23 (!) 138/94        Social History     Tobacco Use   Smoking Status Never   Smokeless Tobacco Never
Yes - the patient is able to be screened

## 2023-08-16 ENCOUNTER — HOSPITAL ENCOUNTER (OUTPATIENT)
Dept: PHYSICAL THERAPY | Age: 48
Setting detail: THERAPIES SERIES
Discharge: HOME | End: 2023-08-16
Payer: COMMERCIAL

## 2023-08-16 PROCEDURE — 97530 THERAPEUTIC ACTIVITIES: CPT

## 2023-08-16 PROCEDURE — 97140 MANUAL THERAPY 1/> REGIONS: CPT

## 2023-08-16 PROCEDURE — 97110 THERAPEUTIC EXERCISES: CPT

## 2023-08-16 NOTE — FLOWSHEET NOTE
Charges:  Timed Code Treatment Minutes: 60   Total Treatment Minutes: 60       [] EVAL (LOW) 69462 (typically 20 minutes face-to-face)  [] EVAL (MOD) 54694 (typically 30 minutes face-to-face)  [] EVAL (HIGH) 05498 (typically 45 minutes face-to-face)  [] RE-EVAL     [x] CG(59592) x  2   [] IONTO (05316)  [] NMR (90055) x     [] VASO (39756)  [x] Manual (03624) x  1   [] Other:  [x] TA (60518)x  1   [] Mech Traction (48592)  [] ES(attended) (70364)     [] ES (un) (45842): If BWC Please Indicate Time In/Out and Total Minutes  CPT Code Time in Time out Total Min                                            Goals:   Patient stated goal: \"To not have as much pain in my back\"     [x] Progressing: [] Met: [] Not Met: [] Adjusted     Therapist goals for Patient:   Short Term Goals: To be achieved in: 2 weeks  1. Independent in HEP and progression per patient tolerance, in order to prevent re-injury. [x] Progressing: [] Met: [] Not Met: [] Adjusted  2. Patient will have a decrease in pain to <3/10 to facilitate improvement in movement, function, and ADLs as indicated by Functional Deficits. [x] Progressing: [] Met: [] Not Met: [] Adjusted     Long Term Goals: To be achieved in: 8 weeks  1. Patient will reach RAFA functional score of less than or equal to 30% to assist with reaching prior level of function with activities such as walking, standing and bending forwards. [] Progressing: [] Met: [x] Not Met: [] Adjusted  2. Patient will demonstrate increased AROM to WNL, good LS mobility, good hip ROM to allow for proper joint functioning as indicated by patients Functional Deficits. [x] Progressing: [] Met: [] Not Met: [] Adjusted  3. Patient will demonstrate an increase in Strength to good proximal hip and core activation to allow for proper functional mobility as indicated by patients Functional Deficits. [x] Progressing: [] Met: [] Not Met: [] Adjusted  4.  Patient will return to walking up 1 flight of steps

## 2023-08-21 ENCOUNTER — HOSPITAL ENCOUNTER (OUTPATIENT)
Dept: PHYSICAL THERAPY | Age: 48
Setting detail: THERAPIES SERIES
Discharge: HOME | End: 2023-08-21
Payer: COMMERCIAL

## 2023-08-21 ENCOUNTER — HOSPITAL ENCOUNTER (OUTPATIENT)
Age: 48
Discharge: HOME | End: 2023-08-21
Payer: COMMERCIAL

## 2023-08-21 ENCOUNTER — TELEPHONE (OUTPATIENT)
Dept: PULMONOLOGY | Age: 48
End: 2023-08-21

## 2023-08-21 DIAGNOSIS — J45.50 SEVERE PERSISTENT ASTHMA WITHOUT COMPLICATION: Primary | ICD-10-CM

## 2023-08-21 LAB — C DIFF TOX A+B STL QL IA: NORMAL

## 2023-08-21 PROCEDURE — 87449 NOS EACH ORGANISM AG IA: CPT

## 2023-08-21 PROCEDURE — 97530 THERAPEUTIC ACTIVITIES: CPT

## 2023-08-21 PROCEDURE — 87324 CLOSTRIDIUM AG IA: CPT

## 2023-08-21 PROCEDURE — 97110 THERAPEUTIC EXERCISES: CPT

## 2023-08-21 PROCEDURE — 97140 MANUAL THERAPY 1/> REGIONS: CPT

## 2023-08-21 PROCEDURE — 87506 IADNA-DNA/RNA PROBE TQ 6-11: CPT

## 2023-08-21 NOTE — TELEPHONE ENCOUNTER
PONCHOI-    Called and spoke with patient advised him to get blood work done. Patient understood but stated it would not be this week that he would as soon as he can. I asked him to give our office a heads up when he does complete it.

## 2023-08-21 NOTE — TELEPHONE ENCOUNTER
Received a fax from [facility]. Med not covered under patients plan at this time. May need alternative , letter does not state what any alternative options.

## 2023-08-21 NOTE — FLOWSHEET NOTE
[address] 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 Chronic bilateral low back pain without sciatica  M54.50     G89.29         Insurance information:   Primary: Payor: Chin Alcocer / Plan: Hernán Montemayor / Product Type: *No Product type* /    Secondary (if applicable):     Plan of care signed (Y/N): []  Yes [x]  No  Date sent:     Date of Patient follow up with Physician: prn     Progress Report: [x]  Yes  []  No     Functional Scale:    Date assessed:  RAFA 58 % disability    23  RAFA 58% disability   23  RAFA 58% disability   23    Date Range for reporting period:  Beginnin23  Endin23    Progress report due (10 Rx/or 30 days whichever is less): 1/3/58     Recertification due (POC duration/ or 90 days whichever is less): 23     Visit # Insurance Allowable Auth required? Date Range   15/16  5/12 16   [x]  Yes  []  No 23 - 23 - 23     Pain level:  510     SUBJECTIVE:  Pt reports his back does not feel too bad today. He states he has not had time to look into gym membership.       OBJECTIVE: See eval  Observation:   Test measurements:   23:  BP at beginning of session: 164/94; 3 mins later: 148/95, at end of session: 142/90   23: BP = 120/78  6/15/23: BP at beg of session = 130/77, end of session = 120/78  23: BP = 138/87  23: BP = 133/82  23: BP = 145/94 at beginning of session; 147/89 at end of session  7/3/23: BP = 154/105 at beginning of session, 160/103 after 3 mins, 156/96 after bike, 139/101 after manual therapy, 154/100 after table exercises  23: BP = 140/83  ROM

## 2023-08-22 LAB — GI PATHOGENS PNL STL NAA+PROBE: NORMAL

## 2023-08-23 ENCOUNTER — APPOINTMENT (OUTPATIENT)
Dept: PHYSICAL THERAPY | Age: 48
End: 2023-08-23
Payer: COMMERCIAL

## 2023-08-24 ENCOUNTER — HOSPITAL ENCOUNTER (OUTPATIENT)
Dept: PHYSICAL THERAPY | Age: 48
Setting detail: THERAPIES SERIES
Discharge: HOME | End: 2023-08-24
Payer: COMMERCIAL

## 2023-08-24 PROCEDURE — 97110 THERAPEUTIC EXERCISES: CPT

## 2023-08-24 PROCEDURE — 97530 THERAPEUTIC ACTIVITIES: CPT

## 2023-08-24 PROCEDURE — 97140 MANUAL THERAPY 1/> REGIONS: CPT

## 2023-08-24 NOTE — FLOWSHEET NOTE
PA 10'   Gr II-III  Mid to lower thoracic   GISTM/STM       Lumbar Mobs in S/L       SI Manip       Hip belt mobs       Hip LA distraction              NMR re-education 2'              SLS ground 20\" 3x ea Mild HHA                          Pt. Education:  -pt educated on diagnosis, prognosis and expectations for rehab  -all pt questions were answered    Home Exercise Program:  Erik Alcocer access code [email or web address]    Therapeutic Exercise and NMR EXR  [x] (12072) Provided verbal/tactile cueing for activities related to strengthening, flexibility, endurance, ROM  for improvements in proximal hip and core control with self care, mobility, lifting and ambulation.  [] (20628) Provided verbal/tactile cueing for activities related to improving balance, coordination, kinesthetic sense, posture, motor skill, proprioception  to assist with core control in self care, mobility, lifting, and ambulation.      Therapeutic Activities:    [] (49596 or 05040) Provided verbal/tactile cueing for activities related to improving balance, coordination, kinesthetic sense, posture, motor skill, proprioception and motor activation to allow for proper function  with self care and ADLs  [] (43245) Provided training and instruction to the patient for proper core and proximal hip recruitment and positioning with ambulation re-education     Home Exercise Program:    [x] (30258) Reviewed/Progressed HEP activities related to strengthening, flexibility, endurance, ROM of core, proximal hip and LE for functional self-care, mobility, lifting and ambulation   [] (57290) Reviewed/Progressed HEP activities related to improving balance, coordination, kinesthetic sense, posture, motor skill, proprioception of core, proximal hip and LE for self care, mobility, lifting, and ambulation      Manual Treatments:  PROM / STM / Oscillations-Mobs:  G-I, II, III, IV (PA's, Inf., Post.)  [x] (60825) Provided manual therapy to mobilize proximal hip and LS spine soft

## 2023-08-25 NOTE — TELEPHONE ENCOUNTER
Tezspire self injecting Pen was denied. Tezspire syringe may be approved for infusion center working on prior auth. Ok per Dr Brian Rocha will send order to infusion center.

## 2023-08-28 ENCOUNTER — HOSPITAL ENCOUNTER (OUTPATIENT)
Dept: PHYSICAL THERAPY | Age: 48
Setting detail: THERAPIES SERIES
Discharge: HOME | End: 2023-08-28
Payer: COMMERCIAL

## 2023-08-28 PROCEDURE — 97140 MANUAL THERAPY 1/> REGIONS: CPT

## 2023-08-28 PROCEDURE — 97110 THERAPEUTIC EXERCISES: CPT

## 2023-08-28 PROCEDURE — 97530 THERAPEUTIC ACTIVITIES: CPT

## 2023-08-28 NOTE — FLOWSHEET NOTE
plan (see comments)  [] Plan of care initiated [] Hold pending insurance auth [] Discharge    Electronically signed by: Cary Wallace PT    Note: If patient does not return for scheduled/recommended follow up visits, this note will serve as a discharge from care along with the most recent update on progress.

## 2023-08-31 ENCOUNTER — HOSPITAL ENCOUNTER (OUTPATIENT)
Dept: PHYSICAL THERAPY | Age: 48
Setting detail: THERAPIES SERIES
Discharge: HOME | End: 2023-08-31
Payer: COMMERCIAL

## 2023-08-31 PROCEDURE — 97140 MANUAL THERAPY 1/> REGIONS: CPT

## 2023-09-08 ENCOUNTER — HOSPITAL ENCOUNTER (OUTPATIENT)
Dept: PHYSICAL THERAPY | Age: 48
Setting detail: THERAPIES SERIES
Discharge: HOME | End: 2023-09-08
Payer: COMMERCIAL

## 2023-09-08 PROCEDURE — 97530 THERAPEUTIC ACTIVITIES: CPT

## 2023-09-08 PROCEDURE — 97140 MANUAL THERAPY 1/> REGIONS: CPT

## 2023-09-08 PROCEDURE — 97110 THERAPEUTIC EXERCISES: CPT

## 2023-09-08 NOTE — FLOWSHEET NOTE
Deficits. [x] Progressing: [] Met: [] Not Met: [] Adjusted  4. Patient will return to walking up 1 flight of steps with mild restriction or symptoms. [x] Progressing: [] Met: [] Not Met: [] Adjusted  5. Patient will be able to squat to pick something up off of the floor without increased symptoms or restriction. [x] Progressing: [] Met: [] Not Met: [] Adjusted           ASSESSMENT:  Pt able to increase weight with sit to stands this date. Patient has appeared to reach max benefit from skilled therapy at this time and would benefit from finding a gym in order for continue regular strength progression. Have discussed placing patient on hold following last approved visits. Treatment/Activity Tolerance:  [] Patient tolerated treatment well [x] Patient limited by fatique  [x] Patient limited by pain  [] Patient limited by other medical complications  [] Other:     Overall Progression Towards Functional goals/ Treatment Progress Update:  [] Patient is progressing as expected towards functional goals listed. [] Progression is slowed due to complexities/Impairments listed. [] Progression has been slowed due to co-morbidities. [x] Plan just implemented, too soon to assess goals progression <30days   [] Goals require adjustment due to lack of progress  [] Patient is not progressing as expected and requires additional follow up with physician  [] Other:    Prognosis for POC: [x] Good [] Fair  [] Poor    Patient requires continued skilled intervention: [x] Yes  [] No        PLAN: Decrease pain, improve strength and endurance. Discussed with pt about finding a gym in order to get on a good workout/gym routine. Pt has 1 more week of approved visits. [x] Continue per plan of care [] Alter current plan (see comments)  [] Plan of care initiated [] Hold pending insurance auth [] Discharge    Electronically signed by:  Hoda Louis, PT    Note: If patient does not return for scheduled/recommended follow up

## 2023-09-12 ENCOUNTER — HOSPITAL ENCOUNTER (OUTPATIENT)
Dept: PHYSICAL THERAPY | Age: 48
Setting detail: THERAPIES SERIES
Discharge: HOME | End: 2023-09-12
Payer: COMMERCIAL

## 2023-09-12 PROCEDURE — 97110 THERAPEUTIC EXERCISES: CPT

## 2023-09-12 PROCEDURE — 97140 MANUAL THERAPY 1/> REGIONS: CPT

## 2023-09-12 PROCEDURE — 97530 THERAPEUTIC ACTIVITIES: CPT

## 2023-09-12 NOTE — FLOWSHEET NOTE
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 Chronic bilateral low back pain without sciatica  M54.50     G89.29         Insurance information:   Primary: Payor: Tay Casas / Plan: Luisito Maldonado / Product Type: *No Product type* /    Secondary (if applicable):     Plan of care signed (Y/N): []  Yes [x]  No  Date sent:     Date of Patient follow up with Physician: prn     Progress Report: []  Yes  [x]  No     Functional Scale:    Date assessed:  RAFA 58 % disability    23  RAFA 58% disability   23  RAFA 58% disability   23    Date Range for reporting period:  Beginnin23  Ending:      Progress report due (10 Rx/or 30 days whichever is less):      Recertification due (POC duration/ or 90 days whichever is less): 23     Visit # Insurance Allowable Auth required? Date Range   15/16  10/12 16   [x]  Yes  []  No 23 - 23 - 23     Pain level: 3-4/10     SUBJECTIVE:  Pt reports he has had more stress lately. No significant change in LBP. Pt does not plan to start going to a gym before he completes PT because \"it is pointless. \"        OBJECTIVE: See eval  Observation:   Test measurements:   23:  BP at beginning of session: 164/94; 3 mins later: 148/95, at end of session: 142/90   23: BP = 120/78  6/15/23: BP at beg of session = 130/77, end of session = 120/78  23: BP = 138/87  23: BP = 133/82  23: BP = 145/94 at beginning of session; 147/89 at end of session  7/3/23: BP = 154/105 at beginning of session, 160/103 after 3 mins, 156/96 after bike, 139/101 after manual therapy, 154/100 after

## 2023-09-14 ENCOUNTER — HOSPITAL ENCOUNTER (OUTPATIENT)
Dept: PHYSICAL THERAPY | Age: 48
Setting detail: THERAPIES SERIES
Discharge: HOME | End: 2023-09-14
Payer: COMMERCIAL

## 2023-09-14 PROCEDURE — 97530 THERAPEUTIC ACTIVITIES: CPT

## 2023-09-14 PROCEDURE — 97110 THERAPEUTIC EXERCISES: CPT

## 2023-09-14 PROCEDURE — 97140 MANUAL THERAPY 1/> REGIONS: CPT

## 2023-09-14 NOTE — FLOWSHEET NOTE
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 Chronic bilateral low back pain without sciatica  M54.50     G89.29         Insurance information:   Primary: Payor: Mony Prater / Plan: Vernadine Pares / Product Type: *No Product type* /    Secondary (if applicable):     Plan of care signed (Y/N): []  Yes [x]  No  Date sent:     Date of Patient follow up with Physician: prn     Progress Report: []  Yes  [x]  No     Functional Scale:    Date assessed:  RAFA 58 % disability    23  RAFA 58% disability   23  RAFA 58% disability   23    Date Range for reporting period:  Beginnin23  Ending:      Progress report due (10 Rx/or 30 days whichever is less):      Recertification due (POC duration/ or 90 days whichever is less): 23     Visit # Insurance Allowable Auth required? Date Range   15/16  11/12 16   [x]  Yes  []  No 23 - 23 - 23     Pain level: 3-4/10     SUBJECTIVE:  Pt reports his back isn't feeling too bad today.       OBJECTIVE: See eval  Observation:   Test measurements:   23:  BP at beginning of session: 164/94; 3 mins later: 148/95, at end of session: 142/90   23: BP = 120/78  6/15/23: BP at beg of session = 130/77, end of session = 120/78  23: BP = 138/87  23: BP = 133/82  23: BP = 145/94 at beginning of session; 147/89 at end of session  7/3/23: BP = 154/105 at beginning of session, 160/103 after 3 mins, 156/96 after bike, 139/101 after manual therapy, 154/100 after table exercises  23: BP = 140/83  ROM       Lumbar Flexion Fingertips to mid shins *LBP     Lumbar Extension

## 2023-09-18 ENCOUNTER — HOSPITAL ENCOUNTER (OUTPATIENT)
Dept: PHYSICAL THERAPY | Age: 48
Setting detail: THERAPIES SERIES
Discharge: HOME | End: 2023-09-18
Payer: COMMERCIAL

## 2023-09-18 PROCEDURE — 97530 THERAPEUTIC ACTIVITIES: CPT

## 2023-09-18 PROCEDURE — 97110 THERAPEUTIC EXERCISES: CPT

## 2023-09-18 PROCEDURE — 97140 MANUAL THERAPY 1/> REGIONS: CPT

## 2023-09-18 NOTE — FLOWSHEET NOTE
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 Chronic bilateral low back pain without sciatica  M54.50     G89.29         Insurance information:   Primary: Payor: [employer] / Plan: Julieth Esters / Product Type: *No Product type* /    Secondary (if applicable):     Plan of care signed (Y/N): []  Yes [x]  No  Date sent:     Date of Patient follow up with Physician: prn     Progress Report: []  Yes  [x]  No     Functional Scale:    Date assessed:  RAFA 58 % disability    23  RAFA 58% disability   23  RAFA 58% disability   23    Date Range for reporting period:  Beginnin23  Ending:      Progress report due (10 Rx/or 30 days whichever is less):      Recertification due (POC duration/ or 90 days whichever is less): 23     Visit # Insurance Allowable Auth required? Date Range   15/16  12/12 16   [x]  Yes  []  No 23 - 23 - 23     Pain level: 3-4/10     SUBJECTIVE:  Pt reports his back isn't feeling too bad today. Pt reports he is not planning to join gym for a little while as he has multiple MD appts coming up regarding his colonoscopy/possibly having celiacs.      OBJECTIVE: See eval  Observation:   Test measurements:   23:  BP at beginning of session: 164/94; 3 mins later: 148/95, at end of session: 142/90   23: BP = 120/78  6/15/23: BP at beg of session = 130/77, end of session = 120/78  23: BP = 138/87  23: BP = 133/82  23: BP = 145/94 at beginning of session; 147/89 at end of session  7/3/23: BP = 154/105 at beginning of session, 160/103 after 3 mins, 156/96 after bike, 139/101

## 2023-09-29 ENCOUNTER — HOSPITAL ENCOUNTER (OUTPATIENT)
Age: 48
Discharge: HOME | End: 2023-09-29
Payer: COMMERCIAL

## 2023-09-29 PROCEDURE — 36415 COLL VENOUS BLD VENIPUNCTURE: CPT

## 2023-09-29 PROCEDURE — 83516 IMMUNOASSAY NONANTIBODY: CPT

## 2023-09-30 LAB — TISSUE TRANSGLUTAMINASE IGA: 9.1 U/ML (ref 0–14)

## 2023-10-11 ENCOUNTER — TELEPHONE (OUTPATIENT)
Dept: PULMONOLOGY | Age: 48
End: 2023-10-11

## 2023-10-11 ENCOUNTER — TELEPHONE (OUTPATIENT)
Dept: FAMILY MEDICINE CLINIC | Age: 48
End: 2023-10-11

## 2023-10-11 DIAGNOSIS — J20.9 ACUTE BRONCHITIS, UNSPECIFIED ORGANISM: Primary | ICD-10-CM

## 2023-10-11 NOTE — TELEPHONE ENCOUNTER
Patient called and said he needs a new nebulizer to be ordered said the compressor stopped working. Patient also mentioned he would need a prescription for the cups as well for his nebulizer.      [facility]   Fax: [phone] [address] Escalanted: [phone]

## 2023-10-11 NOTE — TELEPHONE ENCOUNTER
Pt called and said that he has been diagnosed with bronchitis and he went to urgent care for it. His pulmonologist is out of the office right now. Patient had ordered and bought a nebulizer inhaler on a website but they won't release it to him without a prescription. Pt needs prescription for nebulizer faxed to [phone], [email or web address] is the name of website he ordered from

## 2023-10-11 NOTE — TELEPHONE ENCOUNTER
Spoke with patient. He states his current nebulizer that he received 2 yrs ago is broke. He ordered a nebulizer from [facility], but they are requiring an order from Dr. Faheem Toledo patient who ordered last nebulizer. He is unsure. Lat refill of Albuterol nebulizer solution was 2021 by Dr. Moe Bales. Patient states he was seen at Urgent care for bronchitis and they gave him a new prescription for the Albuterol neb solution. He is requesting to have Dr. Alek Alvarado order a new machine. Advised patient that I will check with Dr. Alek Alvarado when he returns to the office on Tuesday. Also, suggested for pt to check with Dr. Moe Bales to see if he can give him an order sooner. Patient declined. He states it is too difficult to get a hold of his office.

## 2023-10-18 ENCOUNTER — HOSPITAL ENCOUNTER (OUTPATIENT)
Dept: ONCOLOGY | Age: 48
Setting detail: INFUSION SERIES
Discharge: HOME | End: 2023-10-18
Payer: COMMERCIAL

## 2023-10-18 VITALS
TEMPERATURE: 96.8 F | DIASTOLIC BLOOD PRESSURE: 98 MMHG | HEART RATE: 94 BPM | SYSTOLIC BLOOD PRESSURE: 141 MMHG | RESPIRATION RATE: 16 BRPM

## 2023-10-18 DIAGNOSIS — J45.50 SEVERE PERSISTENT ASTHMA, UNSPECIFIED WHETHER COMPLICATED: Primary | ICD-10-CM

## 2023-10-18 PROCEDURE — 99213 OFFICE O/P EST LOW 20 MIN: CPT

## 2023-10-18 PROCEDURE — 6360000002 HC RX W HCPCS: Performed by: INTERNAL MEDICINE

## 2023-10-18 PROCEDURE — 96372 THER/PROPH/DIAG INJ SC/IM: CPT

## 2023-10-18 RX ORDER — TEZEPELUMAB-EKKO 210 MG/1.9ML
210 INJECTION, SOLUTION SUBCUTANEOUS
COMMUNITY

## 2023-10-18 RX ADMIN — TEZEPELUMAB-EKKO 210 MG: 210 INJECTION, SOLUTION SUBCUTANEOUS at 13:43

## 2023-10-18 NOTE — PROGRESS NOTES
Pt to Dept for Initial Tezspire injection. AVS printed and reviewed. Pt nalini injection with no adverse reaction noted. Pt monitored 30 minutes post injection. Pt scheduled to return in 4 weeks. Pt to follow up with PCP in regards to elevated B/P.

## 2023-11-15 ENCOUNTER — HOSPITAL ENCOUNTER (OUTPATIENT)
Dept: GENERAL RADIOLOGY | Age: 48
Discharge: HOME | End: 2023-11-15
Payer: COMMERCIAL

## 2023-11-15 ENCOUNTER — OFFICE VISIT (OUTPATIENT)
Dept: PULMONOLOGY | Age: 48
End: 2023-11-15
Payer: COMMERCIAL

## 2023-11-15 ENCOUNTER — HOSPITAL ENCOUNTER (OUTPATIENT)
Age: 48
Discharge: HOME | End: 2023-11-15
Payer: COMMERCIAL

## 2023-11-15 VITALS
HEART RATE: 81 BPM | BODY MASS INDEX: 28.06 KG/M2 | DIASTOLIC BLOOD PRESSURE: 108 MMHG | OXYGEN SATURATION: 99 % | HEIGHT: 70 IN | WEIGHT: 196 LBS | SYSTOLIC BLOOD PRESSURE: 162 MMHG

## 2023-11-15 DIAGNOSIS — J45.51 SEVERE PERSISTENT ASTHMA WITH ACUTE EXACERBATION: ICD-10-CM

## 2023-11-15 DIAGNOSIS — J45.51 SEVERE PERSISTENT ASTHMA WITH ACUTE EXACERBATION: Primary | ICD-10-CM

## 2023-11-15 DIAGNOSIS — J98.6 ELEVATED DIAPHRAGM: ICD-10-CM

## 2023-11-15 DIAGNOSIS — Z91.09 ENVIRONMENTAL ALLERGIES: ICD-10-CM

## 2023-11-15 PROCEDURE — 99214 OFFICE O/P EST MOD 30 MIN: CPT | Performed by: INTERNAL MEDICINE

## 2023-11-15 PROCEDURE — 3077F SYST BP >= 140 MM HG: CPT | Performed by: INTERNAL MEDICINE

## 2023-11-15 PROCEDURE — G8419 CALC BMI OUT NRM PARAM NOF/U: HCPCS | Performed by: INTERNAL MEDICINE

## 2023-11-15 PROCEDURE — G8427 DOCREV CUR MEDS BY ELIG CLIN: HCPCS | Performed by: INTERNAL MEDICINE

## 2023-11-15 PROCEDURE — 3080F DIAST BP >= 90 MM HG: CPT | Performed by: INTERNAL MEDICINE

## 2023-11-15 PROCEDURE — 3017F COLORECTAL CA SCREEN DOC REV: CPT | Performed by: INTERNAL MEDICINE

## 2023-11-15 PROCEDURE — G8484 FLU IMMUNIZE NO ADMIN: HCPCS | Performed by: INTERNAL MEDICINE

## 2023-11-15 PROCEDURE — 71046 X-RAY EXAM CHEST 2 VIEWS: CPT

## 2023-11-15 PROCEDURE — 1036F TOBACCO NON-USER: CPT | Performed by: INTERNAL MEDICINE

## 2023-11-15 PROCEDURE — 0202U NFCT DS 22 TRGT SARS-COV-2: CPT

## 2023-11-15 RX ORDER — PREDNISONE 10 MG/1
TABLET ORAL
Qty: 30 TABLET | Refills: 0 | Status: SHIPPED | OUTPATIENT
Start: 2023-11-15 | End: 2023-11-25

## 2023-11-15 RX ORDER — LEVOFLOXACIN 750 MG/1
750 TABLET ORAL DAILY
Qty: 10 TABLET | Refills: 0 | Status: SHIPPED | OUTPATIENT
Start: 2023-11-15 | End: 2023-11-25

## 2023-11-15 NOTE — PROGRESS NOTES
[facility]  Consult Note  MD Parris Patrick   YOB: 1975    Date of Visit:  11/15/2023    Assessment/Plan:  1. Severe persistent asthma with exacerbation  PFT 6/22:  Spirometry reveals decreased FVC at 2.28 liters, which is 43% predicted. FEV1 is decreased at 1.43 liters, which is 35% predicted. FEV1/FVC  ratio is reduced at 63%. Expiratory flow rates are also reduced. There  is a 32% improvement in FEV1 after inhaled bronchodilators. Lung  volumes reveal decreased total lung capacity at 77% predicted, vital  capacity is decreased at 55% predicted, residual volume is normal,  diffusion capacity is normal.     IMPRESSION:  Severe obstructive lung disease with a good response to  inhaled bronchodilators. Reduced total lung capacity may indicate an  element of restriction as well. Medication Management:    Trilogy 200  Had to stop Xolair because it was making him dizzy and sick to his stomach. Add Asmanex 220 mcg 2 puffs once daily   He is now on Tezspire. He has had one shot. He is flared up over the last two months from URI/bronchitis  He is clear at the moment but just took a breathing treatment    Have him get CXR and COVID,flu and RSV testing  Levaquin 750 mg x 10 days if viral testing is neagtive  Prednisone taper. 2. Environmental allergies  He does not have significant eosinophilia  He does however have elevated IgE and reacts to cat and dog dander. Now on Advair. 3. Gastroesophageal reflux disease without esophagitis  Currently not symptomatic from GERD  Discussed the relationship between GERD and poorly controlled asthma    4. 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     3 months    Chief Complaint   Patient presents with    Asthma    Shortness of Breath    Wheezing    Hoarse    Cough    Chest Congestion       HPI  He presents for a chief complaint of shortness

## 2023-11-16 LAB
REPORT: NORMAL
RESP PATH DNA+RNA PNL NPH NAA+NON-PROBE: NORMAL

## 2023-11-20 ENCOUNTER — TELEPHONE (OUTPATIENT)
Dept: PULMONOLOGY | Age: 48
End: 2023-11-20

## 2023-11-20 NOTE — TELEPHONE ENCOUNTER
Velasquez Kunz the [employer] with [facility] called and wanted to know if any help was needed on patients [facility]: [phone]

## 2023-11-20 NOTE — TELEPHONE ENCOUNTER
Patient was approved he was to have  the tezspire injection at the infusion center but he was  a no show

## 2023-11-28 DIAGNOSIS — I10 HYPERTENSION, ESSENTIAL: ICD-10-CM

## 2023-11-28 RX ORDER — LOSARTAN POTASSIUM 50 MG/1
50 TABLET ORAL DAILY
Qty: 30 TABLET | Refills: 0 | Status: SHIPPED | OUTPATIENT
Start: 2023-11-28 | End: 2023-12-28

## 2023-11-28 NOTE — TELEPHONE ENCOUNTER
Medication and Quantity requested:      losartan (COZAAR) 50 MG tablet [[id number]]     61 day supply       Last Visit  04/18/2023      Pharmacy and phone number updated in EPIC:  yes      Елена Beatty

## 2023-11-28 NOTE — TELEPHONE ENCOUNTER
Lov 4/18/23  Lrf 90 1 5/30/23  Lab Results   Component Value Date     06/02/2022    K 3.5 06/02/2022     06/02/2022    CO2 22 06/02/2022    BUN 14 06/02/2022    CREATININE 0.8 (L) 06/02/2022    GLUCOSE 84 06/02/2022    CALCIUM 9.5 06/02/2022    PROT 7.7 06/02/2022    LABALBU 4.3 06/02/2022    BILITOT 1.7 (H) 06/02/2022    ALKPHOS 48 06/02/2022    AST 89 (H) 06/02/2022    ALT 69 (H) 06/02/2022    LABGLOM >60 06/02/2022    GFRAA >60 06/02/2022    AGRATIO 1.3 06/02/2022    GLOB 2.8 01/14/2020

## 2023-12-01 ENCOUNTER — OFFICE VISIT (OUTPATIENT)
Dept: FAMILY MEDICINE CLINIC | Age: 48
End: 2023-12-01

## 2023-12-01 VITALS
SYSTOLIC BLOOD PRESSURE: 155 MMHG | BODY MASS INDEX: 28.35 KG/M2 | RESPIRATION RATE: 16 BRPM | WEIGHT: 198 LBS | HEART RATE: 97 BPM | TEMPERATURE: 97.1 F | DIASTOLIC BLOOD PRESSURE: 90 MMHG | HEIGHT: 70 IN | OXYGEN SATURATION: 98 %

## 2023-12-01 DIAGNOSIS — J45.40 MODERATE PERSISTENT ASTHMA WITHOUT COMPLICATION: ICD-10-CM

## 2023-12-01 DIAGNOSIS — I10 HYPERTENSION, ESSENTIAL: ICD-10-CM

## 2023-12-01 DIAGNOSIS — Z23 NEED FOR VACCINATION: ICD-10-CM

## 2023-12-01 DIAGNOSIS — G40.009 PARTIAL IDIOPATHIC EPILEPSY WITH SEIZURES OF LOCALIZED ONSET, NOT INTRACTABLE, WITHOUT STATUS EPILEPTICUS (HCC): ICD-10-CM

## 2023-12-01 DIAGNOSIS — G40.909 SEIZURE DISORDER (HCC): ICD-10-CM

## 2023-12-01 DIAGNOSIS — Z00.00 ANNUAL PHYSICAL EXAM: Primary | ICD-10-CM

## 2023-12-01 RX ORDER — CHLORTHALIDONE 25 MG/1
25 TABLET ORAL DAILY
Qty: 30 TABLET | Refills: 3 | Status: SHIPPED | OUTPATIENT
Start: 2023-12-01

## 2023-12-01 NOTE — PROGRESS NOTES
12/1/2024    Lipid, Fasting     Standing Status:   Future     Standing Expiration Date:   12/1/2024    HIV Screen     Standing Status:   Future     Standing Expiration Date:   12/1/2024       Return in about 3 weeks (around 12/22/2023) for blood pressure check.     Jolynn Alanis MD, MD    12/2/2023  1:33 PM

## 2023-12-06 ENCOUNTER — HOSPITAL ENCOUNTER (OUTPATIENT)
Dept: ONCOLOGY | Age: 48
Setting detail: INFUSION SERIES
Discharge: HOME | End: 2023-12-06

## 2023-12-08 ENCOUNTER — HOSPITAL ENCOUNTER (OUTPATIENT)
Dept: ONCOLOGY | Age: 48
Setting detail: INFUSION SERIES
Discharge: HOME | End: 2023-12-08
Payer: COMMERCIAL

## 2023-12-08 VITALS
TEMPERATURE: 98.2 F | DIASTOLIC BLOOD PRESSURE: 80 MMHG | RESPIRATION RATE: 16 BRPM | SYSTOLIC BLOOD PRESSURE: 111 MMHG | HEART RATE: 83 BPM

## 2023-12-08 DIAGNOSIS — J45.51 SEVERE PERSISTENT ASTHMA WITH ACUTE EXACERBATION: Primary | ICD-10-CM

## 2023-12-08 PROCEDURE — 96372 THER/PROPH/DIAG INJ SC/IM: CPT

## 2023-12-08 PROCEDURE — 6360000002 HC RX W HCPCS: Performed by: INTERNAL MEDICINE

## 2023-12-08 PROCEDURE — 99211 OFF/OP EST MAY X REQ PHY/QHP: CPT

## 2023-12-08 RX ADMIN — TEZEPELUMAB-EKKO 210 MG: 210 INJECTION, SOLUTION SUBCUTANEOUS at 13:02

## 2023-12-08 NOTE — PROGRESS NOTES
Pt to Dept for second Tezspire injection. AVS printed and reviewed. Pt nalini injection with no adverse reaction noted. Pt monitored 30 minutes post injection. Pt scheduled to return in 4 weeks. Blood pressure much better today, see flow sheet, states he was started on a new blood pressure medication from primary doctor this week. Garry

## 2024-01-10 ENCOUNTER — HOSPITAL ENCOUNTER (OUTPATIENT)
Dept: ONCOLOGY | Age: 49
Setting detail: INFUSION SERIES
Discharge: HOME | End: 2024-01-10
Payer: COMMERCIAL

## 2024-01-10 VITALS
TEMPERATURE: 97.3 F | RESPIRATION RATE: 16 BRPM | DIASTOLIC BLOOD PRESSURE: 86 MMHG | HEART RATE: 104 BPM | SYSTOLIC BLOOD PRESSURE: 144 MMHG

## 2024-01-10 DIAGNOSIS — J45.51 SEVERE PERSISTENT ASTHMA WITH ACUTE EXACERBATION: Primary | ICD-10-CM

## 2024-01-10 PROCEDURE — 6360000002 HC RX W HCPCS: Performed by: INTERNAL MEDICINE

## 2024-01-10 PROCEDURE — 96372 THER/PROPH/DIAG INJ SC/IM: CPT

## 2024-01-10 PROCEDURE — 99211 OFF/OP EST MAY X REQ PHY/QHP: CPT

## 2024-01-10 RX ORDER — LEVETIRACETAM 750 MG/1
750 TABLET ORAL 2 TIMES DAILY
COMMUNITY
Start: 2023-12-18

## 2024-01-10 RX ADMIN — TEZEPELUMAB-EKKO 210 MG: 210 INJECTION, SOLUTION SUBCUTANEOUS at 14:06

## 2024-01-10 NOTE — PROGRESS NOTES
Pt to Dept for Tezspire injection. AVS printed and reviewed. Pt nalini injection with no adverse reaction noted. Pt scheduled to return in 4 weeks.

## 2024-01-23 ENCOUNTER — TELEPHONE (OUTPATIENT)
Dept: FAMILY MEDICINE CLINIC | Age: 49
End: 2024-01-23

## 2024-01-23 NOTE — TELEPHONE ENCOUNTER
[facility] transfer to Nurse triage    Patient is calling with complaint of complaint of dizziness from medcation  chlorthalidone 25mg    This has been going on  1months    4Patient is scheduled 1/26/2023      Has patient tried any over the counter medications? no

## 2024-01-26 ENCOUNTER — OFFICE VISIT (OUTPATIENT)
Dept: FAMILY MEDICINE CLINIC | Age: 49
End: 2024-01-26
Payer: COMMERCIAL

## 2024-01-26 VITALS
BODY MASS INDEX: 27.77 KG/M2 | OXYGEN SATURATION: 99 % | HEART RATE: 93 BPM | SYSTOLIC BLOOD PRESSURE: 145 MMHG | DIASTOLIC BLOOD PRESSURE: 100 MMHG | HEIGHT: 71 IN | WEIGHT: 198.4 LBS

## 2024-01-26 DIAGNOSIS — K21.9 GASTROESOPHAGEAL REFLUX DISEASE WITHOUT ESOPHAGITIS: ICD-10-CM

## 2024-01-26 DIAGNOSIS — I10 HTN (HYPERTENSION), BENIGN: ICD-10-CM

## 2024-01-26 DIAGNOSIS — J45.51 SEVERE PERSISTENT ASTHMA WITH ACUTE EXACERBATION: ICD-10-CM

## 2024-01-26 DIAGNOSIS — J45.50 SEVERE PERSISTENT ASTHMA WITHOUT COMPLICATION: ICD-10-CM

## 2024-01-26 DIAGNOSIS — G40.009 PARTIAL IDIOPATHIC EPILEPSY WITH SEIZURES OF LOCALIZED ONSET, NOT INTRACTABLE, WITHOUT STATUS EPILEPTICUS (HCC): Primary | ICD-10-CM

## 2024-01-26 DIAGNOSIS — Z00.00 ANNUAL PHYSICAL EXAM: ICD-10-CM

## 2024-01-26 LAB
ANISOCYTOSIS BLD QL SMEAR: ABNORMAL
BASOPHILS # BLD: 0.1 K/UL (ref 0–0.2)
BASOPHILS NFR BLD: 1 %
DEPRECATED RDW RBC AUTO: 16.2 % (ref 12.4–15.4)
EOSINOPHIL # BLD: 0.3 K/UL (ref 0–0.6)
EOSINOPHIL NFR BLD: 4 %
HCT VFR BLD AUTO: 48.5 % (ref 40.5–52.5)
HGB BLD-MCNC: 16.7 G/DL (ref 13.5–17.5)
LYMPHOCYTES # BLD: 1.8 K/UL (ref 1–5.1)
LYMPHOCYTES NFR BLD: 20 %
MACROCYTES BLD QL SMEAR: ABNORMAL
MCH RBC QN AUTO: 35.6 PG (ref 26–34)
MCHC RBC AUTO-ENTMCNC: 34.3 G/DL (ref 31–36)
MCV RBC AUTO: 103.7 FL (ref 80–100)
MONOCYTES # BLD: 1.1 K/UL (ref 0–1.3)
MONOCYTES NFR BLD: 13 %
NEUTROPHILS # BLD: 5 K/UL (ref 1.7–7.7)
NEUTROPHILS NFR BLD: 58 %
NEUTS BAND NFR BLD MANUAL: 2 % (ref 0–7)
PLATELET # BLD AUTO: 140 K/UL (ref 135–450)
PLATELET BLD QL SMEAR: ABNORMAL
PMV BLD AUTO: 9.8 FL (ref 5–10.5)
RBC # BLD AUTO: 4.68 M/UL (ref 4.2–5.9)
RBC MORPH BLD: NORMAL
SLIDE REVIEW: ABNORMAL
VARIANT LYMPHS NFR BLD MANUAL: 2 % (ref 0–6)
WBC # BLD AUTO: 8.3 K/UL (ref 4–11)

## 2024-01-26 PROCEDURE — 1036F TOBACCO NON-USER: CPT | Performed by: FAMILY MEDICINE

## 2024-01-26 PROCEDURE — 3080F DIAST BP >= 90 MM HG: CPT | Performed by: FAMILY MEDICINE

## 2024-01-26 PROCEDURE — 3077F SYST BP >= 140 MM HG: CPT | Performed by: FAMILY MEDICINE

## 2024-01-26 PROCEDURE — G8482 FLU IMMUNIZE ORDER/ADMIN: HCPCS | Performed by: FAMILY MEDICINE

## 2024-01-26 PROCEDURE — 3017F COLORECTAL CA SCREEN DOC REV: CPT | Performed by: FAMILY MEDICINE

## 2024-01-26 PROCEDURE — G8419 CALC BMI OUT NRM PARAM NOF/U: HCPCS | Performed by: FAMILY MEDICINE

## 2024-01-26 PROCEDURE — G8427 DOCREV CUR MEDS BY ELIG CLIN: HCPCS | Performed by: FAMILY MEDICINE

## 2024-01-26 PROCEDURE — 99213 OFFICE O/P EST LOW 20 MIN: CPT | Performed by: FAMILY MEDICINE

## 2024-01-26 RX ORDER — LOSARTAN POTASSIUM 100 MG/1
100 TABLET ORAL DAILY
Qty: 90 TABLET | Refills: 1 | Status: SHIPPED | OUTPATIENT
Start: 2024-01-26

## 2024-01-26 ASSESSMENT — PATIENT HEALTH QUESTIONNAIRE - PHQ9
SUM OF ALL RESPONSES TO PHQ QUESTIONS 1-9: 0
SUM OF ALL RESPONSES TO PHQ9 QUESTIONS 1 & 2: 0
2. FEELING DOWN, DEPRESSED OR HOPELESS: 0
SUM OF ALL RESPONSES TO PHQ QUESTIONS 1-9: 0
1. LITTLE INTEREST OR PLEASURE IN DOING THINGS: 0

## 2024-01-26 NOTE — PROGRESS NOTES
Edgar Arboleda is a 48 y.o. male.    HPI:here for bp check  Lisinopril caused a cough,  changed to losartan  Blood pressure was still elevated so was started on hygrotin 25, but bp was running too low, some dizziness .so cut Hygroton in half  Did not take hygrotin today  Needs labs done today as well  Seizure condition stable asthma condition stable on infusions  Meds, vitam,ins and allergies reviewed with pt    ROS: No TIA's or unusual headaches, no dysphagia.  No prolonged cough. No dyspnea or chest pain on exertion.  No abdominal pain, change in bowel habits, black or bloody stools.  No urinary tract symptoms.  No new or unusual musculoskeletal symptoms.       Prior to Visit Medications    Medication Sig Taking? Authorizing Provider   losartan (COZAAR) 100 MG tablet Take 1 tablet by mouth daily Yes Brett Momin MD   levETIRAcetam (KEPPRA) 750 MG tablet Take 1 tablet by mouth 2 times daily Yes Aj Gannon MD   chlorthalidone (HYGROTON) 25 MG tablet Take 1 tablet by mouth daily Yes Jarred Vazquez MD   tezepelumab-ekko (TEZSPIRE) 210 MG/1.91ML SOAJ injection Inject 1.91 mLs into the skin every 28 days Yes Aj Gannon MD   mometasone (ASMANEX, 120 METERED DOSES,) 220 MCG/ACT AEPB inhaler Inhale 2 puffs into the lungs daily Yes Isaac Correia MD   VENTOLIN  (90 Base) MCG/ACT inhaler INHALE TWO PUFFS BY MOUTH EVERY 6 HOURS AS NEEDED FOR WHEEZING Yes Prabhjot Salazar MD   hydrocortisone 2.5 % cream Apply topically 2 times daily. Yes Jarred Vazquez MD   Fluticasone-Umeclidin-Vilant (TRELEGY ELLIPTA) 200-62.5-25 MCG/INH AEPB Inhale 1 puff into the lungs daily Yes Isaac Correia MD   albuterol (PROVENTIL) (2.5 MG/3ML) 0.083% nebulizer solution USE THREE MILLILITERS VIA NEBULIZATION BY MOUTH EVERY 6 HOURS AS NEEDED FOR WHEEZING Yes Jarred Vazquez MD   fluticasone (FLONASE) 50 MCG/ACT nasal spray 1 spray by Nasal route daily Yes Aj Gannon MD   losartan (COZAAR) 50

## 2024-01-27 LAB
ALBUMIN SERPL-MCNC: 4.2 G/DL (ref 3.4–5)
ALBUMIN/GLOB SERPL: 1.7 {RATIO} (ref 1.1–2.2)
ALP SERPL-CCNC: 83 U/L (ref 40–129)
ALT SERPL-CCNC: 84 U/L (ref 10–40)
ANION GAP SERPL CALCULATED.3IONS-SCNC: 14 MMOL/L (ref 3–16)
AST SERPL-CCNC: 155 U/L (ref 15–37)
BILIRUB SERPL-MCNC: 1.6 MG/DL (ref 0–1)
BUN SERPL-MCNC: 5 MG/DL (ref 7–20)
CALCIUM SERPL-MCNC: 8.5 MG/DL (ref 8.3–10.6)
CHLORIDE SERPL-SCNC: 98 MMOL/L (ref 99–110)
CHOLEST SERPL-MCNC: 193 MG/DL (ref 0–199)
CO2 SERPL-SCNC: 28 MMOL/L (ref 21–32)
CREAT SERPL-MCNC: 0.7 MG/DL (ref 0.9–1.3)
EST. AVERAGE GLUCOSE BLD GHB EST-MCNC: 73.8 MG/DL
GFR SERPLBLD CREATININE-BSD FMLA CKD-EPI: >60 ML/MIN/{1.73_M2}
GLUCOSE SERPL-MCNC: 80 MG/DL (ref 70–99)
HBA1C MFR BLD: 4.2 %
HDLC SERPL-MCNC: 29 MG/DL (ref 40–60)
LDL CHOLESTEROL CALCULATED: 132 MG/DL
POTASSIUM SERPL-SCNC: 2.9 MMOL/L (ref 3.5–5.1)
PROT SERPL-MCNC: 6.7 G/DL (ref 6.4–8.2)
SODIUM SERPL-SCNC: 140 MMOL/L (ref 136–145)
TRIGL SERPL-MCNC: 158 MG/DL (ref 0–150)
VLDLC SERPL CALC-MCNC: 32 MG/DL

## 2024-01-28 LAB — MISCELLANEOUS LAB TEST ORDER: NORMAL

## 2024-01-29 ENCOUNTER — TELEPHONE (OUTPATIENT)
Dept: FAMILY MEDICINE CLINIC | Age: 49
End: 2024-01-29

## 2024-01-29 RX ORDER — POTASSIUM CHLORIDE 20 MEQ/1
20 TABLET, EXTENDED RELEASE ORAL 2 TIMES DAILY
Qty: 60 TABLET | Refills: 0 | Status: SHIPPED | OUTPATIENT
Start: 2024-01-29

## 2024-01-29 NOTE — TELEPHONE ENCOUNTER
I called and told pt to  his potassim medication he also said that he was not able to get the losartan 100 because pharm does not have them  in stock..

## 2024-02-05 ENCOUNTER — TELEPHONE (OUTPATIENT)
Dept: FAMILY MEDICINE CLINIC | Age: 49
End: 2024-02-05

## 2024-02-05 DIAGNOSIS — I10 HTN (HYPERTENSION), BENIGN: ICD-10-CM

## 2024-02-05 DIAGNOSIS — E87.6 LOW BLOOD POTASSIUM: Primary | ICD-10-CM

## 2024-02-05 DIAGNOSIS — E87.6 LOW BLOOD POTASSIUM: ICD-10-CM

## 2024-02-05 DIAGNOSIS — I10 HTN (HYPERTENSION), BENIGN: Primary | ICD-10-CM

## 2024-02-06 LAB
ALBUMIN SERPL-MCNC: 4.3 G/DL (ref 3.4–5)
ALBUMIN/GLOB SERPL: 1.8 {RATIO} (ref 1.1–2.2)
ALP SERPL-CCNC: 64 U/L (ref 40–129)
ALT SERPL-CCNC: 42 U/L (ref 10–40)
ANION GAP SERPL CALCULATED.3IONS-SCNC: 13 MMOL/L (ref 3–16)
AST SERPL-CCNC: 51 U/L (ref 15–37)
BILIRUB SERPL-MCNC: 1 MG/DL (ref 0–1)
BUN SERPL-MCNC: 6 MG/DL (ref 7–20)
CALCIUM SERPL-MCNC: 8.9 MG/DL (ref 8.3–10.6)
CHLORIDE SERPL-SCNC: 99 MMOL/L (ref 99–110)
CO2 SERPL-SCNC: 23 MMOL/L (ref 21–32)
CREAT SERPL-MCNC: 0.7 MG/DL (ref 0.9–1.3)
GFR SERPLBLD CREATININE-BSD FMLA CKD-EPI: >60 ML/MIN/{1.73_M2}
GLUCOSE SERPL-MCNC: 76 MG/DL (ref 70–99)
POTASSIUM SERPL-SCNC: 3.8 MMOL/L (ref 3.5–5.1)
PROT SERPL-MCNC: 6.7 G/DL (ref 6.4–8.2)
SODIUM SERPL-SCNC: 135 MMOL/L (ref 136–145)

## 2024-02-14 ENCOUNTER — HOSPITAL ENCOUNTER (OUTPATIENT)
Dept: ONCOLOGY | Age: 49
Setting detail: INFUSION SERIES
Discharge: HOME | End: 2024-02-14
Payer: COMMERCIAL

## 2024-02-14 VITALS
DIASTOLIC BLOOD PRESSURE: 99 MMHG | RESPIRATION RATE: 16 BRPM | SYSTOLIC BLOOD PRESSURE: 165 MMHG | TEMPERATURE: 97 F | HEART RATE: 83 BPM

## 2024-02-14 DIAGNOSIS — J45.51 SEVERE PERSISTENT ASTHMA WITH ACUTE EXACERBATION: Primary | ICD-10-CM

## 2024-02-14 PROCEDURE — 96372 THER/PROPH/DIAG INJ SC/IM: CPT

## 2024-02-14 PROCEDURE — 99211 OFF/OP EST MAY X REQ PHY/QHP: CPT

## 2024-02-14 PROCEDURE — 6360000002 HC RX W HCPCS: Performed by: INTERNAL MEDICINE

## 2024-02-14 RX ADMIN — TEZEPELUMAB-EKKO 210 MG: 210 INJECTION, SOLUTION SUBCUTANEOUS at 14:04

## 2024-02-14 NOTE — PROGRESS NOTES
Pt to Dept for Tezspire injection. AVS printed and reviewed. Pt nalini injection with no adverse reaction noted. Pt scheduled to return in 4 weeks.  Blood pressure elavated today, see flow sheet, states he took his medication 2 hours ago, and that they have recently taken him off his water pill and increased another medications.  Encouraged patent to call physician regarding blood pressure, states he has appointment tomorrow.  Will recheck again after sitting for a bit.  Patient states he has something to check it with at home also.

## 2024-02-15 ENCOUNTER — OFFICE VISIT (OUTPATIENT)
Dept: FAMILY MEDICINE CLINIC | Age: 49
End: 2024-02-15
Payer: COMMERCIAL

## 2024-02-15 VITALS
WEIGHT: 194.2 LBS | DIASTOLIC BLOOD PRESSURE: 95 MMHG | HEART RATE: 92 BPM | BODY MASS INDEX: 27.19 KG/M2 | HEIGHT: 71 IN | SYSTOLIC BLOOD PRESSURE: 145 MMHG | OXYGEN SATURATION: 98 %

## 2024-02-15 DIAGNOSIS — I10 HTN (HYPERTENSION), BENIGN: ICD-10-CM

## 2024-02-15 DIAGNOSIS — D69.6 THROMBOCYTOPENIA (HCC): ICD-10-CM

## 2024-02-15 DIAGNOSIS — G40.009 PARTIAL IDIOPATHIC EPILEPSY WITH SEIZURES OF LOCALIZED ONSET, NOT INTRACTABLE, WITHOUT STATUS EPILEPTICUS (HCC): Primary | ICD-10-CM

## 2024-02-15 DIAGNOSIS — J45.51 SEVERE PERSISTENT ASTHMA WITH ACUTE EXACERBATION: ICD-10-CM

## 2024-02-15 PROCEDURE — G8419 CALC BMI OUT NRM PARAM NOF/U: HCPCS | Performed by: FAMILY MEDICINE

## 2024-02-15 PROCEDURE — 99214 OFFICE O/P EST MOD 30 MIN: CPT | Performed by: FAMILY MEDICINE

## 2024-02-15 PROCEDURE — 1036F TOBACCO NON-USER: CPT | Performed by: FAMILY MEDICINE

## 2024-02-15 PROCEDURE — 3017F COLORECTAL CA SCREEN DOC REV: CPT | Performed by: FAMILY MEDICINE

## 2024-02-15 PROCEDURE — 3080F DIAST BP >= 90 MM HG: CPT | Performed by: FAMILY MEDICINE

## 2024-02-15 PROCEDURE — 3077F SYST BP >= 140 MM HG: CPT | Performed by: FAMILY MEDICINE

## 2024-02-15 PROCEDURE — G8482 FLU IMMUNIZE ORDER/ADMIN: HCPCS | Performed by: FAMILY MEDICINE

## 2024-02-15 PROCEDURE — G8427 DOCREV CUR MEDS BY ELIG CLIN: HCPCS | Performed by: FAMILY MEDICINE

## 2024-02-15 RX ORDER — POTASSIUM CHLORIDE 20 MEQ/1
20 TABLET, EXTENDED RELEASE ORAL 2 TIMES DAILY
Qty: 90 TABLET | Refills: 3 | Status: SHIPPED | OUTPATIENT
Start: 2024-02-15

## 2024-02-15 RX ORDER — METOPROLOL SUCCINATE 25 MG/1
25 TABLET, EXTENDED RELEASE ORAL DAILY
Qty: 30 TABLET | Refills: 3 | Status: SHIPPED | OUTPATIENT
Start: 2024-02-15

## 2024-02-15 NOTE — PROGRESS NOTES
Edgar Arboleda is a 48 y.o. male.    HPI:here for bp f/u and complex medical visit  No sz in 4 mo  Bp high at infusion center  Sees pulm and neurol and ortho (for back)  Was some dizziness on diuretic, had to cut in half  Meds, vitamins and allergies reviewed with pt    ROS: No TIA's or unusual headaches, no dysphagia.  No prolonged cough. No dyspnea or chest pain on exertion.  No abdominal pain, change in bowel habits, black or bloody stools.  No urinary tract symptoms.  No new or unusual musculoskeletal symptoms.       Prior to Visit Medications    Medication Sig Taking? Authorizing Provider   potassium chloride (KLOR-CON M) 20 MEQ extended release tablet Take 1 tablet by mouth 2 times daily Yes Brett Momin MD   losartan (COZAAR) 100 MG tablet Take 1 tablet by mouth daily Yes Brett Momin MD   levETIRAcetam (KEPPRA) 750 MG tablet Take 1 tablet by mouth 2 times daily Yes ProviderAj MD   chlorthalidone (HYGROTON) 25 MG tablet Take 1 tablet by mouth daily Yes Jarred Vazquez MD   tezepelumab-ekko (TEZSPIRE) 210 MG/1.91ML SOAJ injection Inject 1.91 mLs into the skin every 28 days Yes ProviderAj MD   mometasone (ASMANEX, 120 METERED DOSES,) 220 MCG/ACT AEPB inhaler Inhale 2 puffs into the lungs daily Yes Isaac Correia MD   VENTOLIN  (90 Base) MCG/ACT inhaler INHALE TWO PUFFS BY MOUTH EVERY 6 HOURS AS NEEDED FOR WHEEZING Yes Prabhjot Salazar MD   hydrocortisone 2.5 % cream Apply topically 2 times daily. Yes Jarred Vazquez MD   Fluticasone-Umeclidin-Vilant (TRELEGY ELLIPTA) 200-62.5-25 MCG/INH AEPB Inhale 1 puff into the lungs daily Yes Isaac Correia MD   albuterol (PROVENTIL) (2.5 MG/3ML) 0.083% nebulizer solution USE THREE MILLILITERS VIA NEBULIZATION BY MOUTH EVERY 6 HOURS AS NEEDED FOR WHEEZING Yes Jarred Vazquez MD   fluticasone (FLONASE) 50 MCG/ACT nasal spray 1 spray by Nasal route daily Yes Provider, Historical, MD       Past Medical History:   Diagnosis

## 2024-03-01 RX ORDER — FLUTICASONE FUROATE, UMECLIDINIUM BROMIDE AND VILANTEROL TRIFENATATE 200; 62.5; 25 UG/1; UG/1; UG/1
POWDER RESPIRATORY (INHALATION)
Qty: 60 EACH | Refills: 3 | Status: SHIPPED | OUTPATIENT
Start: 2024-03-01

## 2024-03-05 ENCOUNTER — TELEPHONE (OUTPATIENT)
Dept: PULMONOLOGY | Age: 49
End: 2024-03-05

## 2024-03-05 NOTE — TELEPHONE ENCOUNTER
Infusion center called and stated that they received a denial for patients Tezspire.    States that the clinical information in an office visit needs to state that patient is stable or improving on medication before they will approve it.    Patient scheduled for visit on 3/12/2024

## 2024-03-07 NOTE — PROGRESS NOTES
Edgar Arboleda is a 48 y.o. male.    HPI:here for bp recheck and complex medical visit  Bp at home 136/89 most days  10 days ago had some dizziness at home at 4 pm that lasted a few  hours, mild double vision - bp at that time 98/63.  Has not been as low as low since then  Had egd/colon 10-23-23 - few benign polyps, biopsy  showed celiac   Now on gluten free diet , less symptomatic, more constipation however  Meds, vitamins and allergies reviewed with pt    ROS: No TIA's or unusual headaches, no dysphagia.  No prolonged cough. No dyspnea or chest pain on exertion.  No abdominal pain, change in bowel habits, black or bloody stools.  No urinary tract symptoms.  No new or unusual musculoskeletal symptoms.       Prior to Visit Medications    Medication Sig Taking? Authorizing Provider   TRELEGY ELLIPTA 200-62.5-25 MCG/ACT AEPB inhaler INHALE 1 PUFF BY MOUTH DAILY Yes Isaac Correia MD   metoprolol succinate (TOPROL XL) 25 MG extended release tablet Take 1 tablet by mouth daily Yes Brett Momin MD   potassium chloride (KLOR-CON M) 20 MEQ extended release tablet Take 1 tablet by mouth 2 times daily Yes Brett Momin MD   losartan (COZAAR) 100 MG tablet Take 1 tablet by mouth daily Yes Brett Momin MD   levETIRAcetam (KEPPRA) 750 MG tablet Take 1 tablet by mouth 2 times daily Yes Aj Gannon MD   chlorthalidone (HYGROTON) 25 MG tablet Take 1 tablet by mouth daily Yes Jarred Vazquez MD   tezepelumab-ekko (TEZSPIRE) 210 MG/1.91ML SOAJ injection Inject 1.91 mLs into the skin every 28 days Yes ProviderAj MD   VENTOLIN  (90 Base) MCG/ACT inhaler INHALE TWO PUFFS BY MOUTH EVERY 6 HOURS AS NEEDED FOR WHEEZING Yes Prabhjot Salazar MD   hydrocortisone 2.5 % cream Apply topically 2 times daily. Yes Jarred Vazquez MD   albuterol (PROVENTIL) (2.5 MG/3ML) 0.083% nebulizer solution USE THREE MILLILITERS VIA NEBULIZATION BY MOUTH EVERY 6 HOURS AS NEEDED FOR WHEEZING Yes Taylor

## 2024-03-08 ENCOUNTER — OFFICE VISIT (OUTPATIENT)
Dept: FAMILY MEDICINE CLINIC | Age: 49
End: 2024-03-08
Payer: COMMERCIAL

## 2024-03-08 VITALS
HEART RATE: 109 BPM | BODY MASS INDEX: 26.78 KG/M2 | SYSTOLIC BLOOD PRESSURE: 126 MMHG | WEIGHT: 192 LBS | DIASTOLIC BLOOD PRESSURE: 89 MMHG

## 2024-03-08 DIAGNOSIS — G40.909 SEIZURE DISORDER (HCC): Primary | ICD-10-CM

## 2024-03-08 DIAGNOSIS — K90.0 CELIAC DISEASE: ICD-10-CM

## 2024-03-08 DIAGNOSIS — I10 HTN (HYPERTENSION), BENIGN: ICD-10-CM

## 2024-03-08 DIAGNOSIS — J45.50 SEVERE PERSISTENT ASTHMA WITHOUT COMPLICATION: ICD-10-CM

## 2024-03-08 PROCEDURE — 3017F COLORECTAL CA SCREEN DOC REV: CPT | Performed by: FAMILY MEDICINE

## 2024-03-08 PROCEDURE — 3074F SYST BP LT 130 MM HG: CPT | Performed by: FAMILY MEDICINE

## 2024-03-08 PROCEDURE — 1036F TOBACCO NON-USER: CPT | Performed by: FAMILY MEDICINE

## 2024-03-08 PROCEDURE — 3079F DIAST BP 80-89 MM HG: CPT | Performed by: FAMILY MEDICINE

## 2024-03-08 PROCEDURE — G8419 CALC BMI OUT NRM PARAM NOF/U: HCPCS | Performed by: FAMILY MEDICINE

## 2024-03-08 PROCEDURE — G8482 FLU IMMUNIZE ORDER/ADMIN: HCPCS | Performed by: FAMILY MEDICINE

## 2024-03-08 PROCEDURE — G8427 DOCREV CUR MEDS BY ELIG CLIN: HCPCS | Performed by: FAMILY MEDICINE

## 2024-03-08 PROCEDURE — 99214 OFFICE O/P EST MOD 30 MIN: CPT | Performed by: FAMILY MEDICINE

## 2024-03-12 ENCOUNTER — OFFICE VISIT (OUTPATIENT)
Dept: PULMONOLOGY | Age: 49
End: 2024-03-12
Payer: COMMERCIAL

## 2024-03-12 VITALS
HEART RATE: 113 BPM | WEIGHT: 189 LBS | SYSTOLIC BLOOD PRESSURE: 128 MMHG | HEIGHT: 71 IN | BODY MASS INDEX: 26.46 KG/M2 | DIASTOLIC BLOOD PRESSURE: 88 MMHG | OXYGEN SATURATION: 98 %

## 2024-03-12 DIAGNOSIS — Z91.09 ENVIRONMENTAL ALLERGIES: ICD-10-CM

## 2024-03-12 DIAGNOSIS — K21.9 GASTROESOPHAGEAL REFLUX DISEASE WITHOUT ESOPHAGITIS: ICD-10-CM

## 2024-03-12 DIAGNOSIS — J45.51 SEVERE PERSISTENT ASTHMA WITH ACUTE EXACERBATION: Primary | ICD-10-CM

## 2024-03-12 DIAGNOSIS — J98.6 ELEVATED DIAPHRAGM: ICD-10-CM

## 2024-03-12 PROCEDURE — G8427 DOCREV CUR MEDS BY ELIG CLIN: HCPCS | Performed by: INTERNAL MEDICINE

## 2024-03-12 PROCEDURE — 3079F DIAST BP 80-89 MM HG: CPT | Performed by: INTERNAL MEDICINE

## 2024-03-12 PROCEDURE — 3074F SYST BP LT 130 MM HG: CPT | Performed by: INTERNAL MEDICINE

## 2024-03-12 PROCEDURE — G8482 FLU IMMUNIZE ORDER/ADMIN: HCPCS | Performed by: INTERNAL MEDICINE

## 2024-03-12 PROCEDURE — G8419 CALC BMI OUT NRM PARAM NOF/U: HCPCS | Performed by: INTERNAL MEDICINE

## 2024-03-12 PROCEDURE — 99214 OFFICE O/P EST MOD 30 MIN: CPT | Performed by: INTERNAL MEDICINE

## 2024-03-12 PROCEDURE — 3017F COLORECTAL CA SCREEN DOC REV: CPT | Performed by: INTERNAL MEDICINE

## 2024-03-12 PROCEDURE — 1036F TOBACCO NON-USER: CPT | Performed by: INTERNAL MEDICINE

## 2024-03-12 NOTE — PROGRESS NOTES
[facility]  Consult Note  Isaac Correia MD       Edgar Arboleda   YOB: 1975    Date of Visit:  3/12/2024    Assessment/Plan:  1. Severe persistent asthma with exacerbation  PFT 6/22:  Spirometry reveals decreased FVC at 2.28 liters, which is 43% predicted.  FEV1 is decreased at 1.43 liters, which is 35% predicted.  FEV1/FVC  ratio is reduced at 63%.  Expiratory flow rates are also reduced.  There  is a 32% improvement in FEV1 after inhaled bronchodilators.  Lung  volumes reveal decreased total lung capacity at 77% predicted, vital  capacity is decreased at 55% predicted, residual volume is normal,  diffusion capacity is normal.     IMPRESSION:  Severe obstructive lung disease with a good response to  inhaled bronchodilators.  Reduced total lung capacity may indicate an  element of restriction as well.    Medication Management:    Trelegy 200  Had to stop Xolair because it was making him dizzy and sick to his stomach.  Add Asmanex 220 mcg 2 puffs once daily     He has been on Tezspire for the last several months and is improved. He has not flared up and is not needing rescue medication. He continues Trelegy with benefit. He feels like Tezspire has created significant improvement.  He failed Xolair 2/2 side effects.  Unfortunately, his insurance denied coverage for reasons we cannot determine. Will try for an appeal.    2. Environmental allergies  He does not have significant eosinophilia  He does however have elevated IgE and reacts to cat and dog dander.    3. Gastroesophageal reflux disease without esophagitis  Currently not symptomatic from GERD  Discussed the relationship between GERD and poorly controlled asthma    4. 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    3 months    Chief Complaint   Patient presents with    Severe persistent asthma with acute exacerbation    Asthma       HPI  He presents for a

## 2024-03-13 ENCOUNTER — HOSPITAL ENCOUNTER (OUTPATIENT)
Dept: ONCOLOGY | Age: 49
Setting detail: INFUSION SERIES
End: 2024-03-13

## 2024-04-19 PROBLEM — J45.909 UNCOMPLICATED ASTHMA: Status: ACTIVE | Noted: 2022-06-29

## 2024-04-19 PROBLEM — G40.909 EPILEPTIC SEIZURE (HCC): Status: ACTIVE | Noted: 2024-04-19

## 2024-04-19 PROBLEM — I10 ESSENTIAL HYPERTENSION: Status: ACTIVE | Noted: 2024-04-19

## 2024-04-19 PROBLEM — E87.20 ACIDOSIS, UNSPECIFIED: Status: ACTIVE | Noted: 2024-04-19

## 2024-04-19 PROBLEM — G93.40 ENCEPHALOPATHY: Status: ACTIVE | Noted: 2024-04-19

## 2024-04-19 PROBLEM — K52.9 NONINFECTIVE ENTEROCOLITIS: Status: ACTIVE | Noted: 2024-04-19

## 2024-04-19 PROBLEM — R10.13 EPIGASTRIC PAIN: Status: ACTIVE | Noted: 2024-04-19

## 2024-04-19 PROBLEM — R11.0 NAUSEA: Status: ACTIVE | Noted: 2024-04-19

## 2024-04-22 ENCOUNTER — OFFICE VISIT (OUTPATIENT)
Dept: ORTHOPEDIC SURGERY | Age: 49
End: 2024-04-22
Payer: COMMERCIAL

## 2024-04-22 VITALS — BODY MASS INDEX: 26.88 KG/M2 | WEIGHT: 192 LBS | HEIGHT: 71 IN

## 2024-04-22 DIAGNOSIS — R20.2 PARESTHESIA OF BOTH LOWER EXTREMITIES: ICD-10-CM

## 2024-04-22 DIAGNOSIS — M51.26 DISC DISPLACEMENT, LUMBAR: ICD-10-CM

## 2024-04-22 DIAGNOSIS — M47.816 LUMBAR SPONDYLOSIS: ICD-10-CM

## 2024-04-22 DIAGNOSIS — R20.0 NUMBNESS IN BOTH LEGS: Primary | ICD-10-CM

## 2024-04-22 PROCEDURE — 1036F TOBACCO NON-USER: CPT | Performed by: INTERNAL MEDICINE

## 2024-04-22 PROCEDURE — 99214 OFFICE O/P EST MOD 30 MIN: CPT | Performed by: INTERNAL MEDICINE

## 2024-04-22 PROCEDURE — 3017F COLORECTAL CA SCREEN DOC REV: CPT | Performed by: INTERNAL MEDICINE

## 2024-04-22 PROCEDURE — G8427 DOCREV CUR MEDS BY ELIG CLIN: HCPCS | Performed by: INTERNAL MEDICINE

## 2024-04-22 PROCEDURE — G8419 CALC BMI OUT NRM PARAM NOF/U: HCPCS | Performed by: INTERNAL MEDICINE

## 2024-04-22 RX ORDER — METHYLPREDNISOLONE 4 MG/1
TABLET ORAL
Qty: 1 KIT | Refills: 0 | Status: SHIPPED | OUTPATIENT
Start: 2024-04-22

## 2024-04-22 NOTE — PROGRESS NOTES
negative      Skin: There are no rashes, ulcerations or lesions.      Gait: Nonantalgic      Reflex intact lower     Additional Comments:        Additional Examinations:          Neurolgic -Light touch sensation diminished pinprick sensation diminished stocking glove distribution; manual muscle testing normal L2-S1. No fasiculations. Pattella tendon and Achilles tendon reflexes +2 bilaterally.  Seated SLR negative           Office Imaging Results/Procedures PerformedToday:      Radiology:      X-rays obtained and reviewed in office:   Views lumbar spine   Location 3 views lumbar spine   Impression  The AP projection lateral projection demonstrates mild multilevel spondylosis no areas of focal advanced intervertebral space narrowing.  Vertebral body heights are well-maintained.  No other soft tissue or osseous abnormalities       Office Procedures:     Orders Placed This Encounter   Procedures    XR LUMBAR SPINE (2-3 VIEWS)     Standing Status:   Future     Number of Occurrences:   1     Standing Expiration Date:   4/22/2025     Order Specific Question:   Reason for exam:     Answer:   Back Pain    [facility] - Jayson Frausto MD, (EMG), [facility]     Referral Priority:   Routine     Referral Type:   Eval and Treat     Referral Reason:   Specialty Services Required     Referred to Provider:   Jayson Frausto MD     Requested Specialty:   Physical Medicine and Rehab     Number of Visits Requested:   1           Other Outside Imaging and Testing Personally Reviewed:    XR LUMBAR SPINE (2-3 VIEWS)    Result Date: 4/22/2024  Radiology exam is complete. No Radiologist dictation. Please follow up with ordering provider.         CONCLUSION:   1. L4-L5 bulging disc that mildly effaces the thecal sac.  Facet arthropathy with mild   biforaminal stenosis with abutment of the right L4 nerve root.   2. L5-S1 minimal retrolisthesis of L5 relative to S1.  Disc displacement.  Mild facet   arthropathy with mild biforaminal

## 2024-04-24 ENCOUNTER — HOSPITAL ENCOUNTER (OUTPATIENT)
Dept: ONCOLOGY | Age: 49
Setting detail: INFUSION SERIES
Discharge: HOME | End: 2024-04-24
Payer: COMMERCIAL

## 2024-04-24 VITALS
SYSTOLIC BLOOD PRESSURE: 145 MMHG | RESPIRATION RATE: 16 BRPM | TEMPERATURE: 97.2 F | DIASTOLIC BLOOD PRESSURE: 92 MMHG | HEART RATE: 98 BPM

## 2024-04-24 DIAGNOSIS — J45.51 SEVERE PERSISTENT ASTHMA WITH ACUTE EXACERBATION: Primary | ICD-10-CM

## 2024-04-24 PROCEDURE — 99211 OFF/OP EST MAY X REQ PHY/QHP: CPT

## 2024-04-24 PROCEDURE — 6360000002 HC RX W HCPCS: Performed by: INTERNAL MEDICINE

## 2024-04-24 PROCEDURE — 96372 THER/PROPH/DIAG INJ SC/IM: CPT

## 2024-04-24 RX ADMIN — TEZEPELUMAB-EKKO 210 MG: 210 INJECTION, SOLUTION SUBCUTANEOUS at 14:09

## 2024-04-24 NOTE — PROGRESS NOTES
Pt to Dept for Tezspire injection. Denied need for printed AVS. Pt nalini injection with no adverse reaction noted. Pt scheduled to return in 4 weeks.

## 2024-04-26 ENCOUNTER — TELEPHONE (OUTPATIENT)
Dept: FAMILY MEDICINE CLINIC | Age: 49
End: 2024-04-26

## 2024-04-26 NOTE — TELEPHONE ENCOUNTER
Pt notified. He said his INS has sent a nurse out to his home for him. He said ok if dr lang does nt want to sign forms. Will see his back

## 2024-04-29 ENCOUNTER — TELEPHONE (OUTPATIENT)
Dept: FAMILY MEDICINE CLINIC | Age: 49
End: 2024-04-29

## 2024-04-29 NOTE — TELEPHONE ENCOUNTER
[facility]-called re: orders faxed to office last week   Asking for status on the orders  Contact [facility]

## 2024-05-15 ENCOUNTER — OFFICE VISIT (OUTPATIENT)
Dept: ORTHOPEDIC SURGERY | Age: 49
End: 2024-05-15
Payer: COMMERCIAL

## 2024-05-15 VITALS — HEIGHT: 71 IN | BODY MASS INDEX: 26.88 KG/M2 | WEIGHT: 192.02 LBS

## 2024-05-15 DIAGNOSIS — R20.2 PARESTHESIA OF BOTH LOWER EXTREMITIES: ICD-10-CM

## 2024-05-15 DIAGNOSIS — R27.0 ATAXIA: ICD-10-CM

## 2024-05-15 DIAGNOSIS — R20.0 NUMBNESS IN BOTH LEGS: Primary | ICD-10-CM

## 2024-05-15 PROCEDURE — G8427 DOCREV CUR MEDS BY ELIG CLIN: HCPCS | Performed by: INTERNAL MEDICINE

## 2024-05-15 PROCEDURE — 1036F TOBACCO NON-USER: CPT | Performed by: INTERNAL MEDICINE

## 2024-05-15 PROCEDURE — 3017F COLORECTAL CA SCREEN DOC REV: CPT | Performed by: INTERNAL MEDICINE

## 2024-05-15 PROCEDURE — G8419 CALC BMI OUT NRM PARAM NOF/U: HCPCS | Performed by: INTERNAL MEDICINE

## 2024-05-15 PROCEDURE — 99214 OFFICE O/P EST MOD 30 MIN: CPT | Performed by: INTERNAL MEDICINE

## 2024-05-15 NOTE — PROGRESS NOTES
clear.     SOFT TISSUES/SKULL:  There is no depressed calvarial fracture.     IMPRESSION:  1. No acute intracranial abnormality.  2. Partial opacification of the right mastoid air cells, possibly  representing a mastoid effusion.              Specimen Collected: 01/13/20 20:11 EST Last Resulted: 01/13/20 20:14 EST                   Assessment   Impression: .    Encounter Diagnoses   Name Primary?    Paresthesia of both lower extremities     Numbness in both legs Yes    Ataxia               Plan:     MRI evaluation brain rule out active neurodegenerative lesion/vascular event/intracranial pathology NOS  Clinical follow-up with his treating neurologist [facility]  Medical management: Cierra Victoria at current dosage           Orders:        Orders Placed This Encounter   Procedures    MRI BRAIN WO CONTRAST     Standing Status:   Future     Standing Expiration Date:   5/15/2025     Scheduling Instructions:      [employer] ([facility])     Order Specific Question:   Reason for exam:     Answer:   r/o neurodegenerative lesion/CVA         Simon Ashton MD.      Disclaimer:    \"This note was dictated with voice recognition software. Though review and correction are routine, we apologize for any errors.\"

## 2024-05-22 ENCOUNTER — HOSPITAL ENCOUNTER (OUTPATIENT)
Dept: ONCOLOGY | Age: 49
Setting detail: INFUSION SERIES
Discharge: HOME | End: 2024-05-22
Payer: COMMERCIAL

## 2024-05-22 VITALS
TEMPERATURE: 96.9 F | DIASTOLIC BLOOD PRESSURE: 88 MMHG | HEART RATE: 90 BPM | SYSTOLIC BLOOD PRESSURE: 132 MMHG | RESPIRATION RATE: 16 BRPM

## 2024-05-22 DIAGNOSIS — J45.51 SEVERE PERSISTENT ASTHMA WITH ACUTE EXACERBATION: Primary | ICD-10-CM

## 2024-05-22 PROCEDURE — 6360000002 HC RX W HCPCS: Performed by: INTERNAL MEDICINE

## 2024-05-22 PROCEDURE — 96372 THER/PROPH/DIAG INJ SC/IM: CPT

## 2024-05-22 PROCEDURE — 99211 OFF/OP EST MAY X REQ PHY/QHP: CPT

## 2024-05-22 RX ADMIN — TEZEPELUMAB-EKKO 210 MG: 210 INJECTION, SOLUTION SUBCUTANEOUS at 14:11

## 2024-06-04 PROBLEM — D12.3 ADENOMATOUS POLYP OF TRANSVERSE COLON: Status: ACTIVE | Noted: 2024-06-04

## 2024-06-19 ENCOUNTER — HOSPITAL ENCOUNTER (OUTPATIENT)
Dept: ONCOLOGY | Age: 49
Setting detail: INFUSION SERIES
Discharge: HOME | End: 2024-06-19
Payer: COMMERCIAL

## 2024-06-19 VITALS
TEMPERATURE: 97.8 F | DIASTOLIC BLOOD PRESSURE: 91 MMHG | RESPIRATION RATE: 16 BRPM | HEART RATE: 91 BPM | SYSTOLIC BLOOD PRESSURE: 131 MMHG

## 2024-06-19 DIAGNOSIS — J45.51 SEVERE PERSISTENT ASTHMA WITH ACUTE EXACERBATION: Primary | ICD-10-CM

## 2024-06-19 PROCEDURE — 99211 OFF/OP EST MAY X REQ PHY/QHP: CPT

## 2024-06-19 PROCEDURE — 96372 THER/PROPH/DIAG INJ SC/IM: CPT

## 2024-06-19 PROCEDURE — 6360000002 HC RX W HCPCS: Performed by: INTERNAL MEDICINE

## 2024-06-19 RX ADMIN — TEZEPELUMAB-EKKO 210 MG: 210 INJECTION, SOLUTION SUBCUTANEOUS at 14:11

## 2024-07-09 NOTE — TELEPHONE ENCOUNTER
Rx Request: Medrol    Last Filled: 04/22/2024  Refill Due: 05/15/2024  Last OV: 5/15/2024  Follow Up: None    Patient is requesting refill for his back. Should he have an appointment? Is another dose Samir appropriate? Please Advise.

## 2024-07-17 ENCOUNTER — HOSPITAL ENCOUNTER (OUTPATIENT)
Dept: ONCOLOGY | Age: 49
Setting detail: INFUSION SERIES
Discharge: HOME | End: 2024-07-17
Payer: COMMERCIAL

## 2024-07-17 VITALS
RESPIRATION RATE: 16 BRPM | SYSTOLIC BLOOD PRESSURE: 131 MMHG | TEMPERATURE: 97.5 F | HEART RATE: 86 BPM | DIASTOLIC BLOOD PRESSURE: 89 MMHG

## 2024-07-17 DIAGNOSIS — J45.51 SEVERE PERSISTENT ASTHMA WITH ACUTE EXACERBATION: Primary | ICD-10-CM

## 2024-07-17 PROCEDURE — 96372 THER/PROPH/DIAG INJ SC/IM: CPT

## 2024-07-17 PROCEDURE — 99211 OFF/OP EST MAY X REQ PHY/QHP: CPT

## 2024-07-17 PROCEDURE — 6360000002 HC RX W HCPCS: Performed by: INTERNAL MEDICINE

## 2024-07-17 RX ADMIN — TEZEPELUMAB-EKKO 210 MG: 210 INJECTION, SOLUTION SUBCUTANEOUS at 14:08

## 2024-08-02 ENCOUNTER — TELEPHONE (OUTPATIENT)
Dept: ORTHOPEDIC SURGERY | Age: 49
End: 2024-08-02

## 2024-08-02 RX ORDER — METHYLPREDNISOLONE 4 MG
TABLET, DOSE PACK ORAL
Qty: 21 TABLET | OUTPATIENT
Start: 2024-08-02

## 2024-08-02 NOTE — TELEPHONE ENCOUNTER
I left a message for the patient to call us back and let us know if he followed up with his neurologist after his MRI.

## 2024-08-05 ENCOUNTER — TELEPHONE (OUTPATIENT)
Dept: PULMONOLOGY | Age: 49
End: 2024-08-05

## 2024-08-05 RX ORDER — FLUTICASONE FUROATE, UMECLIDINIUM BROMIDE AND VILANTEROL TRIFENATATE 200; 62.5; 25 UG/1; UG/1; UG/1
POWDER RESPIRATORY (INHALATION)
Qty: 60 EACH | Refills: 0 | Status: SHIPPED | OUTPATIENT
Start: 2024-08-05

## 2024-08-05 NOTE — TELEPHONE ENCOUNTER
Trelegy was approved     Prior authorization approved Case ID: [id number]      Payer: [employer] Patient's Payer    [phone]   Your PA request for [id number] was approved for 365 days. The PA# assigned is [id number].

## 2024-08-14 ENCOUNTER — TELEPHONE (OUTPATIENT)
Dept: PULMONOLOGY | Age: 49
End: 2024-08-14

## 2024-08-14 ENCOUNTER — HOSPITAL ENCOUNTER (OUTPATIENT)
Dept: ONCOLOGY | Age: 49
Setting detail: INFUSION SERIES
Discharge: HOME | End: 2024-08-14
Payer: COMMERCIAL

## 2024-08-14 VITALS
SYSTOLIC BLOOD PRESSURE: 128 MMHG | HEART RATE: 110 BPM | DIASTOLIC BLOOD PRESSURE: 85 MMHG | RESPIRATION RATE: 16 BRPM | TEMPERATURE: 97.1 F

## 2024-08-14 DIAGNOSIS — J45.51 SEVERE PERSISTENT ASTHMA WITH ACUTE EXACERBATION: Primary | ICD-10-CM

## 2024-08-14 PROCEDURE — 99211 OFF/OP EST MAY X REQ PHY/QHP: CPT

## 2024-08-14 PROCEDURE — 6360000002 HC RX W HCPCS: Performed by: INTERNAL MEDICINE

## 2024-08-14 PROCEDURE — 96372 THER/PROPH/DIAG INJ SC/IM: CPT

## 2024-08-14 RX ORDER — TEZEPELUMAB-EKKO 210 MG/1.9ML
210 INJECTION, SOLUTION SUBCUTANEOUS
Qty: 1.91 ML | Refills: 0 | Status: SHIPPED | OUTPATIENT
Start: 2024-08-14

## 2024-08-14 RX ADMIN — TEZEPELUMAB-EKKO 210 MG: 210 INJECTION, SOLUTION SUBCUTANEOUS at 14:06

## 2024-08-14 NOTE — TELEPHONE ENCOUNTER
Infusion Center called and said they need a new script faxed to [phone] for:    tezepelumab-ekko (TEZSPIRE) 210 MG/1.91ML SOAJ injection

## 2024-08-14 NOTE — PROGRESS NOTES
Pt to Dept for Tezspire injection. Denied need for printed AVS. Pt nalini injection with no adverse reaction noted. Pt scheduled to return in 4 weeks.  Patient in need of new order, called and left message with Dr. Correia office for new order.

## 2024-08-16 ENCOUNTER — TELEPHONE (OUTPATIENT)
Dept: PULMONOLOGY | Age: 49
End: 2024-08-16

## 2024-08-16 NOTE — TELEPHONE ENCOUNTER
S/W Alexsander from [facility].  She fixed the denial for patient's Tezspire and re-sent the new appeal form in.  It will take about three days for a decision.

## 2024-08-16 NOTE — TELEPHONE ENCOUNTER
Faxed progress notes from OV 5/15/23 and 3/12/24 stating patient has tried and failed Xolair.  Patient doing well on Tezspire.

## 2024-08-20 ENCOUNTER — TELEPHONE (OUTPATIENT)
Dept: ORTHOPEDIC SURGERY | Age: 49
End: 2024-08-20

## 2024-08-20 NOTE — TELEPHONE ENCOUNTER
General Question     Subject: LEGS MRI/REQ A CALL BACK   Patient and /or Facility Request: Edgar Arboleda   Contact Number: [phone]       PATIENT CALLED IN TO SEE IF HE CAN SPEAK TO SOMEONE IN THE OFFICE ABOUT HIS MRI...    PLEASE ADVISE

## 2024-08-23 NOTE — TELEPHONE ENCOUNTER
S/W pt to advise.  Disability placard sent to [employer].  Pt v/u   [FreeTextEntry1] : Patient grew up in [place].  She graduated from high school 1954.  High school was good she was an average student.  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  She started working at [employer] where she worked for 11 years.  After her second son she moved to [place].  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  After [employer] she traveled with social she is to travel to [place] every year until about 3 years ago.  Patient was  in 1967  in 1992.  She has 2 sons ages 51 and 53 by the marriage.  Son age 51 is schizophrenic.

## 2024-08-23 NOTE — TELEPHONE ENCOUNTER
S/W pt and he states he has not scheduled with neurology, due to the fact that the MRI did not seem to show anything.  Legs still go numb, but the pain has mostly gone away in the legs but still has LBP.  He has trouble walking and has fallen multiple times.  Uses a cane,  but has trouble walking through a parking lot, so he thinks a disability placard would be helpful.  Does he still need to F/U with neurology?  Should he schedule a F/U with you?  He states he also needs to see his PCP and has bloodwork he needs to get done.    Please advise.

## 2024-09-09 RX ORDER — FLUTICASONE FUROATE, UMECLIDINIUM BROMIDE AND VILANTEROL TRIFENATATE 200; 62.5; 25 UG/1; UG/1; UG/1
1 POWDER RESPIRATORY (INHALATION) DAILY
Qty: 60 EACH | Refills: 5 | Status: SHIPPED | OUTPATIENT
Start: 2024-09-09

## 2024-09-11 ENCOUNTER — HOSPITAL ENCOUNTER (OUTPATIENT)
Dept: ONCOLOGY | Age: 49
Setting detail: INFUSION SERIES
Discharge: HOME | End: 2024-09-11
Payer: COMMERCIAL

## 2024-09-11 VITALS
HEART RATE: 90 BPM | DIASTOLIC BLOOD PRESSURE: 86 MMHG | TEMPERATURE: 96.7 F | SYSTOLIC BLOOD PRESSURE: 128 MMHG | RESPIRATION RATE: 16 BRPM

## 2024-09-11 DIAGNOSIS — J45.51 SEVERE PERSISTENT ASTHMA WITH ACUTE EXACERBATION: Primary | ICD-10-CM

## 2024-09-11 PROCEDURE — 6360000002 HC RX W HCPCS: Performed by: INTERNAL MEDICINE

## 2024-09-11 PROCEDURE — 99211 OFF/OP EST MAY X REQ PHY/QHP: CPT

## 2024-09-11 PROCEDURE — 96372 THER/PROPH/DIAG INJ SC/IM: CPT

## 2024-09-11 RX ADMIN — TEZEPELUMAB-EKKO 210 MG: 210 INJECTION, SOLUTION SUBCUTANEOUS at 14:07

## 2024-09-12 ENCOUNTER — TELEPHONE (OUTPATIENT)
Dept: ORTHOPEDIC SURGERY | Age: 49
End: 2024-09-12

## 2024-09-13 ENCOUNTER — TELEPHONE (OUTPATIENT)
Dept: ORTHOPEDIC SURGERY | Age: 49
End: 2024-09-13

## 2024-09-18 ENCOUNTER — HOSPITAL ENCOUNTER (OUTPATIENT)
Age: 49
Discharge: HOME | End: 2024-09-18
Payer: COMMERCIAL

## 2024-09-18 LAB
ALBUMIN SERPL-MCNC: 4.3 G/DL (ref 3.4–5)
ALBUMIN/GLOB SERPL: 1.4 {RATIO} (ref 1.1–2.2)
ALP SERPL-CCNC: 52 U/L (ref 40–129)
ALT SERPL-CCNC: 61 U/L (ref 10–40)
ANION GAP SERPL CALCULATED.3IONS-SCNC: 14 MMOL/L (ref 3–16)
AST SERPL-CCNC: 99 U/L (ref 15–37)
BASOPHILS # BLD: 0.1 K/UL (ref 0–0.2)
BASOPHILS NFR BLD: 0.9 %
BILIRUB SERPL-MCNC: 1.7 MG/DL (ref 0–1)
BUN SERPL-MCNC: 12 MG/DL (ref 7–20)
CALCIUM SERPL-MCNC: 9.7 MG/DL (ref 8.3–10.6)
CHLORIDE SERPL-SCNC: 102 MMOL/L (ref 99–110)
CO2 SERPL-SCNC: 23 MMOL/L (ref 21–32)
CREAT SERPL-MCNC: 1 MG/DL (ref 0.9–1.3)
DEPRECATED RDW RBC AUTO: 13.4 % (ref 12.4–15.4)
EOSINOPHIL # BLD: 0.1 K/UL (ref 0–0.6)
EOSINOPHIL NFR BLD: 1.1 %
ERYTHROCYTE [SEDIMENTATION RATE] IN BLOOD BY WESTERGREN METHOD: 9 MM/HR (ref 0–20)
GFR SERPLBLD CREATININE-BSD FMLA CKD-EPI: >90 ML/MIN/{1.73_M2}
GLUCOSE SERPL-MCNC: 78 MG/DL (ref 70–99)
HCT VFR BLD AUTO: 45.4 % (ref 40.5–52.5)
HGB BLD-MCNC: 15.3 G/DL (ref 13.5–17.5)
LYMPHOCYTES # BLD: 1.1 K/UL (ref 1–5.1)
LYMPHOCYTES NFR BLD: 17.6 %
MCH RBC QN AUTO: 33.1 PG (ref 26–34)
MCHC RBC AUTO-ENTMCNC: 33.6 G/DL (ref 31–36)
MCV RBC AUTO: 98.7 FL (ref 80–100)
MONOCYTES # BLD: 0.7 K/UL (ref 0–1.3)
MONOCYTES NFR BLD: 11.9 %
NEUTROPHILS # BLD: 4.3 K/UL (ref 1.7–7.7)
NEUTROPHILS NFR BLD: 68.5 %
PLATELET # BLD AUTO: 119 K/UL (ref 135–450)
PMV BLD AUTO: 10 FL (ref 5–10.5)
POTASSIUM SERPL-SCNC: 4.9 MMOL/L (ref 3.5–5.1)
PROT SERPL-MCNC: 7.3 G/DL (ref 6.4–8.2)
RBC # BLD AUTO: 4.61 M/UL (ref 4.2–5.9)
SODIUM SERPL-SCNC: 139 MMOL/L (ref 136–145)
WBC # BLD AUTO: 6.3 K/UL (ref 4–11)

## 2024-09-18 PROCEDURE — 82607 VITAMIN B-12: CPT

## 2024-09-18 PROCEDURE — 86038 ANTINUCLEAR ANTIBODIES: CPT

## 2024-09-18 PROCEDURE — 80053 COMPREHEN METABOLIC PANEL: CPT

## 2024-09-18 PROCEDURE — 36415 COLL VENOUS BLD VENIPUNCTURE: CPT

## 2024-09-18 PROCEDURE — 85652 RBC SED RATE AUTOMATED: CPT

## 2024-09-18 PROCEDURE — 85025 COMPLETE CBC W/AUTO DIFF WBC: CPT

## 2024-09-19 LAB
ANA SER QL IA: NEGATIVE
VIT B12 SERPL-MCNC: 480 PG/ML (ref 211–911)

## 2024-10-09 ENCOUNTER — HOSPITAL ENCOUNTER (OUTPATIENT)
Dept: ONCOLOGY | Age: 49
Setting detail: INFUSION SERIES
End: 2024-10-09
Payer: COMMERCIAL

## 2024-10-11 ENCOUNTER — HOSPITAL ENCOUNTER (OUTPATIENT)
Dept: ONCOLOGY | Age: 49
Setting detail: INFUSION SERIES
Discharge: HOME | End: 2024-10-11
Payer: COMMERCIAL

## 2024-10-11 VITALS
SYSTOLIC BLOOD PRESSURE: 131 MMHG | HEART RATE: 105 BPM | DIASTOLIC BLOOD PRESSURE: 80 MMHG | TEMPERATURE: 96.6 F | RESPIRATION RATE: 16 BRPM

## 2024-10-11 DIAGNOSIS — J45.51 SEVERE PERSISTENT ASTHMA WITH ACUTE EXACERBATION: Primary | ICD-10-CM

## 2024-10-11 PROCEDURE — 99211 OFF/OP EST MAY X REQ PHY/QHP: CPT

## 2024-10-11 PROCEDURE — 6360000002 HC RX W HCPCS: Performed by: INTERNAL MEDICINE

## 2024-10-11 PROCEDURE — 96372 THER/PROPH/DIAG INJ SC/IM: CPT

## 2024-10-11 RX ADMIN — TEZEPELUMAB-EKKO 210 MG: 210 INJECTION, SOLUTION SUBCUTANEOUS at 14:21

## 2024-10-11 NOTE — PROGRESS NOTES
Pt to Dept for Tezspire injection. Denied need for printed AVS. Pt nalini injection with no adverse reaction noted. Pt scheduled to return in 4 weeks. Patient discharged ambulatory with family.

## 2024-10-21 ENCOUNTER — TELEPHONE (OUTPATIENT)
Dept: PULMONOLOGY | Age: 49
End: 2024-10-21

## 2024-10-21 DIAGNOSIS — J45.51 SEVERE PERSISTENT ASTHMA WITH ACUTE EXACERBATION: Primary | ICD-10-CM

## 2024-10-21 RX ORDER — ALBUTEROL SULFATE 90 UG/1
2 INHALANT RESPIRATORY (INHALATION) EVERY 6 HOURS PRN
Qty: 18 G | Refills: 11 | Status: SHIPPED | OUTPATIENT
Start: 2024-10-21

## 2024-10-21 NOTE — TELEPHONE ENCOUNTER
Patient left VM and needs a refill on medication     VENTOLIN  (90 Base) MCG/ACT inhaler [[id number]]      [facility] [id number] - [place] [address] [address] [phone] - F [phone]       PH: [phone]

## 2024-11-06 ENCOUNTER — HOSPITAL ENCOUNTER (OUTPATIENT)
Dept: ONCOLOGY | Age: 49
Setting detail: INFUSION SERIES
Discharge: HOME | End: 2024-11-06
Payer: COMMERCIAL

## 2024-11-06 VITALS
RESPIRATION RATE: 16 BRPM | SYSTOLIC BLOOD PRESSURE: 119 MMHG | TEMPERATURE: 98.6 F | DIASTOLIC BLOOD PRESSURE: 87 MMHG | HEART RATE: 101 BPM

## 2024-11-06 DIAGNOSIS — J45.51 SEVERE PERSISTENT ASTHMA WITH ACUTE EXACERBATION: Primary | ICD-10-CM

## 2024-11-06 PROCEDURE — 6360000002 HC RX W HCPCS: Performed by: INTERNAL MEDICINE

## 2024-11-06 PROCEDURE — 96372 THER/PROPH/DIAG INJ SC/IM: CPT

## 2024-11-06 PROCEDURE — 99211 OFF/OP EST MAY X REQ PHY/QHP: CPT

## 2024-11-06 RX ADMIN — TEZEPELUMAB-EKKO 210 MG: 210 INJECTION, SOLUTION SUBCUTANEOUS at 14:12

## 2024-12-04 ENCOUNTER — HOSPITAL ENCOUNTER (OUTPATIENT)
Dept: ONCOLOGY | Age: 49
Setting detail: INFUSION SERIES
Discharge: HOME | End: 2024-12-04
Payer: COMMERCIAL

## 2024-12-04 VITALS
HEART RATE: 102 BPM | RESPIRATION RATE: 16 BRPM | DIASTOLIC BLOOD PRESSURE: 99 MMHG | TEMPERATURE: 97.2 F | SYSTOLIC BLOOD PRESSURE: 159 MMHG

## 2024-12-04 DIAGNOSIS — J45.51 SEVERE PERSISTENT ASTHMA WITH ACUTE EXACERBATION: Primary | ICD-10-CM

## 2024-12-04 PROCEDURE — 6360000002 HC RX W HCPCS: Performed by: INTERNAL MEDICINE

## 2024-12-04 PROCEDURE — 99211 OFF/OP EST MAY X REQ PHY/QHP: CPT

## 2024-12-04 PROCEDURE — 96372 THER/PROPH/DIAG INJ SC/IM: CPT

## 2024-12-04 RX ADMIN — TEZEPELUMAB-EKKO 210 MG: 210 INJECTION, SOLUTION SUBCUTANEOUS at 14:07

## 2025-01-02 ENCOUNTER — HOSPITAL ENCOUNTER (OUTPATIENT)
Dept: ONCOLOGY | Age: 50
Setting detail: INFUSION SERIES
Discharge: HOME | End: 2025-01-02
Payer: COMMERCIAL

## 2025-01-02 VITALS
TEMPERATURE: 97.4 F | SYSTOLIC BLOOD PRESSURE: 124 MMHG | DIASTOLIC BLOOD PRESSURE: 83 MMHG | HEART RATE: 97 BPM | RESPIRATION RATE: 16 BRPM

## 2025-01-02 DIAGNOSIS — J45.51 SEVERE PERSISTENT ASTHMA WITH ACUTE EXACERBATION: Primary | ICD-10-CM

## 2025-01-02 PROCEDURE — 96372 THER/PROPH/DIAG INJ SC/IM: CPT

## 2025-01-02 PROCEDURE — 99211 OFF/OP EST MAY X REQ PHY/QHP: CPT

## 2025-01-02 PROCEDURE — 6360000002 HC RX W HCPCS: Performed by: INTERNAL MEDICINE

## 2025-01-02 RX ADMIN — TEZEPELUMAB-EKKO 210 MG: 210 INJECTION, SOLUTION SUBCUTANEOUS at 14:18

## 2025-01-30 ENCOUNTER — HOSPITAL ENCOUNTER (OUTPATIENT)
Dept: ONCOLOGY | Age: 50
Setting detail: INFUSION SERIES
Discharge: HOME | End: 2025-01-30
Payer: COMMERCIAL

## 2025-01-30 VITALS
SYSTOLIC BLOOD PRESSURE: 143 MMHG | TEMPERATURE: 97 F | RESPIRATION RATE: 16 BRPM | DIASTOLIC BLOOD PRESSURE: 84 MMHG | HEART RATE: 99 BPM

## 2025-01-30 DIAGNOSIS — J45.51 SEVERE PERSISTENT ASTHMA WITH ACUTE EXACERBATION: Primary | ICD-10-CM

## 2025-01-30 PROCEDURE — 96372 THER/PROPH/DIAG INJ SC/IM: CPT

## 2025-01-30 PROCEDURE — 6360000002 HC RX W HCPCS: Performed by: INTERNAL MEDICINE

## 2025-01-30 PROCEDURE — 99211 OFF/OP EST MAY X REQ PHY/QHP: CPT

## 2025-01-30 RX ORDER — LOSARTAN POTASSIUM 100 MG/1
100 TABLET ORAL DAILY
Qty: 90 TABLET | Refills: 1 | OUTPATIENT
Start: 2025-01-30

## 2025-01-30 RX ADMIN — TEZEPELUMAB-EKKO 210 MG: 210 INJECTION, SOLUTION SUBCUTANEOUS at 14:19

## 2025-01-30 NOTE — TELEPHONE ENCOUNTER
Called Pt Pt refused to make an appt. Pt states he seen Dr Momin last. Dr Momin is not accepting new Pt. Advised Pt he is Dr Vazquez Pt. Please advise

## 2025-01-30 NOTE — TELEPHONE ENCOUNTER
Medication:   Requested Prescriptions     Pending Prescriptions Disp Refills    losartan (COZAAR) 100 MG tablet [Pharmacy Med Name: LOSARTAN POTASSIUM 100 MG TAB] 90 tablet 1     Sig: TAKE 1 TABLET BY MOUTH DAILY       Last Filled:  1/26/24    Patient Phone Number: [phone] (home)     Last appt: 3/8/2024   Next appt: Visit date not found    Lab Results   Component Value Date     09/18/2024    K 4.9 09/18/2024     09/18/2024    CO2 23 09/18/2024    BUN 12 09/18/2024    CREATININE 1.0 09/18/2024    GLUCOSE 78 09/18/2024    CALCIUM 9.7 09/18/2024    BILITOT 1.7 (H) 09/18/2024    ALKPHOS 52 09/18/2024    AST 99 (H) 09/18/2024    ALT 61 (H) 09/18/2024    LABGLOM >90 09/18/2024    GFRAA >60 06/02/2022    AGRATIO 1.4 09/18/2024    GLOB 2.8 01/14/2020

## 2025-01-31 RX ORDER — LOSARTAN POTASSIUM 100 MG/1
100 TABLET ORAL DAILY
Qty: 90 TABLET | Refills: 0 | Status: SHIPPED | OUTPATIENT
Start: 2025-01-31

## 2025-02-12 ENCOUNTER — OFFICE VISIT (OUTPATIENT)
Dept: FAMILY MEDICINE CLINIC | Age: 50
End: 2025-02-12
Payer: COMMERCIAL

## 2025-02-12 VITALS
BODY MASS INDEX: 29.78 KG/M2 | OXYGEN SATURATION: 98 % | HEART RATE: 113 BPM | SYSTOLIC BLOOD PRESSURE: 122 MMHG | WEIGHT: 213.4 LBS | DIASTOLIC BLOOD PRESSURE: 83 MMHG

## 2025-02-12 DIAGNOSIS — G40.009 PARTIAL IDIOPATHIC EPILEPSY WITH SEIZURES OF LOCALIZED ONSET, NOT INTRACTABLE, WITHOUT STATUS EPILEPTICUS (HCC): Primary | ICD-10-CM

## 2025-02-12 DIAGNOSIS — R79.89 ELEVATED LFTS: ICD-10-CM

## 2025-02-12 DIAGNOSIS — J45.40 MODERATE PERSISTENT ASTHMA WITHOUT COMPLICATION: ICD-10-CM

## 2025-02-12 DIAGNOSIS — I10 ESSENTIAL HYPERTENSION: ICD-10-CM

## 2025-02-12 PROCEDURE — 1036F TOBACCO NON-USER: CPT | Performed by: FAMILY MEDICINE

## 2025-02-12 PROCEDURE — 3079F DIAST BP 80-89 MM HG: CPT | Performed by: FAMILY MEDICINE

## 2025-02-12 PROCEDURE — G8419 CALC BMI OUT NRM PARAM NOF/U: HCPCS | Performed by: FAMILY MEDICINE

## 2025-02-12 PROCEDURE — 3017F COLORECTAL CA SCREEN DOC REV: CPT | Performed by: FAMILY MEDICINE

## 2025-02-12 PROCEDURE — 99214 OFFICE O/P EST MOD 30 MIN: CPT | Performed by: FAMILY MEDICINE

## 2025-02-12 PROCEDURE — 3074F SYST BP LT 130 MM HG: CPT | Performed by: FAMILY MEDICINE

## 2025-02-12 PROCEDURE — G8427 DOCREV CUR MEDS BY ELIG CLIN: HCPCS | Performed by: FAMILY MEDICINE

## 2025-02-12 SDOH — ECONOMIC STABILITY: FOOD INSECURITY: WITHIN THE PAST 12 MONTHS, YOU WORRIED THAT YOUR FOOD WOULD RUN OUT BEFORE YOU GOT MONEY TO BUY MORE.: NEVER TRUE

## 2025-02-12 SDOH — ECONOMIC STABILITY: FOOD INSECURITY: WITHIN THE PAST 12 MONTHS, THE FOOD YOU BOUGHT JUST DIDN'T LAST AND YOU DIDN'T HAVE MONEY TO GET MORE.: NEVER TRUE

## 2025-02-12 ASSESSMENT — PATIENT HEALTH QUESTIONNAIRE - PHQ9
SUM OF ALL RESPONSES TO PHQ QUESTIONS 1-9: 0
2. FEELING DOWN, DEPRESSED OR HOPELESS: NOT AT ALL
SUM OF ALL RESPONSES TO PHQ QUESTIONS 1-9: 0
SUM OF ALL RESPONSES TO PHQ QUESTIONS 1-9: 0
1. LITTLE INTEREST OR PLEASURE IN DOING THINGS: NOT AT ALL
SUM OF ALL RESPONSES TO PHQ QUESTIONS 1-9: 0
SUM OF ALL RESPONSES TO PHQ9 QUESTIONS 1 & 2: 0

## 2025-02-12 NOTE — PROGRESS NOTES
Edgar Arboleda is a 49 y.o. male.    HPI: Here for complex medical visit  Has seen his bp drop 1 hr after eating on occ, gets as low as 85/30, gets lightheaded  Stopped toprol and hygroton several weeks ago, only on losartan 100  Saw neurologist,say Neurosurgeon had EMG,brain MRI,told he had neuropathy - pain much better, stilll has numbness  Meds, vitamins and allergies reviewed with pt    ROS: No TIA's or unusual headaches, no dysphagia.  No prolonged cough. No dyspnea or chest pain on exertion.  No abdominal pain, change in bowel habits, black or bloody stools.  No urinary tract symptoms.  No new or unusual musculoskeletal symptoms.       Prior to Visit Medications    Medication Sig Taking? Authorizing Provider   losartan (COZAAR) 100 MG tablet Take 1 tablet by mouth daily Yes Brett Momin MD   albuterol sulfate HFA (VENTOLIN HFA) 108 (90 Base) MCG/ACT inhaler Inhale 2 puffs into the lungs every 6 hours as needed for Wheezing Yes Isaac Correia MD   fluticasone-umeclidin-vilant (TRELEGY ELLIPTA) 200-62.5-25 MCG/ACT AEPB inhaler Inhale 1 puff into the lungs daily Yes Isaac Correia MD   tezepelumab-ekko (TEZSPIRE) 210 MG/1.91ML SOAJ injection Inject 1.91 mLs into the skin every 28 days Yes Isaac Correia MD   metoprolol succinate (TOPROL XL) 25 MG extended release tablet Take 1 tablet by mouth daily Yes Brett Momin MD   potassium chloride (KLOR-CON M) 20 MEQ extended release tablet Take 1 tablet by mouth 2 times daily Yes Brett Momin MD   levETIRAcetam (KEPPRA) 750 MG tablet Take 1 tablet by mouth 2 times daily Yes ProviderAj MD   chlorthalidone (HYGROTON) 25 MG tablet Take 1 tablet by mouth daily Yes Jarred Vazquez MD   hydrocortisone 2.5 % cream Apply topically 2 times daily. Yes Jarred Vazquez MD   albuterol (PROVENTIL) (2.5 MG/3ML) 0.083% nebulizer solution USE THREE MILLILITERS VIA NEBULIZATION BY MOUTH EVERY 6 HOURS AS NEEDED FOR WHEEZING Yes Taylor,

## 2025-02-27 ENCOUNTER — HOSPITAL ENCOUNTER (OUTPATIENT)
Dept: ONCOLOGY | Age: 50
Setting detail: INFUSION SERIES
End: 2025-02-27

## 2025-03-12 ENCOUNTER — TELEPHONE (OUTPATIENT)
Dept: FAMILY MEDICINE CLINIC | Age: 50
End: 2025-03-12

## 2025-03-12 ENCOUNTER — TELEPHONE (OUTPATIENT)
Dept: PULMONOLOGY | Age: 50
End: 2025-03-12

## 2025-03-12 DIAGNOSIS — J45.51 SEVERE PERSISTENT ASTHMA WITH ACUTE EXACERBATION (HCC): Primary | ICD-10-CM

## 2025-03-12 NOTE — TELEPHONE ENCOUNTER
Rupal from [facility] [phone]  would like to know if patient is still suppose to be taking Klor-con? Please advise

## 2025-03-17 ENCOUNTER — TELEPHONE (OUTPATIENT)
Dept: FAMILY MEDICINE CLINIC | Age: 50
End: 2025-03-17

## 2025-03-27 ENCOUNTER — HOSPITAL ENCOUNTER (OUTPATIENT)
Age: 50
Discharge: HOME | End: 2025-03-27
Payer: COMMERCIAL

## 2025-03-27 DIAGNOSIS — J45.51 SEVERE PERSISTENT ASTHMA WITH ACUTE EXACERBATION (HCC): ICD-10-CM

## 2025-03-27 LAB
ALBUMIN SERPL-MCNC: 4.2 G/DL (ref 3.4–5)
ALP SERPL-CCNC: 109 U/L (ref 40–129)
ALT SERPL-CCNC: 28 U/L (ref 10–40)
AST SERPL-CCNC: 31 U/L (ref 15–37)
BILIRUB DIRECT SERPL-MCNC: 0.4 MG/DL (ref 0–0.3)
BILIRUB INDIRECT SERPL-MCNC: 0.5 MG/DL (ref 0–1)
BILIRUB SERPL-MCNC: 0.9 MG/DL (ref 0–1)
IGA SERPL-MCNC: 326 MG/DL (ref 70–400)
IGG SERPL-MCNC: 1080 MG/DL (ref 700–1600)
PROT SERPL-MCNC: 7.4 G/DL (ref 6.4–8.2)

## 2025-03-27 PROCEDURE — 87390 HIV-1 AG IA: CPT

## 2025-03-27 PROCEDURE — 82785 ASSAY OF IGE: CPT

## 2025-03-27 PROCEDURE — 86702 HIV-2 ANTIBODY: CPT

## 2025-03-27 PROCEDURE — 86003 ALLG SPEC IGE CRUDE XTRC EA: CPT

## 2025-03-27 PROCEDURE — 80076 HEPATIC FUNCTION PANEL: CPT

## 2025-03-27 PROCEDURE — 82784 ASSAY IGA/IGD/IGG/IGM EACH: CPT

## 2025-03-27 PROCEDURE — 36415 COLL VENOUS BLD VENIPUNCTURE: CPT

## 2025-03-27 PROCEDURE — 86701 HIV-1ANTIBODY: CPT

## 2025-03-28 LAB
HIV 1+2 AB+HIV1 P24 AG SERPL QL IA: NORMAL
HIV 2 AB SERPL QL IA: NORMAL
HIV1 AB SERPL QL IA: NORMAL
HIV1 P24 AG SERPL QL IA: NORMAL

## 2025-03-29 ENCOUNTER — RESULTS FOLLOW-UP (OUTPATIENT)
Dept: FAMILY MEDICINE CLINIC | Age: 50
End: 2025-03-29

## 2025-03-29 LAB
A ALTERNATA IGE QN: <0.1 KU/L (ref 0–0.34)
A FUMIGATUS IGE QN: <0.1 KU/L (ref 0–0.34)
AMER SYCAMORE IGE QN: <0.1 KU/L (ref 0–0.34)
BERMUDA GRASS IGE QN: <0.1 KU/L (ref 0–0.34)
BOXELDER IGE QN: <0.1 KU/L (ref 0–0.34)
C SPHAEROSPERMUM IGE QN: <0.1 KUL/L (ref 0–0.34)
CALIF WALNUT IGE QN: <0.1 KU/L (ref 0–0.34)
CAT DANDER IGE QN: 6.57 KU/L (ref 0–0.34)
CMN PIGWEED IGE QN: <0.1 KU/L (ref 0–0.34)
COMMON RAGWEED IGE QN: <0.1 KU/L (ref 0–0.34)
COTTONWOOD IGE QN: <0.1 KU/L (ref 0–0.34)
D FARINAE IGE QN: 0.57 KU/L (ref 0–0.34)
D PTERONYSS IGE QN: 0.38 KU/L (ref 0–0.34)
DOG DANDER IGE QN: 10.1 KU/L (ref 0–0.34)
IGE SERPL-ACNC: 100 KU/L
IGE SERPL-ACNC: 102 IU/ML (ref 0–100)
M RACEMOSUS IGE QN: <0.1 KU/L (ref 0–0.34)
MOUSE EPITH IGE QN: 0.37 KU/L (ref 0–0.34)
P NOTATUM IGE QN: <0.1 KU/L (ref 0–0.34)
PECAN/HICK TREE IGE QN: <0.1 KU/L (ref 0–0.34)
RED CEDAR IGE QN: <0.1 KU/L (ref 0–0.34)
ROACH IGE QN: 0.5 KU/L (ref 0–0.34)
SALTWORT IGE QN: <0.1 KU/L (ref 0–0.34)
SHEEP SORREL IGE QN: <0.1 KU/L (ref 0–0.34)
SILVER BIRCH IGE QN: <0.1 KU/L (ref 0–0.34)
TIMOTHY IGE QN: <0.1 KU/L (ref 0–0.34)
WHITE ASH IGE QN: <0.1 KU/L (ref 0–0.34)
WHITE ELM IGE QN: <0.1 KU/L (ref 0–0.34)
WHITE MULBERRY IGE QN: <0.1 KU/L (ref 0–0.34)
WHITE OAK IGE QN: <0.1 KU/L (ref 0–0.34)

## 2025-04-04 ENCOUNTER — OFFICE VISIT (OUTPATIENT)
Dept: FAMILY MEDICINE CLINIC | Age: 50
End: 2025-04-04
Payer: COMMERCIAL

## 2025-04-04 VITALS
HEIGHT: 70 IN | BODY MASS INDEX: 26.77 KG/M2 | OXYGEN SATURATION: 98 % | DIASTOLIC BLOOD PRESSURE: 72 MMHG | HEART RATE: 118 BPM | SYSTOLIC BLOOD PRESSURE: 108 MMHG | WEIGHT: 187 LBS | TEMPERATURE: 97.5 F

## 2025-04-04 DIAGNOSIS — R05.1 ACUTE COUGH: ICD-10-CM

## 2025-04-04 DIAGNOSIS — R06.02 SHORTNESS OF BREATH: Primary | ICD-10-CM

## 2025-04-04 DIAGNOSIS — R06.2 WHEEZE: ICD-10-CM

## 2025-04-04 DIAGNOSIS — R00.0 TACHYCARDIA: ICD-10-CM

## 2025-04-04 DIAGNOSIS — J45.51 SEVERE PERSISTENT ASTHMA WITH EXACERBATION (HCC): ICD-10-CM

## 2025-04-04 LAB — D-DIMER QUANTITATIVE: 0.6 UG/ML FEU (ref 0–0.6)

## 2025-04-04 PROCEDURE — G8419 CALC BMI OUT NRM PARAM NOF/U: HCPCS | Performed by: FAMILY MEDICINE

## 2025-04-04 PROCEDURE — G8427 DOCREV CUR MEDS BY ELIG CLIN: HCPCS | Performed by: FAMILY MEDICINE

## 2025-04-04 PROCEDURE — 99214 OFFICE O/P EST MOD 30 MIN: CPT | Performed by: FAMILY MEDICINE

## 2025-04-04 PROCEDURE — G2211 COMPLEX E/M VISIT ADD ON: HCPCS | Performed by: FAMILY MEDICINE

## 2025-04-04 PROCEDURE — 3017F COLORECTAL CA SCREEN DOC REV: CPT | Performed by: FAMILY MEDICINE

## 2025-04-04 PROCEDURE — 36415 COLL VENOUS BLD VENIPUNCTURE: CPT | Performed by: FAMILY MEDICINE

## 2025-04-04 PROCEDURE — 3078F DIAST BP <80 MM HG: CPT | Performed by: FAMILY MEDICINE

## 2025-04-04 PROCEDURE — 1036F TOBACCO NON-USER: CPT | Performed by: FAMILY MEDICINE

## 2025-04-04 PROCEDURE — 3074F SYST BP LT 130 MM HG: CPT | Performed by: FAMILY MEDICINE

## 2025-04-04 RX ORDER — PREDNISONE 50 MG/1
50 TABLET ORAL DAILY
Qty: 5 TABLET | Refills: 0 | Status: SHIPPED | OUTPATIENT
Start: 2025-04-04 | End: 2025-04-09

## 2025-04-04 NOTE — PROGRESS NOTES
[facility] Medicine  Clinic Note    Date: 4/4/2025                                               /Objective:     Chief Complaint   Patient presents with    Cough     Productive     Congestion    Shortness of Breath    Wheezing       HPI  Cough, chest congestion starting 4 days ago. Cough is productive of sputum. +SOB that is worse with walking. +wheeze at times. Pt has hx of asthma, takes Trelegy and Tezspire. Has been using albuterol. No pain with deep breath. Denies SOB currently.    Of note, pt was in the hospital after breaking his hip 6 weeks ago.  Is doing home physical therapy.  Reports that pain is improving little bit.  Has done follow-up with surgery.  Uses a walker most of the time.         Patient Active Problem List    Diagnosis Date Noted    Severe persistent asthma with acute exacerbation (HCC) 08/10/2022    Uncomplicated asthma 06/29/2022    Environmental allergies 06/29/2022    Gastroesophageal reflux disease without esophagitis 06/29/2022    Elevated diaphragm 06/29/2022    Adenomatous polyp of transverse colon 06/04/2024    Epileptic seizure (HCC) 04/19/2024    Encephalopathy 04/19/2024    Essential hypertension 04/19/2024    Acidosis, unspecified 04/19/2024    Epigastric pain 04/19/2024    Nausea 04/19/2024    Noninfective enterocolitis 04/19/2024    Celiac disease 03/08/2024    Partial idiopathic epilepsy with seizures of localized onset, not intractable, without status epilepticus     Thrombocytopenia 01/13/2020    High anion gap metabolic acidosis 01/13/2020    Lactic acidosis 01/13/2020       Past Medical History:   Diagnosis Date    Adenomatous polyp of transverse colon 06/04/2024    Asthma     GERD (gastroesophageal reflux disease)     Hypertension     Seizure (HCC)     LAST SEIZURE, JANUARY 2020.  FOLLOWS WITH NEUROLOGIST, DR BRYON FULTON       Current Outpatient Medications   Medication Sig Dispense Refill    predniSONE (DELTASONE) 50 MG tablet Take 1 tablet by mouth daily for 5

## 2025-04-07 ENCOUNTER — RESULTS FOLLOW-UP (OUTPATIENT)
Dept: FAMILY MEDICINE CLINIC | Age: 50
End: 2025-04-07

## 2025-04-07 DIAGNOSIS — R05.1 ACUTE COUGH: Primary | ICD-10-CM

## 2025-04-07 RX ORDER — DOXYCYCLINE HYCLATE 100 MG
100 TABLET ORAL 2 TIMES DAILY
Qty: 20 TABLET | Refills: 0 | Status: SHIPPED | OUTPATIENT
Start: 2025-04-07 | End: 2025-04-17

## 2025-04-19 DIAGNOSIS — J45.50 SEVERE PERSISTENT ASTHMA WITHOUT COMPLICATION (HCC): Primary | ICD-10-CM

## 2025-04-21 RX ORDER — FLUTICASONE FUROATE, UMECLIDINIUM BROMIDE AND VILANTEROL TRIFENATATE 200; 62.5; 25 UG/1; UG/1; UG/1
POWDER RESPIRATORY (INHALATION)
Qty: 60 EACH | Refills: 0 | Status: SHIPPED | OUTPATIENT
Start: 2025-04-21

## 2025-04-21 NOTE — TELEPHONE ENCOUNTER
Last appointment:  3/12/2024    Next appointment:   5/2/2025      No refills until patient is seen in office.

## 2025-04-24 ENCOUNTER — HOSPITAL ENCOUNTER (OUTPATIENT)
Dept: ONCOLOGY | Age: 50
Setting detail: INFUSION SERIES
Discharge: HOME | End: 2025-04-24
Payer: COMMERCIAL

## 2025-04-24 VITALS
DIASTOLIC BLOOD PRESSURE: 70 MMHG | RESPIRATION RATE: 16 BRPM | SYSTOLIC BLOOD PRESSURE: 112 MMHG | HEART RATE: 110 BPM | TEMPERATURE: 96.9 F

## 2025-04-24 DIAGNOSIS — J45.51 SEVERE PERSISTENT ASTHMA WITH ACUTE EXACERBATION (HCC): Primary | ICD-10-CM

## 2025-04-24 PROCEDURE — 6360000002 HC RX W HCPCS: Performed by: INTERNAL MEDICINE

## 2025-04-24 PROCEDURE — 99211 OFF/OP EST MAY X REQ PHY/QHP: CPT

## 2025-04-24 PROCEDURE — 96372 THER/PROPH/DIAG INJ SC/IM: CPT

## 2025-04-24 RX ADMIN — TEZEPELUMAB-EKKO 210 MG: 210 INJECTION, SOLUTION SUBCUTANEOUS at 14:03

## 2025-04-24 NOTE — PROGRESS NOTES
Pt to Dept for Tezspire injection. Reviewed AVS with possible side effects. Comprehension noted. Denied need for printed AVS. Pt nalini injection with no adverse reaction noted. Pt scheduled to return in 4 weeks. Patient discharged per wheel chair with family.

## 2025-05-02 ENCOUNTER — OFFICE VISIT (OUTPATIENT)
Dept: PULMONOLOGY | Age: 50
End: 2025-05-02
Payer: COMMERCIAL

## 2025-05-02 VITALS
SYSTOLIC BLOOD PRESSURE: 116 MMHG | HEART RATE: 93 BPM | WEIGHT: 177 LBS | BODY MASS INDEX: 25.34 KG/M2 | DIASTOLIC BLOOD PRESSURE: 78 MMHG | OXYGEN SATURATION: 97 % | HEIGHT: 70 IN

## 2025-05-02 DIAGNOSIS — J98.6 ELEVATED DIAPHRAGM: ICD-10-CM

## 2025-05-02 DIAGNOSIS — J45.50 SEVERE PERSISTENT ASTHMA WITHOUT COMPLICATION (HCC): ICD-10-CM

## 2025-05-02 DIAGNOSIS — J45.51 SEVERE PERSISTENT ASTHMA WITH ACUTE EXACERBATION (HCC): Primary | ICD-10-CM

## 2025-05-02 DIAGNOSIS — K21.9 GASTROESOPHAGEAL REFLUX DISEASE WITHOUT ESOPHAGITIS: ICD-10-CM

## 2025-05-02 PROCEDURE — 3074F SYST BP LT 130 MM HG: CPT | Performed by: INTERNAL MEDICINE

## 2025-05-02 PROCEDURE — 1036F TOBACCO NON-USER: CPT | Performed by: INTERNAL MEDICINE

## 2025-05-02 PROCEDURE — G8427 DOCREV CUR MEDS BY ELIG CLIN: HCPCS | Performed by: INTERNAL MEDICINE

## 2025-05-02 PROCEDURE — G8419 CALC BMI OUT NRM PARAM NOF/U: HCPCS | Performed by: INTERNAL MEDICINE

## 2025-05-02 PROCEDURE — 99214 OFFICE O/P EST MOD 30 MIN: CPT | Performed by: INTERNAL MEDICINE

## 2025-05-02 PROCEDURE — G2211 COMPLEX E/M VISIT ADD ON: HCPCS | Performed by: INTERNAL MEDICINE

## 2025-05-02 PROCEDURE — 3017F COLORECTAL CA SCREEN DOC REV: CPT | Performed by: INTERNAL MEDICINE

## 2025-05-02 PROCEDURE — 3078F DIAST BP <80 MM HG: CPT | Performed by: INTERNAL MEDICINE

## 2025-05-02 RX ORDER — PREDNISONE 10 MG/1
TABLET ORAL
Qty: 30 TABLET | Refills: 0 | Status: SHIPPED | OUTPATIENT
Start: 2025-05-02 | End: 2025-05-12

## 2025-05-02 RX ORDER — LEVOFLOXACIN 750 MG/1
750 TABLET, FILM COATED ORAL DAILY
Qty: 7 TABLET | Refills: 0 | Status: SHIPPED | OUTPATIENT
Start: 2025-05-02 | End: 2025-05-09

## 2025-05-02 RX ORDER — FLUTICASONE FUROATE, UMECLIDINIUM BROMIDE AND VILANTEROL TRIFENATATE 200; 62.5; 25 UG/1; UG/1; UG/1
1 POWDER RESPIRATORY (INHALATION) DAILY
Qty: 60 EACH | Refills: 3 | Status: SHIPPED | OUTPATIENT
Start: 2025-05-02

## 2025-05-02 NOTE — PROGRESS NOTES
beginning of April and went to his PCP.  He struggled to get better. He still has a deep cough.       Review of Systems  No Chest pain, Nausea or vomiting reported      History  I have reviewed past medical, surgical, social and family history. This is documented elsewhere in themedical record.     Physical Exam:  Well developed, well nourished  Alert and oriented  Sclera is clear  No cervical adenopathy  No JVD.  Chest examination is clear.  Cardiac examination reveals regular rate and rhythm without murmur, gallop or rub.  The abdomen is soft, nontender and nondistended.   There is no clubbing, cyanosis or edema of the extremities.  There is no obvious skin rash.  No focal neuro deficicts  Normal mood and affect      Allergies   Allergen Reactions    Lisinopril Cough and Other (See Comments)    Budesonide-Formoterol Fumarate Other (See Comments)     Lung infection     Prior to Visit Medications    Medication Sig Taking? Authorizing Provider   TRELEGY ELLIPTA 200-62.5-25 MCG/ACT AEPB inhaler INHALE 1 PUFF BY MOUTH DAILY Yes Isaac Correia MD   losartan (COZAAR) 100 MG tablet Take 1 tablet by mouth daily Yes Brett Momin MD   albuterol sulfate HFA (VENTOLIN HFA) 108 (90 Base) MCG/ACT inhaler Inhale 2 puffs into the lungs every 6 hours as needed for Wheezing Yes Isaac Correia MD   tezepelumab-ekko (TEZSPIRE) 210 MG/1.91ML SOAJ injection Inject 1.91 mLs into the skin every 28 days Yes Isaac Correia MD   levETIRAcetam (KEPPRA) 750 MG tablet Take 1 tablet by mouth 2 times daily Yes ProviderAj MD   hydrocortisone 2.5 % cream Apply topically 2 times daily. Yes Jarred Vazquez MD   albuterol (PROVENTIL) (2.5 MG/3ML) 0.083% nebulizer solution USE THREE MILLILITERS VIA NEBULIZATION BY MOUTH EVERY 6 HOURS AS NEEDED FOR WHEEZING Yes Jarred Vazquez MD   fluticasone (FLONASE) 50 MCG/ACT nasal spray 1 spray by Nasal route daily Yes Aj Gannon MD       Vitals:    05/02/25 1332   BP:

## 2025-05-06 RX ORDER — LOSARTAN POTASSIUM 100 MG/1
100 TABLET ORAL DAILY
Qty: 90 TABLET | Refills: 1 | Status: SHIPPED | OUTPATIENT
Start: 2025-05-06

## 2025-05-06 NOTE — TELEPHONE ENCOUNTER
Medication:   Requested Prescriptions     Pending Prescriptions Disp Refills    losartan (COZAAR) 100 MG tablet [Pharmacy Med Name: LOSARTAN POTASSIUM 100 MG TAB] 90 tablet 0     Sig: TAKE 1 TABLET BY MOUTH DAILY       Last Filled:  1/31/2025, 90, 0    Patient Phone Number: [phone] (home)     Last appt: 4/4/2025   Next appt: Visit date not found    Lab Results   Component Value Date     09/18/2024    K 4.9 09/18/2024     09/18/2024    CO2 23 09/18/2024    BUN 12 09/18/2024    CREATININE 1.0 09/18/2024    GLUCOSE 78 09/18/2024    CALCIUM 9.7 09/18/2024    BILITOT 0.9 03/27/2025    ALKPHOS 109 03/27/2025    AST 31 03/27/2025    ALT 28 03/27/2025    LABGLOM >90 09/18/2024    GFRAA >60 06/02/2022    AGRATIO 1.4 09/18/2024    GLOB 2.8 01/14/2020

## 2025-05-23 ENCOUNTER — HOSPITAL ENCOUNTER (OUTPATIENT)
Dept: ONCOLOGY | Age: 50
Setting detail: INFUSION SERIES
Discharge: HOME | End: 2025-05-23
Payer: COMMERCIAL

## 2025-05-23 VITALS
SYSTOLIC BLOOD PRESSURE: 137 MMHG | RESPIRATION RATE: 16 BRPM | HEART RATE: 95 BPM | DIASTOLIC BLOOD PRESSURE: 86 MMHG | TEMPERATURE: 97.2 F

## 2025-05-23 DIAGNOSIS — J45.51 SEVERE PERSISTENT ASTHMA WITH ACUTE EXACERBATION (HCC): Primary | ICD-10-CM

## 2025-05-23 PROCEDURE — 96372 THER/PROPH/DIAG INJ SC/IM: CPT

## 2025-05-23 PROCEDURE — 6360000002 HC RX W HCPCS: Performed by: INTERNAL MEDICINE

## 2025-05-23 PROCEDURE — 99211 OFF/OP EST MAY X REQ PHY/QHP: CPT

## 2025-05-23 RX ADMIN — TEZEPELUMAB-EKKO 210 MG: 210 INJECTION, SOLUTION SUBCUTANEOUS at 13:54

## 2025-05-23 NOTE — PROGRESS NOTES
Pt to Dept for Tezspire injection. Reviewed AVS with possible side effects. Comprehension noted. Denied need for printed AVS. Pt nalini injection with no adverse reaction noted. Pt scheduled to return in 4 weeks. Patient discharged per walker with family.

## 2025-06-18 ENCOUNTER — HOSPITAL ENCOUNTER (OUTPATIENT)
Dept: ONCOLOGY | Age: 50
Setting detail: INFUSION SERIES
Discharge: HOME | End: 2025-06-18
Payer: COMMERCIAL

## 2025-06-18 VITALS
RESPIRATION RATE: 16 BRPM | HEART RATE: 87 BPM | SYSTOLIC BLOOD PRESSURE: 143 MMHG | TEMPERATURE: 96.8 F | DIASTOLIC BLOOD PRESSURE: 86 MMHG

## 2025-06-18 DIAGNOSIS — J45.51 SEVERE PERSISTENT ASTHMA WITH ACUTE EXACERBATION (HCC): Primary | ICD-10-CM

## 2025-06-18 PROCEDURE — 99211 OFF/OP EST MAY X REQ PHY/QHP: CPT

## 2025-06-18 PROCEDURE — 6360000002 HC RX W HCPCS: Performed by: INTERNAL MEDICINE

## 2025-06-18 PROCEDURE — 96372 THER/PROPH/DIAG INJ SC/IM: CPT

## 2025-06-18 RX ADMIN — TEZEPELUMAB-EKKO 210 MG: 210 INJECTION, SOLUTION SUBCUTANEOUS at 14:10

## 2025-07-16 ENCOUNTER — HOSPITAL ENCOUNTER (OUTPATIENT)
Dept: ONCOLOGY | Age: 50
Setting detail: INFUSION SERIES
End: 2025-07-16

## 2025-07-18 ENCOUNTER — HOSPITAL ENCOUNTER (OUTPATIENT)
Dept: ONCOLOGY | Age: 50
Setting detail: INFUSION SERIES
Discharge: HOME | End: 2025-07-18

## 2025-07-18 ENCOUNTER — HOSPITAL ENCOUNTER (OUTPATIENT)
Dept: ONCOLOGY | Age: 50
Setting detail: INFUSION SERIES
Discharge: HOME | End: 2025-07-18
Payer: COMMERCIAL

## 2025-07-18 ENCOUNTER — HOSPITAL ENCOUNTER (EMERGENCY)
Age: 50
Discharge: HOME | End: 2025-07-18
Attending: EMERGENCY MEDICINE | Admitting: EMERGENCY MEDICINE
Payer: COMMERCIAL

## 2025-07-18 VITALS — SYSTOLIC BLOOD PRESSURE: 101 MMHG | RESPIRATION RATE: 16 BRPM | HEART RATE: 94 BPM | DIASTOLIC BLOOD PRESSURE: 74 MMHG

## 2025-07-18 VITALS
RESPIRATION RATE: 16 BRPM | DIASTOLIC BLOOD PRESSURE: 76 MMHG | HEART RATE: 154 BPM | TEMPERATURE: 96.5 F | SYSTOLIC BLOOD PRESSURE: 109 MMHG

## 2025-07-18 VITALS
HEART RATE: 95 BPM | WEIGHT: 177 LBS | RESPIRATION RATE: 14 BRPM | HEIGHT: 70 IN | DIASTOLIC BLOOD PRESSURE: 88 MMHG | OXYGEN SATURATION: 100 % | BODY MASS INDEX: 25.34 KG/M2 | TEMPERATURE: 97.2 F | SYSTOLIC BLOOD PRESSURE: 115 MMHG

## 2025-07-18 DIAGNOSIS — J45.51 SEVERE PERSISTENT ASTHMA WITH ACUTE EXACERBATION (HCC): ICD-10-CM

## 2025-07-18 DIAGNOSIS — Z13.9 ENCOUNTER FOR MEDICAL SCREENING EXAMINATION: Primary | ICD-10-CM

## 2025-07-18 DIAGNOSIS — J45.51 SEVERE PERSISTENT ASTHMA WITH ACUTE EXACERBATION (HCC): Primary | ICD-10-CM

## 2025-07-18 PROCEDURE — 6360000002 HC RX W HCPCS: Performed by: INTERNAL MEDICINE

## 2025-07-18 PROCEDURE — 93005 ELECTROCARDIOGRAM TRACING: CPT | Performed by: EMERGENCY MEDICINE

## 2025-07-18 PROCEDURE — 99211 OFF/OP EST MAY X REQ PHY/QHP: CPT

## 2025-07-18 PROCEDURE — 96372 THER/PROPH/DIAG INJ SC/IM: CPT

## 2025-07-18 PROCEDURE — 99283 EMERGENCY DEPT VISIT LOW MDM: CPT

## 2025-07-18 RX ADMIN — TEZEPELUMAB-EKKO 210 MG: 210 INJECTION, SOLUTION SUBCUTANEOUS at 15:08

## 2025-07-18 NOTE — PROGRESS NOTES
Patient to infusion center after being discharged from the ER.  HR returned to normal and patient agreeable to receive shot.  Patient tolerated injection well with no adverse reaction noted.  Patient to return in four weeks for next injection.  Message left with Dr Correia's office for new orders.

## 2025-07-18 NOTE — PROGRESS NOTES
Patient to infusion center for tezspire injection.  HR noted to be in 140-160.  Patient placed on tele to reveal Sinus tachycardia.  Patient states he 'feels fine' and has had no chest pain, shortness of breath or dizziness thus far today.  Denies fever or chills.  Patient hesitant to go to ER.  Charge nurse Janna and bedside and patient is agreeable to be transported to ER.  Patient placed in a bay and report given to charge RN.  Patient in stretcher with Mother at bedside.

## 2025-07-18 NOTE — ED PROVIDER NOTES
Emergency Department Encounter    Patient: Edgar Arboleda  MRN: [id number]  : 1975  Date of Evaluation: 2025  ED Provider:  KASI DOS SANTOS MD    Triage Chief Complaint:   Other (Pt sent by endo for hr of 160, due to patient having high scare of needles and panicking before being poked. Pt has no complaints, no pain, hr 95 during triage. )    [place]:  Edgar Arboleda is a 49 y.o. male that presents to the ER for evaluation of vasovagal like reaction with tachycardia, no chest pain pressure no shortness breath.  Asymptomatic at this point time.  Was at outpatient infusion center for immune modulator for asthma    ROS - see HPI, below listed is current ROS at time of my eval:  General:  No fevers, no weakness  Eyes:  no discharge  ENT:  No sore throat, no nasal congestion  Cardiovascular:  + chest pain, no palpitations  Respiratory:  No shortness of breath, no cough, no wheezing  Gastrointestinal:  No pain, no nausea, no vomiting, no diarrhea  Musculoskeletal:  No muscle pain, no joint pain  Skin:  No rash, no pruritis  Neurologic:  No speech problems, no headache, no extremity numbness, no extremity tingling, no extremity weakness  Psychiatric:  No anxiety  Genitourinary:  No dysuria, no hematuria  Endocrine:  No unexpected weight gain, no unexpected weight loss  Extremities:  no edema, no pain    Past Medical History:   Diagnosis Date    Adenomatous polyp of transverse colon 2024    Asthma     GERD (gastroesophageal reflux disease)     Hypertension     Seizure (HCC)     LAST SEIZURE, 2020.  FOLLOWS WITH NEUROLOGIST, DR BRYON FULTON     Past Surgical History:   Procedure Laterality Date    UPPER GASTROINTESTINAL ENDOSCOPY N/A 2019    EGD BIOPSY performed by Wilfred Tinsley MD at [facility] ENDOSCOPY     Family History   Problem Relation Age of Onset    High Blood Pressure Mother      Social History     Socioeconomic History    Marital status:      Spouse name: Not on file

## 2025-07-19 LAB
EKG ATRIAL RATE: 96 BPM
EKG DIAGNOSIS: NORMAL
EKG P AXIS: 57 DEGREES
EKG P-R INTERVAL: 180 MS
EKG Q-T INTERVAL: 338 MS
EKG QRS DURATION: 72 MS
EKG QTC CALCULATION (BAZETT): 427 MS
EKG R AXIS: 19 DEGREES
EKG T AXIS: 30 DEGREES
EKG VENTRICULAR RATE: 96 BPM

## 2025-07-19 PROCEDURE — 93010 ELECTROCARDIOGRAM REPORT: CPT | Performed by: INTERNAL MEDICINE

## 2025-07-23 ENCOUNTER — TELEPHONE (OUTPATIENT)
Dept: ADMINISTRATIVE | Age: 50
End: 2025-07-23

## 2025-07-23 NOTE — TELEPHONE ENCOUNTER
Submitted PA for Sundeep Patrick 200-62.5-25MCG/ACT aerosol powder   Via CMM Key: [id number]  STATUS: PENDING.    Follow up done daily; if no decision with in three days we will refax.  If another three days goes by with no decision will call the insurance for status.

## 2025-07-25 NOTE — TELEPHONE ENCOUNTER
PA denied for Trelegy    Provided alternatives Advair Diskus (Brand name is preferred by the plan), Advair HFA (Brand name is preferred by the plan), Anoro Ellipta (Brand name is preferred by the plan), Arnuity Ellipta, Asmanex Twisthaler, Atrovent HFA, Dulera, Pulmicort Flexhaler, Qvar, Serevent Diskus, Spiriva (Brand name is preferred by the plan), Stiolto and Symbicort (Brand name is preferred by the plan).

## 2025-07-25 NOTE — TELEPHONE ENCOUNTER
The medication was DENIED; DENIAL letter is uploaded to MED[employer].    Generic Denial: Auto response per portal. No letter generated. please see note below          Note :  Coverage is provided when the member meets ALL the following requirements: Documentation must be provided of medical necessity beyond convenience for why the member cannot be changed to preferred medications in this UPDL category and use at the same time in combination, as appropriate for condition, which include but are not limited to: Advair Diskus (Brand name is preferred by the plan), Advair HFA (Brand name is preferred by the plan), Anoro Ellipta (Brand name is preferred by the plan), Arnuity Ellipta, Asmanex Twisthaler, Atrovent HFA, Dulera, Pulmicort Flexhaler, Qvar, Serevent Diskus, Spiriva (Brand name is preferred by the plan), Stiolto and Symbicort (Brand name is preferred by the plan).       If you want an APPEAL; please note in this encounter what new information you would like to APPEAL with.  Once complete route back to PA POOL.    If this requires a response please respond to the pool ( P [facility] MEDICATION PRE-AUTH).      Thank you please advise patient.

## 2025-07-28 NOTE — TELEPHONE ENCOUNTER
The medication TRELEGY is APPROVED from 8/5/25 to 8/4/26.    Auth / Case # [id number]    Please notify the patient.    If this requires a response please respond to the pool ( P MHCX PSC MEDICATION PRE-AUTH).

## 2025-08-15 ENCOUNTER — HOSPITAL ENCOUNTER (OUTPATIENT)
Dept: ONCOLOGY | Age: 50
Setting detail: INFUSION SERIES
End: 2025-08-15

## (undated) DEVICE — MOUTHPIECE ENDOSCP L CTRL OPN AND SIDE PORTS DISP

## (undated) DEVICE — PROCEDURE KIT ENDOSCP CUST

## (undated) DEVICE — SOLUTION IV IRRIG WATER 500ML POUR BRL ST 2F7113

## (undated) DEVICE — SET VLV 3 PC AWS DISPOSABLE GRDIAN SCOPEVALET

## (undated) DEVICE — FORCEPS BX L240CM WRK CHN 2.8MM STD CAP W/ NDL MIC MESH

## (undated) DEVICE — BW-412T DISP COMBO CLEANING BRUSH: Brand: SINGLE USE COMBINATION CLEANING BRUSH